# Patient Record
Sex: MALE | Race: WHITE | Employment: FULL TIME | URBAN - METROPOLITAN AREA
[De-identification: names, ages, dates, MRNs, and addresses within clinical notes are randomized per-mention and may not be internally consistent; named-entity substitution may affect disease eponyms.]

---

## 2017-02-06 PROBLEM — J96.01 ACUTE RESPIRATORY FAILURE WITH HYPOXIA (HCC): Status: ACTIVE | Noted: 2017-02-06

## 2017-02-06 PROBLEM — J96.02 ACUTE RESPIRATORY FAILURE WITH HYPOXIA AND HYPERCAPNIA (HCC): Status: ACTIVE | Noted: 2017-02-06

## 2017-02-09 DIAGNOSIS — J44.9 CHRONIC OBSTRUCTIVE PULMONARY DISEASE, UNSPECIFIED COPD TYPE (HCC): Primary | ICD-10-CM

## 2017-02-21 ENCOUNTER — TELEPHONE (OUTPATIENT)
Dept: PULMONOLOGY | Age: 52
End: 2017-02-21

## 2017-02-22 ENCOUNTER — HOSPITAL ENCOUNTER (OUTPATIENT)
Dept: PULMONOLOGY | Age: 52
Discharge: HOME | End: 2017-02-22
Attending: NURSE PRACTITIONER | Admitting: NURSE PRACTITIONER

## 2017-02-22 DIAGNOSIS — J44.9 CHRONIC OBSTRUCTIVE PULMONARY DISEASE, UNSPECIFIED COPD TYPE (HCC): ICD-10-CM

## 2017-02-22 DIAGNOSIS — J44.9 CHRONIC OBSTRUCTIVE PULMONARY DISEASE (HCC): ICD-10-CM

## 2017-03-01 ENCOUNTER — HOSPITAL ENCOUNTER (OUTPATIENT)
Dept: PULMONOLOGY | Age: 52
Discharge: HOME | End: 2017-03-01
Attending: NURSE PRACTITIONER | Admitting: NURSE PRACTITIONER

## 2017-03-01 ENCOUNTER — OFFICE VISIT (OUTPATIENT)
Dept: PULMONOLOGY | Age: 52
End: 2017-03-01

## 2017-03-01 VITALS
SYSTOLIC BLOOD PRESSURE: 158 MMHG | TEMPERATURE: 97.9 F | BODY MASS INDEX: 39.99 KG/M2 | WEIGHT: 240 LBS | HEART RATE: 63 BPM | RESPIRATION RATE: 18 BRPM | DIASTOLIC BLOOD PRESSURE: 82 MMHG | HEIGHT: 65 IN | OXYGEN SATURATION: 90 %

## 2017-03-01 DIAGNOSIS — Z72.0 TOBACCO ABUSE: ICD-10-CM

## 2017-03-01 DIAGNOSIS — G47.10 HYPERSOMNIA: Primary | ICD-10-CM

## 2017-03-01 DIAGNOSIS — J44.9 COPD, SEVERE (HCC): ICD-10-CM

## 2017-03-01 DIAGNOSIS — J44.9 CHRONIC OBSTRUCTIVE PULMONARY DISEASE (HCC): ICD-10-CM

## 2017-03-01 DIAGNOSIS — Z23 NEED FOR INFLUENZA VACCINATION: ICD-10-CM

## 2017-03-01 DIAGNOSIS — R06.83 SNORING: ICD-10-CM

## 2017-03-01 PROCEDURE — 99214 OFFICE O/P EST MOD 30 MIN: CPT | Performed by: NURSE PRACTITIONER

## 2017-03-01 PROCEDURE — 90688 IIV4 VACCINE SPLT 0.5 ML IM: CPT | Performed by: NURSE PRACTITIONER

## 2017-03-01 PROCEDURE — 90471 IMMUNIZATION ADMIN: CPT | Performed by: NURSE PRACTITIONER

## 2017-03-01 RX ORDER — ALBUTEROL SULFATE 90 UG/1
2 AEROSOL, METERED RESPIRATORY (INHALATION) EVERY 6 HOURS PRN
Qty: 1 INHALER | Refills: 3 | Status: SHIPPED | OUTPATIENT
Start: 2017-03-01 | End: 2017-06-29 | Stop reason: SDUPTHER

## 2017-03-01 ASSESSMENT — SLEEP AND FATIGUE QUESTIONNAIRES
ESS TOTAL SCORE: 7
HOW LIKELY ARE YOU TO NOD OFF OR FALL ASLEEP WHILE SITTING QUIETLY AFTER LUNCH WITHOUT ALCOHOL: 0
HOW LIKELY ARE YOU TO NOD OFF OR FALL ASLEEP IN A CAR, WHILE STOPPED FOR A FEW MINUTES IN TRAFFIC: 0
HOW LIKELY ARE YOU TO NOD OFF OR FALL ASLEEP WHILE LYING DOWN TO REST IN THE AFTERNOON WHEN CIRCUMSTANCES PERMIT: 1
NECK CIRCUMFERENCE (INCHES): 18
HOW LIKELY ARE YOU TO NOD OFF OR FALL ASLEEP WHILE SITTING INACTIVE IN A PUBLIC PLACE: 2
HOW LIKELY ARE YOU TO NOD OFF OR FALL ASLEEP WHILE SITTING AND TALKING TO SOMEONE: 0
HOW LIKELY ARE YOU TO NOD OFF OR FALL ASLEEP WHILE SITTING AND READING: 2
HOW LIKELY ARE YOU TO NOD OFF OR FALL ASLEEP WHILE WATCHING TV: 2
HOW LIKELY ARE YOU TO NOD OFF OR FALL ASLEEP WHEN YOU ARE A PASSENGER IN A CAR FOR AN HOUR WITHOUT A BREAK: 0

## 2017-03-06 PROBLEM — J44.9 COPD (CHRONIC OBSTRUCTIVE PULMONARY DISEASE) (HCC): Status: ACTIVE | Noted: 2017-03-06

## 2017-03-06 LAB
DLCO: NORMAL ML/MMHG SEC
FEV1/FVC: NORMAL %
FEV1: NORMAL LITERS
FVC: NORMAL LITERS
TLC: NORMAL LITERS

## 2017-03-06 PROCEDURE — 94729 DIFFUSING CAPACITY: CPT | Performed by: INTERNAL MEDICINE

## 2017-03-06 PROCEDURE — 94010 BREATHING CAPACITY TEST: CPT | Performed by: INTERNAL MEDICINE

## 2017-03-22 ENCOUNTER — TELEPHONE (OUTPATIENT)
Dept: PULMONOLOGY | Age: 52
End: 2017-03-22

## 2017-03-22 DIAGNOSIS — J96.02 ACUTE RESPIRATORY FAILURE WITH HYPOXIA AND HYPERCAPNIA (HCC): ICD-10-CM

## 2017-03-22 DIAGNOSIS — J44.1 COPD EXACERBATION (HCC): Primary | ICD-10-CM

## 2017-03-22 DIAGNOSIS — J44.9 CHRONIC OBSTRUCTIVE PULMONARY DISEASE, UNSPECIFIED COPD TYPE (HCC): ICD-10-CM

## 2017-03-22 DIAGNOSIS — J96.01 ACUTE RESPIRATORY FAILURE WITH HYPOXIA AND HYPERCAPNIA (HCC): ICD-10-CM

## 2017-04-12 PROBLEM — J96.12 CHRONIC RESPIRATORY FAILURE WITH HYPERCAPNIA (HCC): Status: ACTIVE | Noted: 2017-04-12

## 2017-04-12 PROBLEM — G47.33 OSA (OBSTRUCTIVE SLEEP APNEA): Status: ACTIVE | Noted: 2017-04-12

## 2017-04-18 ENCOUNTER — TELEPHONE (OUTPATIENT)
Dept: PULMONOLOGY | Age: 52
End: 2017-04-18

## 2017-04-19 ENCOUNTER — OFFICE VISIT (OUTPATIENT)
Dept: PHARMACY | Facility: CLINIC | Age: 52
End: 2017-04-19

## 2017-04-19 ENCOUNTER — HOSPITAL ENCOUNTER (OUTPATIENT)
Dept: OTHER | Age: 52
Discharge: HOME | End: 2017-04-30
Attending: INTERNAL MEDICINE | Admitting: INTERNAL MEDICINE

## 2017-04-19 VITALS
HEART RATE: 63 BPM | SYSTOLIC BLOOD PRESSURE: 161 MMHG | WEIGHT: 243.4 LBS | OXYGEN SATURATION: 97 % | BODY MASS INDEX: 40.5 KG/M2 | DIASTOLIC BLOOD PRESSURE: 83 MMHG

## 2017-04-19 DIAGNOSIS — I50.33 ACUTE ON CHRONIC DIASTOLIC CONGESTIVE HEART FAILURE (HCC): Primary | ICD-10-CM

## 2017-04-20 ENCOUNTER — OFFICE VISIT (OUTPATIENT)
Dept: CARDIOLOGY CLINIC | Age: 52
End: 2017-04-20

## 2017-04-20 ENCOUNTER — HOSPITAL ENCOUNTER (OUTPATIENT)
Dept: OTHER | Age: 52
Discharge: HOME | End: 2017-04-21
Admitting: INTERNAL MEDICINE

## 2017-04-20 ENCOUNTER — HOSPITAL ENCOUNTER (OUTPATIENT)
Dept: OTHER | Age: 52
Discharge: HOME | End: 2017-04-22
Attending: INTERNAL MEDICINE | Admitting: INTERNAL MEDICINE

## 2017-04-20 VITALS
HEIGHT: 65 IN | BODY MASS INDEX: 40.59 KG/M2 | WEIGHT: 243.6 LBS | DIASTOLIC BLOOD PRESSURE: 80 MMHG | OXYGEN SATURATION: 94 % | HEART RATE: 83 BPM | SYSTOLIC BLOOD PRESSURE: 152 MMHG

## 2017-04-20 DIAGNOSIS — Z72.0 TOBACCO USE: ICD-10-CM

## 2017-04-20 DIAGNOSIS — J44.1 COPD EXACERBATION (HCC): ICD-10-CM

## 2017-04-20 DIAGNOSIS — G47.33 OSA (OBSTRUCTIVE SLEEP APNEA): ICD-10-CM

## 2017-04-20 DIAGNOSIS — J44.9 CHRONIC OBSTRUCTIVE PULMONARY DISEASE, UNSPECIFIED COPD TYPE (HCC): ICD-10-CM

## 2017-04-20 DIAGNOSIS — I10 ESSENTIAL HYPERTENSION: ICD-10-CM

## 2017-04-20 DIAGNOSIS — I27.20 PULMONARY HTN (HCC): ICD-10-CM

## 2017-04-20 DIAGNOSIS — J96.12 CHRONIC RESPIRATORY FAILURE WITH HYPERCAPNIA (HCC): ICD-10-CM

## 2017-04-20 DIAGNOSIS — I50.33 ACUTE ON CHRONIC DIASTOLIC CONGESTIVE HEART FAILURE (HCC): Primary | ICD-10-CM

## 2017-04-20 PROCEDURE — 99214 OFFICE O/P EST MOD 30 MIN: CPT | Performed by: NURSE PRACTITIONER

## 2017-04-20 RX ORDER — AMLODIPINE BESYLATE 5 MG/1
5 TABLET ORAL DAILY
Qty: 90 TABLET | Refills: 3 | Status: SHIPPED | OUTPATIENT
Start: 2017-04-20 | End: 2017-11-08 | Stop reason: DRUGHIGH

## 2017-04-20 RX ORDER — LISINOPRIL 40 MG/1
40 TABLET ORAL DAILY
Qty: 90 TABLET | Refills: 3 | Status: SHIPPED | OUTPATIENT
Start: 2017-04-20 | End: 2018-06-22 | Stop reason: SDUPTHER

## 2017-04-21 ENCOUNTER — TELEPHONE (OUTPATIENT)
Dept: PHARMACY | Facility: CLINIC | Age: 52
End: 2017-04-21

## 2017-04-21 ENCOUNTER — TELEPHONE (OUTPATIENT)
Dept: PULMONOLOGY | Age: 52
End: 2017-04-21

## 2017-04-26 ENCOUNTER — TELEPHONE (OUTPATIENT)
Dept: PHARMACY | Facility: CLINIC | Age: 52
End: 2017-04-26

## 2017-04-27 ENCOUNTER — TELEPHONE (OUTPATIENT)
Dept: PULMONOLOGY | Age: 52
End: 2017-04-27

## 2017-05-01 ENCOUNTER — TELEPHONE (OUTPATIENT)
Dept: PHARMACY | Facility: CLINIC | Age: 52
End: 2017-05-01

## 2017-05-02 ENCOUNTER — OFFICE VISIT (OUTPATIENT)
Dept: PULMONOLOGY | Age: 52
End: 2017-05-02

## 2017-05-02 VITALS
HEART RATE: 64 BPM | RESPIRATION RATE: 20 BRPM | HEIGHT: 65 IN | OXYGEN SATURATION: 93 % | BODY MASS INDEX: 40.79 KG/M2 | SYSTOLIC BLOOD PRESSURE: 144 MMHG | TEMPERATURE: 98.1 F | DIASTOLIC BLOOD PRESSURE: 82 MMHG | WEIGHT: 244.8 LBS

## 2017-05-02 DIAGNOSIS — Z72.0 TOBACCO ABUSE: ICD-10-CM

## 2017-05-02 DIAGNOSIS — G47.33 OSA (OBSTRUCTIVE SLEEP APNEA): ICD-10-CM

## 2017-05-02 DIAGNOSIS — J44.9 COPD, SEVERE (HCC): Primary | ICD-10-CM

## 2017-05-02 DIAGNOSIS — Z23 NEED FOR PNEUMOCOCCAL VACCINATION: ICD-10-CM

## 2017-05-02 DIAGNOSIS — J96.12 CHRONIC RESPIRATORY FAILURE WITH HYPERCAPNIA (HCC): ICD-10-CM

## 2017-05-02 PROCEDURE — 90732 PPSV23 VACC 2 YRS+ SUBQ/IM: CPT | Performed by: NURSE PRACTITIONER

## 2017-05-02 PROCEDURE — 90471 IMMUNIZATION ADMIN: CPT | Performed by: NURSE PRACTITIONER

## 2017-05-02 PROCEDURE — 99214 OFFICE O/P EST MOD 30 MIN: CPT | Performed by: NURSE PRACTITIONER

## 2017-05-11 DIAGNOSIS — I50.33 ACUTE ON CHRONIC DIASTOLIC CONGESTIVE HEART FAILURE (HCC): ICD-10-CM

## 2017-05-11 DIAGNOSIS — I10 ESSENTIAL HYPERTENSION: ICD-10-CM

## 2017-05-11 LAB
ANION GAP SERPL CALCULATED.3IONS-SCNC: 17 MMOL/L (ref 3–16)
BUN BLDV-MCNC: 17 MG/DL (ref 7–20)
CALCIUM SERPL-MCNC: 8.7 MG/DL (ref 8.3–10.6)
CHLORIDE BLD-SCNC: 98 MMOL/L (ref 99–110)
CO2: 27 MMOL/L (ref 21–32)
CREAT SERPL-MCNC: 0.6 MG/DL (ref 0.9–1.3)
GFR AFRICAN AMERICAN: >60
GFR NON-AFRICAN AMERICAN: >60
GLUCOSE BLD-MCNC: 205 MG/DL (ref 70–99)
POTASSIUM SERPL-SCNC: 4.1 MMOL/L (ref 3.5–5.1)
SODIUM BLD-SCNC: 142 MMOL/L (ref 136–145)

## 2017-05-15 ENCOUNTER — TELEPHONE (OUTPATIENT)
Dept: PHARMACY | Facility: CLINIC | Age: 52
End: 2017-05-15

## 2017-05-15 ENCOUNTER — OFFICE VISIT (OUTPATIENT)
Dept: CARDIOLOGY CLINIC | Age: 52
End: 2017-05-15

## 2017-05-15 VITALS
SYSTOLIC BLOOD PRESSURE: 146 MMHG | DIASTOLIC BLOOD PRESSURE: 66 MMHG | WEIGHT: 242 LBS | BODY MASS INDEX: 41.32 KG/M2 | HEART RATE: 66 BPM | OXYGEN SATURATION: 94 % | HEIGHT: 64 IN

## 2017-05-15 DIAGNOSIS — I50.32 CHRONIC DIASTOLIC HF (HEART FAILURE) (HCC): Primary | ICD-10-CM

## 2017-05-15 DIAGNOSIS — G47.33 OSA (OBSTRUCTIVE SLEEP APNEA): ICD-10-CM

## 2017-05-15 DIAGNOSIS — J96.12 CHRONIC RESPIRATORY FAILURE WITH HYPERCAPNIA (HCC): ICD-10-CM

## 2017-05-15 DIAGNOSIS — I27.20 PULMONARY HTN (HCC): ICD-10-CM

## 2017-05-15 DIAGNOSIS — Z72.0 TOBACCO USE: ICD-10-CM

## 2017-05-15 DIAGNOSIS — I10 ESSENTIAL HYPERTENSION: ICD-10-CM

## 2017-05-15 PROCEDURE — 99213 OFFICE O/P EST LOW 20 MIN: CPT | Performed by: NURSE PRACTITIONER

## 2017-05-15 RX ORDER — SPIRONOLACTONE 25 MG/1
12.5 TABLET ORAL DAILY
Qty: 15 TABLET | Refills: 3 | Status: SHIPPED | OUTPATIENT
Start: 2017-05-15 | End: 2017-11-30 | Stop reason: SDUPTHER

## 2017-06-29 ENCOUNTER — OFFICE VISIT (OUTPATIENT)
Dept: PULMONOLOGY | Age: 52
End: 2017-06-29

## 2017-06-29 VITALS
RESPIRATION RATE: 16 BRPM | OXYGEN SATURATION: 91 % | HEART RATE: 60 BPM | WEIGHT: 243 LBS | SYSTOLIC BLOOD PRESSURE: 176 MMHG | HEIGHT: 64 IN | DIASTOLIC BLOOD PRESSURE: 82 MMHG | TEMPERATURE: 98.3 F | BODY MASS INDEX: 41.48 KG/M2

## 2017-06-29 DIAGNOSIS — G47.10 HYPERSOMNIA: ICD-10-CM

## 2017-06-29 DIAGNOSIS — J44.9 COPD, SEVERE (HCC): ICD-10-CM

## 2017-06-29 DIAGNOSIS — J31.0 CHRONIC RHINITIS: ICD-10-CM

## 2017-06-29 DIAGNOSIS — Z72.0 TOBACCO ABUSE: ICD-10-CM

## 2017-06-29 DIAGNOSIS — G47.33 OSA TREATED WITH BIPAP: Primary | ICD-10-CM

## 2017-06-29 PROCEDURE — 99214 OFFICE O/P EST MOD 30 MIN: CPT | Performed by: INTERNAL MEDICINE

## 2017-06-29 RX ORDER — ALBUTEROL SULFATE 90 UG/1
2 AEROSOL, METERED RESPIRATORY (INHALATION) EVERY 6 HOURS PRN
Qty: 1 INHALER | Refills: 3 | Status: SHIPPED | OUTPATIENT
Start: 2017-06-29 | End: 2019-02-11 | Stop reason: SDUPTHER

## 2017-06-29 RX ORDER — FLUTICASONE PROPIONATE 50 MCG
2 SPRAY, SUSPENSION (ML) NASAL DAILY
Qty: 1 BOTTLE | Refills: 3 | Status: SHIPPED | OUTPATIENT
Start: 2017-06-29 | End: 2018-11-09

## 2017-06-29 RX ORDER — DILTIAZEM HYDROCHLORIDE 60 MG/1
2 TABLET, FILM COATED ORAL 2 TIMES DAILY
Qty: 1 INHALER | Refills: 5 | Status: CANCELLED | OUTPATIENT
Start: 2017-06-29

## 2017-06-29 RX ORDER — FLUTICASONE FUROATE AND VILANTEROL 100; 25 UG/1; UG/1
1 POWDER RESPIRATORY (INHALATION) DAILY
Qty: 1 EACH | Refills: 0 | COMMUNITY
Start: 2017-06-29 | End: 2017-11-08 | Stop reason: SDUPTHER

## 2017-06-29 RX ORDER — FLUTICASONE FUROATE AND VILANTEROL 100; 25 UG/1; UG/1
1 POWDER RESPIRATORY (INHALATION) DAILY
Qty: 1 EACH | Refills: 5 | Status: SHIPPED | OUTPATIENT
Start: 2017-06-29 | End: 2017-11-08

## 2017-06-29 ASSESSMENT — SLEEP AND FATIGUE QUESTIONNAIRES
HOW LIKELY ARE YOU TO NOD OFF OR FALL ASLEEP WHILE SITTING QUIETLY AFTER LUNCH WITHOUT ALCOHOL: 0
HOW LIKELY ARE YOU TO NOD OFF OR FALL ASLEEP WHILE WATCHING TV: 2
NECK CIRCUMFERENCE (INCHES): 18.5
HOW LIKELY ARE YOU TO NOD OFF OR FALL ASLEEP WHILE SITTING INACTIVE IN A PUBLIC PLACE: 1
HOW LIKELY ARE YOU TO NOD OFF OR FALL ASLEEP WHILE SITTING AND READING: 2
HOW LIKELY ARE YOU TO NOD OFF OR FALL ASLEEP WHILE LYING DOWN TO REST IN THE AFTERNOON WHEN CIRCUMSTANCES PERMIT: 3
ESS TOTAL SCORE: 10
HOW LIKELY ARE YOU TO NOD OFF OR FALL ASLEEP WHILE SITTING AND TALKING TO SOMEONE: 1
HOW LIKELY ARE YOU TO NOD OFF OR FALL ASLEEP WHEN YOU ARE A PASSENGER IN A CAR FOR AN HOUR WITHOUT A BREAK: 1
HOW LIKELY ARE YOU TO NOD OFF OR FALL ASLEEP IN A CAR, WHILE STOPPED FOR A FEW MINUTES IN TRAFFIC: 0

## 2017-07-28 ENCOUNTER — SCHEDULED TELEPHONE ENCOUNTER (OUTPATIENT)
Dept: PHARMACY | Facility: CLINIC | Age: 52
End: 2017-07-28

## 2017-11-08 ENCOUNTER — TELEPHONE (OUTPATIENT)
Dept: CARDIOLOGY CLINIC | Age: 52
End: 2017-11-08

## 2017-11-08 ENCOUNTER — OFFICE VISIT (OUTPATIENT)
Dept: PULMONOLOGY | Age: 52
End: 2017-11-08

## 2017-11-08 VITALS
BODY MASS INDEX: 39.82 KG/M2 | DIASTOLIC BLOOD PRESSURE: 90 MMHG | WEIGHT: 239 LBS | SYSTOLIC BLOOD PRESSURE: 186 MMHG | OXYGEN SATURATION: 93 % | HEIGHT: 65 IN | HEART RATE: 56 BPM | TEMPERATURE: 97.9 F | RESPIRATION RATE: 16 BRPM

## 2017-11-08 DIAGNOSIS — J31.0 OTHER CHRONIC RHINITIS: ICD-10-CM

## 2017-11-08 DIAGNOSIS — G47.10 HYPERSOMNIA: ICD-10-CM

## 2017-11-08 DIAGNOSIS — Z72.0 TOBACCO ABUSE: ICD-10-CM

## 2017-11-08 DIAGNOSIS — I10 ESSENTIAL HYPERTENSION: Primary | ICD-10-CM

## 2017-11-08 DIAGNOSIS — J44.9 COPD, SEVERE (HCC): ICD-10-CM

## 2017-11-08 DIAGNOSIS — G47.33 OSA TREATED WITH BIPAP: ICD-10-CM

## 2017-11-08 PROCEDURE — 99214 OFFICE O/P EST MOD 30 MIN: CPT | Performed by: INTERNAL MEDICINE

## 2017-11-08 RX ORDER — BUDESONIDE AND FORMOTEROL FUMARATE DIHYDRATE 160; 4.5 UG/1; UG/1
2 AEROSOL RESPIRATORY (INHALATION) 2 TIMES DAILY
Qty: 1 INHALER | Refills: 6 | Status: SHIPPED | OUTPATIENT
Start: 2017-11-08 | End: 2018-06-28 | Stop reason: SDUPTHER

## 2017-11-08 RX ORDER — AMLODIPINE BESYLATE 5 MG/1
10 TABLET ORAL DAILY
Qty: 90 TABLET | Refills: 3 | Status: SHIPPED | OUTPATIENT
Start: 2017-11-08 | End: 2018-04-19 | Stop reason: SDUPTHER

## 2017-11-08 ASSESSMENT — SLEEP AND FATIGUE QUESTIONNAIRES
HOW LIKELY ARE YOU TO NOD OFF OR FALL ASLEEP WHILE SITTING AND READING: 1
HOW LIKELY ARE YOU TO NOD OFF OR FALL ASLEEP WHEN YOU ARE A PASSENGER IN A CAR FOR AN HOUR WITHOUT A BREAK: 0
HOW LIKELY ARE YOU TO NOD OFF OR FALL ASLEEP WHILE LYING DOWN TO REST IN THE AFTERNOON WHEN CIRCUMSTANCES PERMIT: 1
HOW LIKELY ARE YOU TO NOD OFF OR FALL ASLEEP WHILE SITTING AND TALKING TO SOMEONE: 0
HOW LIKELY ARE YOU TO NOD OFF OR FALL ASLEEP WHILE WATCHING TV: 2
ESS TOTAL SCORE: 6
NECK CIRCUMFERENCE (INCHES): 18
HOW LIKELY ARE YOU TO NOD OFF OR FALL ASLEEP WHILE SITTING QUIETLY AFTER LUNCH WITHOUT ALCOHOL: 1
HOW LIKELY ARE YOU TO NOD OFF OR FALL ASLEEP WHILE SITTING INACTIVE IN A PUBLIC PLACE: 1
HOW LIKELY ARE YOU TO NOD OFF OR FALL ASLEEP IN A CAR, WHILE STOPPED FOR A FEW MINUTES IN TRAFFIC: 0

## 2017-11-08 NOTE — PROGRESS NOTES
Noted. I will start a phone encounter on this and have an appt scheduled for pt.
Data Reviewed:   CBC and Renal reviewed  Last CBC  Lab Results   Component Value Date    WBC 11.3 04/17/2017    RBC 6.08 04/17/2017    HGB 17.9 04/17/2017    MCV 91.8 04/17/2017     04/17/2017     Last Renal  Lab Results   Component Value Date     05/11/2017    K 4.1 05/11/2017    CL 98 05/11/2017    CO2 27 05/11/2017    CO2 33 04/17/2017    CO2 40 04/16/2017    BUN 17 05/11/2017    CREATININE 0.6 05/11/2017    GLUCOSE 205 05/11/2017    CALCIUM 8.7 05/11/2017       Last ABG  POC Blood Gas: No results found for: POCPH, POCPCO2, POCPO2, POCHCO3, NBEA, [id number]  No results for input(s): PH, PCO2, PO2, HCO3, BE, O2SAT in the last 72 hours. Assessment:     · Severe sleep apnea with AHI of 135 with nocturnal hypoxemia down to 58%  · Hypersomnia  · Severe COPD  · Chronic rhinitis  · Tobacco abuse, start lung cancer screening at the age of 52  · Obesity Body mass index is 39.77 kg/m². Plan:      1. Essential hypertension  His blood pressure is 200/88 today and was ~ 230 at [facility]. He is taking all his medications. At this point . This is controlled by Dr. Tiffanie Palmer but he missed his last appointment. I have advised him to double norvasc to 10 mg daily and told him to stop by Dr. Alicea Fear office on his way out to get f/u visit. I advised of risk of CVA, CKD at this level of blood pressure. Will defer to Dr Tiffanie Palmer if further blood pressure work up is needed. - amLODIPine (NORVASC) 5 MG tablet; Take 2 tablets by mouth daily  Dispense: 90 tablet; Refill: 3    2. COPD, severe ([address]  Breo and Incruse . Will change back to Symbicort since be believes this works better. Continue Incruse     3. SILVANA treated with BiPAP  4. Hypersomnia  No download today. He is using daily and hypersomnolence  Is controlled. 5. Tobacco abuse  He is still smoking and is not ready to quit at this point. 6. Other chronic rhinitis  Continue Flonase. Add Sinus rinse at this point.

## 2017-11-08 NOTE — Clinical Note
His blood pressure is 200/88 today and ~ 230 at [facility]. He sees you for blood pressure but missed last appointment. Taking medications. Increased Norvasc from 5 to 10 and I told him to stop by your office on the way out to make appointment. See full note attached.   Thanks, Todd Mora

## 2017-11-29 PROBLEM — J96.01 ACUTE RESPIRATORY FAILURE WITH HYPOXIA AND HYPERCAPNIA (HCC): Status: RESOLVED | Noted: 2017-02-06 | Resolved: 2017-11-29

## 2017-11-29 PROBLEM — I27.20 PULMONARY HTN (HCC): Status: ACTIVE | Noted: 2017-11-29

## 2017-11-29 PROBLEM — J96.02 ACUTE RESPIRATORY FAILURE WITH HYPOXIA AND HYPERCAPNIA (HCC): Status: RESOLVED | Noted: 2017-02-06 | Resolved: 2017-11-29

## 2017-11-30 PROBLEM — Z87.09 HISTORY OF PNEUMOTHORAX: Status: ACTIVE | Noted: 2017-11-30

## 2017-11-30 PROBLEM — I10 ESSENTIAL HYPERTENSION: Status: ACTIVE | Noted: 2017-11-30

## 2017-11-30 PROBLEM — Z87.442 HX OF RENAL CALCULI: Status: ACTIVE | Noted: 2017-11-30

## 2017-11-30 PROBLEM — M54.32 SCIATICA OF LEFT SIDE: Status: ACTIVE | Noted: 2017-11-30

## 2017-12-07 ENCOUNTER — OFFICE VISIT (OUTPATIENT)
Dept: FAMILY MEDICINE CLINIC | Age: 52
End: 2017-12-07

## 2017-12-07 VITALS
SYSTOLIC BLOOD PRESSURE: 148 MMHG | DIASTOLIC BLOOD PRESSURE: 72 MMHG | OXYGEN SATURATION: 92 % | HEART RATE: 63 BPM | TEMPERATURE: 98.5 F | BODY MASS INDEX: 39.78 KG/M2 | WEIGHT: 233 LBS | RESPIRATION RATE: 18 BRPM | HEIGHT: 64 IN

## 2017-12-07 DIAGNOSIS — E27.8 ADRENAL NODULE (HCC): ICD-10-CM

## 2017-12-07 DIAGNOSIS — Z72.0 TOBACCO USE: ICD-10-CM

## 2017-12-07 DIAGNOSIS — I27.20 PULMONARY HTN (HCC): ICD-10-CM

## 2017-12-07 DIAGNOSIS — G56.00 CARPAL TUNNEL SYNDROME, UNSPECIFIED LATERALITY: ICD-10-CM

## 2017-12-07 DIAGNOSIS — I10 ESSENTIAL HYPERTENSION: Primary | ICD-10-CM

## 2017-12-07 DIAGNOSIS — Z12.11 SCREENING FOR MALIGNANT NEOPLASM OF COLON: ICD-10-CM

## 2017-12-07 DIAGNOSIS — Z11.59 ENCOUNTER FOR HEPATITIS C SCREENING TEST FOR LOW RISK PATIENT: ICD-10-CM

## 2017-12-07 DIAGNOSIS — G47.33 OSA (OBSTRUCTIVE SLEEP APNEA): ICD-10-CM

## 2017-12-07 DIAGNOSIS — R73.9 ELEVATED BLOOD SUGAR: ICD-10-CM

## 2017-12-07 DIAGNOSIS — Z23 NEED FOR INFLUENZA VACCINATION: ICD-10-CM

## 2017-12-07 PROBLEM — E27.9 ADRENAL NODULE (HCC): Status: ACTIVE | Noted: 2017-12-07

## 2017-12-07 LAB
ANION GAP SERPL CALCULATED.3IONS-SCNC: 11 MMOL/L (ref 3–16)
BASOPHILS ABSOLUTE: 0 K/UL (ref 0–0.2)
BASOPHILS RELATIVE PERCENT: 0.5 %
BUN BLDV-MCNC: 15 MG/DL (ref 7–20)
CALCIUM SERPL-MCNC: 9.4 MG/DL (ref 8.3–10.6)
CHLORIDE BLD-SCNC: 99 MMOL/L (ref 99–110)
CHOLESTEROL, TOTAL: 160 MG/DL (ref 0–199)
CO2: 31 MMOL/L (ref 21–32)
CREAT SERPL-MCNC: 0.6 MG/DL (ref 0.9–1.3)
EOSINOPHILS ABSOLUTE: 0.2 K/UL (ref 0–0.6)
EOSINOPHILS RELATIVE PERCENT: 2.3 %
GFR AFRICAN AMERICAN: >60
GFR NON-AFRICAN AMERICAN: >60
GLUCOSE BLD-MCNC: 133 MG/DL (ref 70–99)
HBA1C MFR BLD: 6.3 %
HCT VFR BLD CALC: 47.9 % (ref 40.5–52.5)
HDLC SERPL-MCNC: 35 MG/DL (ref 40–60)
HEMOGLOBIN: 16.2 G/DL (ref 13.5–17.5)
HEPATITIS C ANTIBODY INTERPRETATION: NORMAL
LDL CHOLESTEROL CALCULATED: 105 MG/DL
LYMPHOCYTES ABSOLUTE: 2.1 K/UL (ref 1–5.1)
LYMPHOCYTES RELATIVE PERCENT: 21.5 %
MCH RBC QN AUTO: 32.4 PG (ref 26–34)
MCHC RBC AUTO-ENTMCNC: 33.8 G/DL (ref 31–36)
MCV RBC AUTO: 95.8 FL (ref 80–100)
MONOCYTES ABSOLUTE: 0.9 K/UL (ref 0–1.3)
MONOCYTES RELATIVE PERCENT: 8.9 %
NEUTROPHILS ABSOLUTE: 6.5 K/UL (ref 1.7–7.7)
NEUTROPHILS RELATIVE PERCENT: 66.8 %
PDW BLD-RTO: 13.5 % (ref 12.4–15.4)
PLATELET # BLD: 217 K/UL (ref 135–450)
PMV BLD AUTO: 9.4 FL (ref 5–10.5)
POTASSIUM SERPL-SCNC: 4.1 MMOL/L (ref 3.5–5.1)
RBC # BLD: 5 M/UL (ref 4.2–5.9)
SODIUM BLD-SCNC: 141 MMOL/L (ref 136–145)
TRIGL SERPL-MCNC: 100 MG/DL (ref 0–150)
TSH REFLEX FT4: 1.05 UIU/ML (ref 0.27–4.2)
VLDLC SERPL CALC-MCNC: 20 MG/DL
WBC # BLD: 9.7 K/UL (ref 4–11)

## 2017-12-07 PROCEDURE — 99203 OFFICE O/P NEW LOW 30 MIN: CPT | Performed by: INTERNAL MEDICINE

## 2017-12-07 PROCEDURE — 83036 HEMOGLOBIN GLYCOSYLATED A1C: CPT | Performed by: INTERNAL MEDICINE

## 2017-12-07 PROCEDURE — 90471 IMMUNIZATION ADMIN: CPT | Performed by: INTERNAL MEDICINE

## 2017-12-07 PROCEDURE — 36415 COLL VENOUS BLD VENIPUNCTURE: CPT | Performed by: INTERNAL MEDICINE

## 2017-12-07 PROCEDURE — 90654 INFLUENZA, TRIV, 18-64 YRS, ID, PF, MICRO INJ, 0.1ML (FLUZONE TRIV, PF, ID): CPT | Performed by: INTERNAL MEDICINE

## 2017-12-07 NOTE — PATIENT INSTRUCTIONS
Patient Education        Carpal Tunnel Syndrome: Exercises  Your Care Instructions  Here are some examples of typical rehabilitation exercises for your condition. Start each exercise slowly. Ease off the exercise if you start to have pain. Your doctor or your physical or occupational therapist will tell you when you can start these exercises and which ones will work best for you. Warm-up stretches  When you no longer have pain or numbness, you can do exercises to help prevent carpal tunnel syndrome from coming back. Do not do any stretch or movement that is uncomfortable or painful. 1. Rotate your wrist up, down, and from side to side. Repeat 4 times. 2. Stretch your fingers far apart. Relax them, and then stretch them again. Repeat 4 times. 3. Stretch your thumb by pulling it back gently, holding it, and then releasing it. Repeat 4 times. How to do the exercises  Prayer stretch    1. Start with your palms together in front of your chest just below your chin. 2. Slowly lower your hands toward your waistline, keeping your hands close to your stomach and your palms together until you feel a mild to moderate stretch under your forearms. 3. Hold for at least 15 to 30 seconds. Repeat 2 to 4 times. Wrist flexor stretch    1. Extend your arm in front of you with your palm up. 2. Bend your wrist, pointing your hand toward the floor. 3. With your other hand, gently bend your wrist farther until you feel a mild to moderate stretch in your forearm. 4. Hold for at least 15 to 30 seconds. Repeat 2 to 4 times. Wrist extensor stretch    1. Repeat steps 1 through 4 of the stretch above, but begin with your extended hand palm down. Follow-up care is a key part of your treatment and safety. Be sure to make and go to all appointments, and call your doctor if you are having problems. It's also a good idea to know your test results and keep a list of the medicines you take. Where can you learn more?   Go to [email or web address] org and sign in to your [employer] account. Enter R540 in the [employer] box to learn more about \"Carpal Tunnel Syndrome: Exercises. \"     If you do not have an account, please click on the \"Sign Up Now\" link. Current as of: March 21, 2017  Content Version: 11.4  © 2730-4535 [employer]. Care instructions adapted under license by [facility] ([facility]). If you have questions about a medical condition or this instruction, always ask your healthcare professional. [place] [address] any warranty or liability for your use of this information. Patient Education        Carpal Tunnel Syndrome: Care Instructions  Your Care Instructions    Carpal tunnel syndrome is a nerve problem. It can cause tingling, numbness, weakness, or pain in the fingers, thumb, and hand. The median nerve and several tough tissues called tendons run through a space in the wrist called the carpal tunnel. The repeated hand motions used in work and some hobbies and sports can put pressure on the nerve. Pregnancy and several conditions, including diabetes, arthritis, and an underactive thyroid, also can cause carpal tunnel syndrome. You may be able to limit an activity or do it differently to reduce your symptoms. You also can take other steps to feel better. If your symptoms are mild, 1 to 2 weeks of home treatment are likely to ease your pain. Surgery is needed only if other treatments do not work. Follow-up care is a key part of your treatment and safety. Be sure to make and go to all appointments, and call your doctor if you are having problems. It's also a good idea to know your test results and keep a list of the medicines you take. How can you care for yourself at home? · If possible, stop or reduce the activity that causes your symptoms. If you cannot stop the activity, take frequent breaks to rest and stretch or change hand positions to do a task.  Try switching hands, such as when using a computer mouse. · Try to avoid bending or twisting your wrists. · Ask your doctor if you can take an over-the-counter pain medicine, such as acetaminophen (Tylenol), ibuprofen (Advil, Motrin), or naproxen (Aleve). Be safe with medicines. Read and follow all instructions on the label. · If your doctor prescribes corticosteroid medicine to help reduce pain and swelling, take it exactly as prescribed. Call your doctor if you think you are having a problem with your medicine. · Put ice or a cold pack on your wrist for 10 to 20 minutes at a time to ease pain. Put a thin cloth between the ice and your skin. · If your doctor or your physical or occupational therapist tells you to wear a wrist splint, wear it as directed to keep your wrist in a neutral position. This also eases pressure on your median nerve. · Ask your doctor whether you should have physical or occupational therapy to learn how to do tasks differently. · Try a yoga class to stretch your muscles and build strength in your hands and wrists. Yoga has been shown to ease carpal tunnel symptoms. To prevent carpal tunnel  · When working at a computer, keep your hands and wrists in line with your forearms. Hold your elbows close to your sides. Take a break every 10 to 15 minutes. · Try these exercises:  ¨ Warm up: Rotate your wrist up, down, and from side to side. Repeat this 4 times. Stretch your fingers far apart, relax them, then stretch them again. Repeat 4 times. Stretch your thumb by pulling it back gently, holding it, and then releasing it. Repeat 4 times. ¨ Prayer stretch: Start with your palms together in front of your chest just below your chin. Slowly lower your hands toward your waistline while keeping your hands close to your stomach and your palms together until you feel a mild to moderate stretch under your forearms. Hold for 10 to 20 seconds. Repeat 4 times.   ¨ Wrist flexor stretch: Hold your arm in front of you with your use of this information.            MUKUND [phone] financial assistance

## 2017-12-07 NOTE — PROGRESS NOTES
HPI: Naman Wood presents to establish care with chronic illnesses of tobacco use, prediabetes, hypertension, carpal tunnel, COPD, and obstructive sleep apnea. History hypertension over last 5-6 years. Echocardiogram April 2017 with moderate left ventricular hypertrophy ejection fraction of 55% without wall abnormality. Right ventricle moderately enlarged and positive pulmonary hypertension. He follows with pulmonary and cardiology. Blood pressures continue to be elevated. Stable adrenal nodule on film. Cardiology appointment next month. Denies any ischemic cardiomyopathy or cardiac catheterization. Prediabetes. Positive tobacco. Unknown lipids. Denies any myocardial infarction or CVA. No claudication. Pressures as an outpatient are in the 150-190 range. Has not filled spironolactone yet. Is on 10 mg amlodipine 25 twice a day metoprolol lisinopril 40 and Lasix 20. Last blood count with elevated hemoglobin    Positive tobacco. One to one and a half pack a day currently. Recurrent bronchitis. Uses nasal spray. Unable to afford Symbicort. Breathing normal today. Shortness of breath with exertion. No hemoptysis. Has been on prednisone in the past rarely. History of traumatic new pneumohemothorax. Compliant with CPAP. Poor dentition. Unable to afford dentist.    History of borderline sugars. Uses no medication. Concern ×10 years. Negative retinopathy 2012. Denies any nephropathy. Feels like he has dysesthesias in his feet on occasion. Carpal tunnel syndrome. Left hand symptomatic. Right hand surgery successful. CTS left h      PMH   right CTS   Pneumohemothorax post rib fracture   Hypoxemia        SH make boxes. + tobacco. No alcohol. No drugs. No STD concerns. . No exercise outside of work.  Wife has breast cancer    FH: 6 brothers 1 sisters   + DM, Heart disease,father lymph node cancer    - colon, prostate cancer    Review of systems: Sleep apnea, chronic sinusitis, ear pain, poor dentition, COPD intervention and exercises given. He did BMI. Discussed decreasing calories. States he is lost 10 pounds. Dental disease. Recommended and prevention. Consider oral surgery. Follow-up one week for blood pressure check and potassium      Diagnosis and treatment discussed.   Possible side effects of medication reviewed  Patients questions answered  Follow up understood  Pt aware if they are not contacted about any test results , this does not mean they are normal.  They should call

## 2017-12-07 NOTE — PROGRESS NOTES
Vaccine Information Sheet, \"Influenza - Inactivated\"  given to Valerie Lee, or parent/legal guardian of  Valerie Lee and verbalized understanding. Patient responses:    Have you ever had a reaction to a flu vaccine? No  Are you able to eat eggs without adverse effects? Yes  Do you have any current illness? No  Have you ever had Guillian Blanchard Syndrome? No    Flu vaccine given per order. Please see immunization tab.     Given in right deltoid-BR

## 2017-12-07 NOTE — LETTER
[address] [facility] [address]  Phone: [phone]  Fax: [phone]    Claudia Romero MD        December 7, 2017    [address]  [facility] [address]      Dear Denisha Arzate:    Michelle Lyles is a FIT test stool card we would like you to complete. This test checks for blood in the stool and avoids colonoscopy's if negative. Please complete and bring with you to your appointment next week. Instructions are on the inside of the envelope. Please make sure name date of birth and day that you took the specimen is written on test label and envelope. If you have any questions or concerns, please don't hesitate to call.     Sincerely,        Claudia Romero MD

## 2017-12-13 PROBLEM — R73.03 PREDIABETES: Status: ACTIVE | Noted: 2017-12-13

## 2017-12-14 ENCOUNTER — OFFICE VISIT (OUTPATIENT)
Dept: FAMILY MEDICINE CLINIC | Age: 52
End: 2017-12-14

## 2017-12-14 VITALS
HEART RATE: 58 BPM | OXYGEN SATURATION: 92 % | RESPIRATION RATE: 16 BRPM | SYSTOLIC BLOOD PRESSURE: 160 MMHG | DIASTOLIC BLOOD PRESSURE: 90 MMHG | HEIGHT: 64 IN

## 2017-12-14 DIAGNOSIS — I10 ESSENTIAL HYPERTENSION: Primary | ICD-10-CM

## 2017-12-14 DIAGNOSIS — R19.5 OCCULT BLOOD POSITIVE STOOL: Primary | ICD-10-CM

## 2017-12-14 DIAGNOSIS — Z12.11 SCREENING FOR MALIGNANT NEOPLASM OF COLON: ICD-10-CM

## 2017-12-14 LAB
CONTROL: ABNORMAL
HEMOCCULT STL QL: POSITIVE

## 2017-12-14 PROCEDURE — 82274 ASSAY TEST FOR BLOOD FECAL: CPT | Performed by: INTERNAL MEDICINE

## 2017-12-14 PROCEDURE — 99212 OFFICE O/P EST SF 10 MIN: CPT | Performed by: INTERNAL MEDICINE

## 2017-12-14 NOTE — PROGRESS NOTES
HPI: Casey Mccarthy presents for blood pressure check however he has not changed his medication.     History hypertension over last 5-6 years. Echocardiogram April 2017 with moderate left ventricular hypertrophy ejection fraction of 55% without wall abnormality. Right ventricle moderately enlarged and positive pulmonary hypertension. He follows with pulmonary and cardiology. Blood pressures continue to be elevated. Stable adrenal nodule on film. Cardiology appointment next month. Denies any ischemic cardiomyopathy or cardiac catheterization. Prediabetes. Positive tobacco. Unknown lipids. Denies any myocardial infarction or CVA. No claudication. Pressures as an outpatient are in the 150-190 range. Has not filled spironolactone yet. Is on 10 mg amlodipine 25 twice a day metoprolol lisinopril 40 and Lasix 20. Last blood count with elevated hemoglobin     Positive tobacco. One to one and a half pack a day currently. Recurrent bronchitis. Uses nasal spray. Unable to afford Symbicort. Breathing normal today. Shortness of breath with exertion. No hemoptysis. Has been on prednisone in the past rarely. History of traumatic new pneumohemothorax. Compliant with CPAP.     Poor dentition. Unable to afford dentist.     History of borderline sugars. Uses no medication. Concern ×10 years. Negative retinopathy 2012. Denies any nephropathy. Feels like he has dysesthesias in his feet on occasion.     Carpal tunnel syndrome. Left hand symptomatic. Right hand surgery successful.     CTS left h        PMH   right CTS   Pneumohemothorax post rib fracture   Hypoxemia         SH make boxes. + tobacco. No alcohol. No drugs. No STD concerns. . No exercise outside of work. Wife has breast cancer     FH: 6 brothers 1 sisters   + DM, Heart disease,father lymph node cancer    - colon, prostate cancer     Review of systems: Sleep apnea, chronic sinusitis, ear pain, poor dentition, COPD with baseline dyspnea, no chest pain with exertion. arthroscopic         Social History     Social History    Marital status:      Spouse name: N/A    Number of children: N/A    Years of education: N/A     Occupational History    Not on file.      Social History Main Topics    Smoking status: Current Every Day Smoker     Packs/day: 1.00     Years: 43.00     Types: Cigarettes     Start date: 1/1/1974    Smokeless tobacco: Never Used    Alcohol use No    Drug use: No    Sexual activity: Yes     Partners: Female     Other Topics Concern    Not on file     Social History Narrative    No narrative on file       Family History   Problem Relation Age of Onset    Diabetes Mother     Cancer Mother     Cancer Father     No Known Problems Sister     Diabetes Brother     No Known Problems Brother     No Known Problems Brother     No Known Problems Brother     No Known Problems Brother     No Known Problems Brother            Review of Systems      Objective     BP (!) 150/70   Pulse 58   Resp 16   Ht 5' 4\" (1.626 m)   SpO2 92% Comment: RA    @LASTSAO2(3)@    Wt Readings from Last 3 Encounters:   12/07/17 233 lb (105.7 kg)   11/30/17 233 lb (105.7 kg)   11/08/17 239 lb (108.4 kg)       Physical Exam     NAD alert and cooperative      Chemistry        Component Value Date/Time     12/07/2017 0839    K 4.1 12/07/2017 0839    CL 99 12/07/2017 0839    CO2 31 12/07/2017 0839    BUN 15 12/07/2017 0839    CREATININE 0.6 (L) 12/07/2017 0839        Component Value Date/Time    CALCIUM 9.4 12/07/2017 0839    ALKPHOS 91 04/11/2017 1354    AST 15 04/11/2017 1354    ALT 20 04/11/2017 1354    BILITOT 0.5 04/11/2017 1354            Lab Results   Component Value Date    WBC 9.7 12/07/2017    HGB 16.2 12/07/2017    HCT 47.9 12/07/2017    MCV 95.8 12/07/2017     12/07/2017     Lab Results   Component Value Date    LABA1C 6.3 12/07/2017     No results found for: EAG  Lab Results   Component Value Date    LABA1C 6.3 12/07/2017     No components found for: CHLPL  Lab Results   Component Value Date    TRIG 100 12/07/2017     Lab Results   Component Value Date    HDL 35 (L) 12/07/2017     Lab Results   Component Value Date    LDLCALC 105 (H) 12/07/2017     Lab Results   Component Value Date    LABVLDL 20 12/07/2017       Old labs and records reviewed or requested  Discussed past lab and studies with patient       Blood pressure still elevated. Discussed laboratories and getting new medicine. We'll give Coreg 12.5 twice a day if this is not improved. Discussed coupons on the Internet and using Atrovent from [employer]. Also let him know that samples were available at the pulmonology office    Follow-up blood pressure on medicine once changes made    Diagnosis and treatment discussed.   Possible side effects of medication reviewed  Patients questions answered  Follow up understood  Pt aware if they are not contacted about any test results , this does not mean they are normal.  They should call

## 2018-01-02 ENCOUNTER — TELEPHONE (OUTPATIENT)
Dept: FAMILY MEDICINE CLINIC | Age: 53
End: 2018-01-02

## 2018-01-02 NOTE — TELEPHONE ENCOUNTER
----- Message from Nicki Pichardo MD sent at 12/21/2017  8:06 AM EST -----  Please check how BP is on aldactone, and if not , 140/90 will switch to below with follow up BP 2 weeks. If BP good get potassium draw and Cardiology apt on adlactone  ----- Message -----  From: Malinda Lockhart MD  Sent: 12/7/2017  11:49 AM  To: Nicki Pichardo MD    Noted. I agree with switching to Coreg 12.5 by mouth twice a day if needs arise. Thanks,  ----- Message -----  From: Nicki Pichardo MD  Sent: 12/7/2017  11:11 AM  To: Malinda Lockhart MD    financial difficulties. Currently 10 norvasc, metoprolol 25 bid with HR 50-70, lasix 20 with elevated hct, lisinopril 40. Starting 12.5 aldactone again with recheck bp 1 week and potassium. Apt with you next month. ? Coreg to replace metoprol if still up and if so do you rec 12.5 bid. -160 in office    thanks  ----- Message -----  From: Malinda Lockhart MD  Sent: 11/29/2017   2:18 PM  To: Nicki Picharod MD    Pedro Raf,    It seems that He is already on ACEI,BB & norvasc per our note. I usually recommend spacing BP meds at least 2-3 hours apart. Encourage Na restriction. If BP is still elevated, consider maximizing current blood pressure meds. ----- Message -----  From: Nicki Pichardo MD  Sent: 11/29/2017   7:29 AM  To: Malinda Lockhart MD    Pt is on the schedule to see me Monday for the first time and I see he never followed up for his htn. Is adding an ACE the next step for elevated BP ?  Appreciate your advise

## 2018-01-04 ENCOUNTER — OFFICE VISIT (OUTPATIENT)
Dept: CARDIOLOGY CLINIC | Age: 53
End: 2018-01-04

## 2018-01-04 VITALS
DIASTOLIC BLOOD PRESSURE: 70 MMHG | SYSTOLIC BLOOD PRESSURE: 148 MMHG | HEIGHT: 65 IN | HEART RATE: 64 BPM | OXYGEN SATURATION: 93 % | WEIGHT: 230 LBS | BODY MASS INDEX: 38.32 KG/M2

## 2018-01-04 DIAGNOSIS — Z51.81 ENCOUNTER FOR MONITORING DIURETIC THERAPY: Primary | ICD-10-CM

## 2018-01-04 DIAGNOSIS — Z79.899 ENCOUNTER FOR MONITORING DIURETIC THERAPY: Primary | ICD-10-CM

## 2018-01-04 PROCEDURE — 99214 OFFICE O/P EST MOD 30 MIN: CPT | Performed by: INTERNAL MEDICINE

## 2018-01-04 RX ORDER — SPIRONOLACTONE 25 MG/1
12.5 TABLET ORAL DAILY
Qty: 15 TABLET | Refills: 5 | Status: SHIPPED | OUTPATIENT
Start: 2018-01-04 | End: 2018-04-19 | Stop reason: SDUPTHER

## 2018-01-04 NOTE — LETTER
Current Outpatient Prescriptions   Medication Sig Dispense Refill    spironolactone (ALDACTONE) 25 MG tablet Take 0.5 tablets by mouth daily 15 tablet 5    ipratropium (ATROVENT) 0.02 % nebulizer solution Take 2.5 mLs by nebulization 4 times daily 2.5 mL 3    amLODIPine (NORVASC) 5 MG tablet Take 2 tablets by mouth daily 90 tablet 3    budesonide-formoterol (SYMBICORT) 160-4.5 MCG/ACT AERO Inhale 2 puffs into the lungs 2 times daily 1 Inhaler 6    albuterol sulfate HFA (PROAIR HFA) 108 (90 Base) MCG/ACT inhaler Inhale 2 puffs into the lungs every 6 hours as needed for Wheezing or Shortness of Breath 1 Inhaler 3    fluticasone (FLONASE) 50 MCG/ACT nasal spray 2 sprays by Nasal route daily 1 Bottle 3    metoprolol tartrate (LOPRESSOR) 25 MG tablet Take 1 tablet by mouth 2 times daily 60 tablet 5    lisinopril (PRINIVIL;ZESTRIL) 40 MG tablet Take 1 tablet by mouth daily 90 tablet 3    furosemide (LASIX) 20 MG tablet Take 1 tablet by mouth daily 60 tablet 3     No current facility-administered medications for this visit. Physical Exam:   BP (!) 148/70 Comment: recheck  Pulse 64   Ht 5' 5\" (1.651 m)   Wt 230 lb (104.3 kg) Comment: did not wish to remove shoes  SpO2 93%   BMI 38.27 kg/m²    Wt Readings from Last 2 Encounters:   01/04/18 230 lb (104.3 kg)   12/07/17 233 lb (105.7 kg)     Constitutional: He is oriented to person, place, and time. He appears well-developed and well-nourished. In no acute distress. Reeks of cigarette smoke. HEENT: Normocephalic and atraumatic. Sclerae anicteric. No xanthelasmas. Neck: Neck supple but thick. No JVD present. No thyromegaly present. Cardiovascular: RRR, normal S1 and S2; no murmur/gallop or rub  Pulmonary/Chest: Effort normal .  Lungs clear to auscultation. Chest wall nontender  Abdominal: obese,soft, nontender, nondistended. + bowel sounds; no hepatomegaly  Extremities: No cyanosis, or clubbing.  Pulses are 2+ radial/dorsalis pedis

## 2018-01-04 NOTE — PROGRESS NOTES
BMP in one month. Pulmonary HTN ([address]  Secondary to underlying COPD and hypoventilation syndrome ( SILVANA and morbid obesity). No evidence of right heart failure at the present time    Patient to return for follow up in 3 months.       [address], [address] [facility] ([facility]) [place], [address] [place] [address]  [facility]  Office: [phone]  Fax: [phone]

## 2018-01-04 NOTE — PATIENT INSTRUCTIONS
goal.  Follow-up care is a key part of your treatment and safety. Be sure to make and go to all appointments, and call your doctor if you are having problems. It's also a good idea to know your test results and keep a list of the medicines you take. How can you care for yourself at home? Medical treatment  · If you stop taking your medicine, your blood pressure will go back up. You may take one or more types of medicine to lower your blood pressure. Be safe with medicines. Take your medicine exactly as prescribed. Call your doctor if you think you are having a problem with your medicine. · Talk to your doctor before you start taking aspirin every day. Aspirin can help certain people lower their risk of a heart attack or stroke. But taking aspirin isn't right for everyone, because it can cause serious bleeding. · See your doctor regularly. You may need to see the doctor more often at first or until your blood pressure comes down. · If you are taking blood pressure medicine, talk to your doctor before you take decongestants or anti-inflammatory medicine, such as ibuprofen. Some of these medicines can raise blood pressure. · Learn how to check your blood pressure at home. Lifestyle changes  · Stay at a healthy weight. This is especially important if you put on weight around the waist. Losing even 10 pounds can help you lower your blood pressure. · If your doctor recommends it, get more exercise. Walking is a good choice. Bit by bit, increase the amount you walk every day. Try for at least 30 minutes on most days of the week. You also may want to swim, bike, or do other activities. · Avoid or limit alcohol. Talk to your doctor about whether you can drink any alcohol. · Try to limit how much sodium you eat to less than 2,300 milligrams (mg) a day. Your doctor may ask you to try to eat less than 1,500 mg a day.   · Eat plenty of fruits (such as bananas and oranges), vegetables, legumes, whole grains, and low-fat

## 2018-01-11 ENCOUNTER — TELEPHONE (OUTPATIENT)
Dept: CARDIOLOGY CLINIC | Age: 53
End: 2018-01-11

## 2018-01-11 ENCOUNTER — PAT TELEPHONE (OUTPATIENT)
Dept: PREADMISSION TESTING | Age: 53
End: 2018-01-11

## 2018-01-11 VITALS — WEIGHT: 215 LBS | HEIGHT: 65 IN | BODY MASS INDEX: 35.82 KG/M2

## 2018-01-11 DIAGNOSIS — I50.32 CHRONIC DIASTOLIC HEART FAILURE (HCC): ICD-10-CM

## 2018-01-11 DIAGNOSIS — Z01.810 PREOPERATIVE CARDIOVASCULAR EXAMINATION: Primary | ICD-10-CM

## 2018-01-11 NOTE — TELEPHONE ENCOUNTER
Spoke with Madie at [address] and [facility]. Confirmed that echo is needed. Will place stat order for clearance and call patient to schedule. will need to call scheduling tomorrow morning. Madie orr/rosalina.

## 2018-01-11 NOTE — TELEPHONE ENCOUNTER
Patient is scheduled to have a colonoscopy with Dr. Amanda Tamayo on 1/18, per pt's wife Dr. Pugh Right office called the pt and informed him he would need an echo completed prior to his colonoscopy. Patient's wife does not know why the pt would need an echo completed prior, but would like to know if Dr. Rajwinder Evans would order one. You can reach the pt or his wife at [phone].

## 2018-01-11 NOTE — TELEPHONE ENCOUNTER
I spoke with Dr. Christina Carmen office to clarify message. It appears that anesthesia is requiring a repeat echocardiogram but Dr. Christina Carmen office will call me back to confirm. I did speak with Dr. Courtney Granados in office in Dr. Maria Del Carmen Jaramillo absence, and he states okay to order echo if it is indeed needed. Will place order after I confirm and contact patient.

## 2018-01-17 ENCOUNTER — HOSPITAL ENCOUNTER (OUTPATIENT)
Dept: NON INVASIVE DIAGNOSTICS | Age: 53
Discharge: HOME | End: 2018-01-17
Attending: INTERNAL MEDICINE | Admitting: INTERNAL MEDICINE

## 2018-01-17 ENCOUNTER — TELEPHONE (OUTPATIENT)
Dept: CARDIOLOGY CLINIC | Age: 53
End: 2018-01-17

## 2018-01-17 DIAGNOSIS — Z01.810 ENCOUNTER FOR PREPROCEDURAL CARDIOVASCULAR EXAMINATION: ICD-10-CM

## 2018-01-17 DIAGNOSIS — I50.32 CHRONIC DIASTOLIC HEART FAILURE (HCC): ICD-10-CM

## 2018-01-17 DIAGNOSIS — Z01.810 PREOPERATIVE CARDIOVASCULAR EXAMINATION: ICD-10-CM

## 2018-01-17 LAB
LV EF: 60 %
LVEF MODALITY: NORMAL

## 2018-01-17 NOTE — ANESTHESIA PRE-OP
Patient Active Problem List   Diagnosis Code    Tobacco use Z72.0    Chronic obstructive pulmonary disease ([address] J44.9    Chronic respiratory failure with hypercapnia ([facility]) J96.12    SILVANA (obstructive sleep apnea) G47.33    Acute on chronic diastolic congestive heart failure (HCC) I50.33    NSVT (nonsustained ventricular tachycardia) ([facility]) I47.2    Acute on chronic respiratory failure with hypercapnia ([address] J96.22    Pulmonary HTN I27.20    Essential hypertension I10    History of pneumothorax Z87.09    Hx of renal calculi Z87.442    Sciatica of left side M54.32    Adrenal nodule ([address] E27.9    Prediabetes R73.03       Past Medical History:        Diagnosis Date    Acute on chronic diastolic congestive heart failure (HCC)     Adrenal nodule ([address] 12/7/2017    low-density nodular enlargement of a left adrenal gland which is stable since 2007      COPD (chronic obstructive pulmonary disease) ([address]     Essential hypertension 11/30/2017    History of pneumothorax 11/30/2017    Rib fx    Hx of renal calculi 11/30/2017    Hypertension     Kidney stone     Pneumothorax     2016 d/t fall     Prediabetes 12/13/2017    Sleep apnea        Past Surgical History:        Procedure Laterality Date    CARPAL TUNNEL RELEASE      KNEE SURGERY      arthroscopic       Social History:    Social History   Substance Use Topics    Smoking status: Current Every Day Smoker     Packs/day: 1.00     Years: 43.00     Types: Cigarettes     Start date: 1/1/1974    Smokeless tobacco: Never Used    Alcohol use No                                Ready to quit: Not Answered  Counseling given: Not Answered      Vital Signs (Current):   Vitals:    01/18/18 0723   BP: (!) 180/77   Pulse: 63   Resp: 16   Temp: 98.5 °F (36.9 °C)   TempSrc: Temporal   SpO2: 95%   Weight: 228 lb 6 oz (103.6 kg)   Height: 5' 5\" (1.651 m)                                              BP Readings from Last 3 Encounters:   01/18/18 (!) 180/77 examination. Anesthetic plan, risks, benefits, alternatives, and personnel involved discussed with patient. Patient verbalized an understanding and agrees to proceed.       Florentino Delong MD  January 18, 2018  7:34 AM      Florentino Delong MD   1/18/2018

## 2018-01-17 NOTE — TELEPHONE ENCOUNTER
Phone encounter on 1/11/18, patient was scheduled for Echo today prior to colonoscopy tomorrow. Last OV 1/4/18. DX: CHF, Pulmonary HTN, HTN. Patient has blood in his stool. Patient not taking any blood thinners.

## 2018-01-17 NOTE — TELEPHONE ENCOUNTER
Dr. Sanjay Rogers,   In Dr. Carolee Austin absence, See below. Can you read the echo and let us know. Thanks.

## 2018-01-18 ENCOUNTER — HOSPITAL ENCOUNTER (OUTPATIENT)
Dept: ENDOSCOPY | Age: 53
Discharge: HOME | End: 2018-01-18
Attending: INTERNAL MEDICINE | Admitting: INTERNAL MEDICINE

## 2018-01-18 VITALS
DIASTOLIC BLOOD PRESSURE: 50 MMHG | HEART RATE: 50 BPM | HEIGHT: 65 IN | TEMPERATURE: 99 F | RESPIRATION RATE: 18 BRPM | SYSTOLIC BLOOD PRESSURE: 127 MMHG | OXYGEN SATURATION: 94 % | BODY MASS INDEX: 38.05 KG/M2 | WEIGHT: 228.38 LBS

## 2018-01-18 PROBLEM — Z86.010 HX OF COLONIC POLYPS: Status: ACTIVE | Noted: 2018-01-18

## 2018-01-18 PROBLEM — Z86.0100 HX OF COLONIC POLYPS: Status: ACTIVE | Noted: 2018-01-18

## 2018-01-18 RX ORDER — MORPHINE SULFATE 4 MG/ML
2 INJECTION, SOLUTION INTRAMUSCULAR; INTRAVENOUS EVERY 5 MIN PRN
Status: DISCONTINUED | OUTPATIENT
Start: 2018-01-18 | End: 2018-01-19 | Stop reason: HOSPADM

## 2018-01-18 RX ORDER — ONDANSETRON 2 MG/ML
4 INJECTION INTRAMUSCULAR; INTRAVENOUS
Status: ACTIVE | OUTPATIENT
Start: 2018-01-18 | End: 2018-01-18

## 2018-01-18 RX ORDER — FENTANYL CITRATE 50 UG/ML
25 INJECTION, SOLUTION INTRAMUSCULAR; INTRAVENOUS EVERY 5 MIN PRN
Status: DISCONTINUED | OUTPATIENT
Start: 2018-01-18 | End: 2018-01-19 | Stop reason: HOSPADM

## 2018-01-18 RX ORDER — MEPERIDINE HYDROCHLORIDE 25 MG/ML
12.5 INJECTION INTRAMUSCULAR; INTRAVENOUS; SUBCUTANEOUS EVERY 5 MIN PRN
Status: DISCONTINUED | OUTPATIENT
Start: 2018-01-18 | End: 2018-01-19 | Stop reason: HOSPADM

## 2018-01-18 RX ORDER — SODIUM CHLORIDE 9 MG/ML
INJECTION, SOLUTION INTRAVENOUS CONTINUOUS
Status: DISCONTINUED | OUTPATIENT
Start: 2018-01-18 | End: 2018-01-19 | Stop reason: HOSPADM

## 2018-01-18 RX ORDER — OXYCODONE HYDROCHLORIDE AND ACETAMINOPHEN 5; 325 MG/1; MG/1
2 TABLET ORAL PRN
Status: ACTIVE | OUTPATIENT
Start: 2018-01-18 | End: 2018-01-18

## 2018-01-18 RX ORDER — OXYCODONE HYDROCHLORIDE AND ACETAMINOPHEN 5; 325 MG/1; MG/1
1 TABLET ORAL PRN
Status: ACTIVE | OUTPATIENT
Start: 2018-01-18 | End: 2018-01-18

## 2018-01-18 RX ORDER — SODIUM CHLORIDE 0.9 % (FLUSH) 0.9 %
10 SYRINGE (ML) INJECTION EVERY 12 HOURS SCHEDULED
Status: DISCONTINUED | OUTPATIENT
Start: 2018-01-18 | End: 2018-01-19 | Stop reason: HOSPADM

## 2018-01-18 RX ORDER — MORPHINE SULFATE 4 MG/ML
1 INJECTION, SOLUTION INTRAMUSCULAR; INTRAVENOUS EVERY 5 MIN PRN
Status: DISCONTINUED | OUTPATIENT
Start: 2018-01-18 | End: 2018-01-19 | Stop reason: HOSPADM

## 2018-01-18 RX ORDER — SODIUM CHLORIDE 0.9 % (FLUSH) 0.9 %
10 SYRINGE (ML) INJECTION PRN
Status: DISCONTINUED | OUTPATIENT
Start: 2018-01-18 | End: 2018-01-19 | Stop reason: HOSPADM

## 2018-01-18 RX ORDER — FENTANYL CITRATE 50 UG/ML
50 INJECTION, SOLUTION INTRAMUSCULAR; INTRAVENOUS EVERY 5 MIN PRN
Status: DISCONTINUED | OUTPATIENT
Start: 2018-01-18 | End: 2018-01-19 | Stop reason: HOSPADM

## 2018-01-18 RX ADMIN — SODIUM CHLORIDE: 9 INJECTION, SOLUTION INTRAVENOUS at 07:32

## 2018-01-18 ASSESSMENT — PAIN - FUNCTIONAL ASSESSMENT: PAIN_FUNCTIONAL_ASSESSMENT: 0-10

## 2018-01-18 ASSESSMENT — ENCOUNTER SYMPTOMS: SHORTNESS OF BREATH: 1

## 2018-01-18 ASSESSMENT — PAIN SCALES - GENERAL
PAINLEVEL_OUTOF10: 0

## 2018-01-18 ASSESSMENT — LIFESTYLE VARIABLES: SMOKING_STATUS: 1

## 2018-01-23 ENCOUNTER — TELEPHONE (OUTPATIENT)
Dept: PULMONOLOGY | Age: 53
End: 2018-01-23

## 2018-02-15 ENCOUNTER — OFFICE VISIT (OUTPATIENT)
Dept: FAMILY MEDICINE CLINIC | Age: 53
End: 2018-02-15

## 2018-02-15 VITALS
WEIGHT: 231 LBS | SYSTOLIC BLOOD PRESSURE: 129 MMHG | OXYGEN SATURATION: 94 % | HEART RATE: 53 BPM | DIASTOLIC BLOOD PRESSURE: 72 MMHG | HEIGHT: 65 IN | BODY MASS INDEX: 38.49 KG/M2

## 2018-02-15 DIAGNOSIS — I27.20 PULMONARY HTN (HCC): ICD-10-CM

## 2018-02-15 DIAGNOSIS — J44.9 CHRONIC OBSTRUCTIVE PULMONARY DISEASE, UNSPECIFIED COPD TYPE (HCC): ICD-10-CM

## 2018-02-15 DIAGNOSIS — R73.03 PREDIABETES: ICD-10-CM

## 2018-02-15 DIAGNOSIS — Z51.81 ENCOUNTER FOR MONITORING DIURETIC THERAPY: ICD-10-CM

## 2018-02-15 DIAGNOSIS — G47.33 OSA (OBSTRUCTIVE SLEEP APNEA): ICD-10-CM

## 2018-02-15 DIAGNOSIS — Z72.0 TOBACCO USE: ICD-10-CM

## 2018-02-15 DIAGNOSIS — J11.1 INFLUENZA-LIKE ILLNESS: Primary | ICD-10-CM

## 2018-02-15 DIAGNOSIS — Z79.899 ENCOUNTER FOR MONITORING DIURETIC THERAPY: ICD-10-CM

## 2018-02-15 DIAGNOSIS — I10 ESSENTIAL HYPERTENSION: ICD-10-CM

## 2018-02-15 LAB
ANION GAP SERPL CALCULATED.3IONS-SCNC: 12 MMOL/L (ref 3–16)
BUN BLDV-MCNC: 13 MG/DL (ref 7–20)
CALCIUM SERPL-MCNC: 9.2 MG/DL (ref 8.3–10.6)
CHLORIDE BLD-SCNC: 101 MMOL/L (ref 99–110)
CO2: 27 MMOL/L (ref 21–32)
CREAT SERPL-MCNC: 0.6 MG/DL (ref 0.9–1.3)
ESTIMATED AVERAGE GLUCOSE: 145.6 MG/DL
GFR AFRICAN AMERICAN: >60
GFR NON-AFRICAN AMERICAN: >60
GLUCOSE BLD-MCNC: 121 MG/DL (ref 70–99)
HBA1C MFR BLD: 6.7 %
INFLUENZA A ANTIBODY: NORMAL
INFLUENZA B ANTIBODY: NORMAL
POTASSIUM SERPL-SCNC: 4.4 MMOL/L (ref 3.5–5.1)
SODIUM BLD-SCNC: 140 MMOL/L (ref 136–145)

## 2018-02-15 PROCEDURE — 87804 INFLUENZA ASSAY W/OPTIC: CPT | Performed by: INTERNAL MEDICINE

## 2018-02-15 PROCEDURE — 99214 OFFICE O/P EST MOD 30 MIN: CPT | Performed by: INTERNAL MEDICINE

## 2018-02-15 RX ORDER — AZITHROMYCIN 250 MG/1
TABLET, FILM COATED ORAL
Qty: 1 PACKET | Refills: 0 | Status: SHIPPED | OUTPATIENT
Start: 2018-02-15 | End: 2018-02-25

## 2018-02-15 RX ORDER — OSELTAMIVIR PHOSPHATE 75 MG/1
CAPSULE ORAL
Qty: 10 CAPSULE | Refills: 0 | Status: SHIPPED | OUTPATIENT
Start: 2018-02-15 | End: 2018-04-19

## 2018-02-15 NOTE — PATIENT INSTRUCTIONS
Start nebulizer 4 times daily  Stop smoking!  antiviral and antibiotic at pharmacy  10 pound weight loss in next 2-3 months recommended.

## 2018-04-19 ENCOUNTER — OFFICE VISIT (OUTPATIENT)
Dept: FAMILY MEDICINE CLINIC | Age: 53
End: 2018-04-19

## 2018-04-19 VITALS
RESPIRATION RATE: 16 BRPM | HEART RATE: 55 BPM | HEIGHT: 65 IN | DIASTOLIC BLOOD PRESSURE: 90 MMHG | SYSTOLIC BLOOD PRESSURE: 190 MMHG | BODY MASS INDEX: 37.99 KG/M2 | OXYGEN SATURATION: 95 % | WEIGHT: 228 LBS

## 2018-04-19 DIAGNOSIS — E11.8 TYPE 2 DIABETES MELLITUS WITH COMPLICATION, WITHOUT LONG-TERM CURRENT USE OF INSULIN (HCC): ICD-10-CM

## 2018-04-19 DIAGNOSIS — G56.02 CARPAL TUNNEL SYNDROME OF LEFT WRIST: ICD-10-CM

## 2018-04-19 DIAGNOSIS — M77.12 LEFT TENNIS ELBOW: ICD-10-CM

## 2018-04-19 DIAGNOSIS — I27.20 PULMONARY HTN (HCC): ICD-10-CM

## 2018-04-19 DIAGNOSIS — J44.9 CHRONIC OBSTRUCTIVE PULMONARY DISEASE, UNSPECIFIED COPD TYPE (HCC): ICD-10-CM

## 2018-04-19 DIAGNOSIS — G47.33 OSA (OBSTRUCTIVE SLEEP APNEA): ICD-10-CM

## 2018-04-19 DIAGNOSIS — I10 ESSENTIAL HYPERTENSION: Primary | ICD-10-CM

## 2018-04-19 DIAGNOSIS — F17.210 CIGARETTE NICOTINE DEPENDENCE WITHOUT COMPLICATION: ICD-10-CM

## 2018-04-19 PROBLEM — E11.9 TYPE 2 DIABETES MELLITUS (HCC): Status: ACTIVE | Noted: 2017-12-13

## 2018-04-19 LAB
CREATININE URINE: 69.5 MG/DL (ref 39–259)
HBA1C MFR BLD: 6.3 %
MICROALBUMIN UR-MCNC: 4.3 MG/DL
MICROALBUMIN/CREAT UR-RTO: 61.9 MG/G (ref 0–30)

## 2018-04-19 PROCEDURE — 83036 HEMOGLOBIN GLYCOSYLATED A1C: CPT | Performed by: INTERNAL MEDICINE

## 2018-04-19 PROCEDURE — 99214 OFFICE O/P EST MOD 30 MIN: CPT | Performed by: INTERNAL MEDICINE

## 2018-04-19 RX ORDER — AMLODIPINE BESYLATE 10 MG/1
TABLET ORAL
Qty: 90 TABLET | Refills: 1 | Status: SHIPPED | OUTPATIENT
Start: 2018-04-19 | End: 2019-06-27 | Stop reason: SDUPTHER

## 2018-04-19 RX ORDER — PREDNISONE 20 MG/1
TABLET ORAL
Qty: 4 TABLET | Refills: 0 | Status: SHIPPED | OUTPATIENT
Start: 2018-04-19 | End: 2018-05-04 | Stop reason: ALTCHOICE

## 2018-04-19 RX ORDER — SPIRONOLACTONE 25 MG/1
TABLET ORAL
Qty: 90 TABLET | Refills: 1 | Status: SHIPPED | OUTPATIENT
Start: 2018-04-19 | End: 2018-11-09 | Stop reason: SDUPTHER

## 2018-04-20 ENCOUNTER — TELEPHONE (OUTPATIENT)
Dept: FAMILY MEDICINE CLINIC | Age: 53
End: 2018-04-20

## 2018-05-02 ENCOUNTER — OFFICE VISIT (OUTPATIENT)
Dept: PULMONOLOGY | Age: 53
End: 2018-05-02

## 2018-05-02 VITALS
HEART RATE: 64 BPM | WEIGHT: 227 LBS | DIASTOLIC BLOOD PRESSURE: 74 MMHG | RESPIRATION RATE: 12 BRPM | HEIGHT: 64 IN | OXYGEN SATURATION: 96 % | SYSTOLIC BLOOD PRESSURE: 160 MMHG | TEMPERATURE: 98.3 F | BODY MASS INDEX: 38.76 KG/M2

## 2018-05-02 DIAGNOSIS — J18.9 COMMUNITY ACQUIRED PNEUMONIA OF RIGHT UPPER LOBE OF LUNG: Primary | ICD-10-CM

## 2018-05-02 DIAGNOSIS — G47.33 OSA TREATED WITH BIPAP: ICD-10-CM

## 2018-05-02 DIAGNOSIS — J31.0 OTHER CHRONIC RHINITIS: ICD-10-CM

## 2018-05-02 DIAGNOSIS — Z72.0 TOBACCO ABUSE: ICD-10-CM

## 2018-05-02 DIAGNOSIS — J44.9 COPD, SEVERE (HCC): ICD-10-CM

## 2018-05-02 DIAGNOSIS — G47.10 HYPERSOMNIA: ICD-10-CM

## 2018-05-02 PROCEDURE — 3023F SPIROM DOC REV: CPT | Performed by: INTERNAL MEDICINE

## 2018-05-02 PROCEDURE — 4004F PT TOBACCO SCREEN RCVD TLK: CPT | Performed by: INTERNAL MEDICINE

## 2018-05-02 PROCEDURE — G8428 CUR MEDS NOT DOCUMENT: HCPCS | Performed by: INTERNAL MEDICINE

## 2018-05-02 PROCEDURE — G8926 SPIRO NO PERF OR DOC: HCPCS | Performed by: INTERNAL MEDICINE

## 2018-05-02 PROCEDURE — G8417 CALC BMI ABV UP PARAM F/U: HCPCS | Performed by: INTERNAL MEDICINE

## 2018-05-02 PROCEDURE — 99214 OFFICE O/P EST MOD 30 MIN: CPT | Performed by: INTERNAL MEDICINE

## 2018-05-02 PROCEDURE — 3017F COLORECTAL CA SCREEN DOC REV: CPT | Performed by: INTERNAL MEDICINE

## 2018-05-02 RX ORDER — PREDNISONE 10 MG/1
TABLET ORAL
Qty: 30 TABLET | Refills: 0 | Status: SHIPPED | OUTPATIENT
Start: 2018-05-02 | End: 2018-05-12

## 2018-05-02 RX ORDER — BUDESONIDE AND FORMOTEROL FUMARATE DIHYDRATE 160; 4.5 UG/1; UG/1
2 AEROSOL RESPIRATORY (INHALATION) 2 TIMES DAILY
Qty: 1 INHALER | Refills: 1 | COMMUNITY
Start: 2018-05-02 | End: 2018-05-04 | Stop reason: SDUPTHER

## 2018-05-02 RX ORDER — LEVOFLOXACIN 750 MG/1
750 TABLET ORAL DAILY
Qty: 7 TABLET | Refills: 0 | Status: SHIPPED | OUTPATIENT
Start: 2018-05-02 | End: 2018-05-09

## 2018-05-02 ASSESSMENT — SLEEP AND FATIGUE QUESTIONNAIRES
HOW LIKELY ARE YOU TO NOD OFF OR FALL ASLEEP IN A CAR, WHILE STOPPED FOR A FEW MINUTES IN TRAFFIC: 0
HOW LIKELY ARE YOU TO NOD OFF OR FALL ASLEEP WHILE SITTING AND TALKING TO SOMEONE: 0
HOW LIKELY ARE YOU TO NOD OFF OR FALL ASLEEP WHILE LYING DOWN TO REST IN THE AFTERNOON WHEN CIRCUMSTANCES PERMIT: 1
HOW LIKELY ARE YOU TO NOD OFF OR FALL ASLEEP WHILE SITTING QUIETLY AFTER LUNCH WITHOUT ALCOHOL: 0
HOW LIKELY ARE YOU TO NOD OFF OR FALL ASLEEP WHEN YOU ARE A PASSENGER IN A CAR FOR AN HOUR WITHOUT A BREAK: 0
ESS TOTAL SCORE: 5
HOW LIKELY ARE YOU TO NOD OFF OR FALL ASLEEP WHILE SITTING AND READING: 1
HOW LIKELY ARE YOU TO NOD OFF OR FALL ASLEEP WHILE SITTING INACTIVE IN A PUBLIC PLACE: 1
HOW LIKELY ARE YOU TO NOD OFF OR FALL ASLEEP WHILE WATCHING TV: 2

## 2018-05-03 ENCOUNTER — TELEPHONE (OUTPATIENT)
Dept: PULMONOLOGY | Age: 53
End: 2018-05-03

## 2018-05-04 ENCOUNTER — OFFICE VISIT (OUTPATIENT)
Dept: CARDIOLOGY CLINIC | Age: 53
End: 2018-05-04

## 2018-05-04 VITALS
SYSTOLIC BLOOD PRESSURE: 132 MMHG | HEIGHT: 64 IN | DIASTOLIC BLOOD PRESSURE: 62 MMHG | WEIGHT: 226.8 LBS | OXYGEN SATURATION: 90 % | BODY MASS INDEX: 38.72 KG/M2 | HEART RATE: 63 BPM

## 2018-05-04 DIAGNOSIS — G47.33 OSA (OBSTRUCTIVE SLEEP APNEA): ICD-10-CM

## 2018-05-04 DIAGNOSIS — I10 ESSENTIAL HYPERTENSION: ICD-10-CM

## 2018-05-04 DIAGNOSIS — I27.20 PULMONARY HTN (HCC): ICD-10-CM

## 2018-05-04 DIAGNOSIS — I50.32 CHRONIC DIASTOLIC HEART FAILURE (HCC): Primary | ICD-10-CM

## 2018-05-04 DIAGNOSIS — J96.12 CHRONIC RESPIRATORY FAILURE WITH HYPERCAPNIA (HCC): ICD-10-CM

## 2018-05-04 DIAGNOSIS — Z72.0 TOBACCO ABUSE: ICD-10-CM

## 2018-05-04 PROCEDURE — 4004F PT TOBACCO SCREEN RCVD TLK: CPT | Performed by: INTERNAL MEDICINE

## 2018-05-04 PROCEDURE — G8417 CALC BMI ABV UP PARAM F/U: HCPCS | Performed by: INTERNAL MEDICINE

## 2018-05-04 PROCEDURE — 3017F COLORECTAL CA SCREEN DOC REV: CPT | Performed by: INTERNAL MEDICINE

## 2018-05-04 PROCEDURE — 99214 OFFICE O/P EST MOD 30 MIN: CPT | Performed by: INTERNAL MEDICINE

## 2018-05-04 PROCEDURE — G8427 DOCREV CUR MEDS BY ELIG CLIN: HCPCS | Performed by: INTERNAL MEDICINE

## 2018-05-17 ENCOUNTER — TELEPHONE (OUTPATIENT)
Dept: FAMILY MEDICINE CLINIC | Age: 53
End: 2018-05-17

## 2018-06-22 DIAGNOSIS — I10 ESSENTIAL HYPERTENSION: ICD-10-CM

## 2018-06-25 RX ORDER — LISINOPRIL 40 MG/1
TABLET ORAL
Qty: 30 TABLET | Refills: 2 | Status: SHIPPED | OUTPATIENT
Start: 2018-06-25 | End: 2019-01-30 | Stop reason: SDUPTHER

## 2018-06-28 ENCOUNTER — TELEPHONE (OUTPATIENT)
Dept: PULMONOLOGY | Age: 53
End: 2018-06-28

## 2018-06-28 RX ORDER — BUDESONIDE AND FORMOTEROL FUMARATE DIHYDRATE 160; 4.5 UG/1; UG/1
2 AEROSOL RESPIRATORY (INHALATION) 2 TIMES DAILY
Qty: 1 INHALER | Refills: 6 | Status: SHIPPED | OUTPATIENT
Start: 2018-06-28 | End: 2020-01-27 | Stop reason: SDUPTHER

## 2018-10-31 DIAGNOSIS — I50.32 CHRONIC DIASTOLIC HEART FAILURE (HCC): ICD-10-CM

## 2018-10-31 LAB
ANION GAP SERPL CALCULATED.3IONS-SCNC: 14 MMOL/L (ref 3–16)
BUN BLDV-MCNC: 15 MG/DL (ref 7–20)
CALCIUM SERPL-MCNC: 9.5 MG/DL (ref 8.3–10.6)
CHLORIDE BLD-SCNC: 99 MMOL/L (ref 99–110)
CO2: 26 MMOL/L (ref 21–32)
CREAT SERPL-MCNC: 0.7 MG/DL (ref 0.9–1.3)
GFR AFRICAN AMERICAN: >60
GFR NON-AFRICAN AMERICAN: >60
GLUCOSE BLD-MCNC: 101 MG/DL (ref 70–99)
POTASSIUM SERPL-SCNC: 3.9 MMOL/L (ref 3.5–5.1)
SODIUM BLD-SCNC: 139 MMOL/L (ref 136–145)

## 2018-11-05 ENCOUNTER — OFFICE VISIT (OUTPATIENT)
Dept: PULMONOLOGY | Age: 53
End: 2018-11-05
Payer: COMMERCIAL

## 2018-11-05 VITALS
OXYGEN SATURATION: 93 % | WEIGHT: 233 LBS | SYSTOLIC BLOOD PRESSURE: 164 MMHG | HEART RATE: 60 BPM | HEIGHT: 64 IN | BODY MASS INDEX: 39.78 KG/M2 | TEMPERATURE: 98.5 F | RESPIRATION RATE: 16 BRPM | DIASTOLIC BLOOD PRESSURE: 74 MMHG

## 2018-11-05 DIAGNOSIS — G47.33 OSA (OBSTRUCTIVE SLEEP APNEA): ICD-10-CM

## 2018-11-05 DIAGNOSIS — J31.0 CHRONIC RHINITIS: ICD-10-CM

## 2018-11-05 DIAGNOSIS — J96.12 CHRONIC RESPIRATORY FAILURE WITH HYPERCAPNIA (HCC): ICD-10-CM

## 2018-11-05 DIAGNOSIS — J44.9 COPD, SEVERE (HCC): ICD-10-CM

## 2018-11-05 DIAGNOSIS — Z72.0 TOBACCO USE: ICD-10-CM

## 2018-11-05 DIAGNOSIS — Z23 NEED FOR INFLUENZA VACCINATION: Primary | ICD-10-CM

## 2018-11-05 PROCEDURE — G8417 CALC BMI ABV UP PARAM F/U: HCPCS | Performed by: INTERNAL MEDICINE

## 2018-11-05 PROCEDURE — 3023F SPIROM DOC REV: CPT | Performed by: INTERNAL MEDICINE

## 2018-11-05 PROCEDURE — G8482 FLU IMMUNIZE ORDER/ADMIN: HCPCS | Performed by: INTERNAL MEDICINE

## 2018-11-05 PROCEDURE — 90688 IIV4 VACCINE SPLT 0.5 ML IM: CPT | Performed by: INTERNAL MEDICINE

## 2018-11-05 PROCEDURE — 3017F COLORECTAL CA SCREEN DOC REV: CPT | Performed by: INTERNAL MEDICINE

## 2018-11-05 PROCEDURE — 90471 IMMUNIZATION ADMIN: CPT | Performed by: INTERNAL MEDICINE

## 2018-11-05 PROCEDURE — G8926 SPIRO NO PERF OR DOC: HCPCS | Performed by: INTERNAL MEDICINE

## 2018-11-05 PROCEDURE — G8427 DOCREV CUR MEDS BY ELIG CLIN: HCPCS | Performed by: INTERNAL MEDICINE

## 2018-11-05 PROCEDURE — 99214 OFFICE O/P EST MOD 30 MIN: CPT | Performed by: INTERNAL MEDICINE

## 2018-11-05 PROCEDURE — 4004F PT TOBACCO SCREEN RCVD TLK: CPT | Performed by: INTERNAL MEDICINE

## 2018-11-05 ASSESSMENT — SLEEP AND FATIGUE QUESTIONNAIRES
NECK CIRCUMFERENCE (INCHES): 17
HOW LIKELY ARE YOU TO NOD OFF OR FALL ASLEEP WHILE SITTING INACTIVE IN A PUBLIC PLACE: 1
HOW LIKELY ARE YOU TO NOD OFF OR FALL ASLEEP WHILE SITTING AND READING: 2
ESS TOTAL SCORE: 7
HOW LIKELY ARE YOU TO NOD OFF OR FALL ASLEEP WHEN YOU ARE A PASSENGER IN A CAR FOR AN HOUR WITHOUT A BREAK: 0
HOW LIKELY ARE YOU TO NOD OFF OR FALL ASLEEP IN A CAR, WHILE STOPPED FOR A FEW MINUTES IN TRAFFIC: 0
HOW LIKELY ARE YOU TO NOD OFF OR FALL ASLEEP WHILE SITTING QUIETLY AFTER LUNCH WITHOUT ALCOHOL: 1
HOW LIKELY ARE YOU TO NOD OFF OR FALL ASLEEP WHILE WATCHING TV: 1
HOW LIKELY ARE YOU TO NOD OFF OR FALL ASLEEP WHILE LYING DOWN TO REST IN THE AFTERNOON WHEN CIRCUMSTANCES PERMIT: 2
HOW LIKELY ARE YOU TO NOD OFF OR FALL ASLEEP WHILE SITTING AND TALKING TO SOMEONE: 0

## 2018-11-05 NOTE — PROGRESS NOTES
Vaccine Information Sheet, \"Influenza - Inactivated\"  given to Hossein Meraz, or parent/legal guardian of  Hossein Meraz and verbalized understanding. Patient responses:    Have you ever had a reaction to a flu vaccine? No  Are you able to eat eggs without adverse effects? Yes  Do you have any current illness? No  Have you ever had [place] Syndrome? No    Flu vaccine given per order. Please see immunization tab.

## 2018-11-09 ENCOUNTER — OFFICE VISIT (OUTPATIENT)
Dept: CARDIOLOGY CLINIC | Age: 53
End: 2018-11-09
Payer: COMMERCIAL

## 2018-11-09 VITALS
BODY MASS INDEX: 39.44 KG/M2 | HEART RATE: 64 BPM | OXYGEN SATURATION: 94 % | HEIGHT: 64 IN | DIASTOLIC BLOOD PRESSURE: 74 MMHG | WEIGHT: 231 LBS | SYSTOLIC BLOOD PRESSURE: 138 MMHG

## 2018-11-09 DIAGNOSIS — I50.32 CHRONIC DIASTOLIC HEART FAILURE (HCC): Primary | ICD-10-CM

## 2018-11-09 DIAGNOSIS — I27.20 PULMONARY HTN (HCC): ICD-10-CM

## 2018-11-09 DIAGNOSIS — J96.12 CHRONIC RESPIRATORY FAILURE WITH HYPERCAPNIA (HCC): ICD-10-CM

## 2018-11-09 DIAGNOSIS — E78.2 MIXED HYPERLIPIDEMIA: ICD-10-CM

## 2018-11-09 DIAGNOSIS — F17.200 SMOKING ADDICTION: ICD-10-CM

## 2018-11-09 DIAGNOSIS — I10 ESSENTIAL HYPERTENSION: ICD-10-CM

## 2018-11-09 DIAGNOSIS — G47.33 OSA (OBSTRUCTIVE SLEEP APNEA): ICD-10-CM

## 2018-11-09 PROCEDURE — G8417 CALC BMI ABV UP PARAM F/U: HCPCS | Performed by: INTERNAL MEDICINE

## 2018-11-09 PROCEDURE — 4004F PT TOBACCO SCREEN RCVD TLK: CPT | Performed by: INTERNAL MEDICINE

## 2018-11-09 PROCEDURE — G8482 FLU IMMUNIZE ORDER/ADMIN: HCPCS | Performed by: INTERNAL MEDICINE

## 2018-11-09 PROCEDURE — 3017F COLORECTAL CA SCREEN DOC REV: CPT | Performed by: INTERNAL MEDICINE

## 2018-11-09 PROCEDURE — 99214 OFFICE O/P EST MOD 30 MIN: CPT | Performed by: INTERNAL MEDICINE

## 2018-11-09 PROCEDURE — G8427 DOCREV CUR MEDS BY ELIG CLIN: HCPCS | Performed by: INTERNAL MEDICINE

## 2018-11-09 RX ORDER — SPIRONOLACTONE 25 MG/1
TABLET ORAL
Qty: 90 TABLET | Refills: 1 | Status: SHIPPED | OUTPATIENT
Start: 2018-11-09 | End: 2019-09-19 | Stop reason: SDUPTHER

## 2018-11-09 NOTE — PATIENT INSTRUCTIONS
trying to quit smoking. · Consider signing up for a smoking cessation program, such as the American Lung Association's Freedom from Smoking program.  · Get text messaging support. Go to the website at www.smokefree. gov to sign up for the [facility] program.  · Set a quit date. Pick your date carefully so that it is not right in the middle of a big deadline or stressful time. Once you quit, do not even take a puff. Get rid of all ashtrays and lighters after your last cigarette. Clean your house and your clothes so that they do not smell of smoke. · Learn how to be a nonsmoker. Think about ways you can avoid those things that make you reach for a cigarette. ? Avoid situations that put you at greatest risk for smoking. For some people, it is hard to have a drink with friends without smoking. For others, they might skip a coffee break with coworkers who smoke. ? Change your daily routine. Take a different route to work or eat a meal in a different place. · Cut down on stress. Calm yourself or release tension by doing an activity you enjoy, such as reading a book, taking a hot bath, or gardening. · Talk to your doctor or pharmacist about nicotine replacement therapy, which replaces the nicotine in your body. You still get nicotine but you do not use tobacco. Nicotine replacement products help you slowly reduce the amount of nicotine you need. These products come in several forms, many of them available over-the-counter:  ? Nicotine patches  ? Nicotine gum and lozenges  ? Nicotine inhaler  · Ask your doctor about bupropion (Wellbutrin) or varenicline (Chantix), which are prescription medicines. They do not contain nicotine. They help you by reducing withdrawal symptoms, such as stress and anxiety. · Some people find hypnosis, acupuncture, and massage helpful for ending the smoking habit. · Eat a healthy diet and get regular exercise.  Having healthy habits will help your body move past its craving for

## 2018-11-12 ENCOUNTER — TELEPHONE (OUTPATIENT)
Dept: CARDIOLOGY CLINIC | Age: 53
End: 2018-11-12

## 2018-12-26 RX ORDER — FUROSEMIDE 20 MG/1
20 TABLET ORAL DAILY
Qty: 60 TABLET | Refills: 3 | Status: SHIPPED | OUTPATIENT
Start: 2018-12-26 | End: 2019-03-28 | Stop reason: SDUPTHER

## 2019-01-24 ENCOUNTER — TELEPHONE (OUTPATIENT)
Dept: FAMILY MEDICINE CLINIC | Age: 54
End: 2019-01-24

## 2019-01-25 RX ORDER — BENZONATATE 100 MG/1
100 CAPSULE ORAL 3 TIMES DAILY PRN
Qty: 30 CAPSULE | Refills: 0 | Status: SHIPPED | OUTPATIENT
Start: 2019-01-25 | End: 2019-02-01

## 2019-01-30 DIAGNOSIS — I10 ESSENTIAL HYPERTENSION: ICD-10-CM

## 2019-01-30 RX ORDER — LISINOPRIL 40 MG/1
TABLET ORAL
Qty: 30 TABLET | Refills: 3 | Status: SHIPPED | OUTPATIENT
Start: 2019-01-30 | End: 2019-08-26 | Stop reason: SDUPTHER

## 2019-02-08 PROBLEM — I27.20 PULMONARY HTN (HCC): Status: RESOLVED | Noted: 2017-11-29 | Resolved: 2019-02-08

## 2019-02-11 ENCOUNTER — OFFICE VISIT (OUTPATIENT)
Dept: FAMILY MEDICINE CLINIC | Age: 54
End: 2019-02-11
Payer: COMMERCIAL

## 2019-02-11 VITALS
OXYGEN SATURATION: 97 % | RESPIRATION RATE: 18 BRPM | HEIGHT: 64 IN | SYSTOLIC BLOOD PRESSURE: 150 MMHG | BODY MASS INDEX: 40.12 KG/M2 | DIASTOLIC BLOOD PRESSURE: 92 MMHG | WEIGHT: 235 LBS | HEART RATE: 61 BPM

## 2019-02-11 DIAGNOSIS — I10 ESSENTIAL HYPERTENSION: ICD-10-CM

## 2019-02-11 DIAGNOSIS — E11.8 TYPE 2 DIABETES MELLITUS WITH COMPLICATION, WITHOUT LONG-TERM CURRENT USE OF INSULIN (HCC): Primary | ICD-10-CM

## 2019-02-11 DIAGNOSIS — J44.9 COPD, SEVERE (HCC): ICD-10-CM

## 2019-02-11 DIAGNOSIS — Z86.010 HX OF COLONIC POLYPS: ICD-10-CM

## 2019-02-11 DIAGNOSIS — Z72.0 TOBACCO USE: ICD-10-CM

## 2019-02-11 LAB
CREATININE URINE: 52.5 MG/DL (ref 39–259)
HBA1C MFR BLD: 6.4 %
MICROALBUMIN UR-MCNC: 2.7 MG/DL
MICROALBUMIN/CREAT UR-RTO: 51.4 MG/G (ref 0–30)

## 2019-02-11 PROCEDURE — 3017F COLORECTAL CA SCREEN DOC REV: CPT | Performed by: INTERNAL MEDICINE

## 2019-02-11 PROCEDURE — 3044F HG A1C LEVEL LT 7.0%: CPT | Performed by: INTERNAL MEDICINE

## 2019-02-11 PROCEDURE — 3023F SPIROM DOC REV: CPT | Performed by: INTERNAL MEDICINE

## 2019-02-11 PROCEDURE — 4004F PT TOBACCO SCREEN RCVD TLK: CPT | Performed by: INTERNAL MEDICINE

## 2019-02-11 PROCEDURE — 83036 HEMOGLOBIN GLYCOSYLATED A1C: CPT | Performed by: INTERNAL MEDICINE

## 2019-02-11 PROCEDURE — G8482 FLU IMMUNIZE ORDER/ADMIN: HCPCS | Performed by: INTERNAL MEDICINE

## 2019-02-11 PROCEDURE — G8417 CALC BMI ABV UP PARAM F/U: HCPCS | Performed by: INTERNAL MEDICINE

## 2019-02-11 PROCEDURE — G8427 DOCREV CUR MEDS BY ELIG CLIN: HCPCS | Performed by: INTERNAL MEDICINE

## 2019-02-11 PROCEDURE — G8926 SPIRO NO PERF OR DOC: HCPCS | Performed by: INTERNAL MEDICINE

## 2019-02-11 PROCEDURE — 99214 OFFICE O/P EST MOD 30 MIN: CPT | Performed by: INTERNAL MEDICINE

## 2019-02-11 PROCEDURE — 2022F DILAT RTA XM EVC RTNOPTHY: CPT | Performed by: INTERNAL MEDICINE

## 2019-02-11 RX ORDER — ALBUTEROL SULFATE 90 UG/1
2 AEROSOL, METERED RESPIRATORY (INHALATION) EVERY 6 HOURS PRN
Qty: 1 INHALER | Refills: 3 | Status: SHIPPED | OUTPATIENT
Start: 2019-02-11 | End: 2021-01-20

## 2019-02-11 ASSESSMENT — PATIENT HEALTH QUESTIONNAIRE - PHQ9
1. LITTLE INTEREST OR PLEASURE IN DOING THINGS: 0
SUM OF ALL RESPONSES TO PHQ QUESTIONS 1-9: 0
SUM OF ALL RESPONSES TO PHQ9 QUESTIONS 1 & 2: 0
2. FEELING DOWN, DEPRESSED OR HOPELESS: 0
SUM OF ALL RESPONSES TO PHQ QUESTIONS 1-9: 0

## 2019-02-14 RX ORDER — SIMVASTATIN 20 MG
20 TABLET ORAL EVERY EVENING
Qty: 90 TABLET | Refills: 0 | Status: SHIPPED | OUTPATIENT
Start: 2019-02-14 | End: 2019-06-13 | Stop reason: SDUPTHER

## 2019-03-11 ENCOUNTER — OFFICE VISIT (OUTPATIENT)
Dept: FAMILY MEDICINE CLINIC | Age: 54
End: 2019-03-11
Payer: COMMERCIAL

## 2019-03-11 VITALS
DIASTOLIC BLOOD PRESSURE: 80 MMHG | HEIGHT: 64 IN | WEIGHT: 238 LBS | BODY MASS INDEX: 40.63 KG/M2 | OXYGEN SATURATION: 92 % | SYSTOLIC BLOOD PRESSURE: 150 MMHG | HEART RATE: 67 BPM | RESPIRATION RATE: 20 BRPM

## 2019-03-11 DIAGNOSIS — I10 ESSENTIAL HYPERTENSION: ICD-10-CM

## 2019-03-11 DIAGNOSIS — J01.00 ACUTE MAXILLARY SINUSITIS, RECURRENCE NOT SPECIFIED: Primary | ICD-10-CM

## 2019-03-11 DIAGNOSIS — Z72.0 TOBACCO USE: ICD-10-CM

## 2019-03-11 DIAGNOSIS — J44.9 COPD, SEVERE (HCC): ICD-10-CM

## 2019-03-11 DIAGNOSIS — G47.33 OSA (OBSTRUCTIVE SLEEP APNEA): ICD-10-CM

## 2019-03-11 PROCEDURE — G8427 DOCREV CUR MEDS BY ELIG CLIN: HCPCS | Performed by: INTERNAL MEDICINE

## 2019-03-11 PROCEDURE — 4004F PT TOBACCO SCREEN RCVD TLK: CPT | Performed by: INTERNAL MEDICINE

## 2019-03-11 PROCEDURE — G8482 FLU IMMUNIZE ORDER/ADMIN: HCPCS | Performed by: INTERNAL MEDICINE

## 2019-03-11 PROCEDURE — G8417 CALC BMI ABV UP PARAM F/U: HCPCS | Performed by: INTERNAL MEDICINE

## 2019-03-11 PROCEDURE — 3023F SPIROM DOC REV: CPT | Performed by: INTERNAL MEDICINE

## 2019-03-11 PROCEDURE — 99213 OFFICE O/P EST LOW 20 MIN: CPT | Performed by: INTERNAL MEDICINE

## 2019-03-11 PROCEDURE — 3017F COLORECTAL CA SCREEN DOC REV: CPT | Performed by: INTERNAL MEDICINE

## 2019-03-11 PROCEDURE — G8926 SPIRO NO PERF OR DOC: HCPCS | Performed by: INTERNAL MEDICINE

## 2019-03-11 RX ORDER — AMOXICILLIN 500 MG/1
500 CAPSULE ORAL 2 TIMES DAILY
Qty: 20 CAPSULE | Refills: 0 | Status: SHIPPED | OUTPATIENT
Start: 2019-03-11 | End: 2019-03-21

## 2019-03-11 RX ORDER — OXYMETAZOLINE HYDROCHLORIDE 0.05 G/100ML
2 SPRAY NASAL 2 TIMES DAILY
Qty: 1 BOTTLE | Refills: 0 | Status: SHIPPED | OUTPATIENT
Start: 2019-03-11 | End: 2020-03-10

## 2019-03-11 RX ORDER — PREDNISONE 20 MG/1
TABLET ORAL
Qty: 9 TABLET | Refills: 0 | Status: SHIPPED | OUTPATIENT
Start: 2019-03-11 | End: 2019-04-25 | Stop reason: ALTCHOICE

## 2019-03-28 DIAGNOSIS — I10 ESSENTIAL HYPERTENSION: Primary | ICD-10-CM

## 2019-03-28 RX ORDER — FUROSEMIDE 20 MG/1
TABLET ORAL
Qty: 30 TABLET | Refills: 0 | Status: SHIPPED | OUTPATIENT
Start: 2019-03-28 | End: 2019-05-30 | Stop reason: SDUPTHER

## 2019-04-25 ENCOUNTER — OFFICE VISIT (OUTPATIENT)
Dept: FAMILY MEDICINE CLINIC | Age: 54
End: 2019-04-25
Payer: COMMERCIAL

## 2019-04-25 VITALS
SYSTOLIC BLOOD PRESSURE: 150 MMHG | WEIGHT: 239 LBS | HEART RATE: 58 BPM | DIASTOLIC BLOOD PRESSURE: 90 MMHG | HEIGHT: 64 IN | BODY MASS INDEX: 40.8 KG/M2 | OXYGEN SATURATION: 92 % | RESPIRATION RATE: 16 BRPM

## 2019-04-25 DIAGNOSIS — I10 ESSENTIAL HYPERTENSION: Primary | ICD-10-CM

## 2019-04-25 DIAGNOSIS — Z72.0 TOBACCO USE: ICD-10-CM

## 2019-04-25 DIAGNOSIS — E11.8 TYPE 2 DIABETES MELLITUS WITH COMPLICATION, WITHOUT LONG-TERM CURRENT USE OF INSULIN (HCC): ICD-10-CM

## 2019-04-25 DIAGNOSIS — J44.9 COPD, SEVERE (HCC): ICD-10-CM

## 2019-04-25 DIAGNOSIS — E78.2 MIXED HYPERLIPIDEMIA: ICD-10-CM

## 2019-04-25 LAB
ALBUMIN SERPL-MCNC: 4.2 G/DL (ref 3.4–5)
ALP BLD-CCNC: 101 U/L (ref 40–129)
ALT SERPL-CCNC: 16 U/L (ref 10–40)
ANION GAP SERPL CALCULATED.3IONS-SCNC: 11 MMOL/L (ref 3–16)
AST SERPL-CCNC: 13 U/L (ref 15–37)
BILIRUB SERPL-MCNC: 0.5 MG/DL (ref 0–1)
BILIRUBIN DIRECT: <0.2 MG/DL (ref 0–0.3)
BILIRUBIN, INDIRECT: ABNORMAL MG/DL (ref 0–1)
BUN BLDV-MCNC: 15 MG/DL (ref 7–20)
CALCIUM SERPL-MCNC: 9.4 MG/DL (ref 8.3–10.6)
CHLORIDE BLD-SCNC: 102 MMOL/L (ref 99–110)
CHOLESTEROL, TOTAL: 138 MG/DL (ref 0–199)
CO2: 26 MMOL/L (ref 21–32)
CREAT SERPL-MCNC: 0.7 MG/DL (ref 0.9–1.3)
GFR AFRICAN AMERICAN: >60
GFR NON-AFRICAN AMERICAN: >60
GLUCOSE BLD-MCNC: 154 MG/DL (ref 70–99)
HDLC SERPL-MCNC: 42 MG/DL (ref 40–60)
LDL CHOLESTEROL CALCULATED: 81 MG/DL
POTASSIUM SERPL-SCNC: 4.2 MMOL/L (ref 3.5–5.1)
SODIUM BLD-SCNC: 139 MMOL/L (ref 136–145)
TOTAL PROTEIN: 7.6 G/DL (ref 6.4–8.2)
TRIGL SERPL-MCNC: 73 MG/DL (ref 0–150)
VLDLC SERPL CALC-MCNC: 15 MG/DL

## 2019-04-25 PROCEDURE — 3044F HG A1C LEVEL LT 7.0%: CPT | Performed by: INTERNAL MEDICINE

## 2019-04-25 PROCEDURE — 3023F SPIROM DOC REV: CPT | Performed by: INTERNAL MEDICINE

## 2019-04-25 PROCEDURE — G8427 DOCREV CUR MEDS BY ELIG CLIN: HCPCS | Performed by: INTERNAL MEDICINE

## 2019-04-25 PROCEDURE — 4004F PT TOBACCO SCREEN RCVD TLK: CPT | Performed by: INTERNAL MEDICINE

## 2019-04-25 PROCEDURE — G8417 CALC BMI ABV UP PARAM F/U: HCPCS | Performed by: INTERNAL MEDICINE

## 2019-04-25 PROCEDURE — 36415 COLL VENOUS BLD VENIPUNCTURE: CPT | Performed by: INTERNAL MEDICINE

## 2019-04-25 PROCEDURE — G8926 SPIRO NO PERF OR DOC: HCPCS | Performed by: INTERNAL MEDICINE

## 2019-04-25 PROCEDURE — 2022F DILAT RTA XM EVC RTNOPTHY: CPT | Performed by: INTERNAL MEDICINE

## 2019-04-25 PROCEDURE — 99213 OFFICE O/P EST LOW 20 MIN: CPT | Performed by: INTERNAL MEDICINE

## 2019-04-25 PROCEDURE — 3017F COLORECTAL CA SCREEN DOC REV: CPT | Performed by: INTERNAL MEDICINE

## 2019-04-25 NOTE — LETTER
[address] [facility] [address] [id number]  Phone: [phone]  Fax: [phone]    Nadia Sarah MD        April 29, 2019    Huong Seaman  [address] [facility] [id number] [place] [address]      Dear Danielle Blue:    I'm sorry I am unable to reach you. Your cholesterol is great. Your liver tests are good and kidney function stable. Please let me know if you found \"free\" satin, BP med and if so where to send your scripts to. If you have any questions or concerns, please don't hesitate to call.     Sincerely,        Nadia Sarah MD

## 2019-04-25 NOTE — PROGRESS NOTES
HPI: Darshana Garcia presents for lipid profile and hypertension. Chronic health issues include tobacco addiction, COPD, hypertension, and left ventricular hypertrophy, poor dentition, elevated A1c and carpal tunnel.           Hypertension diagnosed 2012. Echocardiogram April 2017 with LVH, ejection fraction 55% no abnormal wall motion. Positive ventricular enlargement and pulmonary hypertension. Now on BiPAP. Nocturnal oxygen discontinued. Follows with pulmonology. Negative coronary disease on cath. He denies any chest pain lower extremity edema palpitations no TIA symptoms or claudication. Blood pressure been up. He is out of his Lasix and lisinopril today. States he has a cuff at home. Fasting for his cholesterol panel. Intolerant of simvastatin      Positive occult with adenomatous polyp. Recheck 2023 no blood grossly in the stool.     COPD. Follows with pulmonary. Sleep apnea. Positive Symbicort. Wife has nebulizer. Is using twice a day nebulizer. .  Uses their money to buy his wife's medicines. .      Poor dentition. Unable to afford dentist.     A1c ranging between 6.3 and 6.7. BMI 40 and stable. Uses no medication currently. Does not follow diet. Is active. Denies polyuria. Is behind on eye exams. No microalbumin.        PMH   right CTS   Pneumohemothorax post rib fracture   Hypoxemia        SH make sausages.  + tobacco. No alcohol. No drugs. No STD concerns. . No exercise outside of work. Wife had  breast cancer and is chronically ill with COPD      FH: 6 brothers 1 sisters   + DM, Heart disease,father lymph node cancer    - colon, prostate cancer     Review of systems: Sleep apnea, chronic sinusitis, ear pain, poor dentition, COPD with baseline dyspnea, no chest pain with exertion. Denies GE reflux or bloody stools. I'll endoscopy 2018 negative cancer. 2 polyps removed  No STD concerns.  Generalized arthralgias.       Constitutional, ent, CV, respiratory, GI, , joint, skin, allergic and psychiatric ROS reviewed and negative except for above    Allergies   Allergen Reactions    Asa [Aspirin]     Robitussin (Alcohol Free) [Guaifenesin]        Outpatient Medications Marked as Taking for the 4/25/19 encounter (Office Visit) with Byron Ureña MD   Medication Sig Dispense Refill    simvastatin (ZOCOR) 20 MG tablet Take 1 tablet by mouth every evening Lab on medication in 2 months 90 tablet 0    spironolactone (ALDACTONE) 25 MG tablet 1 po q day 90 tablet 1    metoprolol tartrate (LOPRESSOR) 25 MG tablet TAKE ONE TABLET BY MOUTH TWICE A DAY 60 tablet 4    budesonide-formoterol (SYMBICORT) 160-4.5 MCG/ACT AERO Inhale 2 puffs into the lungs 2 times daily 1 Inhaler 6    amLODIPine (NORVASC) 10 MG tablet 1 po q day note change dose 90 tablet 1     Ran out of simvastatin and Lasix.  today        Past Medical History:   Diagnosis Date    Acute on chronic diastolic congestive heart failure ([address]     ADHD (attention deficit hyperactivity disorder)     Adrenal nodule ([address] 12/7/2017    low-density nodular enlargement of a left adrenal gland which is stable since 2007      COPD (chronic obstructive pulmonary disease) ([address]     Emphysema of lung ([address]     Essential hypertension 11/30/2017    History of pneumothorax 11/30/2017    Rib fx    Hx of colonic polyps 1/18/2018    1.2018 Descending Polyp 2. Sigmoid Polyp 3.  Sigmoid Diverticulosis recheck 2023    Hx of renal calculi 11/30/2017    Hypertension     Kidney stone     Neuropathy     Obesity     Osteoarthritis     Pneumothorax     2016 d/t fall     Prediabetes 12/13/2017    Sleep apnea     Substance abuse ([address]     Type 2 diabetes mellitus without complication ([address]        Past Surgical History:   Procedure Laterality Date    CARPAL TUNNEL RELEASE      COLONOSCOPY  01/18/2018    Cronely, x2 polyps    KNEE SURGERY      arthroscopic             Family History   Problem Relation Age of Onset    Diabetes Mother     Cancer

## 2019-04-25 NOTE — PATIENT INSTRUCTIONS
Check BP on medication  Check with Aidee Jean Baptiste for free drugs    Statin, coreg, lisinopril, lasix most likely ones to be free. I can switch to other medications in same class if free.

## 2019-05-03 ENCOUNTER — TELEPHONE (OUTPATIENT)
Dept: FAMILY MEDICINE CLINIC | Age: 54
End: 2019-05-03

## 2019-05-03 NOTE — TELEPHONE ENCOUNTER
PT called in for recent labs, I gave all available notes. \"Cholesterol great. Liver tests good.  Kidney function stable  Did he find \"free\" satin, BP med and if so where to send scripts\"    PT said he's using the [facility] in [facility] [place]

## 2019-05-03 NOTE — TELEPHONE ENCOUNTER
i'm not sure what this means. I believe he has written scripts for zocor and atorvastatin.   He was going to see if he could find medication free and let me know where and which to continue

## 2019-06-13 DIAGNOSIS — E78.2 MIXED HYPERLIPIDEMIA: Primary | ICD-10-CM

## 2019-06-13 RX ORDER — SIMVASTATIN 20 MG
TABLET ORAL
Qty: 30 TABLET | Refills: 0 | Status: SHIPPED | OUTPATIENT
Start: 2019-06-13 | End: 2019-07-24 | Stop reason: SDUPTHER

## 2019-06-26 ENCOUNTER — TELEPHONE (OUTPATIENT)
Dept: FAMILY MEDICINE CLINIC | Age: 54
End: 2019-06-26

## 2019-06-26 NOTE — TELEPHONE ENCOUNTER
States he has tendonitis in left wrist. It is swollen and painful. Was wanting an appt for tomorrow morning, no availability. Pt is wanting to know if you would call in Rx for an anti-inflammatory med? [facility] [place]  Please call pt's wife back at his request due to having phone issues at [phone]

## 2019-06-27 ENCOUNTER — OFFICE VISIT (OUTPATIENT)
Dept: FAMILY MEDICINE CLINIC | Age: 54
End: 2019-06-27
Payer: COMMERCIAL

## 2019-06-27 VITALS
WEIGHT: 241 LBS | HEART RATE: 62 BPM | OXYGEN SATURATION: 90 % | BODY MASS INDEX: 41.15 KG/M2 | HEIGHT: 64 IN | SYSTOLIC BLOOD PRESSURE: 180 MMHG | RESPIRATION RATE: 16 BRPM | DIASTOLIC BLOOD PRESSURE: 84 MMHG

## 2019-06-27 DIAGNOSIS — Z72.0 TOBACCO USE: ICD-10-CM

## 2019-06-27 DIAGNOSIS — I10 ESSENTIAL HYPERTENSION: ICD-10-CM

## 2019-06-27 DIAGNOSIS — Z86.010 HX OF COLONIC POLYPS: ICD-10-CM

## 2019-06-27 DIAGNOSIS — J44.9 COPD, SEVERE (HCC): ICD-10-CM

## 2019-06-27 DIAGNOSIS — M77.9 TENDONITIS: Primary | ICD-10-CM

## 2019-06-27 PROCEDURE — 3017F COLORECTAL CA SCREEN DOC REV: CPT | Performed by: INTERNAL MEDICINE

## 2019-06-27 PROCEDURE — G8926 SPIRO NO PERF OR DOC: HCPCS | Performed by: INTERNAL MEDICINE

## 2019-06-27 PROCEDURE — G8427 DOCREV CUR MEDS BY ELIG CLIN: HCPCS | Performed by: INTERNAL MEDICINE

## 2019-06-27 PROCEDURE — 3023F SPIROM DOC REV: CPT | Performed by: INTERNAL MEDICINE

## 2019-06-27 PROCEDURE — 99213 OFFICE O/P EST LOW 20 MIN: CPT | Performed by: INTERNAL MEDICINE

## 2019-06-27 PROCEDURE — G8417 CALC BMI ABV UP PARAM F/U: HCPCS | Performed by: INTERNAL MEDICINE

## 2019-06-27 PROCEDURE — 4004F PT TOBACCO SCREEN RCVD TLK: CPT | Performed by: INTERNAL MEDICINE

## 2019-06-27 RX ORDER — AMLODIPINE BESYLATE 10 MG/1
TABLET ORAL
Qty: 90 TABLET | Refills: 1 | Status: SHIPPED | OUTPATIENT
Start: 2019-06-27 | End: 2020-01-27 | Stop reason: SDUPTHER

## 2019-06-27 RX ORDER — PREDNISONE 20 MG/1
TABLET ORAL
Qty: 9 TABLET | Refills: 0 | Status: SHIPPED | OUTPATIENT
Start: 2019-06-27 | End: 2019-08-12

## 2019-06-27 NOTE — PROGRESS NOTES
HPI: Manuela Andre presents for left wrist pain. Chronic health issues include tobacco addiction, COPD, hypertension, and left ventricular hypertrophy, poor dentition, elevated A1c and carpal tunnel, obesity. Left forearm pain over the last week. Mild induration and erythema. No trauma other than repetitive motion. History of tendinitis in the past.  Wish to have a nonsteroidal anti-inflammatory. Has using Motrin without improvement. Started using a brace. No fevers or chills. No history of gout. No weakness. Better with rest.    Financial constraints. Did not fill blood pressure medicines until having [employer]. Hypertension 2012 echocardiogram April 2017 with left ventricular hypertrophy, ejection fraction 55% and positive ventricular enlargement with pulmonary hypertension. On BiPAP. Discontinued nocturnal oxygen. Follows with pulmonary. Negative coronary disease on cath. Currently taking simvastatin intermittently. Positive wheeze. Uses Symbicort. There is a nebulizer at home for his wife but he does not use this currently. Feels like breathing is average. Continues to smoke.     Positive occult with adenomatous polyp. Recheck 2023 no blood grossly in the stool.     COPD. Follows with pulmonary. Sleep apnea. Positive Symbicort. .. Uses their money to buy his wife's medicines. Often goes without his. .      Poor dentition. Unable to afford dentist.     A1c ranging between 6.3 and 6.7. BMI greater than 41 and up. Does not follow diet. Is active. Denies polyuria. Is behind on eye exams. No microalbumin. 2018.        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 .  + tobacco. No alcohol. No drugs. No STD concerns. . No exercise outside of work.  Wife had  breast cancer and is chronically ill with COPD     FH: 6 brothers 1 sisters   + DM, Heart disease,father lymph node cancer    - colon, prostate cancer     Review of systems: Sleep apnea, chronic sinusitis, ear pain, poor dentition, COPD with baseline dyspnea, obesity. No chest pain with exertion. Denies GE reflux or bloody stools. A lot of adenomatous polyps 2018.  No STD concerns. Generalized arthralgias.       Constitutional, ent, CV, respiratory, GI, , joint, skin, allergic and psychiatric ROS reviewed and negative except for above    Allergies   Allergen Reactions    Asa [Aspirin]     Robitussin (Alcohol Free) [Guaifenesin]        Outpatient Medications Marked as Taking for the 6/27/19 encounter (Office Visit) with Kimmy Freeman MD   Medication Sig Dispense Refill    albuterol sulfate HFA (PROAIR HFA) 108 (90 Base) MCG/ACT inhaler Inhale 2 puffs into the lungs every 6 hours as needed for Wheezing or Shortness of Breath 1 Inhaler 3    budesonide-formoterol (SYMBICORT) 160-4.5 MCG/ACT AERO Inhale 2 puffs into the lungs 2 times daily 1 Inhaler 6             Past Medical History:   Diagnosis Date    Acute on chronic diastolic congestive heart failure (HCC)     ADHD (attention deficit hyperactivity disorder)     Adrenal nodule ([address] 12/7/2017    low-density nodular enlargement of a left adrenal gland which is stable since 2007      COPD (chronic obstructive pulmonary disease) ([address]     Emphysema of lung ([address]     Essential hypertension 11/30/2017    History of pneumothorax 11/30/2017    Rib fx    Hx of colonic polyps 1/18/2018    1.2018 Descending Polyp 2. Sigmoid Polyp 3.  Sigmoid Diverticulosis recheck 2023    Hx of renal calculi 11/30/2017    Hypertension     Kidney stone     Neuropathy     Obesity     Osteoarthritis     Pneumothorax     2016 d/t fall     Prediabetes 12/13/2017    Sleep apnea     Substance abuse ([address]     Type 2 diabetes mellitus without complication ([address]        Past Surgical History:   Procedure Laterality Date    CARPAL TUNNEL RELEASE      COLONOSCOPY  01/18/2018    Cronely, x2 polyps    KNEE SURGERY

## 2019-08-26 DIAGNOSIS — I10 ESSENTIAL HYPERTENSION: ICD-10-CM

## 2019-08-26 RX ORDER — LISINOPRIL 40 MG/1
TABLET ORAL
Qty: 90 TABLET | Refills: 1 | Status: SHIPPED | OUTPATIENT
Start: 2019-08-26 | End: 2020-01-27 | Stop reason: SDUPTHER

## 2019-09-19 RX ORDER — SPIRONOLACTONE 25 MG/1
TABLET ORAL
Qty: 30 TABLET | Refills: 0 | Status: SHIPPED | OUTPATIENT
Start: 2019-09-19 | End: 2020-01-27 | Stop reason: SDUPTHER

## 2019-10-05 DIAGNOSIS — I10 ESSENTIAL HYPERTENSION: ICD-10-CM

## 2019-10-07 RX ORDER — AMLODIPINE BESYLATE 10 MG/1
TABLET ORAL
Qty: 90 TABLET | Refills: 1 | OUTPATIENT
Start: 2019-10-07

## 2019-10-09 DIAGNOSIS — I10 ESSENTIAL HYPERTENSION: ICD-10-CM

## 2019-10-09 RX ORDER — AMLODIPINE BESYLATE 10 MG/1
TABLET ORAL
Qty: 90 TABLET | Refills: 1 | OUTPATIENT
Start: 2019-10-09

## 2019-10-31 ENCOUNTER — NURSE ONLY (OUTPATIENT)
Dept: FAMILY MEDICINE CLINIC | Age: 54
End: 2019-10-31
Payer: COMMERCIAL

## 2019-10-31 DIAGNOSIS — Z23 NEED FOR INFLUENZA VACCINATION: Primary | ICD-10-CM

## 2019-10-31 PROCEDURE — 90686 IIV4 VACC NO PRSV 0.5 ML IM: CPT | Performed by: INTERNAL MEDICINE

## 2019-10-31 PROCEDURE — 90471 IMMUNIZATION ADMIN: CPT | Performed by: INTERNAL MEDICINE

## 2019-11-23 DIAGNOSIS — I10 ESSENTIAL HYPERTENSION: ICD-10-CM

## 2019-11-25 RX ORDER — FUROSEMIDE 20 MG/1
TABLET ORAL
Qty: 30 TABLET | Refills: 3 | Status: SHIPPED | OUTPATIENT
Start: 2019-11-25 | End: 2020-01-27 | Stop reason: SDUPTHER

## 2020-01-27 ENCOUNTER — NURSE ONLY (OUTPATIENT)
Dept: FAMILY MEDICINE CLINIC | Age: 55
End: 2020-01-27

## 2020-01-27 VITALS — SYSTOLIC BLOOD PRESSURE: 196 MMHG | DIASTOLIC BLOOD PRESSURE: 90 MMHG

## 2020-01-27 RX ORDER — SPIRONOLACTONE 25 MG/1
TABLET ORAL
Qty: 90 TABLET | Refills: 1 | Status: SHIPPED | OUTPATIENT
Start: 2020-01-27 | End: 2020-09-15

## 2020-01-27 RX ORDER — AMLODIPINE BESYLATE 10 MG/1
TABLET ORAL
Qty: 90 TABLET | Refills: 1 | Status: SHIPPED | OUTPATIENT
Start: 2020-01-27 | End: 2020-09-15

## 2020-01-27 RX ORDER — LISINOPRIL 40 MG/1
TABLET ORAL
Qty: 90 TABLET | Refills: 1 | Status: SHIPPED | OUTPATIENT
Start: 2020-01-27 | End: 2020-09-15

## 2020-01-27 RX ORDER — FUROSEMIDE 20 MG/1
TABLET ORAL
Qty: 90 TABLET | Refills: 1 | Status: SHIPPED | OUTPATIENT
Start: 2020-01-27 | End: 2020-09-15

## 2020-01-27 RX ORDER — BUDESONIDE AND FORMOTEROL FUMARATE DIHYDRATE 160; 4.5 UG/1; UG/1
2 AEROSOL RESPIRATORY (INHALATION) 2 TIMES DAILY
Qty: 1 INHALER | Refills: 6 | Status: SHIPPED | OUTPATIENT
Start: 2020-01-27 | End: 2020-09-15

## 2020-01-27 NOTE — PROGRESS NOTES
Patient seen today for bp check. Confirmed patient has taken metoprolol and lisinopril last night. He has ran out of amlodipine. 1st reading is 196/90    Dr. Chelsea Hays discussed instruction with patient.

## 2020-03-25 NOTE — PROGRESS NOTES
NOTE - this visit conducted via audiovisual means : Stan, Doxy, etc, in accordance with CMS guidelines. Visit initiated at patient or caregiver request with permission to bill to insurer granted. Chronic health issues include tobacco addiction, COPD, hypertension, and left ventricular hypertrophy, poor dentition, elevated A1c and carpal tunnel, obesity. Patient is notes over the last 2 days he had some swelling and pain in the jaw. History of poor dentition. Has had a tooth abscess in the past.  It of tooth pain. Financial constraints make it difficult for him to seek dental care. Feels like this is what is going on currently. Is hoping to have medicine. No systemic symptoms. States that he has not been checking his sugars or his blood pressure however been taking his medication. Has been using Motrin. Able to eat and drink. Symptoms seem to be mildly improved today.         Hypertension 2012 echocardiogram April 2017 with left ventricular hypertrophy, ejection fraction 55% and positive ventricular enlargement with pulmonary hypertension. On BiPAP. Discontinued nocturnal oxygen. Follows with pulmonary. Negative coronary disease on cath. Currently taking simvastatin intermittently. Positive wheeze. Uses Symbicort. There is a nebulizer at home for his wife but he does not use this currently. Feels like breathing is average. Continues to smoke.  Medication noncompliance intermittently secondary to financial issues. Not checking his blood pressure. Denies any edema or increased exercise intolerance no chest pain. Needs a refill on his statin     Positive occult with adenomatous polyp. Recheck 2023 no blood grossly in the stool.     COPD. Follows with pulmonary. Sleep apnea. Positive Symbicort. ..  Uses their money to buy his wife's medicines. Often goes without his. .       A1c ranging between 6.3 and 6.7. BMI greater than 41 and up. Does not follow diet. Is active. Denies polyuria.  Is behind on eye exams. No microalbumin. 2018. Wishes to have a refill on his simvastatin.        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 .  + tobacco. No alcohol. No drugs. No STD concerns. . No exercise outside of work. Wife had  breast cancer and is chronically ill with COPD      FH: 6 brothers 1 sisters   + DM, Heart disease,father lymph node cancer    - colon, prostate cancer     Review of systems: Sleep apnea, chronic sinusitis, ear pain, poor dentition, COPD with baseline dyspnea, obesity. No chest pain with exertion. Denies GE reflux or bloody stools. A lot of adenomatous polyps 2018.  No STD concerns.  Generalized arthralgias.     Constitutional, ent, CV, respiratory, GI, , joint, skin, allergic and psychiatric ROS reviewed and negative except for above    Allergies   Allergen Reactions    Asa [Aspirin]     Robitussin (Alcohol Free) [Guaifenesin]        Outpatient Medications Marked as Taking for the 3/26/20 encounter (Virtual Visit) with Maria Elena Perez MD   Medication Sig Dispense Refill    simvastatin (ZOCOR) 20 MG tablet TAKE 1 TABLET BY MOUTH EVERY DAY IN THE EVENING 30 tablet 9    amLODIPine (NORVASC) 10 MG tablet TAKE ONE TABLET BY MOUTH DAILY 90 tablet 1    spironolactone (ALDACTONE) 25 MG tablet TAKE 1 TABLET BY MOUTH EVERY DAY 90 tablet 1    lisinopril (PRINIVIL;ZESTRIL) 40 MG tablet TAKE ONE TABLET BY MOUTH ONCE DAILY 90 tablet 1    furosemide (LASIX) 20 MG tablet TAKE 1 TABLET BY MOUTH EVERY DAY 90 tablet 1    metoprolol tartrate (LOPRESSOR) 25 MG tablet TAKE ONE TABLET BY MOUTH TWICE A  tablet 1    budesonide-formoterol (SYMBICORT) 160-4.5 MCG/ACT AERO Inhale 2 puffs into the lungs 2 times daily 1 Inhaler 6    albuterol sulfate HFA (PROAIR HFA) 108 (90 Base) MCG/ACT inhaler Inhale 2 puffs into the lungs every 6 hours as needed for Wheezing or Shortness of Breath 1 Inhaler 3             Past Medical History:   Diagnosis Date    Acute on chronic diastolic congestive heart failure ([address]     ADHD (attention deficit hyperactivity disorder)     Adrenal nodule ([address] 12/7/2017    low-density nodular enlargement of a left adrenal gland which is stable since 2007      COPD (chronic obstructive pulmonary disease) ([address]     [facility]     Essential hypertension 11/30/2017    History of pneumothorax 11/30/2017    Rib fx    Hx of colonic polyps 1/18/2018    1.2018 Descending Polyp 2. Sigmoid Polyp 3. Sigmoid Diverticulosis recheck 2023    Hx of renal calculi 11/30/2017    Hypertension     Kidney stone     Neuropathy     Obesity     Osteoarthritis     Pneumothorax     2016 d/t fall     Prediabetes 12/13/2017    Sleep apnea     Substance abuse ([facility]     Type 2 diabetes mellitus without complication ([facility]        Past Surgical History:   Procedure Laterality Date    CARPAL TUNNEL RELEASE      COLONOSCOPY  01/18/2018    Cronely, x2 polyps    KNEE SURGERY      arthroscopic             Family History   Problem Relation Age of Onset    Diabetes Mother     Cancer Mother     Cancer Father     No Known Problems Sister     Diabetes Brother     No Known Problems Brother     No Known Problems Brother     No Known Problems Brother     No Known Problems Brother     No Known Problems Brother          Review of Systems      Chemistry        Component Value Date/Time     04/25/2019 0825    K 4.2 04/25/2019 0825     04/25/2019 0825    CO2 26 04/25/2019 0825    BUN 15 04/25/2019 0825    CREATININE 0.7 (L) 04/25/2019 0825        Component Value Date/Time    CALCIUM 9.4 04/25/2019 0825    ALKPHOS 101 04/25/2019 0825    AST 13 (L) 04/25/2019 0825    ALT 16 04/25/2019 0825    BILITOT 0.5 04/25/2019 0825            NO vitals  as this was a virtual visit during cvod19 pandemic  Visualization he has slightly swollen left jaw. Mild erythema. Talks without dysarthria. Occasional cough.

## 2020-03-26 ENCOUNTER — VIRTUAL VISIT (OUTPATIENT)
Dept: FAMILY MEDICINE CLINIC | Age: 55
End: 2020-03-26
Payer: COMMERCIAL

## 2020-03-26 PROCEDURE — 99212 OFFICE O/P EST SF 10 MIN: CPT | Performed by: INTERNAL MEDICINE

## 2020-03-26 RX ORDER — PENICILLIN V POTASSIUM 500 MG/1
500 TABLET ORAL 4 TIMES DAILY
Qty: 40 TABLET | Refills: 0 | Status: SHIPPED | OUTPATIENT
Start: 2020-03-26 | End: 2020-04-05

## 2020-03-26 RX ORDER — SIMVASTATIN 20 MG
TABLET ORAL
Qty: 30 TABLET | Refills: 9 | Status: SHIPPED | OUTPATIENT
Start: 2020-03-26 | End: 2020-12-29 | Stop reason: SDUPTHER

## 2020-07-14 ENCOUNTER — TELEPHONE (OUTPATIENT)
Dept: FAMILY MEDICINE CLINIC | Age: 55
End: 2020-07-14

## 2020-07-14 NOTE — TELEPHONE ENCOUNTER
Requesting refill on albuterol for nebulizer. [facility] [place]  Pt is reachable at [phone]

## 2020-07-28 NOTE — PROGRESS NOTES
HPI: Oswaldo Brennan presents for follow up. Chronic health issues include tobacco addiction, COPD, hypertension, and left ventricular hypertrophy, poor dentition, elevated A1c and carpal tunnel, obesity,     States over last week and a half his left leg is been swollen some erythema warmth. No trauma. Says this is happened before. No history of clots. No coagulopathy. No known cancer. Not immobile. No increased shortness of breath. No fevers or chills. Is increased his Aldactone. States this is made it a bit better      Hypertension  echocardiogram 2017 with left ventricular hypertrophy, ejection fraction 55% and positive ventricular enlargement with pulmonary hypertension.  On BiPAP.  Discontinued nocturnal oxygen. Claribel Drop coronary disease on cath.  Continue to have elevated blood pressure. Denies any palpitations. No increased exercise intolerance.     Positive occult with adenomatous polyp. Recheck  no blood grossly in the stool.     COPD/SILVANA. Follows with pulmonary. Sleep apnea. BiPAP  Positive Symbicort. financial constraints with intermittent noncompliance .  Does not have a rescue inhaler. Going to  his nebulizer solution today. No productive sputum. Positive wheeze. Baseline shortness of breath. No chest pain.     A1c ranging between 6.3 and 6.7. BMI greater than 41 and up.   Does not follow diet. Is active. Denies polyuria. Is behind on eye exams. + microalb 2.. Grieving. Wife  suddenly. Had multiple close calls previously. However was shocked. Is working 40 hours a week. Notes tearfulness. Walks the dog. Notes some vision changes. Says that he has some changes in field of vision. Long time since he had his eyes examined. No known strokes. No bruits. No vascular studies. Positive tobacco.  LDL 80.   Uncontrolled hypertension.        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 ..  + tobacco 1 PPD. Umesh Grover No alcohol. No drugs. No STD concerns. Wife   No exercise outside of work.        FH: 6 brothers 1 sisters   + DM, Heart disease,father lymph node cancer    - colon, prostate cancer     Review of systems: Sleep apnea, chronic sinusitis, ear pain, poor dentition, COPD with baseline dyspnea, obesity.  No chest pain with exertion. Denies GE reflux or bloody stools.  hx adenomatous polyps 2018.  No STD concerns.  Generalized arthralgias.     Constitutional, ent, CV, respiratory, GI, , joint, skin, allergic and psychiatric ROS reviewed and negative except for above    Allergies   Allergen Reactions    Asa [Aspirin]     Robitussin (Alcohol Free) [Guaifenesin]        Outpatient Medications Marked as Taking for the 20 encounter (Office Visit) with Myke Castillo MD   Medication Sig Dispense Refill    simvastatin (ZOCOR) 20 MG tablet TAKE 1 TABLET BY MOUTH EVERY DAY IN THE EVENING 30 tablet 9    amLODIPine (NORVASC) 10 MG tablet TAKE ONE TABLET BY MOUTH DAILY 90 tablet 1    spironolactone (ALDACTONE) 25 MG tablet TAKE 1 TABLET BY MOUTH EVERY DAY 90 tablet 1    lisinopril (PRINIVIL;ZESTRIL) 40 MG tablet TAKE ONE TABLET BY MOUTH ONCE DAILY 90 tablet 1    furosemide (LASIX) 20 MG tablet TAKE 1 TABLET BY MOUTH EVERY DAY 90 tablet 1    metoprolol tartrate (LOPRESSOR) 25 MG tablet TAKE ONE TABLET BY MOUTH TWICE A  tablet 1    budesonide-formoterol (SYMBICORT) 160-4.5 MCG/ACT AERO Inhale 2 puffs into the lungs 2 times daily 1 Inhaler 6             Past Medical History:   Diagnosis Date    Acute on chronic diastolic congestive heart failure (HCC)     ADHD (attention deficit hyperactivity disorder)     Adrenal nodule (HCC) 2017    low-density nodular enlargement of a left adrenal gland which is stable since       COPD (chronic obstructive pulmonary disease) (HCC)     [facility] [address] [facility]     Essential hypertension 2017    History of pneumothorax 11/30/2017    Rib fx    Hx of colonic polyps 1/18/2018    1.2018 Descending Polyp 2. Sigmoid Polyp 3. Sigmoid Diverticulosis recheck 2023    Hx of renal calculi 11/30/2017    Hypertension     Kidney stone     Neuropathy     Obesity     Osteoarthritis     Pneumothorax     2016 d/t fall     Prediabetes 12/13/2017    Sleep apnea     Substance [facility] [address]     Type 2 diabetes mellitus without complication (HCC)        Past Surgical History:   Procedure Laterality Date    CARPAL TUNNEL RELEASE      COLONOSCOPY  01/18/2018    Cronely, x2 polyps    KNEE SURGERY      arthroscopic             Family History   Problem Relation Age of Onset    Diabetes Mother     Cancer Mother     Cancer Father     No Known Problems Sister     Diabetes Brother     No Known Problems Brother     No Known Problems Brother     No Known Problems Brother     No Known Problems Brother     No Known Problems Brother            Review of Systems          Objective     BP (!) 150/78   Pulse 68   Temp 97.2 °F (36.2 °C)   Resp 16   Ht 5' 4\" (1.626 m)   Wt 246 lb (111.6 kg)   SpO2 90% Comment: RA  BMI 42.23 kg/m²     @LASTSAO2(3)@    Wt Readings from Last 3 Encounters:   06/27/19 241 lb (109.3 kg)   04/25/19 239 lb (108.4 kg)   03/11/19 238 lb (108 kg)       Physical Exam     NAD alert and cooperative tearful. Appropriate. HEENT: Poor dentition. Erythematous pharynx. No masses. Good upstroke of the carotids. No bruits. Lungs increased FRANK ratio without any rhonchi. Rales right base. Cardiovascular exam regular rate and rhythm. No murmur click. No ectopy. Positive obesity. No point tenderness. I cannot detect any hepatosplenomegaly or ascites. Left lower extremity erythema. Warmth. Swollen. Positive pulses lower extremities. Sensation is intact in toes. Rambler nails. No maceration. Questionable tinea between fourth and fifth. No suspicious skin lesions other than above. Wellbutrin for sadness as well as tobacco cessation. Will prescribe after laboratories obtained. Obstructive sleep apnea compliant. Saturation 90%. History colonic polyps repeat in the 23. Hypertension uncontrolled. LVH. Will switch metoprolol to Coreg. Vision changes. Will most likely need carotid ultrasound CTA pending on results with ophthalmology    No follow-ups on file. Diagnosis and treatment discussed.   Possible side effects of medication reviewed  Patients questions answered  Follow up understood  Pt aware if they are not contacted about any test results , this does not mean they are normal.  They should call

## 2020-07-29 ENCOUNTER — OFFICE VISIT (OUTPATIENT)
Dept: FAMILY MEDICINE CLINIC | Age: 55
End: 2020-07-29
Payer: COMMERCIAL

## 2020-07-29 VITALS
SYSTOLIC BLOOD PRESSURE: 150 MMHG | DIASTOLIC BLOOD PRESSURE: 78 MMHG | HEART RATE: 68 BPM | WEIGHT: 246 LBS | BODY MASS INDEX: 42 KG/M2 | HEIGHT: 64 IN | RESPIRATION RATE: 16 BRPM | OXYGEN SATURATION: 90 % | TEMPERATURE: 97.2 F

## 2020-07-29 LAB
A/G RATIO: 1.2 (ref 1.1–2.2)
ALBUMIN SERPL-MCNC: 4 G/DL (ref 3.4–5)
ALP BLD-CCNC: 94 U/L (ref 40–129)
ALT SERPL-CCNC: 10 U/L (ref 10–40)
ANION GAP SERPL CALCULATED.3IONS-SCNC: 17 MMOL/L (ref 3–16)
AST SERPL-CCNC: 14 U/L (ref 15–37)
BASOPHILS ABSOLUTE: 0.1 K/UL (ref 0–0.2)
BASOPHILS RELATIVE PERCENT: 0.7 %
BILIRUB SERPL-MCNC: 0.4 MG/DL (ref 0–1)
BUN BLDV-MCNC: 16 MG/DL (ref 7–20)
CALCIUM SERPL-MCNC: 9 MG/DL (ref 8.3–10.6)
CHLORIDE BLD-SCNC: 97 MMOL/L (ref 99–110)
CHOLESTEROL, TOTAL: 132 MG/DL (ref 0–199)
CO2: 25 MMOL/L (ref 21–32)
CREAT SERPL-MCNC: 0.8 MG/DL (ref 0.9–1.3)
CREATININE URINE: 66.7 MG/DL (ref 39–259)
D DIMER: 296 NG/ML DDU (ref 0–229)
EOSINOPHILS ABSOLUTE: 0.2 K/UL (ref 0–0.6)
EOSINOPHILS RELATIVE PERCENT: 1.9 %
GFR AFRICAN AMERICAN: >60
GFR NON-AFRICAN AMERICAN: >60
GLOBULIN: 3.4 G/DL
GLUCOSE BLD-MCNC: 171 MG/DL (ref 70–99)
HCT VFR BLD CALC: 49.7 % (ref 40.5–52.5)
HDLC SERPL-MCNC: 38 MG/DL (ref 40–60)
HEMOGLOBIN: 16.5 G/DL (ref 13.5–17.5)
LDL CHOLESTEROL CALCULATED: 73 MG/DL
LYMPHOCYTES ABSOLUTE: 1.9 K/UL (ref 1–5.1)
LYMPHOCYTES RELATIVE PERCENT: 23.5 %
MCH RBC QN AUTO: 31.6 PG (ref 26–34)
MCHC RBC AUTO-ENTMCNC: 33.2 G/DL (ref 31–36)
MCV RBC AUTO: 95.3 FL (ref 80–100)
MICROALBUMIN UR-MCNC: 2.6 MG/DL
MICROALBUMIN/CREAT UR-RTO: 39 MG/G (ref 0–30)
MONOCYTES ABSOLUTE: 0.8 K/UL (ref 0–1.3)
MONOCYTES RELATIVE PERCENT: 9.6 %
NEUTROPHILS ABSOLUTE: 5.2 K/UL (ref 1.7–7.7)
NEUTROPHILS RELATIVE PERCENT: 64.3 %
PDW BLD-RTO: 14.2 % (ref 12.4–15.4)
PLATELET # BLD: 199 K/UL (ref 135–450)
PMV BLD AUTO: 9.3 FL (ref 5–10.5)
POTASSIUM SERPL-SCNC: 4.4 MMOL/L (ref 3.5–5.1)
RBC # BLD: 5.22 M/UL (ref 4.2–5.9)
SODIUM BLD-SCNC: 139 MMOL/L (ref 136–145)
TOTAL PROTEIN: 7.4 G/DL (ref 6.4–8.2)
TRIGL SERPL-MCNC: 107 MG/DL (ref 0–150)
VLDLC SERPL CALC-MCNC: 21 MG/DL
WBC # BLD: 8.1 K/UL (ref 4–11)

## 2020-07-29 PROCEDURE — G8417 CALC BMI ABV UP PARAM F/U: HCPCS | Performed by: INTERNAL MEDICINE

## 2020-07-29 PROCEDURE — 99214 OFFICE O/P EST MOD 30 MIN: CPT | Performed by: INTERNAL MEDICINE

## 2020-07-29 PROCEDURE — 3017F COLORECTAL CA SCREEN DOC REV: CPT | Performed by: INTERNAL MEDICINE

## 2020-07-29 PROCEDURE — 3023F SPIROM DOC REV: CPT | Performed by: INTERNAL MEDICINE

## 2020-07-29 PROCEDURE — 3046F HEMOGLOBIN A1C LEVEL >9.0%: CPT | Performed by: INTERNAL MEDICINE

## 2020-07-29 PROCEDURE — 36415 COLL VENOUS BLD VENIPUNCTURE: CPT | Performed by: INTERNAL MEDICINE

## 2020-07-29 PROCEDURE — 2022F DILAT RTA XM EVC RTNOPTHY: CPT | Performed by: INTERNAL MEDICINE

## 2020-07-29 PROCEDURE — G8427 DOCREV CUR MEDS BY ELIG CLIN: HCPCS | Performed by: INTERNAL MEDICINE

## 2020-07-29 PROCEDURE — 4004F PT TOBACCO SCREEN RCVD TLK: CPT | Performed by: INTERNAL MEDICINE

## 2020-07-29 PROCEDURE — G8926 SPIRO NO PERF OR DOC: HCPCS | Performed by: INTERNAL MEDICINE

## 2020-07-29 RX ORDER — CLINDAMYCIN HYDROCHLORIDE 300 MG/1
300 CAPSULE ORAL 3 TIMES DAILY
Qty: 30 CAPSULE | Refills: 0 | Status: SHIPPED | OUTPATIENT
Start: 2020-07-29 | End: 2020-08-08

## 2020-07-29 ASSESSMENT — PATIENT HEALTH QUESTIONNAIRE - PHQ9
SUM OF ALL RESPONSES TO PHQ9 QUESTIONS 1 & 2: 0
2. FEELING DOWN, DEPRESSED OR HOPELESS: 0
SUM OF ALL RESPONSES TO PHQ QUESTIONS 1-9: 0
1. LITTLE INTEREST OR PLEASURE IN DOING THINGS: 0
SUM OF ALL RESPONSES TO PHQ QUESTIONS 1-9: 0

## 2020-07-29 NOTE — PATIENT INSTRUCTIONS
Call to set up leg ultrasound today  Start antibiotic  Increase lasix to 2 a day  switch metoprolol to coreg for better blood pressure control at next refill  I will order bupropion for smoking cessation when labs are back

## 2020-07-30 ENCOUNTER — TELEPHONE (OUTPATIENT)
Dept: FAMILY MEDICINE CLINIC | Age: 55
End: 2020-07-30

## 2020-07-30 LAB
ESTIMATED AVERAGE GLUCOSE: 154.2 MG/DL
HBA1C MFR BLD: 7 %

## 2020-07-30 NOTE — TELEPHONE ENCOUNTER
Calling for results: notified pt of message on results: \" Lab not worrisome for clot.  Still want you to get ultrasound. When is it? Able to get medication? \" he has not scheduled US yet- asked him to call office when scheduled.  States he picked up med today

## 2020-07-31 ENCOUNTER — TELEPHONE (OUTPATIENT)
Dept: FAMILY MEDICINE CLINIC | Age: 55
End: 2020-07-31

## 2020-08-01 ENCOUNTER — HOSPITAL ENCOUNTER (OUTPATIENT)
Dept: VASCULAR LAB | Age: 55
Discharge: HOME | End: 2020-08-01
Payer: COMMERCIAL

## 2020-08-01 PROCEDURE — 93971 EXTREMITY STUDY: CPT

## 2020-08-03 ENCOUNTER — TELEPHONE (OUTPATIENT)
Dept: FAMILY MEDICINE CLINIC | Age: 55
End: 2020-08-03

## 2020-08-03 NOTE — TELEPHONE ENCOUNTER
Tried calling patient, no answer and vm not set up. Please give info and also the blood work results for him.

## 2020-08-03 NOTE — TELEPHONE ENCOUNTER
Had [address] done on Saturday. Calling for results. Please advise.

## 2020-08-04 NOTE — TELEPHONE ENCOUNTER
Returning call. Notified him US results are not in yet. Gave him the message on lab results: \"continued protein in urine   Blood count , cholesterol and kidney blood tests good   Low suspicion for blood clot on blood test but do need to have ultrasound   a1c up to 7. No new medication needed until above 7.  \"

## 2020-08-06 ENCOUNTER — TELEPHONE (OUTPATIENT)
Dept: FAMILY MEDICINE CLINIC | Age: 55
End: 2020-08-06

## 2020-08-06 NOTE — TELEPHONE ENCOUNTER
No clot. + lymph node enlargement which can be from leg infection, but should rechek in future. How is leg and redness with antibiotic?

## 2020-08-06 NOTE — TELEPHONE ENCOUNTER
Reached out to pt.    No answer  Can not lvm     [facility] tomorrow

## 2020-08-07 NOTE — TELEPHONE ENCOUNTER
PT called in regarding message. Informed PT of Dr. Marshel Phoenix note. He says that the swelling has gone down and the redness faded however it's still sore and tender but not as bad.

## 2020-08-21 ENCOUNTER — TELEPHONE (OUTPATIENT)
Dept: FAMILY MEDICINE CLINIC | Age: 55
End: 2020-08-21

## 2020-08-21 NOTE — TELEPHONE ENCOUNTER
Patient notified and verbalizes understanding of instruction given. Patient will call if any other questions/issues arise.

## 2020-08-21 NOTE — TELEPHONE ENCOUNTER
PT called in wanting to let Dr. Jarrett Osborne know that his legs are both still swelling and that his right leg is showing red spots. He was on medications he said for it but he's not sure it's working. Best call back number: [phone], Pt says that after 3 pm he will be at work so to try to call before that time.

## 2020-08-21 NOTE — TELEPHONE ENCOUNTER
Given his very poor health he is advised to be evaluated for blood clot/worsning infection or CHF at ED today.

## 2020-09-15 RX ORDER — LISINOPRIL 40 MG/1
TABLET ORAL
Qty: 90 TABLET | Refills: 1 | Status: SHIPPED | OUTPATIENT
Start: 2020-09-15 | End: 2021-05-17

## 2020-09-15 RX ORDER — FUROSEMIDE 20 MG/1
TABLET ORAL
Qty: 90 TABLET | Refills: 1 | Status: SHIPPED | OUTPATIENT
Start: 2020-09-15 | End: 2020-10-29 | Stop reason: SDUPTHER

## 2020-09-15 RX ORDER — BUDESONIDE AND FORMOTEROL FUMARATE DIHYDRATE 160; 4.5 UG/1; UG/1
AEROSOL RESPIRATORY (INHALATION)
Qty: 30.6 INHALER | Refills: 2 | Status: SHIPPED | OUTPATIENT
Start: 2020-09-15 | End: 2020-10-29 | Stop reason: SDUPTHER

## 2020-09-15 RX ORDER — AMLODIPINE BESYLATE 10 MG/1
TABLET ORAL
Qty: 90 TABLET | Refills: 1 | Status: SHIPPED | OUTPATIENT
Start: 2020-09-15 | End: 2021-05-17

## 2020-09-15 RX ORDER — SPIRONOLACTONE 25 MG/1
TABLET ORAL
Qty: 90 TABLET | Refills: 1 | Status: SHIPPED | OUTPATIENT
Start: 2020-09-15 | End: 2021-05-17

## 2020-09-22 ENCOUNTER — TELEPHONE (OUTPATIENT)
Dept: FAMILY MEDICINE CLINIC | Age: 55
End: 2020-09-22

## 2020-09-22 NOTE — TELEPHONE ENCOUNTER
PT is having trouble seeing out of his right eye seems cloudy. PT says he spoke to PCP regarding this at his last visit.

## 2020-09-22 NOTE — TELEPHONE ENCOUNTER
----- Message from Saima Lee sent at 9/22/2020  9:47 AM EDT -----  Subject: Referral Request    QUESTIONS   Reason for referral request? Patient would like a referral to a eye doctor   if possible   Has the physician seen you for this condition before? No   Preferred Specialist (if applicable)? Queta Cabrera you already have an appointment scheduled? No  Additional Information for Provider? Patient would just like Dr. Jarrett Osborne to   referral him to a eye doctor if possible.   ---------------------------------------------------------------------------  --------------  [address]  What is the best way for the office to contact you? OK to leave message on   voicemail  Preferred Call Back Phone Number?  [phone]

## 2020-09-25 ENCOUNTER — TELEPHONE (OUTPATIENT)
Dept: ADMINISTRATIVE | Age: 55
End: 2020-09-25

## 2020-09-25 ENCOUNTER — TELEPHONE (OUTPATIENT)
Dept: FAMILY MEDICINE CLINIC | Age: 55
End: 2020-09-25

## 2020-09-25 NOTE — TELEPHONE ENCOUNTER
Spoke with pt. He is calling us back when he isn't driving to give us further info on what doctor he is seeing Monday. Carotid US is scheduled for Monday 9/28/20  at 2:30. Arrive at [facility] ID and insurance card     Provide pt with scheduling number.  [phone]

## 2020-09-25 NOTE — TELEPHONE ENCOUNTER
Optometrist in retinal group at [facility], cell number if you need to discuss more:  [phone],  Dr. Shoshana Whyte     She is concerned because patient has bilateral hemorrhaging in his eyes. Right worse than left. Has vitreous hemorrhage, preretinal, and intraretinal hemorrhage. Some vessels are white. She is unable to tell if there are any actual plaques in the vessels due to the amount of hemorrhage. Especially because the hemorrhaging is so asymmetric, she recommends patient have further medical evaluation. This includes carotid Dopplers to rule out significant plaque, potential MRI to rule out stroke though this is less of a concern right now, and possible cardiac evaluation. Patient told her he was not diabetic, but I did inform her he was in fact diabetic. I let her know I would pass this information on to Dr. Angel Luis Kam, and she would be returning on Monday and would be able to evaluate further.

## 2020-09-25 NOTE — TELEPHONE ENCOUNTER
Please let Mr Letitia Jones know I have ordered a carotid ultrasound as optho recommended.  Please call to schedule

## 2020-09-28 ENCOUNTER — HOSPITAL ENCOUNTER (OUTPATIENT)
Dept: VASCULAR LAB | Age: 55
Discharge: HOME | End: 2020-09-28
Payer: COMMERCIAL

## 2020-09-28 PROCEDURE — 93880 EXTRACRANIAL BILAT STUDY: CPT

## 2020-10-05 ENCOUNTER — TELEPHONE (OUTPATIENT)
Dept: FAMILY MEDICINE CLINIC | Age: 55
End: 2020-10-05

## 2020-10-05 NOTE — TELEPHONE ENCOUNTER
PT called in wanting to know the results of his carotid artery ultrasound.      Please call PT back: [phone]

## 2020-10-12 ENCOUNTER — TELEPHONE (OUTPATIENT)
Dept: FAMILY MEDICINE CLINIC | Age: 55
End: 2020-10-12

## 2020-10-12 RX ORDER — CARVEDILOL 12.5 MG/1
12.5 TABLET ORAL 2 TIMES DAILY
Qty: 60 TABLET | Refills: 0 | Status: SHIPPED | OUTPATIENT
Start: 2020-10-12 | End: 2020-11-25

## 2020-10-29 ENCOUNTER — OFFICE VISIT (OUTPATIENT)
Dept: FAMILY MEDICINE CLINIC | Age: 55
End: 2020-10-29
Payer: COMMERCIAL

## 2020-10-29 VITALS
HEIGHT: 64 IN | TEMPERATURE: 97.9 F | RESPIRATION RATE: 18 BRPM | OXYGEN SATURATION: 98 % | BODY MASS INDEX: 43.87 KG/M2 | HEART RATE: 79 BPM | DIASTOLIC BLOOD PRESSURE: 70 MMHG | WEIGHT: 257 LBS | SYSTOLIC BLOOD PRESSURE: 150 MMHG

## 2020-10-29 PROBLEM — H35.60 RETINAL HEMORRHAGE: Status: ACTIVE | Noted: 2020-10-29

## 2020-10-29 LAB
HBA1C MFR BLD: 7.8 %
SEDIMENTATION RATE, ERYTHROCYTE: 17 MM/HR (ref 0–20)

## 2020-10-29 PROCEDURE — 3051F HG A1C>EQUAL 7.0%<8.0%: CPT | Performed by: INTERNAL MEDICINE

## 2020-10-29 PROCEDURE — 3017F COLORECTAL CA SCREEN DOC REV: CPT | Performed by: INTERNAL MEDICINE

## 2020-10-29 PROCEDURE — G8417 CALC BMI ABV UP PARAM F/U: HCPCS | Performed by: INTERNAL MEDICINE

## 2020-10-29 PROCEDURE — G8427 DOCREV CUR MEDS BY ELIG CLIN: HCPCS | Performed by: INTERNAL MEDICINE

## 2020-10-29 PROCEDURE — 36415 COLL VENOUS BLD VENIPUNCTURE: CPT | Performed by: INTERNAL MEDICINE

## 2020-10-29 PROCEDURE — 83036 HEMOGLOBIN GLYCOSYLATED A1C: CPT | Performed by: INTERNAL MEDICINE

## 2020-10-29 PROCEDURE — G8926 SPIRO NO PERF OR DOC: HCPCS | Performed by: INTERNAL MEDICINE

## 2020-10-29 PROCEDURE — 3023F SPIROM DOC REV: CPT | Performed by: INTERNAL MEDICINE

## 2020-10-29 PROCEDURE — 4004F PT TOBACCO SCREEN RCVD TLK: CPT | Performed by: INTERNAL MEDICINE

## 2020-10-29 PROCEDURE — G8484 FLU IMMUNIZE NO ADMIN: HCPCS | Performed by: INTERNAL MEDICINE

## 2020-10-29 PROCEDURE — 99214 OFFICE O/P EST MOD 30 MIN: CPT | Performed by: INTERNAL MEDICINE

## 2020-10-29 PROCEDURE — 2022F DILAT RTA XM EVC RTNOPTHY: CPT | Performed by: INTERNAL MEDICINE

## 2020-10-29 RX ORDER — METFORMIN HYDROCHLORIDE 500 MG/1
TABLET, EXTENDED RELEASE ORAL
Qty: 60 TABLET | Refills: 2 | Status: SHIPPED | OUTPATIENT
Start: 2020-10-29 | End: 2021-01-20

## 2020-10-29 RX ORDER — ALBUTEROL SULFATE 1.25 MG/3ML
1 SOLUTION RESPIRATORY (INHALATION) EVERY 6 HOURS PRN
Qty: 360 ML | Refills: 3 | Status: SHIPPED | OUTPATIENT
Start: 2020-10-29 | End: 2021-01-20

## 2020-10-29 RX ORDER — BUDESONIDE AND FORMOTEROL FUMARATE DIHYDRATE 160; 4.5 UG/1; UG/1
AEROSOL RESPIRATORY (INHALATION)
Qty: 30.6 INHALER | Refills: 11 | Status: SHIPPED | OUTPATIENT
Start: 2020-10-29 | End: 2021-09-28 | Stop reason: SDUPTHER

## 2020-10-29 RX ORDER — FUROSEMIDE 20 MG/1
TABLET ORAL
Qty: 180 TABLET | Refills: 1 | Status: SHIPPED | OUTPATIENT
Start: 2020-11-08 | End: 2021-01-28 | Stop reason: SDUPTHER

## 2020-10-29 NOTE — PROGRESS NOTES
HPI: Ashutosh Low presents for follow-up hypertension diabetes, COPD, no hemorrhage. .    Chronic health issues include tobacco addiction, COPD, hypertension, and left ventricular hypertrophy, poor dentition, diabetes, BMI 40, grieving, retinal hemorrhage.        Hypertension  echocardiogram 2017 with left ventricular hypertrophy, ejection fraction 55% and positive ventricular enlargement with pulmonary hypertension.  On CPAP.  Discontinued nocturnal oxygen. [facility] coronary disease on cath. Distant elevation of blood pressure. Retinal bleed. Recent switch to Coreg 12.5 twice daily from his metoprolol. Dates that this makes him irritable. Has only been using for a week. Does not have a blood pressure cuff. No chest pain palpitations or increased exercise intolerance. Lasix 20 twice daily Norvasc 10 and lisinopril 40. Creatinine stable. Clipped right knee. Pain medial joint space. No swelling or locking. Positive pain lateral hip. Positive arthralgias. History sciatica with normal films remotely. No medication for this. No falls or locking. No erythema or swelling. Chronic lower extremity edema. Positive occult with adenomatous polyp. Recheck  no blood grossly in the stool.     COPD/SILVANA. Follows with pulmonary. Sleep apnea.early on CPAP.  Positive Symbicort. financial constraints with intermittent noncompliance .  Has nebulizer. Rescue inhaler at home. .  . No productive sputum. Positive wheeze. Baseline shortness of breath. No chest pain.      A1c 7 in . Weight is up. Not following diet. States he had low sugars with Metformin in the past.  No polyuria. Positive eye disease from retinal bleed. No peripheral neuropathy. Rarely goes barefoot.   + microalb 2..       Grieving. Wife  suddenly. Had multiple close calls previously. However was shocked. Is working 40 hours a week. Notes tearfulness. Walks the dog.     Tobacco 1 PPD not inclined to discontinue at this time. Not interested in medication. 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 ..  + tobacco 1 PPD. Darlynn Magic No alcohol. No drugs. No STD concerns. Wife   No exercise outside of work.        FH: 6 brothers 1 sisters   + DM, Heart disease,father lymph node cancer    - colon, prostate cancer     Review of systems: Sleep apnea, chronic sinusitis, , poor dentition, COPD with baseline dyspnea, obesity. Struct of sleep apnea no chest pain with exertion. Denies GE reflux or bloody stools.  hx adenomatous polyps 2018.  No STD concerns. Generalized arthralgias. Constitutional, ent, CV, respiratory, GI, , joint, skin, allergic and psychiatric ROS reviewed and negative except for above    Allergies   Allergen Reactions    Asa [Aspirin]     Robitussin (Alcohol Free) [Guaifenesin]        No outpatient medications have been marked as taking for the 10/29/20 encounter (Appointment) with Erick Parra MD.             Past Medical History:   Diagnosis Date    Acute on chronic diastolic congestive heart failure ([address]     ADHD (attention deficit hyperactivity disorder)     Adrenal nodule ([address] 2017    low-density nodular enlargement of a left adrenal gland which is stable since       COPD (chronic obstructive pulmonary disease) ([address]     Emphysema of lung ([address]     Essential hypertension 2017    History of pneumothorax 2017    Rib fx    Hx of colonic polyps 2018    1. Descending Polyp 2. Sigmoid Polyp 3.  Sigmoid Diverticulosis recheck     Hx of renal calculi 2017    Hypertension     Kidney stone     Neuropathy     Obesity     Osteoarthritis     Pneumothorax     2016 d/t fall     Prediabetes 2017    Sleep apnea     Substance abuse ([address]     Type 2 diabetes mellitus without complication [facility])        Past Surgical History:   Procedure Laterality Date    CARPAL TUNNEL RELEASE      COLONOSCOPY  01/18/2018    Adelaida, x2 polyps    KNEE SURGERY      arthroscopic             Family History   Problem Relation Age of Onset    Diabetes Mother     Cancer Mother     Cancer Father     No Known Problems Sister     Diabetes Brother     No Known Problems Brother     No Known Problems Brother     No Known Problems Brother     No Known Problems Brother     No Known Problems Brother                Objective     BP (!) 150/70   Pulse 79   Temp 97.9 °F (36.6 °C)   Resp 18   Ht 5' 4\" (1.626 m)   Wt 257 lb (116.6 kg)   SpO2 98% Comment: rA  BMI 44.11 kg/m²     @LASTSAO2(3)@    Wt Readings from Last 3 Encounters:   07/29/20 246 lb (111.6 kg)   06/27/19 241 lb (109.3 kg)   04/25/19 239 lb (108.4 kg)       Physical Exam     NAD alert and cooperative  HEENT: Poor dentition. Carotid hypopharynx. Conjunctival injection. Neck is full. Good upstroke of the carotids. No nodules. Lungs without any wheezes rales or rhonchi. Decreased inspiration. Cardiovascular exam regular rate and rhythm without any murmur click. Obesity. I cannot define any hepatosplenomegaly. No point tenderness. Able to lie flat. Pain medial aspect right knee. No crepitus with good range of motion. Mild tenderness outer right hip consistent with trochanteric bursa. Diminished range of motion of hips bilaterally. Diminished pulses feet. Pitting edema below the knee. Roundworm toenails. No maceration. Positive callus great toe. Sensation is intact.   No suspicious skin lesions or nodules    Chemistry        Component Value Date/Time     07/29/2020 0853    K 4.4 07/29/2020 0853    CL 97 (L) 07/29/2020 0853    CO2 25 07/29/2020 0853    BUN 16 07/29/2020 0853    CREATININE 0.8 (L) 07/29/2020 0853        Component Value Date/Time    CALCIUM 9.0 07/29/2020 0853    ALKPHOS 94 07/29/2020 0853    AST 14 (L) 07/29/2020 0853    ALT 10 07/29/2020 0853 BILITOT 0.4 07/29/2020 0853            Lab Results   Component Value Date    WBC 8.1 07/29/2020    HGB 16.5 07/29/2020    HCT 49.7 07/29/2020    MCV 95.3 07/29/2020     07/29/2020     Lab Results   Component Value Date    LABA1C 7.0 07/29/2020     Lab Results   Component Value Date    .2 07/29/2020     Lab Results   Component Value Date    LABA1C 7.0 07/29/2020     No components found for: CHLPL  Lab Results   Component Value Date    TRIG 107 07/29/2020    TRIG 73 04/25/2019    TRIG 100 12/07/2017     Lab Results   Component Value Date    HDL 38 (L) 07/29/2020    HDL 42 04/25/2019    HDL 35 (L) 12/07/2017     Lab Results   Component Value Date    LDLCALC 73 07/29/2020    LDLCALC 81 04/25/2019    LDLCALC 105 (H) 12/07/2017     Lab Results   Component Value Date    LABVLDL 21 07/29/2020    LABVLDL 15 04/25/2019    LABVLDL 20 12/07/2017       Old labs and records reviewed or requested  Discussed past lab and studies with patient      Diagnosis Orders   1. Essential hypertension  furosemide (LASIX) 20 MG tablet    ALDOSTERONE   2. Retinal hemorrhage, unspecified laterality  Sedimentation Rate   3. COPD, severe ([address]     4. Type 2 diabetes mellitus with microalbuminuria, without long-term current use of insulin (HCC)  POCT glycosylated hemoglobin (Hb A1C)   5. Tobacco use     6. SILVANA (obstructive sleep apnea)     7. Hx of colonic polyps     8. Acute pain of right knee  Ambulatory referral to Orthopedic Surgery   9. Trochanteric bursitis of right hip  Ambulatory referral to Orthopedic Surgery     Hypertension uncontrolled. Will increase Coreg to 25 mg twice daily. Check aldosterone. Retinal hemorrhage secondary to hypertension. Discussed smoking cessation    Diabetes. A1c 7.8. Will try Metformin. Tobacco abuse. Discussed bupropion and cessation. CT screening  Obstructive sleep apnea    Historical polyps recheck in 23. Knee sprain hip pain.   Follow-up Dr. Oconnor Pretty some if interested in injection. No follow-ups on file. Diagnosis and treatment discussed.   Possible side effects of medication reviewed  Patients questions answered  Follow up understood  Pt aware if they are not contacted about any test results , this does not mean they are normal.  They should call

## 2020-10-29 NOTE — PATIENT INSTRUCTIONS
Consider Wellbutrin for  smoking cessation and sadness. Increase carvidiol  to 2 pills twice a day ( 25 mg twice daily)  The new diabetes medicine can cause some swelling so I would prefer to order Metformin. We can start at a very low dose. Nice breathing today. Colon testing in 2023. If interested in CT screening of the lungs in the future let me know.     Follow-up 2 weeks blood pressure check on to Carvidiol twice daily

## 2020-10-31 LAB — ALDOSTERONE: 5.6 NG/DL

## 2020-12-29 RX ORDER — SIMVASTATIN 20 MG
TABLET ORAL
Qty: 30 TABLET | Refills: 9 | Status: SHIPPED | OUTPATIENT
Start: 2020-12-29 | End: 2022-01-26

## 2021-01-20 ENCOUNTER — APPOINTMENT (OUTPATIENT)
Dept: CT IMAGING | Age: 56
End: 2021-01-20
Payer: COMMERCIAL

## 2021-01-20 ENCOUNTER — HOSPITAL ENCOUNTER (EMERGENCY)
Age: 56
Discharge: HOME | End: 2021-01-20
Attending: EMERGENCY MEDICINE
Payer: COMMERCIAL

## 2021-01-20 VITALS
RESPIRATION RATE: 16 BRPM | HEIGHT: 63 IN | OXYGEN SATURATION: 91 % | WEIGHT: 263.67 LBS | BODY MASS INDEX: 46.72 KG/M2 | TEMPERATURE: 97.7 F | SYSTOLIC BLOOD PRESSURE: 161 MMHG | DIASTOLIC BLOOD PRESSURE: 87 MMHG | HEART RATE: 75 BPM

## 2021-01-20 DIAGNOSIS — R10.9 ACUTE RIGHT FLANK PAIN: Primary | ICD-10-CM

## 2021-01-20 LAB
A/G RATIO: 0.9 (ref 1.1–2.2)
ALBUMIN SERPL-MCNC: 3.9 G/DL (ref 3.4–5)
ALP BLD-CCNC: 105 U/L (ref 40–129)
ALT SERPL-CCNC: 14 U/L (ref 10–40)
ANION GAP SERPL CALCULATED.3IONS-SCNC: 7 MMOL/L (ref 3–16)
AST SERPL-CCNC: 32 U/L (ref 15–37)
BASOPHILS ABSOLUTE: 0 K/UL (ref 0–0.2)
BASOPHILS RELATIVE PERCENT: 0 %
BILIRUB SERPL-MCNC: 0.3 MG/DL (ref 0–1)
BILIRUBIN URINE: NEGATIVE
BLOOD, URINE: NEGATIVE
BUN BLDV-MCNC: 17 MG/DL (ref 7–20)
CALCIUM SERPL-MCNC: 9.2 MG/DL (ref 8.3–10.6)
CHLORIDE BLD-SCNC: 99 MMOL/L (ref 99–110)
CLARITY: CLEAR
CO2: 29 MMOL/L (ref 21–32)
COLOR: YELLOW
CREAT SERPL-MCNC: 0.7 MG/DL (ref 0.9–1.3)
EOSINOPHILS ABSOLUTE: 0.1 K/UL (ref 0–0.6)
EOSINOPHILS RELATIVE PERCENT: 1.2 %
GFR AFRICAN AMERICAN: >60
GFR NON-AFRICAN AMERICAN: >60
GLOBULIN: 4.3 G/DL
GLUCOSE BLD-MCNC: 234 MG/DL (ref 70–99)
GLUCOSE URINE: 500 MG/DL
HCT VFR BLD CALC: 51.7 % (ref 40.5–52.5)
HEMOGLOBIN: 17 G/DL (ref 13.5–17.5)
KETONES, URINE: NEGATIVE MG/DL
LEUKOCYTE ESTERASE, URINE: NEGATIVE
LIPASE: 47 U/L (ref 13–60)
LYMPHOCYTES ABSOLUTE: 1.7 K/UL (ref 1–5.1)
LYMPHOCYTES RELATIVE PERCENT: 18.3 %
MCH RBC QN AUTO: 31.3 PG (ref 26–34)
MCHC RBC AUTO-ENTMCNC: 32.9 G/DL (ref 31–36)
MCV RBC AUTO: 95 FL (ref 80–100)
MICROSCOPIC EXAMINATION: YES
MONOCYTES ABSOLUTE: 0.6 K/UL (ref 0–1.3)
MONOCYTES RELATIVE PERCENT: 6.1 %
MUCUS: ABNORMAL /LPF
NEUTROPHILS ABSOLUTE: 6.8 K/UL (ref 1.7–7.7)
NEUTROPHILS RELATIVE PERCENT: 74.4 %
NITRITE, URINE: NEGATIVE
PDW BLD-RTO: 14.4 % (ref 12.4–15.4)
PH UA: 6 (ref 5–8)
PLATELET # BLD: 213 K/UL (ref 135–450)
PMV BLD AUTO: 8.7 FL (ref 5–10.5)
POTASSIUM REFLEX MAGNESIUM: 5.6 MMOL/L (ref 3.5–5.1)
PROTEIN UA: ABNORMAL MG/DL
RBC # BLD: 5.44 M/UL (ref 4.2–5.9)
RBC UA: ABNORMAL /HPF (ref 0–4)
SODIUM BLD-SCNC: 135 MMOL/L (ref 136–145)
SPECIFIC GRAVITY UA: 1.02 (ref 1–1.03)
TOTAL PROTEIN: 8.2 G/DL (ref 6.4–8.2)
URINE REFLEX TO CULTURE: ABNORMAL
URINE TYPE: ABNORMAL
UROBILINOGEN, URINE: 0.2 E.U./DL
WBC # BLD: 9.2 K/UL (ref 4–11)
WBC UA: ABNORMAL /HPF (ref 0–5)

## 2021-01-20 PROCEDURE — 80053 COMPREHEN METABOLIC PANEL: CPT

## 2021-01-20 PROCEDURE — 99284 EMERGENCY DEPT VISIT MOD MDM: CPT

## 2021-01-20 PROCEDURE — 81001 URINALYSIS AUTO W/SCOPE: CPT

## 2021-01-20 PROCEDURE — 85025 COMPLETE CBC W/AUTO DIFF WBC: CPT

## 2021-01-20 PROCEDURE — 83690 ASSAY OF LIPASE: CPT

## 2021-01-20 PROCEDURE — 6360000002 HC RX W HCPCS: Performed by: EMERGENCY MEDICINE

## 2021-01-20 PROCEDURE — 36415 COLL VENOUS BLD VENIPUNCTURE: CPT

## 2021-01-20 PROCEDURE — 74176 CT ABD & PELVIS W/O CONTRAST: CPT

## 2021-01-20 PROCEDURE — 96374 THER/PROPH/DIAG INJ IV PUSH: CPT

## 2021-01-20 RX ORDER — HYDROCODONE BITARTRATE AND ACETAMINOPHEN 5; 325 MG/1; MG/1
1 TABLET ORAL EVERY 6 HOURS PRN
Qty: 12 TABLET | Refills: 0 | Status: SHIPPED | OUTPATIENT
Start: 2021-01-20 | End: 2021-01-23

## 2021-01-20 RX ORDER — KETOROLAC TROMETHAMINE 10 MG/1
10 TABLET, FILM COATED ORAL EVERY 6 HOURS PRN
Qty: 20 TABLET | Refills: 0 | Status: SHIPPED | OUTPATIENT
Start: 2021-01-20 | End: 2021-02-25

## 2021-01-20 RX ORDER — BACLOFEN 10 MG/1
10 TABLET ORAL 3 TIMES DAILY
Qty: 21 TABLET | Refills: 0 | Status: SHIPPED | OUTPATIENT
Start: 2021-01-20 | End: 2021-02-25

## 2021-01-20 RX ORDER — KETOROLAC TROMETHAMINE 30 MG/ML
15 INJECTION, SOLUTION INTRAMUSCULAR; INTRAVENOUS ONCE
Status: COMPLETED | OUTPATIENT
Start: 2021-01-20 | End: 2021-01-20

## 2021-01-20 RX ADMIN — KETOROLAC TROMETHAMINE 15 MG: 30 INJECTION, SOLUTION INTRAMUSCULAR at 18:17

## 2021-01-20 ASSESSMENT — PAIN DESCRIPTION - ORIENTATION: ORIENTATION: RIGHT;LOWER

## 2021-01-20 ASSESSMENT — PAIN SCALES - GENERAL: PAINLEVEL_OUTOF10: 5

## 2021-01-20 ASSESSMENT — ENCOUNTER SYMPTOMS
NAUSEA: 0
WHEEZING: 0
SHORTNESS OF BREATH: 1
ABDOMINAL PAIN: 0
DIARRHEA: 0
EYE REDNESS: 0
SORE THROAT: 0
BACK PAIN: 1
COUGH: 0
EYE PAIN: 0
EYE DISCHARGE: 0
RHINORRHEA: 0
VOMITING: 0

## 2021-01-20 ASSESSMENT — PAIN DESCRIPTION - LOCATION: LOCATION: BACK;FLANK

## 2021-01-20 ASSESSMENT — PAIN DESCRIPTION - DESCRIPTORS: DESCRIPTORS: ACHING

## 2021-01-20 NOTE — ED TRIAGE NOTES
Pt. C/o right lower back pain, flank area for a week, worse past 2 days. Thought it was sciatica but now thinks could be a kidney stone. No urinary issues.

## 2021-01-20 NOTE — ED PROVIDER NOTES
Neurological: Negative for dizziness, seizures, syncope and headaches. Hematological: Negative for adenopathy. Psychiatric/Behavioral: Negative for suicidal ideas. The patient is not nervous/anxious. PAST MEDICAL HISTORY     Past Medical History:   Diagnosis Date    Acute on chronic diastolic congestive heart failure ([address]     ADHD (attention deficit hyperactivity disorder)     Adrenal nodule ([address] 12/7/2017    low-density nodular enlargement of a left adrenal gland which is stable since 2007      COPD (chronic obstructive pulmonary disease) ([address]     Emphysema of lung ([address]     Essential hypertension 11/30/2017    History of pneumothorax 11/30/2017    Rib fx    Hx of colonic polyps 1/18/2018    1.2018 Descending Polyp 2. Sigmoid Polyp 3.  Sigmoid Diverticulosis recheck 2023    Hx of renal calculi 11/30/2017    Hypertension     Kidney stone     Neuropathy     Obesity     Osteoarthritis     Pneumothorax     2016 d/t fall     Prediabetes 12/13/2017    Retinal hemorrhage 10/29/2020    HTN    Sleep apnea     Substance abuse [facility]     Type 2 diabetes mellitus without complication (HCC)          SURGICAL HISTORY     Past Surgical History:   Procedure Laterality Date    CARPAL TUNNEL RELEASE      COLONOSCOPY  01/18/2018    Cronely, x2 polyps    KNEE SURGERY      arthroscopic         CURRENTMEDICATIONS       Previous Medications    AMLODIPINE (NORVASC) 10 MG TABLET    TAKE 1 TABLET BY MOUTH EVERY DAY    BUDESONIDE-FORMOTEROL (SYMBICORT) 160-4.5 MCG/ACT AERO    TAKE 2 PUFFS BY MOUTH TWICE A DAY    CARVEDILOL (COREG) 12.5 MG TABLET    2 po bid for blood pressure    FUROSEMIDE (LASIX) 20 MG TABLET    1 po bid    LISINOPRIL (PRINIVIL;ZESTRIL) 40 MG TABLET    TAKE 1 TABLET BY MOUTH EVERY DAY    SIMVASTATIN (ZOCOR) 20 MG TABLET    TAKE 1 TABLET BY MOUTH EVERY DAY IN THE EVENING    SPIRONOLACTONE (ALDACTONE) 25 MG TABLET    TAKE 1 TABLET BY MOUTH EVERY DAY       ALLERGIES Robitussin (alcohol free) [guaifenesin] and Asa [aspirin]    FAMILY HISTORY       Family History   Problem Relation Age of Onset    Diabetes Mother     Cancer Mother     Cancer Father     No Known Problems Sister     Diabetes Brother     No Known Problems Brother     No Known Problems Brother     No Known Problems Brother     No Known Problems Brother     No Known Problems Brother           SOCIAL HISTORY       Social History     Socioeconomic History    Marital status:       Spouse name: Not on file    Number of children: Not on file    Years of education: Not on file    Highest education level: Not on file   Occupational History    Not on file   Social Needs    Financial resource strain: Not on file    Food insecurity     Worry: Not on file     Inability: Not on file    Transportation needs     Medical: Not on file     Non-medical: Not on file   Tobacco Use    Smoking status: Current Every Day Smoker     Packs/day: 1.00     Years: 43.00     Pack years: 43.00     Types: Cigarettes     Start date: 1/1/1974    Smokeless tobacco: Never Used   Substance and Sexual Activity    Alcohol use: No    Drug use: No    Sexual activity: Yes     Partners: Female     Comment: wife   Lifestyle    Physical activity     Days per week: Not on file     Minutes per session: Not on file    Stress: Not on file   Relationships    Social connections     Talks on phone: Not on file     Gets together: Not on file     Attends [employer] service: Not on file     Active member of club or organization: Not on file     Attends meetings of clubs or organizations: Not on file     Relationship status: Not on file    Intimate partner violence     Fear of current or ex partner: Not on file     Emotionally abused: Not on file     Physically abused: Not on file     Forced sexual activity: Not on file   Other Topics Concern    Not on file   Social History Narrative    Not on file       SCREENINGS PHYSICAL EXAM    (up to 7 for level 4, 8 or more for level 5)     ED Triage Vitals [01/20/21 1704]   BP Temp Temp Source Pulse Resp SpO2 Height Weight   (!) 187/80 97.9 °F (36.6 °C) Oral 88 16 91 % 5' 3\" (1.6 m) 263 lb 10.7 oz (119.6 kg)      height is 5' 3\" (1.6 m) and weight is 263 lb 10.7 oz (119.6 kg). His oral temperature is 97.7 °F (36.5 °C). His blood pressure is 161/87 (abnormal) and his pulse is 75. His respiration is 16 and oxygen saturation is 91%. Physical Exam  Constitutional:       Appearance: He is well-developed. He is not diaphoretic. HENT:      Head: Normocephalic and atraumatic. Right Ear: External ear normal.      Left Ear: External ear normal.   Eyes:      General: No scleral icterus. Right eye: No discharge. Left eye: No discharge. Neck:      Musculoskeletal: Normal range of motion. Thyroid: No thyromegaly. Vascular: No JVD. Trachea: No tracheal deviation. Cardiovascular:      Rate and Rhythm: Normal rate and regular rhythm. Heart sounds: No murmur. No friction rub. No gallop. Pulmonary:      Effort: Pulmonary effort is normal. No respiratory distress. Breath sounds: No stridor. Examination of the right-lower field reveals decreased breath sounds. Examination of the left-lower field reveals decreased breath sounds. Decreased breath sounds present. No wheezing or rales. Abdominal:      General: There is no distension. Palpations: Abdomen is soft. Tenderness: There is abdominal tenderness in the right upper quadrant. There is right CVA tenderness. There is no guarding or rebound. Musculoskeletal:         General: No tenderness. Skin:     General: Skin is warm and dry. Findings: No rash (On exposed body surfaces). Neurological:      Mental Status: He is alert and oriented to person, place, and time.       Coordination: Coordination normal.   Psychiatric:         Behavior: Behavior normal. Thought Content:  Thought content normal.         DIAGNOSTIC RESULTS   LABS:    Results for orders placed or performed during the hospital encounter of 01/20/21   CBC Auto Differential   Result Value Ref Range    WBC 9.2 4.0 - 11.0 K/uL    RBC 5.44 4.20 - 5.90 M/uL    Hemoglobin 17.0 13.5 - 17.5 g/dL    Hematocrit 51.7 40.5 - 52.5 %    MCV 95.0 80.0 - 100.0 fL    MCH 31.3 26.0 - 34.0 pg    MCHC 32.9 31.0 - 36.0 g/dL    RDW 14.4 12.4 - 15.4 %    Platelets 541 885 - 934 K/uL    MPV 8.7 5.0 - 10.5 fL    Neutrophils % 74.4 %    Lymphocytes % 18.3 %    Monocytes % 6.1 %    Eosinophils % 1.2 %    Basophils % 0.0 %    Neutrophils Absolute 6.8 1.7 - 7.7 K/uL    Lymphocytes Absolute 1.7 1.0 - 5.1 K/uL    Monocytes Absolute 0.6 0.0 - 1.3 K/uL    Eosinophils Absolute 0.1 0.0 - 0.6 K/uL    Basophils Absolute 0.0 0.0 - 0.2 K/uL   Comprehensive Metabolic Panel w/ Reflex to MG   Result Value Ref Range    Sodium 135 (L) 136 - 145 mmol/L    Potassium reflex Magnesium 5.6 (H) 3.5 - 5.1 mmol/L    Chloride 99 99 - 110 mmol/L    CO2 29 21 - 32 mmol/L    Anion Gap 7 3 - 16    Glucose 234 (H) 70 - 99 mg/dL    BUN 17 7 - 20 mg/dL    CREATININE 0.7 (L) 0.9 - 1.3 mg/dL    GFR Non-African American >60 >60    GFR African American >60 >60    Calcium 9.2 8.3 - 10.6 mg/dL    Total Protein 8.2 6.4 - 8.2 g/dL    Alb 3.9 3.4 - 5.0 g/dL    Albumin/Globulin Ratio 0.9 (L) 1.1 - 2.2    Total Bilirubin 0.3 0.0 - 1.0 mg/dL    Alkaline Phosphatase 105 40 - 129 U/L    ALT 14 10 - 40 U/L    AST 32 15 - 37 U/L    Globulin 4.3 g/dL   Lipase   Result Value Ref Range    Lipase 47.0 13.0 - 60.0 U/L   Urinalysis Reflex to Culture    Specimen: Urine, clean catch   Result Value Ref Range    Color, UA Yellow Straw/Yellow    Clarity, UA Clear Clear    Glucose, Ur 500 (A) Negative mg/dL    Bilirubin Urine Negative Negative    Ketones, Urine Negative Negative mg/dL    Specific Gravity, UA 1.025 1.005 - 1.030    Blood, Urine Negative Negative    pH, UA 6.0 5.0 - 8.0 Protein, UA TRACE (A) Negative mg/dL    Urobilinogen, Urine 0.2 <2.0 E.U./dL    Nitrite, Urine Negative Negative    Leukocyte Esterase, Urine Negative Negative    Microscopic Examination YES     Urine Type Cleancatch     Urine Reflex to Culture Not Indicated    Microscopic Urinalysis   Result Value Ref Range    Mucus, UA Rare (A) None Seen /LPF    WBC, UA None seen 0 - 5 /HPF    RBC, UA None seen 0 - 4 /HPF       All other labs were within normal range or not returned as of this dictation. EKG:  All EKG's are interpreted by the Emergency Department Physician who either signs orCo-signs this chart in the absence of a cardiologist.    None    RADIOLOGY:   Non-plain film images such as CT, Ultrasound and MRI are read by the radiologist. Deya Myrick radiographic images are visualized and preliminarily interpreted by the  EDProvider with the below findings:    Ct Abdomen Pelvis Wo Contrast Additional Contrast? None    Result Date: 1/20/2021 (Please note that portions of this note were completed with a voice recognition program.  Efforts were made to editthe dictations but occasionally words are mis-transcribed.)    Ana María Crisostomo MD (electronically signed)            Ana María Crisostomo MD  01/20/21 Eloisa Welsh

## 2021-01-21 NOTE — ED NOTES
Gave patient discharge instructions. He states, understanding.  Patient discharged to home      Myla Guerrier RN  01/20/21 1942

## 2021-01-28 DIAGNOSIS — I10 ESSENTIAL HYPERTENSION: Primary | ICD-10-CM

## 2021-01-28 DIAGNOSIS — I10 HYPERTENSION, UNSPECIFIED TYPE: Primary | ICD-10-CM

## 2021-01-28 DIAGNOSIS — I10 ESSENTIAL HYPERTENSION: ICD-10-CM

## 2021-01-28 RX ORDER — FUROSEMIDE 20 MG/1
TABLET ORAL
Qty: 360 TABLET | Refills: 0 | Status: SHIPPED | OUTPATIENT
Start: 2021-01-28 | End: 2021-03-12

## 2021-01-28 RX ORDER — CARVEDILOL 25 MG/1
TABLET ORAL
Qty: 180 TABLET | Refills: 0 | Status: SHIPPED | OUTPATIENT
Start: 2021-01-28 | End: 2021-04-30

## 2021-01-28 NOTE — TELEPHONE ENCOUNTER
----- Message from Harsha Siddiqui sent at 1/28/2021  9:19 AM EST -----  Subject: Refill Request    QUESTIONS  Name of Medication? carvedilol (COREG) 12.5 MG tablet  Patient-reported dosage and instructions? 1 a day   How many days do you have left? 0  Preferred Pharmacy? [id number]  Pharmacy phone number (if available)? [phone]  Additional Information for Provider? patient is out of his medication for   a week for HTN    he call to schedule a f/u appt for HTN he is need of a morning in office   visit can not do viritual need morning appt due his shift he work   please call pt to let know if can do the refill.  ---------------------------------------------------------------------------  --------------  CALL BACK INFO  What is the best way for the office to contact you? OK to leave message on   voicemail  Preferred Call Back Phone Number?  [phone]

## 2021-02-11 ENCOUNTER — TELEPHONE (OUTPATIENT)
Dept: FAMILY MEDICINE CLINIC | Age: 56
End: 2021-02-11

## 2021-02-11 NOTE — TELEPHONE ENCOUNTER
Received message from the call center that PT was needing to schedule an HTN follow up with Dr. Maritza Covarrubias. Called PT, no voicemail set up so unable to leave message for PT to return call to make appointment. Subject: Appointment Request     Reason for Call: Routine Existing Condition Follow Up     QUESTIONS   Type of Appointment? Established Patient   Reason for appointment request? Available appointments did not meet   patient need   Additional Information for Provider? Pt wants to come in person- for HTN   follow up. Must come in    in person    before noon because he works second shift. (I was only seeing in person   around 3pm) Pt goes into work at 3 and can answer phone after 3. No VM.   ---------------------------------------------------------------------------   --------------   CALL BACK INFO   What is the best way for the office to contact you? Do not leave any   message    patient will call back for answer   Preferred Call Back Phone Number? [phone]   ---------------------------------------------------------------------------   --------------   SCRIPT ANSWERS   Relationship to Patient? Self   Have your symptoms changed? No   Appointment reason? Well Care/Follow Ups   Select a Well Care/Follow Ups appointment reason? Adult Existing Condition   Follow Up [Diabetes    CHF    COPD    Hypertension/Blood Pressure Check]   (Is the patient requesting to be seen urgently for their symptoms?)? No   Is this follow up request related to routine Diabetes Management? No   Are you having any new concerns about your existing condition? No   Have you been diagnosed with    tested for    or told that you are suspected of having COVID-19 (Coronavirus)? Yes   Did your symptoms begin or have you been tested for COVID-19 in the last   10 days? No   Have you had a fever or taken medication to treat a fever within the past   3 days?  No   Have you had a cough    shortness of breath or flu-like symptoms within the past 3

## 2021-02-25 ENCOUNTER — OFFICE VISIT (OUTPATIENT)
Dept: FAMILY MEDICINE CLINIC | Age: 56
End: 2021-02-25
Payer: COMMERCIAL

## 2021-02-25 VITALS
OXYGEN SATURATION: 90 % | WEIGHT: 266 LBS | HEIGHT: 63 IN | BODY MASS INDEX: 47.13 KG/M2 | SYSTOLIC BLOOD PRESSURE: 128 MMHG | RESPIRATION RATE: 18 BRPM | HEART RATE: 65 BPM | TEMPERATURE: 97.3 F | DIASTOLIC BLOOD PRESSURE: 66 MMHG

## 2021-02-25 DIAGNOSIS — R06.09 DOE (DYSPNEA ON EXERTION): ICD-10-CM

## 2021-02-25 DIAGNOSIS — J44.9 COPD, SEVERE (HCC): ICD-10-CM

## 2021-02-25 DIAGNOSIS — I10 ESSENTIAL HYPERTENSION: ICD-10-CM

## 2021-02-25 DIAGNOSIS — Z72.0 TOBACCO USE: ICD-10-CM

## 2021-02-25 DIAGNOSIS — E27.8 ADRENAL NODULE (HCC): ICD-10-CM

## 2021-02-25 DIAGNOSIS — H35.60 RETINAL HEMORRHAGE, UNSPECIFIED LATERALITY: ICD-10-CM

## 2021-02-25 DIAGNOSIS — R80.9 TYPE 2 DIABETES MELLITUS WITH MICROALBUMINURIA, WITHOUT LONG-TERM CURRENT USE OF INSULIN (HCC): Primary | ICD-10-CM

## 2021-02-25 DIAGNOSIS — G89.29 CHRONIC PAIN OF RIGHT KNEE: ICD-10-CM

## 2021-02-25 DIAGNOSIS — M25.561 CHRONIC PAIN OF RIGHT KNEE: ICD-10-CM

## 2021-02-25 DIAGNOSIS — E11.29 TYPE 2 DIABETES MELLITUS WITH MICROALBUMINURIA, WITHOUT LONG-TERM CURRENT USE OF INSULIN (HCC): Primary | ICD-10-CM

## 2021-02-25 LAB
CHOLESTEROL, TOTAL: 171 MG/DL (ref 0–199)
HBA1C MFR BLD: 8.4 %
HDLC SERPL-MCNC: 73 MG/DL (ref 40–60)
LDL CHOLESTEROL CALCULATED: 73 MG/DL
TRIGL SERPL-MCNC: 123 MG/DL (ref 0–150)
VLDLC SERPL CALC-MCNC: 25 MG/DL

## 2021-02-25 PROCEDURE — 3023F SPIROM DOC REV: CPT | Performed by: INTERNAL MEDICINE

## 2021-02-25 PROCEDURE — 83036 HEMOGLOBIN GLYCOSYLATED A1C: CPT | Performed by: INTERNAL MEDICINE

## 2021-02-25 PROCEDURE — 3017F COLORECTAL CA SCREEN DOC REV: CPT | Performed by: INTERNAL MEDICINE

## 2021-02-25 PROCEDURE — G8926 SPIRO NO PERF OR DOC: HCPCS | Performed by: INTERNAL MEDICINE

## 2021-02-25 PROCEDURE — 2022F DILAT RTA XM EVC RTNOPTHY: CPT | Performed by: INTERNAL MEDICINE

## 2021-02-25 PROCEDURE — 99214 OFFICE O/P EST MOD 30 MIN: CPT | Performed by: INTERNAL MEDICINE

## 2021-02-25 PROCEDURE — 93000 ELECTROCARDIOGRAM COMPLETE: CPT | Performed by: INTERNAL MEDICINE

## 2021-02-25 PROCEDURE — G8484 FLU IMMUNIZE NO ADMIN: HCPCS | Performed by: INTERNAL MEDICINE

## 2021-02-25 PROCEDURE — G8427 DOCREV CUR MEDS BY ELIG CLIN: HCPCS | Performed by: INTERNAL MEDICINE

## 2021-02-25 PROCEDURE — 3052F HG A1C>EQUAL 8.0%<EQUAL 9.0%: CPT | Performed by: INTERNAL MEDICINE

## 2021-02-25 PROCEDURE — 4004F PT TOBACCO SCREEN RCVD TLK: CPT | Performed by: INTERNAL MEDICINE

## 2021-02-25 PROCEDURE — 36415 COLL VENOUS BLD VENIPUNCTURE: CPT | Performed by: INTERNAL MEDICINE

## 2021-02-25 PROCEDURE — G8417 CALC BMI ABV UP PARAM F/U: HCPCS | Performed by: INTERNAL MEDICINE

## 2021-02-25 RX ORDER — METFORMIN HYDROCHLORIDE 500 MG/1
TABLET, EXTENDED RELEASE ORAL
Qty: 180 TABLET | Refills: 0 | Status: SHIPPED | OUTPATIENT
Start: 2021-02-25 | End: 2021-05-24

## 2021-02-25 RX ORDER — ALBUTEROL SULFATE 1.25 MG/3ML
1 SOLUTION RESPIRATORY (INHALATION) EVERY 6 HOURS PRN
Qty: 360 ML | Refills: 3 | Status: SHIPPED | OUTPATIENT
Start: 2021-02-25

## 2021-02-25 ASSESSMENT — PATIENT HEALTH QUESTIONNAIRE - PHQ9
SUM OF ALL RESPONSES TO PHQ QUESTIONS 1-9: 0
SUM OF ALL RESPONSES TO PHQ QUESTIONS 1-9: 0
SUM OF ALL RESPONSES TO PHQ9 QUESTIONS 1 & 2: 0
1. LITTLE INTEREST OR PLEASURE IN DOING THINGS: 0

## 2021-02-25 NOTE — PROGRESS NOTES
HPI: Cristiano Miller presents for follow-up. Chronic health issues include tobacco addiction, COPD, hypertension, and left ventricular hypertrophy, poor dentition, diabetes, BMI 40, grieving, retinal hemorrhage. ER visit back pain 1.20.21 CT abd. Non obstructing renal calc. Stable left adrenal mass, + diverticuli, DJD thoracolumbar spine and healed rib fractures. Script for baclofen, norco 5 and ketorolac. No longer using. Brandon Diaz at work on her a palate. Some pain in the knee. Did not have head trauma. Positive osteoarthritis.       Hypertension 2012 echocardiogram April 2017 with left ventricular hypertrophy, ejection fraction 55% and positive ventricular enlargement with pulmonary hypertension.  On CPAP.  Discontinued nocturnal oxygen. Lynsey Guillen coronary disease on cath. Retinal bleed. Coreg 25 twice daily from his metoprolol. No chest pain palpitations at increased exercise intolerance. Lasix 20 twice daily Norvasc 10 and lisinopril 40. And Aldactone. Creatinine stable but last potassium elevated. Marco Murillo Retinal bleed. Neg carotids. Elevated bp a1c 7. Hypertension. Recent switch from Toprol to Coreg 25 twice daily. Knee injury several months ago continues have some discomfort. Had meniscal tear in the past.  Positive hip pain arthritis back. Interested in following up with orthopedist and possible MRI. Using no medication.     A1c 7 June 2020. Weight is up. Taking in [address] [employer] daily. States low sugars with Metformin in the past not taking. Positive polyuria. No retinal disease however bleed. No peripheral neuropathy. Does not go barefoot often. Positive microalbumin. Hypertension.   No recent lipid.    /66   Pulse 65   Temp 97.3 °F (36.3 °C)   Resp 18   Ht 5' 3\" (1.6 m)   Wt 266 lb (120.7 kg)   SpO2 90% Comment: RA  BMI 47.12 kg/m²     @LASTSAO2(3)@    Wt Readings from Last 3 Encounters:   01/20/21 263 lb 10.7 oz (119.6 kg)   10/29/20 257 lb (116.6 kg)   07/29/20 246 lb (111.6 kg)       Physical Exam     NAD alert and cooperative  HEENT: Poor dentition crowded hypopharynx. No stridor. Full neck. No adenopathy. Significantly decreased breath sounds. No wheezes or rales. Cardiovascular exam regular rate and rhythm without any murmur. Able to lie flat. Positive obesity. No point tenderness or mass noted. Umbilical hernia. Pulses lower extremities. Sensation intact. SOBIA for nails. No maceration. No bony abnormality.   No suspicious skin lesions or nodules    Chemistry        Component Value Date/Time     (L) 01/20/2021 1809    K 5.6 (H) 01/20/2021 1809    CL 99 01/20/2021 1809    CO2 29 01/20/2021 1809    BUN 17 01/20/2021 1809    CREATININE 0.7 (L) 01/20/2021 1809        Component Value Date/Time    CALCIUM 9.2 01/20/2021 1809    ALKPHOS 105 01/20/2021 1809    AST 32 01/20/2021 1809    ALT 14 01/20/2021 1809    BILITOT 0.3 01/20/2021 1809            Lab Results   Component Value Date    WBC 9.2 01/20/2021    HGB 17.0 01/20/2021    HCT 51.7 01/20/2021    MCV 95.0 01/20/2021     01/20/2021     Lab Results   Component Value Date    LABA1C 7.8 10/29/2020     Lab Results   Component Value Date    .2 07/29/2020     Lab Results   Component Value Date    LABA1C 7.8 10/29/2020     No components found for: CHLPL  Lab Results   Component Value Date    TRIG 107 07/29/2020    TRIG 73 04/25/2019    TRIG 100 12/07/2017     Lab Results   Component Value Date    HDL 38 (L) 07/29/2020    HDL 42 04/25/2019    HDL 35 (L) 12/07/2017     Lab Results   Component Value Date    LDLCALC 73 07/29/2020    LDLCALC 81 04/25/2019    LDLCALC 105 (H) 12/07/2017     Lab Results   Component Value Date LABVLDL 21 07/29/2020    LABVLDL 15 04/25/2019    LABVLDL 20 12/07/2017       Old labs and records reviewed or requested  Discussed past lab and studies with patient     EKG sinus rhythm with Q's anteriorly unchanged. Nonspecific ST-T wave changes   Diagnosis Orders   1. Type 2 diabetes mellitus with microalbuminuria, without long-term current use of insulin (HCC)  POCT glycosylated hemoglobin (Hb A1C)    Amb External Referral To Podiatry    Lipid Panel   2. Tobacco use     3. Retinal hemorrhage, unspecified laterality     4. Essential hypertension  Lipid Panel   5. COPD, severe ([address]     6. Adrenal nodule ([address]     7. PINK (dyspnea on exertion)  EKG 12 Lead   8. Chronic pain of right knee  Ambulatory referral to Orthopedic Surgery       A1c 8.6. Back on Metformin. Discussed weight loss diet exercise. Has been immunized. Tobacco cessation. Retinal hemorrhage. No recurrence. Did not follow back up. Hypertension. Finally has good control. Continue current medications. Question whether this is exacerbated his COPD as he notes some increasing shortness of breath. Has had a negative cardiac catheterization however some diastolic dysfunction. Stable adrenal nodule on imaging. Knee pain. Injury. Follow-up orthopedist.    Porter Paredes nails. Follow-up podiatry. Tobacco addiction discussed cessation. Not interested at this time. Grief stable. Osteoarthritis    Review of ER labs indicate elevated potassium. May need to discontinue his Aldactone. Nicolas Covid vaccine follow-up with pulmonary for pulmonary function test and COPD. CT screening. Follow back up in 2 weeks for repeat sugar check      No follow-ups on file. Diagnosis and treatment discussed.   Possible side effects of medication reviewed  Patients questions answered  Follow up understood  Pt aware if they are not contacted about any test results , this does not mean they are normal.  They should call

## 2021-02-25 NOTE — PATIENT INSTRUCTIONS
Start nebulizer albuterol 2-4 times daily. Call for podiatry apt and if wanted ortho apt  Cut back on sugar. Start metformin and titrate up to 1 twice daily. EKG no change  Knee exercises and ortho apt if desired  consider CT chest for cancer screen and follow up with lung doctor for COPD ( DR Yogi Mcneil I believe saw you in the past)    Follow up 2 months sugar check    Patient Education        Medial Collateral Ligament Sprain: Rehab Exercises  Introduction  Here are some examples of exercises for you to try. The exercises may be suggested for a condition or for rehabilitation. Start each exercise slowly. Ease off the exercises if you start to have pain. You will be told when to start these exercises and which ones will work best for you. How to do the exercises  Knee flexion with heel slide   1. Lie on your back with your knees bent. 2. Slide your heel back by bending your affected knee as far as you can. Then hook your other foot around your ankle to help pull your heel even farther back. 3. Hold for about 6 seconds, then rest for up to 10 seconds. 4. Repeat 8 to 12 times. Heel slides on a wall   1. Lie on the floor close enough to a wall so that you can place both legs up on the wall. Your hips should be as close to the wall as is comfortable for you. 2. Start with both feet resting on the wall. Slowly let the foot of your affected leg slide down the wall until you feel a stretch in your knee. 3. Hold for 15 to 30 seconds. 4. Then slowly slide your foot up to where you started. 5. Repeat 2 to 4 times. Quad sets   1. Sit with your affected leg straight and supported on the floor or a firm bed. Place a small, rolled-up towel under your knee. Your other leg should be bent, with that foot flat on the floor. 2. Tighten the thigh muscles of your affected leg by pressing the back of your knee down into the towel. 3. Hold for about 6 seconds, then rest for up to 10 seconds. 4. Repeat 8 to 12 times. Short-arc quad   1. Lie on your back with your knees bent over a foam roll or a large rolled-up towel. 2. Lift the lower part of your affected leg and straighten your knee by tightening your thigh muscle. Keep the bottom of your knee on the foam roll or rolled-up towel. 3. Hold your knee straight for about 6 seconds, then slowly bend your knee and lower your leg back to the floor. Rest for up to 10 seconds between repetitions. 4. Repeat 8 to 12 times. Straight-leg raises to the front   1. Lie on your back with your good knee bent so that your foot rests flat on the floor. Your affected leg should be straight. Make sure that your low back has a normal curve. You should be able to slip your hand in between the floor and the small of your back, with your palm touching the floor and your back touching the back of your hand. 2. Tighten the thigh muscles in your affected leg by pressing the back of your knee flat down to the floor. Hold your knee straight. 3. Keeping the thigh muscles tight and your leg straight, lift your affected leg up so that your heel is about 12 inches off the floor. Hold for about 6 seconds, then lower slowly. 4. Relax for up to 10 seconds between repetitions. 5. Repeat 8 to 12 times. Hamstring set (heel dig)   1. Sit with your affected leg bent. Your good leg should be straight and supported on the floor. 2. Tighten the muscles on the back of your bent leg (hamstring) by pressing your heel into the floor. 3. Hold for about 6 seconds, then rest for up to 10 seconds. 4. Repeat 8 to 12 times. Hip adduction   You will need a pillow for this exercise. 1. Sit on the floor with your knees bent. 2. Place a pillow between your knees. 3. Put your hands slightly behind your hips for support. 4. Squeeze the pillow by tightening the muscles on the inside of your thighs. 5. Hold for 6 seconds, then rest for up to 10 seconds. 6. Repeat 8 to 12 times. Hip abduction   You will need a small pillow for this exercise. 1. Sit on the floor with your affected knee close to a wall. 2. Bend your affected knee but keep the other leg straight in front of you. 3. Place a pillow between the outside of your knee and the wall. 4. Put your hands slightly behind your hips for support. 5. Push the outside of your knee against the pillow and the wall. 6. Hold for 6 seconds, then rest for up to 10 seconds. 7. Repeat 8 to 12 times. Lateral step-up   1. Stand sideways on the bottom step of a staircase with your injured leg on the step and your other foot on the floor. Hold on to the banister or wall. 2. Use your injured leg to raise yourself up, bringing your other foot level with the stair step. Make sure to keep your hips level as you do this. And try to keep your knee moving in a straight line with your middle toe. Do not put the foot you are raising on the stair step. 3. Slowly lower your foot back down. 4. Repeat 8 to 12 times. Wall squats with ball   You will need a large therapy ball for this exercise. Ask your doctor or physical therapist what size you will need, but it should be large enough to cover your back. 1. Stand with your back facing a wall. Place your feet about a shoulder-width apart. 2. Place the therapy ball between your back and the wall, and move your feet out in front of you so they are about a foot in front of your hips. 3. Keep your arms at your sides, or put your hands on your hips. 4. Slowly squat down as if you are going to sit in a chair, rolling your back over the ball as you squat. The ball should move with you but stay pressed into the wall. 5. Be sure that your knees do not go in front of your toes as you squat. 6. Hold for 6 seconds. 7. Slowly rise to your standing position. 8. Repeat 8 to 12 times. Follow-up care is a key part of your treatment and safety. Be sure to make and go to all appointments, and call your doctor if you are having problems. It's also a good idea to know your test results and keep a list of the medicines you take. Where can you learn more? Go to [email or web address] org and sign in to your [employer] account. Enter R100 in the [employer] box to learn more about \"Medial Collateral Ligament Sprain: Rehab Exercises. \"     If you do not have an account, please click on the \"Sign Up Now\" link. Current as of: March 2, 2020               Content Version: 12.6  © 7834-3249 [employer]. Care instructions adapted under license by [facility] ([facility]). If you have questions about a medical condition or this instruction, always ask your healthcare professional. [address] any warranty or liability for your use of this information.

## 2021-03-08 ENCOUNTER — OFFICE VISIT (OUTPATIENT)
Dept: ORTHOPEDIC SURGERY | Age: 56
End: 2021-03-08
Payer: COMMERCIAL

## 2021-03-08 VITALS
HEIGHT: 63 IN | BODY MASS INDEX: 47.13 KG/M2 | WEIGHT: 266 LBS | SYSTOLIC BLOOD PRESSURE: 133 MMHG | DIASTOLIC BLOOD PRESSURE: 66 MMHG | RESPIRATION RATE: 12 BRPM | HEART RATE: 63 BPM | TEMPERATURE: 97.7 F

## 2021-03-08 DIAGNOSIS — M25.561 ACUTE PAIN OF RIGHT KNEE: Primary | ICD-10-CM

## 2021-03-08 PROCEDURE — G8427 DOCREV CUR MEDS BY ELIG CLIN: HCPCS | Performed by: ORTHOPAEDIC SURGERY

## 2021-03-08 PROCEDURE — 99204 OFFICE O/P NEW MOD 45 MIN: CPT | Performed by: ORTHOPAEDIC SURGERY

## 2021-03-08 PROCEDURE — G8417 CALC BMI ABV UP PARAM F/U: HCPCS | Performed by: ORTHOPAEDIC SURGERY

## 2021-03-08 PROCEDURE — 20610 DRAIN/INJ JOINT/BURSA W/O US: CPT | Performed by: ORTHOPAEDIC SURGERY

## 2021-03-08 PROCEDURE — 4004F PT TOBACCO SCREEN RCVD TLK: CPT | Performed by: ORTHOPAEDIC SURGERY

## 2021-03-08 PROCEDURE — 3017F COLORECTAL CA SCREEN DOC REV: CPT | Performed by: ORTHOPAEDIC SURGERY

## 2021-03-08 PROCEDURE — G8484 FLU IMMUNIZE NO ADMIN: HCPCS | Performed by: ORTHOPAEDIC SURGERY

## 2021-03-08 RX ORDER — METHYLPREDNISOLONE ACETATE 40 MG/ML
80 INJECTION, SUSPENSION INTRA-ARTICULAR; INTRALESIONAL; INTRAMUSCULAR; SOFT TISSUE ONCE
Status: COMPLETED | OUTPATIENT
Start: 2021-03-08 | End: 2021-03-08

## 2021-03-08 RX ORDER — LIDOCAINE HYDROCHLORIDE 10 MG/ML
4 INJECTION, SOLUTION INFILTRATION; PERINEURAL ONCE
Status: COMPLETED | OUTPATIENT
Start: 2021-03-08 | End: 2021-03-08

## 2021-03-08 RX ADMIN — METHYLPREDNISOLONE ACETATE 80 MG: 40 INJECTION, SUSPENSION INTRA-ARTICULAR; INTRALESIONAL; INTRAMUSCULAR; SOFT TISSUE at 09:17

## 2021-03-08 RX ADMIN — LIDOCAINE HYDROCHLORIDE 4 ML: 10 INJECTION, SOLUTION INFILTRATION; PERINEURAL at 09:17

## 2021-03-08 ASSESSMENT — ENCOUNTER SYMPTOMS
GASTROINTESTINAL NEGATIVE: 1
BACK PAIN: 1
SHORTNESS OF BREATH: 1
EYES NEGATIVE: 1
ALLERGIC/IMMUNOLOGIC NEGATIVE: 1
WHEEZING: 1

## 2021-03-08 NOTE — PROGRESS NOTES
weakness or sensory deficit. Skin:    Head/Neck: inspection reveals no rashes, ulcerations or lesions. Trunk:  inspection reveals no rashes, ulcerations or lesions. Right Lower Extremity: inspection reveals no rashes, ulcerations or lesions. Left Lower Extremity: inspection reveals no rashes, ulcerations or lesions. Bilateral hip exam is benign. Left knee exam range of motion 0 to 100 degrees limited by body habitus but no joint line pain or tenderness. He is stable. Right knee has range of motion 0 to about 100 degrees which is also limited by body habitus. Calf is soft. He has medial and lateral joint line tenderness but no drainable effusion. He is ligamentously stable. Calf is soft. X-rays were obtained today in the office and interpreted by me. AP standing, PA flexed, and merchant views of the bilateral knees as well as a lateral of the right knee. These demonstrate:  Medial joint line narrowing of the right knee greater than left. Most recent hemoglobin A1c was 8.4    Assessment:      Medial meniscus tear versus chondromalacia right knee  Situation is complicated by multiple medical problems, including diabetes, heart disease, morbid obesity, tobacco abuse with COPD. Plan: Today, were going to attempt a cortisone injection to calm down his symptoms. He is not at all a surgical candidate. He is not a good candidate for oral anti-inflammatories given his multiple medical problems. We did have a discussion about the need to get his diabetes under better control. Unfortunately, he is of the opinion that \"I do not actually have diabetes\". Procedure: Under sterile technique, the right knee was injected into the anterolateral arthroscopy portal with 80 mg of Depo-Medrol and 40 mg of lidocaine. Tolerated that well. I recommend he use ice every day after work. Follow-up with me in a month.     I also reminded him the importance of adhering to his primary care provider's advice and medical management. Decision making today was moderate. This note was created using voice recognition software. It has been proofread, but occasionally errors remain. Please disregard these errors. They will be corrected as they are noted.

## 2021-03-12 DIAGNOSIS — I10 ESSENTIAL HYPERTENSION: ICD-10-CM

## 2021-03-12 RX ORDER — FUROSEMIDE 20 MG/1
TABLET ORAL
Qty: 90 TABLET | Refills: 1 | Status: SHIPPED | OUTPATIENT
Start: 2021-03-12 | End: 2021-04-30

## 2021-04-30 DIAGNOSIS — I10 ESSENTIAL HYPERTENSION: ICD-10-CM

## 2021-04-30 RX ORDER — FUROSEMIDE 20 MG/1
TABLET ORAL
Qty: 360 TABLET | Refills: 0 | Status: SHIPPED | OUTPATIENT
Start: 2021-04-30 | End: 2022-01-24

## 2021-04-30 RX ORDER — CARVEDILOL 25 MG/1
TABLET ORAL
Qty: 180 TABLET | Refills: 0 | Status: SHIPPED | OUTPATIENT
Start: 2021-04-30 | End: 2021-09-29

## 2021-05-13 ENCOUNTER — OFFICE VISIT (OUTPATIENT)
Dept: FAMILY MEDICINE CLINIC | Age: 56
End: 2021-05-13
Payer: COMMERCIAL

## 2021-05-13 VITALS
DIASTOLIC BLOOD PRESSURE: 65 MMHG | HEART RATE: 60 BPM | HEIGHT: 63 IN | OXYGEN SATURATION: 91 % | SYSTOLIC BLOOD PRESSURE: 129 MMHG | WEIGHT: 268.4 LBS | BODY MASS INDEX: 47.55 KG/M2

## 2021-05-13 DIAGNOSIS — I10 ESSENTIAL HYPERTENSION: ICD-10-CM

## 2021-05-13 DIAGNOSIS — Z86.010 HX OF COLONIC POLYPS: ICD-10-CM

## 2021-05-13 DIAGNOSIS — Z72.0 TOBACCO USE: ICD-10-CM

## 2021-05-13 DIAGNOSIS — R80.9 TYPE 2 DIABETES MELLITUS WITH MICROALBUMINURIA, WITHOUT LONG-TERM CURRENT USE OF INSULIN (HCC): Primary | ICD-10-CM

## 2021-05-13 DIAGNOSIS — E11.29 TYPE 2 DIABETES MELLITUS WITH MICROALBUMINURIA, WITHOUT LONG-TERM CURRENT USE OF INSULIN (HCC): Primary | ICD-10-CM

## 2021-05-13 DIAGNOSIS — G47.33 OSA (OBSTRUCTIVE SLEEP APNEA): ICD-10-CM

## 2021-05-13 DIAGNOSIS — J44.9 COPD, SEVERE (HCC): ICD-10-CM

## 2021-05-13 LAB — HBA1C MFR BLD: 9 %

## 2021-05-13 PROCEDURE — 3052F HG A1C>EQUAL 8.0%<EQUAL 9.0%: CPT | Performed by: INTERNAL MEDICINE

## 2021-05-13 PROCEDURE — 83036 HEMOGLOBIN GLYCOSYLATED A1C: CPT | Performed by: INTERNAL MEDICINE

## 2021-05-13 PROCEDURE — 99213 OFFICE O/P EST LOW 20 MIN: CPT | Performed by: INTERNAL MEDICINE

## 2021-05-13 RX ORDER — ALBUTEROL SULFATE 90 UG/1
2 AEROSOL, METERED RESPIRATORY (INHALATION) 4 TIMES DAILY PRN
Qty: 1 INHALER | Refills: 0 | Status: SHIPPED | OUTPATIENT
Start: 2021-05-13 | End: 2021-06-07

## 2021-05-13 RX ORDER — EMPAGLIFLOZIN 10 MG/1
1 TABLET, FILM COATED ORAL DAILY
Qty: 30 TABLET | Refills: 0 | Status: SHIPPED | OUTPATIENT
Start: 2021-05-13 | End: 2021-08-16

## 2021-05-13 NOTE — PROGRESS NOTES
a left adrenal gland which is stable since 2007      COPD (chronic obstructive pulmonary disease) (HCC)     Emphysema of lung ([address]     Essential hypertension 11/30/2017    History of pneumothorax 11/30/2017    Rib fx    Hx of colonic polyps 1/18/2018    1.2018 Descending Polyp 2. Sigmoid Polyp 3. Sigmoid Diverticulosis recheck 2023    Hx of renal calculi 11/30/2017    Hypertension     Kidney stone     Neuropathy     Obesity     Osteoarthritis     Pneumothorax     2016 d/t fall     Prediabetes 12/13/2017    Retinal hemorrhage 10/29/2020    HTN    Sleep apnea     Substance abuse ([address]     Type 2 diabetes mellitus without complication ([address]        Past Surgical History:   Procedure Laterality Date    CARPAL TUNNEL RELEASE      COLONOSCOPY  01/18/2018    Cronely, x2 polyps    KNEE SURGERY      arthroscopic             Family History   Problem Relation Age of Onset    Diabetes Mother     Cancer Mother     Cancer Father     No Known Problems Sister     Diabetes Brother     No Known Problems Brother     No Known Problems Brother     No Known Problems Brother     No Known Problems Brother     No Known Problems Brother            Objective     /65   Pulse 60   Ht 5' 3\" (1.6 m)   Wt 268 lb 6.4 oz (121.7 kg)   SpO2 91% Comment: RA  BMI 47.54 kg/m²     @LASTSAO2(3)@    Wt Readings from Last 3 Encounters:   03/08/21 266 lb (120.7 kg)   02/25/21 266 lb (120.7 kg)   01/20/21 263 lb 10.7 oz (119.6 kg)       Physical Exam     NAD alert and cooperative  HEENT: Poor dentition. Dry pharynx. Mild erythema. No masses. Full neck. No adenopathy. Lungs diminished breath sounds. Significant increased FRANK ratio. Cardiovascular exam regular rate and rhythm I do not detect any murmur or S4. Abdomen is obese. I cannot assess hepatosplenomegaly or ascites. No point tenderness. Minimal crepitus of the knees. Good pulses feet. Sensation is intact. Thickened nails. No maceration.   Positive calluses. Edema to the mid calf. No weepage or stasis changes  Patient is appropriate    Chemistry        Component Value Date/Time     (L) 01/20/2021 1809    K 5.6 (H) 01/20/2021 1809    CL 99 01/20/2021 1809    CO2 29 01/20/2021 1809    BUN 17 01/20/2021 1809    CREATININE 0.7 (L) 01/20/2021 1809        Component Value Date/Time    CALCIUM 9.2 01/20/2021 1809    ALKPHOS 105 01/20/2021 1809    AST 32 01/20/2021 1809    ALT 14 01/20/2021 1809    BILITOT 0.3 01/20/2021 1809            Lab Results   Component Value Date    WBC 9.2 01/20/2021    HGB 17.0 01/20/2021    HCT 51.7 01/20/2021    MCV 95.0 01/20/2021     01/20/2021     Lab Results   Component Value Date    LABA1C 8.4 02/25/2021     Lab Results   Component Value Date    .2 07/29/2020     Lab Results   Component Value Date    LABA1C 8.4 02/25/2021     No components found for: CHLPL  Lab Results   Component Value Date    TRIG 123 02/25/2021    TRIG 107 07/29/2020    TRIG 73 04/25/2019     Lab Results   Component Value Date    HDL 73 (H) 02/25/2021    HDL 38 (L) 07/29/2020    HDL 42 04/25/2019     Lab Results   Component Value Date    LDLCALC 73 02/25/2021    LDLCALC 73 07/29/2020    LDLCALC 81 04/25/2019     Lab Results   Component Value Date    LABVLDL 25 02/25/2021    LABVLDL 21 07/29/2020    LABVLDL 15 04/25/2019       Old labs and records reviewed or requested  Discussed past lab and studies with patient      Diagnosis Orders   1. Type 2 diabetes mellitus with microalbuminuria, without long-term current use of insulin (HCC)  POCT glycosylated hemoglobin (Hb A1C)   2. Tobacco use     3. SILVANA (obstructive sleep apnea)     4. Hx of colonic polyps     5. Essential hypertension     6. COPD, severe ([address]         Diabetes uncontrolled. We will see if we can use Jardiance. Patient says he will not check his sugars. Tobacco use. Knows I advised on cessation. Obstructive sleep apnea.   Questionable pulmonary hypertension with increased shortness of breath asked to follow back up with pulmonary. Hypertension good control. Asked him to follow-up with his cardiologist.    Severe COPD. Advised to use his albuterol nebulizer at least twice a day. We will try and get handheld albuterol as he is new to insurance. No follow-ups on file. Diagnosis and treatment discussed.   Possible side effects of medication reviewed  Patients questions answered  Follow up understood  Pt aware if they are not contacted about any test results , this does not mean they are normal.  They should call

## 2021-05-13 NOTE — PATIENT INSTRUCTIONS
Increase your Lasix to three a day. If your insurance covers Ashley Nieves use that with half of your Metformin. I would like you to call Dr. Miguel Armstrong and Dr. Heu Hunter for follow-up of your shortness of breath. I recommend getting the Covid vaccine   other vaccines that are due include your tetanus and shingles. [email or web address]. [email or web address]. [email or web address].gov

## 2021-05-15 DIAGNOSIS — I10 ESSENTIAL HYPERTENSION: ICD-10-CM

## 2021-05-17 RX ORDER — SPIRONOLACTONE 25 MG/1
TABLET ORAL
Qty: 90 TABLET | Refills: 1 | Status: SHIPPED | OUTPATIENT
Start: 2021-05-17 | End: 2021-11-15

## 2021-05-17 RX ORDER — LISINOPRIL 40 MG/1
TABLET ORAL
Qty: 90 TABLET | Refills: 1 | Status: SHIPPED | OUTPATIENT
Start: 2021-05-17 | End: 2021-11-15

## 2021-05-17 RX ORDER — AMLODIPINE BESYLATE 10 MG/1
TABLET ORAL
Qty: 90 TABLET | Refills: 1 | Status: SHIPPED | OUTPATIENT
Start: 2021-05-17 | End: 2021-11-15

## 2021-05-24 RX ORDER — METFORMIN HYDROCHLORIDE 500 MG/1
TABLET, EXTENDED RELEASE ORAL
Qty: 180 TABLET | Refills: 0 | Status: SHIPPED | OUTPATIENT
Start: 2021-05-24 | End: 2022-07-21

## 2021-06-15 ENCOUNTER — CLINICAL DOCUMENTATION (OUTPATIENT)
Dept: OTHER | Age: 56
End: 2021-06-15

## 2021-07-02 ENCOUNTER — APPOINTMENT (OUTPATIENT)
Dept: GENERAL RADIOLOGY | Age: 56
End: 2021-07-02
Payer: COMMERCIAL

## 2021-07-02 ENCOUNTER — HOSPITAL ENCOUNTER (EMERGENCY)
Age: 56
Discharge: HOME | End: 2021-07-03
Attending: EMERGENCY MEDICINE
Payer: COMMERCIAL

## 2021-07-02 VITALS
HEART RATE: 82 BPM | BODY MASS INDEX: 33.97 KG/M2 | RESPIRATION RATE: 16 BRPM | OXYGEN SATURATION: 88 % | DIASTOLIC BLOOD PRESSURE: 82 MMHG | HEIGHT: 64 IN | SYSTOLIC BLOOD PRESSURE: 159 MMHG | TEMPERATURE: 98.2 F | WEIGHT: 199 LBS

## 2021-07-02 DIAGNOSIS — S46.912A STRAIN OF AC JOINT, LEFT, INITIAL ENCOUNTER: Primary | ICD-10-CM

## 2021-07-02 PROCEDURE — 99283 EMERGENCY DEPT VISIT LOW MDM: CPT

## 2021-07-02 PROCEDURE — 73050 X-RAY EXAM OF SHOULDERS: CPT

## 2021-07-02 PROCEDURE — 73030 X-RAY EXAM OF SHOULDER: CPT

## 2021-07-02 ASSESSMENT — PAIN DESCRIPTION - DESCRIPTORS: DESCRIPTORS: ACHING

## 2021-07-02 ASSESSMENT — PAIN SCALES - GENERAL: PAINLEVEL_OUTOF10: 8

## 2021-07-02 ASSESSMENT — PAIN DESCRIPTION - PAIN TYPE: TYPE: ACUTE PAIN

## 2021-07-02 ASSESSMENT — PAIN DESCRIPTION - ORIENTATION: ORIENTATION: LEFT

## 2021-07-02 ASSESSMENT — PAIN DESCRIPTION - LOCATION: LOCATION: SHOULDER

## 2021-07-02 ASSESSMENT — PAIN DESCRIPTION - FREQUENCY: FREQUENCY: INTERMITTENT

## 2021-07-03 NOTE — ED PROVIDER NOTES
[facility]  eMERGENCY dEPARTMENT eNCOUnter      Pt Name: Wendie Saldivar  MRN: [id number]  [place] 1965  Date of evaluation: 7/2/2021  Provider: Emy Reno MD    CHIEF COMPLAINT       Chief Complaint   Patient presents with    Shoulder Pain      LEft shoulder pain since breaking up afight betwenn his little dog and the neighbors dog this afternoon          HISTORY OF PRESENT ILLNESS  (Location/Symptom, Timing/Onset, Context/Setting, Quality, Duration, Modifying Factors, Severity.)   Wendie Saldivar is a 60 y.o. male who presents to the emergency department planing of an injury to his left shoulder that occurred at home around 2 PM this afternoon. He states he was going after his dog and fell backwards and landed on the back of his left shoulder. He did not hit his head. He has no neck or back pain. The pain is in the front of the shoulder. He is able to move the shoulder but is painful. He has no elbow forearm or wrist pain. No other injuries from the fall. Nursing Notes were reviewed and I agree. REVIEW OF SYSTEMS    (2-9 systems for level 4, 10 or more for level 5)     HEENT: No head injury. No head pain. Cardiovascular: No chest or rib pain. Musculoskeletal: Left anterior shoulder pain. Worse with movement. Nonradiating. Skin: No bruising lacerations or abrasions. Neuro: No extremity weakness numbness or tingling. Except as noted above the remainder of the review of systems was reviewed and negative.        PAST MEDICAL HISTORY         Diagnosis Date    Acute on chronic diastolic congestive heart failure ([address]     ADHD (attention deficit hyperactivity disorder)     Adrenal nodule ([address] 12/7/2017    low-density nodular enlargement of a left adrenal gland which is stable since 2007      COPD (chronic obstructive pulmonary disease) ([address]     Emphysema of lung ([address]     Essential hypertension 11/30/2017    History of pneumothorax 11/30/2017    Rib fx    Hx of colonic polyps 1/18/2018 1.2018 Descending Polyp 2. Sigmoid Polyp 3.  Sigmoid Diverticulosis recheck 2023    Hx of renal calculi 11/30/2017    Hypertension     Kidney stone     Neuropathy     Obesity     Osteoarthritis     Pneumothorax     2016 d/t fall     Prediabetes 12/13/2017    Retinal hemorrhage 10/29/2020    HTN    Sleep apnea     [facility]     Type 2 diabetes mellitus without complication (HCC)        SURGICAL HISTORY           Procedure Laterality Date    CARPAL TUNNEL RELEASE      COLONOSCOPY  01/18/2018    Cronely, x2 polyps    KNEE SURGERY      arthroscopic       CURRENT MEDICATIONS       Previous Medications    ALBUTEROL (ACCUNEB) 1.25 MG/3ML NEBULIZER SOLUTION    Inhale 3 mLs into the lungs every 6 hours as needed for Wheezing    ALBUTEROL SULFATE  (90 BASE) MCG/ACT INHALER    INHALE 2 PUFFS INTO THE LUNGS 4 TIMES DAILY AS NEEDED FOR WHEEZING    AMLODIPINE (NORVASC) 10 MG TABLET    TAKE 1 TABLET BY MOUTH EVERY DAY    BUDESONIDE-FORMOTEROL (SYMBICORT) 160-4.5 MCG/ACT AERO    TAKE 2 PUFFS BY MOUTH TWICE A DAY    CARVEDILOL (COREG) 25 MG TABLET    TAKE 1 TABLET BY MOUTH TWICE A DAY FOR BLOOD PRESSURE    EMPAGLIFLOZIN (JARDIANCE) 10 MG TABLET    Take 1 tablet by mouth daily    FUROSEMIDE (LASIX) 20 MG TABLET    TAKE 1 TO 2 TABLETS BY MOUTH TWICE A DAY    LISINOPRIL (PRINIVIL;ZESTRIL) 40 MG TABLET    TAKE 1 TABLET BY MOUTH EVERY DAY    METFORMIN (GLUCOPHAGE-XR) 500 MG EXTENDED RELEASE TABLET    TAKE 1/2 TO 1 TABLET BY MOUTH TWICE A DAY FOR DIABETES    SIMVASTATIN (ZOCOR) 20 MG TABLET    TAKE 1 TABLET BY MOUTH EVERY DAY IN THE EVENING    SPIRONOLACTONE (ALDACTONE) 25 MG TABLET    TAKE 1 TABLET BY MOUTH EVERY DAY       ALLERGIES     Guaifenesin and Asa [aspirin]    FAMILY HISTORY           Problem Relation Age of Onset    Diabetes Mother     Cancer Mother     Cancer Father     No Known Problems Sister     Diabetes Brother     No Known Problems Brother  No Known Problems Brother     No Known Problems Brother     No Known Problems Brother     No Known Problems Brother      Family Status   Relation Name Status    Mother      Father      Sister  Alive    Brother  Alive    Brother  Alive    Brother  Alive    Brother  Alive    Brother  Alive    Brother  Alive    Brother  Alive        [facility] [place]      reports that he has been smoking cigarettes. He started smoking about 47 years ago. He has a 43.00 pack-year smoking history. He has never used smokeless tobacco. He reports that he does not drink alcohol and does not use drugs. PHYSICAL EXAM    (up to 7 for level 4, 8 or more for level 5)     ED Triage Vitals [21]   BP Temp Temp src Pulse Resp SpO2 Height Weight   (!) 159/82 98.2 °F (36.8 °C) -- 82 16 (!) 88 % 5' 4\" (1.626 m) 199 lb (90.3 kg)       General: Alert white male, morbidly obese, no acute distress. Musculoskeletal: Left scapula is nontender. Medial and mid clavicle is nontender. There is point tenderness over the left AC joint. The glenohumeral joint and proximal humerus are not significantly tender. He has intact range of motion including flexion extension at the elbow. Abduction and abduction at the shoulder. Internal and external rotation of the shoulder. He has moderate pain over his anterior shoulder with range of motion. The mid and distal humerus are nontender. The elbow forearm and wrist are nontender on the left. He is intact distal pulses. Skin: Warm and dry, good turgor. No bruising, lacerations, or abrasions. Neuro: Intact motor function sensation left upper extremity.       DIAGNOSTIC RESULTS     RADIOLOGY:   Non-plain film images such as CT, Ultrasound and MRI are read by the radiologist. Plain radiographic images are visualized and preliminarily interpreted by Perla Parr MD with the below findings:      Interpretation per the Radiologist below, if available at the time of

## 2021-08-16 ENCOUNTER — OFFICE VISIT (OUTPATIENT)
Dept: FAMILY MEDICINE CLINIC | Age: 56
End: 2021-08-16
Payer: COMMERCIAL

## 2021-08-16 VITALS
HEART RATE: 53 BPM | DIASTOLIC BLOOD PRESSURE: 70 MMHG | OXYGEN SATURATION: 83 % | SYSTOLIC BLOOD PRESSURE: 110 MMHG | BODY MASS INDEX: 46.99 KG/M2 | RESPIRATION RATE: 15 BRPM | HEIGHT: 63 IN | WEIGHT: 265.2 LBS

## 2021-08-16 DIAGNOSIS — E11.29 TYPE 2 DIABETES MELLITUS WITH MICROALBUMINURIA, WITHOUT LONG-TERM CURRENT USE OF INSULIN (HCC): Primary | ICD-10-CM

## 2021-08-16 DIAGNOSIS — Z72.0 TOBACCO USE: ICD-10-CM

## 2021-08-16 DIAGNOSIS — G47.33 OSA (OBSTRUCTIVE SLEEP APNEA): ICD-10-CM

## 2021-08-16 DIAGNOSIS — J31.0 CHRONIC RHINITIS: ICD-10-CM

## 2021-08-16 DIAGNOSIS — E27.8 ADRENAL NODULE (HCC): ICD-10-CM

## 2021-08-16 DIAGNOSIS — R09.02 HYPOXEMIA: ICD-10-CM

## 2021-08-16 DIAGNOSIS — J44.9 COPD, SEVERE (HCC): ICD-10-CM

## 2021-08-16 DIAGNOSIS — I10 ESSENTIAL HYPERTENSION: ICD-10-CM

## 2021-08-16 DIAGNOSIS — R80.9 TYPE 2 DIABETES MELLITUS WITH MICROALBUMINURIA, WITHOUT LONG-TERM CURRENT USE OF INSULIN (HCC): Primary | ICD-10-CM

## 2021-08-16 DIAGNOSIS — Z72.0 TOBACCO ABUSE: ICD-10-CM

## 2021-08-16 DIAGNOSIS — Z87.891 PERSONAL HISTORY OF TOBACCO USE: ICD-10-CM

## 2021-08-16 DIAGNOSIS — Z86.010 HX OF COLONIC POLYPS: ICD-10-CM

## 2021-08-16 LAB
ANION GAP SERPL CALCULATED.3IONS-SCNC: 12 MMOL/L (ref 3–16)
BUN BLDV-MCNC: 25 MG/DL (ref 7–20)
CALCIUM SERPL-MCNC: 9.5 MG/DL (ref 8.3–10.6)
CHLORIDE BLD-SCNC: 94 MMOL/L (ref 99–110)
CO2: 27 MMOL/L (ref 21–32)
CORTISOL - AM: 12.8 UG/DL (ref 4.3–22.4)
CREAT SERPL-MCNC: 1 MG/DL (ref 0.9–1.3)
GFR AFRICAN AMERICAN: >60
GFR NON-AFRICAN AMERICAN: >60
GLUCOSE BLD-MCNC: 250 MG/DL (ref 70–99)
HBA1C MFR BLD: 11.1 %
POTASSIUM SERPL-SCNC: 4.7 MMOL/L (ref 3.5–5.1)
SODIUM BLD-SCNC: 133 MMOL/L (ref 136–145)

## 2021-08-16 PROCEDURE — 3052F HG A1C>EQUAL 8.0%<EQUAL 9.0%: CPT | Performed by: INTERNAL MEDICINE

## 2021-08-16 PROCEDURE — 36415 COLL VENOUS BLD VENIPUNCTURE: CPT | Performed by: INTERNAL MEDICINE

## 2021-08-16 PROCEDURE — 83036 HEMOGLOBIN GLYCOSYLATED A1C: CPT | Performed by: INTERNAL MEDICINE

## 2021-08-16 PROCEDURE — G0296 VISIT TO DETERM LDCT ELIG: HCPCS | Performed by: INTERNAL MEDICINE

## 2021-08-16 PROCEDURE — 99214 OFFICE O/P EST MOD 30 MIN: CPT | Performed by: INTERNAL MEDICINE

## 2021-08-16 RX ORDER — LEVOFLOXACIN 250 MG/1
TABLET ORAL
Qty: 7 TABLET | Refills: 0 | Status: SHIPPED | OUTPATIENT
Start: 2021-08-16 | End: 2022-03-28

## 2021-08-16 RX ORDER — EMPAGLIFLOZIN 25 MG/1
1 TABLET, FILM COATED ORAL DAILY
Qty: 30 TABLET | Refills: 5 | Status: SHIPPED | OUTPATIENT
Start: 2021-08-16 | End: 2022-02-28

## 2021-08-16 RX ORDER — PREDNISONE 20 MG/1
20 TABLET ORAL 2 TIMES DAILY
Qty: 10 TABLET | Refills: 0 | Status: SHIPPED | OUTPATIENT
Start: 2021-08-16 | End: 2021-08-16

## 2021-08-16 NOTE — PATIENT INSTRUCTIONS
Increase Jardiance to 25 mg daily. Use Metformin to 2 pills a day. Sugars are still elevated we will go up to 4. Side effect can be some loose stool. I recommend having CT screening and following with the lung doctor for your breathing. Put in another referral to Dr. Doni Cox so he can check your sleep apnea equipment and your breathing. I put in an antibiotic and some prednisone since your oximetry is low. I recommend using your oxygen at home. I do think you should go to the emergency room understand that you will not do this. You can call [phone] to set up your CT screening    I recommend having Covid testing as well you may call 72 6/7/2007 to set this up    Vaccines that are recommended include your tetanus, shingles and Covid vaccine. What is lung cancer screening? Lung cancer screening is a way in which doctors check the lungs for early signs of cancer in people who have no symptoms of lung cancer. A low-dose CT scan uses much less radiation than a normal CT scan and shows a more detailed image of the lungs than a standard X-ray. The goal of lung cancer screening is to find cancer early, before it has a chance to grow, spread, or cause problems. One large study found that smokers who were screened with low-dose CT scans were less likely to die of lung cancer than those who were screened with standard X-ray. Below is a summary of the things you need to know regarding screening for lung cancer with low-dose computed tomography (LDCT). This is a screening program that involves routine annual screening with LDCT studies of the lung. The LDCTs are done using low-dose radiation that is not thought to increase your cancer risk. If you have other serious medical conditions (other cancers, congestive heart failure) that limit your life expectancy to less than 10 years, you should not undergo lung cancer screening with LDCT.   The chance is 20%-60% that the LDCT result will show abnormalities. This would require additional testing which could include repeat imaging or even invasive procedures. Most (about 95%) of \"abnormal\" LDCT results are false in the sense that no lung cancer is ultimately found. Additionally, some (about 10%) of the cancers found would not affect your life expectancy, even if undetected and untreated. If you are still smoking, the single most important thing that you can do to reduce your risk of dying of lung cancer is to quit. For this screening to be covered by Medicare and most other insurers, strict criteria must be met. If you do not meet these criteria, but still wish to undergo LDCT testing, you will be required to sign a waiver indicating your willingness to pay for the scan.

## 2021-08-16 NOTE — PROGRESS NOTES
HPI: Britt Hastings presents for hypertension diabetes COPD    Chronic health issues include tobacco addiction, COPD, hypertension, and left ventricular hypertrophy, poor dentition, diabetes, BMI 40, grieving, retinal hemorrhage.     A1c  9 May 2021. BMI 47      No retinal disease however hypertensive bleed.  No peripheral neuropathy.  Does not go barefoot often.  Positive microalbumin.  Hypertension. LDL 73. Walking dog . Current medications prescribed Metformin 500 mg twice daily but discontinued and is only taking. Jardiance 10, statin, ACE, declines aspirin. Nocturia x 3. Denies any lightheadedness. Positive ACE     DJD thoracolumbar spine. AC strain. Knee pain. Seen orthopedist.     CT abdomen 2021 with non obstructive radial calculi, stable left adrenal mass, diverticuli       Hypertension  echocardiogram 2017 with left ventricular hypertrophy, ejection fraction 55% and positive ventricular enlargement with pulmonary hypertension.  On CPAP.  Discontinued nocturnal oxygen. Shae Howard coronary disease on cath.    Retinal bleed.   Coreg 25 twice daily  No chest pain palpitations +increased exercise intolerance.  Lasix 20 twice daily Norvasc 10 and lisinopril 40.  Spironolactone creatinine stable        COPD/SILVANA. Follows with pulmonary. Sleep apnea compliant with CPAP.  Positive Symbicort. financial constraints with intermittent noncompliance .  Has nebulizer uses a couple times a week. Symbicort started has not followed back with pulmonary since . hx pneumothorax and rib fracture.  Emphysema on CT. no PFTs. Positive tobacco.  Has had COVID #1. Some shortness of breath today and congestion. Has oxygen at home but no oximeter.     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   + tobacco 1 PPD. . No alcohol. No drugs.  No STD concerns. Wife   suddenly.  Had multiple health issues no exercise outside of work. Karl Gardner     FH: 6 brothers 1 sisters   + DM, Heart disease,father lymph node cancer    - colon, prostate cancer     Review of systems: Sleep apnea, chronic sinusitis, , poor dentition, COPD with baseline dyspnea, obesity.    SILVANA.  Dyspnea on exertion.  No chest pain with exertion. Denies GE reflux or bloody stools.  hx adenomatous polyps 2018 recheck 2023. Suzan Tamie STD concerns.  Generalized arthralgias.  To arthritis.  Knee pain.  Grief     Constitutional, ent, CV, respiratory, GI, , joint, skin, allergic and psychiatric ROS reviewed and negative except for above    Allergies   Allergen Reactions    Guaifenesin Other (See Comments)     Felt like having a stroke  \"paralized me\"    Asa [Aspirin] Itching       Outpatient Medications Marked as Taking for the 8/16/21 encounter (Office Visit) with Salina Amador MD   Medication Sig Dispense Refill    levoFLOXacin (LEVAQUIN) 250 MG tablet 1 po q day 7 tablet 0    albuterol sulfate  (90 Base) MCG/ACT inhaler INHALE 2 PUFFS INTO THE LUNGS 4 TIMES DAILY AS NEEDED FOR WHEEZING 6.7 Inhaler 5    amLODIPine (NORVASC) 10 MG tablet TAKE 1 TABLET BY MOUTH EVERY DAY 90 tablet 1    lisinopril (PRINIVIL;ZESTRIL) 40 MG tablet TAKE 1 TABLET BY MOUTH EVERY DAY 90 tablet 1    spironolactone (ALDACTONE) 25 MG tablet TAKE 1 TABLET BY MOUTH EVERY DAY 90 tablet 1    [DISCONTINUED] empagliflozin (JARDIANCE) 10 MG tablet Take 1 tablet by mouth daily 30 tablet 0    carvedilol (COREG) 25 MG tablet TAKE 1 TABLET BY MOUTH TWICE A DAY FOR BLOOD PRESSURE 180 tablet 0    furosemide (LASIX) 20 MG tablet TAKE 1 TO 2 TABLETS BY MOUTH TWICE A  tablet 0    albuterol (ACCUNEB) 1.25 MG/3ML nebulizer solution Inhale 3 mLs into the lungs every 6 hours as needed for Wheezing 360 mL 3    simvastatin (ZOCOR) 20 MG tablet TAKE 1 TABLET BY MOUTH EVERY DAY IN THE EVENING 30 tablet 9    budesonide-formoterol (SYMBICORT) 160-4.5 MCG/ACT AERO TAKE 2 PUFFS BY MOUTH TWICE A DAY 30.6 Inhaler 11             Past Medical History:   Diagnosis Date    Acute on chronic diastolic congestive heart failure ([address]     ADHD (attention deficit hyperactivity disorder)     Adrenal nodule ([address] 12/7/2017    low-density nodular enlargement of a left adrenal gland which is stable since 2007      COPD (chronic obstructive pulmonary disease) ([address]     Emphysema of lung ([address]     Essential hypertension 11/30/2017    History of pneumothorax 11/30/2017    Rib fx    Hx of colonic polyps 1/18/2018    1.2018 Descending Polyp 2. Sigmoid Polyp 3. Sigmoid Diverticulosis recheck 2023    Hx of renal calculi 11/30/2017    Hypertension     Kidney stone     Neuropathy     Obesity     Osteoarthritis     Pneumothorax     2016 d/t fall     Prediabetes 12/13/2017    Retinal hemorrhage 10/29/2020    HTN    Sleep apnea     Substance abuse ([address]     Type 2 diabetes mellitus without complication [facility]        Past Surgical History:   Procedure Laterality Date    CARPAL TUNNEL RELEASE      COLONOSCOPY  01/18/2018    Cronely, x2 polyps    KNEE SURGERY      arthroscopic             Family History   Problem Relation Age of Onset    Diabetes Mother     Cancer Mother     Cancer Father     No Known Problems Sister     Diabetes Brother     No Known Problems Brother     No Known Problems Brother     No Known Problems Brother     No Known Problems Brother     No Known Problems Brother            Objective     /70   Pulse 53   Resp 15   Ht 5' 2.5\" (1.588 m)   Wt 265 lb 3.2 oz (120.3 kg)   SpO2 (!) 83%   BMI 47.73 kg/m²     @LASTSAO2(3)@    Wt Readings from Last 3 Encounters:   07/02/21 199 lb (90.3 kg)   05/13/21 268 lb 6.4 oz (121.7 kg)   03/08/21 266 lb (120.7 kg)       Physical Exam     NAD alert and cooperative  HEENT: Crowded hypopharynx erythematous. Conjunctival injection. TMs are unremarkable. Lungs increased FRANK ratio wheeze rales few rhonchi.   Cardiovascular exam regular rate and rhythm without any murmur click. Obesity had did not define any hepatosplenomegaly. Trace peripheral edema. Positive pulses lower extremity. Rolf horn toenails. No suspicious skin lesions or nodules. He is appropriate normal affect    Chemistry        Component Value Date/Time     (L) 01/20/2021 1809    K 5.6 (H) 01/20/2021 1809    CL 99 01/20/2021 1809    CO2 29 01/20/2021 1809    BUN 17 01/20/2021 1809    CREATININE 0.7 (L) 01/20/2021 1809        Component Value Date/Time    CALCIUM 9.2 01/20/2021 1809    ALKPHOS 105 01/20/2021 1809    AST 32 01/20/2021 1809    ALT 14 01/20/2021 1809    BILITOT 0.3 01/20/2021 1809            Lab Results   Component Value Date    WBC 9.2 01/20/2021    HGB 17.0 01/20/2021    HCT 51.7 01/20/2021    MCV 95.0 01/20/2021     01/20/2021     Lab Results   Component Value Date    LABA1C 9.0 05/13/2021     Lab Results   Component Value Date    .2 07/29/2020     Lab Results   Component Value Date    LABA1C 9.0 05/13/2021     No components found for: CHLPL  Lab Results   Component Value Date    TRIG 123 02/25/2021    TRIG 107 07/29/2020    TRIG 73 04/25/2019     Lab Results   Component Value Date    HDL 73 (H) 02/25/2021    HDL 38 (L) 07/29/2020    HDL 42 04/25/2019     Lab Results   Component Value Date    LDLCALC 73 02/25/2021    LDLCALC 73 07/29/2020    LDLCALC 81 04/25/2019     Lab Results   Component Value Date    LABVLDL 25 02/25/2021    LABVLDL 21 07/29/2020    LABVLDL 15 04/25/2019       Old labs and records reviewed or requested  Discussed past lab and studies with patient      Diagnosis Orders   1. Type 2 diabetes mellitus with microalbuminuria, without long-term current use of insulin (HCC)  POCT glycosylated hemoglobin (Hb A1C)   2. Tobacco use     3. SILVANA (obstructive sleep apnea)  Darshana Barrientos MD, Pulmonary, [facility]   4. Hx of colonic polyps     5. Essential hypertension  Basic Metabolic Panel   6.  COPD, severe ([address]  Reji Alfonso, Aidan Harkins MD, Pulmonary, [facility]   7. Chronic rhinitis     8. Adrenal nodule (HCC)  CORTISOL AM   9. Tobacco abuse  CT Lung Screen (Initial or Annual)   10. Hypoxemia  COVID-19   11. Personal history of tobacco use  [place] VISIT TO DISCUSS LUNG CA SCREEN W LDCT    CT Lung Screen (Annual)         A1c is 11. Discussed ADH Jardiance and his Metformin. Probable glipizide. He does not check sugars and states he will not. Tobacco addiction not interested in stopping. Is interested in CT screening following up with his pulmonary doctor. History of colonic polyps. Hypertension good control. Adrenal nodule. Cortisol    Hypoxemia patient is aware I recommend going to the emergency room. He states he will not do so. Has other things to do. Will use his oxygen at home and his nebulizer. Levaquin. Has had recent Covid vaccine. No known exposures. Will put on prednisone for now    No follow-ups on file. Diagnosis and treatment discussed. Possible side effects of medication reviewed  Patients questions answered  Follow up understood  Pt aware if they are not contacted about any test results , this does not mean they are normal.  They should call  Low Dose CT (LDCT) Lung Screening criteria met   Age 50-69   Pack year smoking >30   Still smoking or less than 15 year since quit   No sign or symptoms of lung cancer   > 11 months since last LDCT     Risks and benefits of lung cancer screening with LDCT scans discussed:    Significance of positive screen - False-positive LDCT results often occur. 95% of all positive results do not lead to a diagnosis of cancer. Usually further imaging can resolve most false-positive results; however, some patients may require invasive procedures. Over diagnosis risk - 10% to 12% of screen-detected lung cancer cases are over diagnosedthat is, the cancer would not have been detected in the patient's lifetime without the screening.     Need for follow up screens annually to continue lung cancer screening effectiveness     Risks associated with radiation from annual LDCT- Radiation exposure is about the same as for a mammogram, which is about 1/3 of the annual background radiation exposure from everyday life. Starting screening at age 54 is not likely to increase cancer risk from radiation exposure. Patients with comorbidities resulting in life expectancy of < 10 years, or that would preclude treatment of an abnormality identified on CT, should not be screened due to lack of benefit.     To obtain maximal benefit from this screening, smoking cessation and long-term abstinence from smoking is critical

## 2021-09-27 NOTE — PROGRESS NOTES
HPI: Wendie Saldivar presents for follow-up diabetes, heel pain, severe COPD. Chronic health issues include tobacco addiction, COPD, hypertension, and left ventricular hypertrophy, poor dentition, controlled diabetes with compliance issues, BMI 44, grieving, retinal hemorrhage.     A1c   11 August 2021  intermittent noncompliance.  No retinal disease however hypertensive bleed.  No peripheral neuropathy.  Does not go barefoot often.  Positive microalbumin.  Hypertension.  LDL 73. Walking dog .  Metformin 500 twice daily, Jardiance 25, statin, ACE, declines aspirin. Nocturia x 3. Denies any lightheadedness. .  Has stopped his Metformin prior to last visit. BMI 47. Not following his diet. Opposed to insulin. Positive heel pain. Using ice. Doing some stretching exercises. No trauma. No fevers or chills.     DJD thoracolumbar spine. AC strain. Knee pain. Seen orthopedist.     CT abdomen January 2021 with non obstructive renal calculi, stable left adrenal mass, diverticuli       Hypertension 2012 echocardiogram April 2017 with left ventricular hypertrophy, ejection fraction 55% and positive ventricular enlargement with pulmonary hypertension. EKG decreased anterior forces.  On CPAP.  Discontinued nocturnal oxygen. Gino Amabile coronary disease on cath.    Retinal bleed.   Coreg 25 twice daily denies chest pain palpitations +increased exercise intolerance.  Lasix 20 twice daily Norvasc 10 and lisinopril 40.  Spironolactone creatinine stable. States edema is improved on Jardiance.        COPD/SILVANA. Follows with pulmonary. Sleep apnea compliant with CPAP.  Positive Symbicort. financial constraints with intermittent noncompliance .  Has nebulizer using 1-2 times daily. Symbicort started has not followed back with pulmonary since 2019. hx pneumothorax and rib fracture.  Emphysema on CT. no PFTs. 1 pack a day tobacco Covid vaccine. Some shortness of breath today and congestion.   Has oxygen at home but no oximeter.     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   + tobacco 1 PPD. . No alcohol. No drugs. No STD concerns. Wife   suddenly.  Had multiple health issues no exercise outside of work.        FH: 6 brothers 1 sisters   + DM, Heart disease,father lymph node cancer    - colon, prostate cancer     Review of systems: Sleep apnea, chronic sinusitis, , poor dentition, COPD with baseline dyspnea, obesity.    SILVANA.  Dyspnea on exertion.  No chest pain with exertion. Denies GE reflux or bloody stools.  hx adenomatous polyps 2018 recheck . Tucker Jones STD concerns.  Generalized arthralgias.  To arthritis.  Knee pain.  Grief       Constitutional, ent, CV, respiratory, GI, , joint, skin, allergic and psychiatric ROS reviewed and negative except for above    Allergies   Allergen Reactions    Guaifenesin Other (See Comments)     Felt like having a stroke  \"paralized me\"    Asa [Aspirin] Itching       Outpatient Medications Marked as Taking for the 21 encounter (Office Visit) with Ronn Nesbitt MD   Medication Sig Dispense Refill    albuterol sulfate  (90 Base) MCG/ACT inhaler Inhale 2 puffs into the lungs 4 times daily as needed for Wheezing 1 each 5    budesonide-formoterol (SYMBICORT) 160-4.5 MCG/ACT AERO TAKE 2 PUFFS BY MOUTH TWICE A DAY 1 each 11    diclofenac sodium (VOLTAREN) 1 % GEL 4 grams topically qid to heel 350 g 0    Dulaglutide 0.75 MG/0.5ML SOPN Inject 0.75 mg into the skin once a week 4 pen 1    Insulin Pen Needle 31G X 5 MM MISC 1 each by Does not apply route daily 12 each 1    amoxicillin-clavulanate (AUGMENTIN) 875-125 MG per tablet Take 1 tablet by mouth 2 times daily for 7 days 14 tablet 0    empagliflozin (JARDIANCE) 25 MG tablet Take 1 tablet by mouth daily To replace 10 mg 30 tablet 5    metFORMIN (GLUCOPHAGE-XR) 500 MG extended release tablet TAKE 1/2 TO 1 TABLET BY MOUTH TWICE A DAY FOR DIABETES 180 tablet 0    amLODIPine (NORVASC) 10 MG tablet TAKE 1 TABLET BY MOUTH EVERY DAY 90 tablet 1    lisinopril (PRINIVIL;ZESTRIL) 40 MG tablet TAKE 1 TABLET BY MOUTH EVERY DAY 90 tablet 1    furosemide (LASIX) 20 MG tablet TAKE 1 TO 2 TABLETS BY MOUTH TWICE A  tablet 0    simvastatin (ZOCOR) 20 MG tablet TAKE 1 TABLET BY MOUTH EVERY DAY IN THE EVENING 30 tablet 9             Past Medical History:   Diagnosis Date    Acute on chronic diastolic congestive heart failure (HCC)     ADHD (attention deficit hyperactivity disorder)     Adrenal nodule (HCC) 12/7/2017    low-density nodular enlargement of a left adrenal gland which is stable since 2007      COPD (chronic obstructive pulmonary disease) (HCC)     Em[facility]ma of lung ([facility]     Essential hypertension 11/30/2017    History of pneumothorax 11/30/2017    Rib fx    Hx of colonic polyps 1/18/2018    1.2018 Descending Polyp 2. Sigmoid Polyp 3.  Sigmoid Diverticulosis recheck 2023    Hx of renal calculi 11/30/2017    Hypertension     Kidney stone     Neuropathy     Obesity     Osteoarthritis     Pneumothorax     2016 d/t fall     Prediabetes 12/13/2017    Retinal hemorrhage 10/29/2020    HTN    Sleep apnea     Substance abuse ([address]     Type 2 diabetes mellitus without complication ([address]        Past Surgical History:   Procedure Laterality Date    CARPAL TUNNEL RELEASE      COLONOSCOPY  01/18/2018    Cronely, x2 polyps    KNEE SURGERY      arthroscopic             Family History   Problem Relation Age of Onset    Diabetes Mother     Cancer Mother     Cancer Father     No Known Problems Sister     Diabetes Brother     No Known Problems Brother     No Known Problems Brother     No Known Problems Brother     No Known Problems Brother     No Known Problems Brother              Objective     /71   Pulse 64   Resp 16   Ht 5' 4\" (1.626 m)   Wt 265 lb (120.2 kg)   BMI 45.49 kg/m²     @LASTSAO2(3)@    Wt Readings from Last 3 Encounters:   08/16/21 265 lb 3.2 oz (120.3 kg)   07/02/21 199 lb (90.3 kg)   05/13/21 268 lb 6.4 oz (121.7 kg)       Physical Exam     NAD alert and cooperative appropriate. Blue lips. HEENT: Candida tongue and lips. Erythematous pharynx. Bilateral otitis. Conjunctival injection. Full neck. No stridor. Rales bases bilaterally. Increased FRANK occasional wheeze. Cardiovascular exam regular rate and rhythm no increased P2 or gallop. Abdomen is obese. No point tenderness. Trace lower extremity edema. Callus heel. No abrasion. Rolf horn nails. Diminished pulses lower extremities. He is appropriate. Intermittently tearful.     Chemistry        Component Value Date/Time     (L) 08/16/2021 0849    K 4.7 08/16/2021 0849    K 5.6 (H) 01/20/2021 1809    CL 94 (L) 08/16/2021 0849    CO2 27 08/16/2021 0849    BUN 25 (H) 08/16/2021 0849    CREATININE 1.0 08/16/2021 0849        Component Value Date/Time    CALCIUM 9.5 08/16/2021 0849    ALKPHOS 105 01/20/2021 1809    AST 32 01/20/2021 1809    ALT 14 01/20/2021 1809    BILITOT 0.3 01/20/2021 1809            Lab Results   Component Value Date    WBC 9.2 01/20/2021    HGB 17.0 01/20/2021    HCT 51.7 01/20/2021    MCV 95.0 01/20/2021     01/20/2021     Lab Results   Component Value Date    LABA1C 11.1 08/16/2021     Lab Results   Component Value Date    .2 07/29/2020     Lab Results   Component Value Date    LABA1C 11.1 08/16/2021     No components found for: CHLPL  Lab Results   Component Value Date    TRIG 123 02/25/2021    TRIG 107 07/29/2020    TRIG 73 04/25/2019     Lab Results   Component Value Date    HDL 73 (H) 02/25/2021    HDL 38 (L) 07/29/2020    HDL 42 04/25/2019     Lab Results   Component Value Date    LDLCALC 73 02/25/2021    LDLCALC 73 07/29/2020    LDLCALC 81 04/25/2019     Lab Results   Component Value Date    LABVLDL 25 02/25/2021    LABVLDL 21 07/29/2020    LABVLDL 15 04/25/2019       Old labs and records reviewed or requested  Discussed past lab and studies with patient      Diagnosis Orders   1. Type 2 diabetes mellitus with microalbuminuria, without long-term current use of insulin (HCC)  POCT glycosylated hemoglobin (Hb A1C)    Microalbumin / Creatinine Urine Ratio   2. Tobacco use     3. Retinal hemorrhage, unspecified laterality     4. SILVANA (obstructive sleep apnea)     5. Essential hypertension     6. COPD, severe ([address]     7. Hypoxemia  XR CHEST STANDARD (2 VW)   8. Pain of left heel  XR CALCANEUS LEFT (MIN 2 VIEWS)     On this date  have spent 40minutes reviewing previous notes, test results, and face to face with the patient discussing the diagnosis and plan    Diabetes uncontrolled. Start on higher dose of Metformin, weekly Trulicity. Prescriptions given. Follow back up in 6 weeks. Tobacco use. Severe hypoxemia. Needs 6 L of oxygen to get into the high 80s low 90s. Patient aware I strongly recommend emergency room and this is life-threatening issue. He does not wish to go. Positive grief. Obstructive sleep apnea using machine. Not using nocturnal oxygen. Hypertension LVH controlled. Severe COPD increase his nebulizers refilled his medicines follow-up pulmonary. Left heel pain topical medication. Film. Grieving. Declines urgent intervention of life-threatening illness. He is aware of consequences and competent. No follow-ups on file. Diagnosis and treatment discussed.   Possible side effects of medication reviewed  Patients questions answered  Follow up understood  Pt aware if they are not contacted about any test results , this does not mean they are normal.  They should call

## 2021-09-28 ENCOUNTER — OFFICE VISIT (OUTPATIENT)
Dept: FAMILY MEDICINE CLINIC | Age: 56
End: 2021-09-28
Payer: COMMERCIAL

## 2021-09-28 ENCOUNTER — TELEPHONE (OUTPATIENT)
Dept: PULMONOLOGY | Age: 56
End: 2021-09-28

## 2021-09-28 VITALS
HEART RATE: 64 BPM | BODY MASS INDEX: 45.24 KG/M2 | DIASTOLIC BLOOD PRESSURE: 71 MMHG | RESPIRATION RATE: 16 BRPM | HEIGHT: 64 IN | SYSTOLIC BLOOD PRESSURE: 122 MMHG | WEIGHT: 265 LBS

## 2021-09-28 DIAGNOSIS — J44.9 COPD, SEVERE (HCC): ICD-10-CM

## 2021-09-28 DIAGNOSIS — M79.672 PAIN OF LEFT HEEL: ICD-10-CM

## 2021-09-28 DIAGNOSIS — R80.9 TYPE 2 DIABETES MELLITUS WITH MICROALBUMINURIA, WITHOUT LONG-TERM CURRENT USE OF INSULIN (HCC): Primary | ICD-10-CM

## 2021-09-28 DIAGNOSIS — R09.02 HYPOXEMIA: ICD-10-CM

## 2021-09-28 DIAGNOSIS — Z72.0 TOBACCO USE: ICD-10-CM

## 2021-09-28 DIAGNOSIS — Z23 NEED FOR INFLUENZA VACCINATION: ICD-10-CM

## 2021-09-28 DIAGNOSIS — E11.29 TYPE 2 DIABETES MELLITUS WITH MICROALBUMINURIA, WITHOUT LONG-TERM CURRENT USE OF INSULIN (HCC): Primary | ICD-10-CM

## 2021-09-28 DIAGNOSIS — I10 ESSENTIAL HYPERTENSION: ICD-10-CM

## 2021-09-28 DIAGNOSIS — H35.60 RETINAL HEMORRHAGE, UNSPECIFIED LATERALITY: ICD-10-CM

## 2021-09-28 DIAGNOSIS — G47.33 OSA (OBSTRUCTIVE SLEEP APNEA): ICD-10-CM

## 2021-09-28 LAB
CREATININE URINE: 43.5 MG/DL (ref 39–259)
HBA1C MFR BLD: 10.4 %
MICROALBUMIN UR-MCNC: <1.2 MG/DL
MICROALBUMIN/CREAT UR-RTO: NORMAL MG/G (ref 0–30)

## 2021-09-28 PROCEDURE — 83036 HEMOGLOBIN GLYCOSYLATED A1C: CPT | Performed by: INTERNAL MEDICINE

## 2021-09-28 PROCEDURE — 90674 CCIIV4 VAC NO PRSV 0.5 ML IM: CPT | Performed by: INTERNAL MEDICINE

## 2021-09-28 PROCEDURE — 90471 IMMUNIZATION ADMIN: CPT | Performed by: INTERNAL MEDICINE

## 2021-09-28 PROCEDURE — 99214 OFFICE O/P EST MOD 30 MIN: CPT | Performed by: INTERNAL MEDICINE

## 2021-09-28 RX ORDER — AMOXICILLIN AND CLAVULANATE POTASSIUM 875; 125 MG/1; MG/1
1 TABLET, FILM COATED ORAL 2 TIMES DAILY
Qty: 14 TABLET | Refills: 0 | Status: SHIPPED | OUTPATIENT
Start: 2021-09-28 | End: 2021-10-05

## 2021-09-28 RX ORDER — BUDESONIDE AND FORMOTEROL FUMARATE DIHYDRATE 160; 4.5 UG/1; UG/1
AEROSOL RESPIRATORY (INHALATION)
Qty: 1 EACH | Refills: 11 | Status: SHIPPED | OUTPATIENT
Start: 2021-09-28 | End: 2022-10-18

## 2021-09-28 RX ORDER — ALBUTEROL SULFATE 90 UG/1
2 AEROSOL, METERED RESPIRATORY (INHALATION) 4 TIMES DAILY PRN
Qty: 1 EACH | Refills: 5 | Status: SHIPPED | OUTPATIENT
Start: 2021-09-28 | End: 2022-10-10

## 2021-09-28 NOTE — TELEPHONE ENCOUNTER
----- Message from Desire Quintana MD sent at 9/28/2021 10:00 AM EDT -----  [facility]:  please offer add on this Friday. Have him complete the chest X-ray first.        Dr. Keke Blanco: sounds like to try to do the right thing    ----- Message -----  From: Fabián Jones MD  Sent: 9/28/2021   9:22 AM EDT  To: Desire Quintana MD    History oxygen saturations in the 70s on room air and increases to high 80s low 90s on 6 L. .  Declines emergency room visit. Bilateral otitis, thrush, basilar rales, increased FRANK ratio. Aware I see this is a life-threatening event. He is competent and chooses not to go to the emergency room. We will increase his albuterol. Increase home O2. And use at night. Augmentin for his bilateral otitis nystatin for yeast.I have urged that he call to follow-up with you as well.   Your office could reach out to him as well

## 2021-09-28 NOTE — PATIENT INSTRUCTIONS
Increase metformin to 4 daily   6 L of oxygen. Follow-up Dr. Stanislav Steward pulmonary. X-ray chest and heel. I strongly recommend emergency room evaluation for dangerously low oxygen which is life-threatening. Medicine for ear infection. Use your nebulizer 4 times daily.  your rescue inhaler your maintenance inhaler. Buy an oximeter. Increase your Metformin to 4 a day. Start Trulicity 1 injection weekly for sugar. Consider shingles and tetanus vaccine in the future. Follow-up 6 weeks  . Sandra Moeller

## 2021-09-28 NOTE — LETTER
[address] [place] [address]  Phone: [phone]  Fax: [phone]    Zheng Reed MD        September 28, 2021     Patient: Karl Buenrostro   YOB: 1965   Date of Visit: 9/28/2021       To Whom It May Concern: It is my medical opinion that Perfecto Sleeper should not return to work today secondary to medical issues    If you have any questions or concerns, please don't hesitate to call.     Sincerely,        Zheng Reed MD

## 2021-09-28 NOTE — PROGRESS NOTES
Vaccine Information Sheet, \"Influenza - Inactivated\"  given to Ashutosh Low, or parent/legal guardian of  Ashutosh Low and verbalized understanding. Patient responses:    Have you received any other vaccine within the last 14 days? No  Do you currently have an active infectious or acute respiratory illness or fever? No  Have you ever had a reaction to a flu vaccine? No  Do you have any current illness? No  Have you ever had Guillian Menno Syndrome? No  Do you have a serious allergy to any of the follow: Neomycin, Polymyxin, Thimerosal, eggs or egg products? No      Flu vaccine given per order. Please see immunization tab. Risks and benefits explained. Current VIS given.

## 2021-09-29 DIAGNOSIS — I10 ESSENTIAL HYPERTENSION: ICD-10-CM

## 2021-09-29 RX ORDER — CARVEDILOL 25 MG/1
TABLET ORAL
Qty: 60 TABLET | Refills: 5 | Status: SHIPPED | OUTPATIENT
Start: 2021-09-29 | End: 2022-10-18

## 2021-09-29 NOTE — TELEPHONE ENCOUNTER
LOV 9/28/21    Future Appointments   Date Time Provider Christina Artis   10/1/2021 11:40 AM Angel Luis Thomas MD [facility] [place]   11/9/2021  8:40 AM MD NIKOLAS Navarro  [facility] CALLES

## 2021-10-01 ENCOUNTER — OFFICE VISIT (OUTPATIENT)
Dept: PULMONOLOGY | Age: 56
End: 2021-10-01
Payer: COMMERCIAL

## 2021-10-01 VITALS
TEMPERATURE: 98.7 F | BODY MASS INDEX: 43.9 KG/M2 | DIASTOLIC BLOOD PRESSURE: 80 MMHG | OXYGEN SATURATION: 75 % | WEIGHT: 263.5 LBS | HEIGHT: 65 IN | RESPIRATION RATE: 18 BRPM | SYSTOLIC BLOOD PRESSURE: 130 MMHG | HEART RATE: 75 BPM

## 2021-10-01 DIAGNOSIS — J44.9 COPD, SEVERE (HCC): ICD-10-CM

## 2021-10-01 DIAGNOSIS — Z72.0 TOBACCO USE: ICD-10-CM

## 2021-10-01 DIAGNOSIS — J96.11 CHRONIC RESPIRATORY FAILURE WITH HYPOXIA (HCC): ICD-10-CM

## 2021-10-01 DIAGNOSIS — G47.33 OSA (OBSTRUCTIVE SLEEP APNEA): ICD-10-CM

## 2021-10-01 DIAGNOSIS — E66.01 OBESITY, CLASS III, BMI 40-49.9 (MORBID OBESITY) (HCC): ICD-10-CM

## 2021-10-01 DIAGNOSIS — J96.12 CHRONIC RESPIRATORY FAILURE WITH HYPERCAPNIA (HCC): ICD-10-CM

## 2021-10-01 DIAGNOSIS — Z87.891 PERSONAL HISTORY OF TOBACCO USE: Primary | ICD-10-CM

## 2021-10-01 PROBLEM — E66.813 OBESITY, CLASS III, BMI 40-49.9 (MORBID OBESITY) (HCC): Status: ACTIVE | Noted: 2021-10-01

## 2021-10-01 PROCEDURE — 99205 OFFICE O/P NEW HI 60 MIN: CPT | Performed by: INTERNAL MEDICINE

## 2021-10-01 PROCEDURE — G0296 VISIT TO DETERM LDCT ELIG: HCPCS | Performed by: INTERNAL MEDICINE

## 2021-10-01 RX ORDER — BUDESONIDE, GLYCOPYRROLATE, AND FORMOTEROL FUMARATE 160; 9; 4.8 UG/1; UG/1; UG/1
2 AEROSOL, METERED RESPIRATORY (INHALATION) 2 TIMES DAILY
Qty: 1 EACH | Refills: 11 | Status: SHIPPED | OUTPATIENT
Start: 2021-10-01 | End: 2022-08-29

## 2021-10-01 NOTE — LETTER
[address] [facility]  [address] [place] [address] [address] [facility]. [address] [facility]  Phone: [phone]  Fax: [phone]     10/1/2021    Dr. Hasmukh Mcneil MD    Today had the pleasure to see our mutual patient, Radha Ku. My office note is attached. Please let me know if you have any questions.         Sincerely,    Gricelda Ryan Pulmonary, Sleep and Critical Care  [phone]

## 2021-10-01 NOTE — ASSESSMENT & PLAN NOTE
Patient has a history of pneumothorax and now severely hypoxemic.  chest X-ray advised by Dr. Edwige Freeman. He was advised to complete chest X-ray prior to visit. He was then advised to complete directly after visit. It is still not completed. He also refused to go to ER. Will order oxygen at 2 L [place] and ordered 6 minute walk test to assess oxygen need.

## 2021-10-01 NOTE — ASSESSMENT & PLAN NOTE
He is having worsening shortness of breath and is clearly hypoxemia. We discussed step up in therapy vs just starting oxygen. Change Symbicort to [employer] given.

## 2021-10-01 NOTE — PROGRESS NOTES
REASON FOR CONSULTATION/CC: COPD and sleep apnea      PCP: Shanthi Apodaca MD    HISTORY OF PRESENT ILLNESS: Kamila Mullen is a 60y.o. year old male with a history of COPD, sleep apnea, obesity who presents                 Chronic hypoxemia  Has oxygen but not really using. Currently 75% on room air. Does not have protable tank. Did not complete chest X-ray. COPD  Symbicort bid. Has nebulizer using prn. Needs albuterol refill. SILVANA  He states he returned to old cpap device. Has bipap but states cpap is working better      Tobacco abuse  Still smoking ~ 1 ppd. Obesity   Discusses                  REVIEW OF SYSTEMS:  Constitutional: Negative for fever   HENT: Negative for sore throat  Respiratory: Negative for dyspnea, baseline cough  Cardiovascular: Negative for chest pain  Gastrointestinal: Negative for vomiting, diarrhea   Skin: Negative for rash  Psychiatric/Behavorial: Negative for anxiety    Objective:   PHYSICAL EXAM:  Blood pressure 130/80, pulse 75, temperature 98.7 °F (37.1 °C), resp. rate 18, height 5' 5\" (1.651 m), weight 263 lb 8 oz (119.5 kg), SpO2 (!) 75 %.'  Gen: No distress. Obese Body mass index is 43.85 kg/m². Resp: No accessory muscle use. No crackles. No wheezes. No rhonchi. CV: Regular rate. Regular rhythm. No murmur or rub. No edema. Skin: Warm, dry, normal texture and turgor. No nodule on exposed extremities. M/S: No cyanosis. No clubbing. No joint deformity. Psych: Oriented x 3. No anxiety. Awake. Alert. Intact judgement and insight. Good Mood / Affect. Memory appears in tact        Data Reviewed:       Assessment:     · Severe sleep apnea with AHI of 135 with nocturnal hypoxemia down to 58%  · Hypersomnia  · Severe COPD  · Chronic rhinitis  · Tobacco abuse,    · Obesity Body mass index is 43.85 kg/m².      Plan:         Problem List Items Addressed This Visit     Tobacco use     Despite is shortness of breath and severe hypoxemia, he continues to smoke. He is not interested in quitting.     lung cancer screening ordered. SILVANA (obstructive sleep apnea)     Using cpap. Did not bring device. Obesity, Class III, BMI 40-49.9 (morbid obesity) ([facility])     Uncontrolled. Advised calorie reduction          COPD, severe ([address]     He is having worsening shortness of breath and is clearly hypoxemia. We discussed step up in therapy vs just starting oxygen. Change Symbicort to [employer] given. Relevant Medications    Budeson-Glycopyrrol-Formoterol (BREZTRI AEROSPHERE) 160-9-4.8 MCG/ACT AERO    Chronic respiratory failure with hypoxia (HCC)     Patient has a history of pneumothorax and now severely hypoxemic.  chest X-ray advised by Dr. Cristina Diaz. He was advised to complete chest X-ray prior to visit. He was then advised to complete directly after visit. It is still not completed. He also refused to go to ER. Will order oxygen at 2 L NC and ordered 6 minute walk test to assess oxygen need. Relevant Orders    6 Minute Walk Test    Chronic respiratory failure with hypercapnia (HCC)     Has bipap but only using cpap. Did not bring device for download. Other Visit Diagnoses     Personal history of tobacco use    -  Primary    Relevant Orders    [place] VISIT TO DISCUSS LUNG CA SCREEN W LDCT (Completed)    CT Lung Screen (Annual)        This note was transcribed using [id number]. Please disregard any translational errors. Low Dose CT (LDCT) Lung Screening criteria met:     Age 55-77(Medicare) or 50-80 (USPST)   Pack year smoking >30 (Medicare) or >20 (USPST)   Still smoking or less than 15 year since quit   No sign or symptoms of lung cancer   > 11 months since last LDCT     Risks and benefits of lung cancer screening with LDCT scans discussed:    Significance of positive screen - False-positive LDCT results often occur. 95% of all positive results do not lead to a diagnosis of cancer.  Usually further imaging can resolve most false-positive results; however, some patients may require invasive procedures. Over diagnosis risk - 10% to 12% of screen-detected lung cancer cases are over diagnosed--that is, the cancer would not have been detected in the patient's lifetime without the screening. Need for follow up screens annually to continue lung cancer screening effectiveness     Risks associated with radiation from annual LDCT- Radiation exposure is about the same as for a mammogram, which is about 1/3 of the annual background radiation exposure from everyday life. Starting screening at age 54 is not likely to increase cancer risk from radiation exposure. Patients with comorbidities resulting in life expectancy of < 10 years, or that would preclude treatment of an abnormality identified on CT, should not be screened due to lack of benefit.     To obtain maximal benefit from this screening, smoking cessation and long-term abstinence from smoking is critical

## 2021-10-01 NOTE — ASSESSMENT & PLAN NOTE
Despite is shortness of breath and severe hypoxemia, he continues to smoke. He is not interested in quitting.     lung cancer screening ordered.

## 2021-10-01 NOTE — PATIENT INSTRUCTIONS
chest X-ray today    lung cancer screening Please call [phone] to schedule test      Breztri sample     6 min walk test Please call  [phone] to order 6 minute walk test.            What is lung cancer screening? Lung cancer screening is a way in which doctors check the lungs for early signs of cancer in people who have no symptoms of lung cancer. A low-dose CT scan uses much less radiation than a normal CT scan and shows a more detailed image of the lungs than a standard X-ray. The goal of lung cancer screening is to find cancer early, before it has a chance to grow, spread, or cause problems. One large study found that smokers who were screened with low-dose CT scans were less likely to die of lung cancer than those who were screened with standard X-ray. Below is a summary of the things you need to know regarding screening for lung cancer with low-dose computed tomography (LDCT). This is a screening program that involves routine annual screening with LDCT studies of the lung. The LDCTs are done using low-dose radiation that is not thought to increase your cancer risk. If you have other serious medical conditions (other cancers, congestive heart failure) that limit your life expectancy to less than 10 years, you should not undergo lung cancer screening with LDCT. The chance is 20%-60% that the LDCT result will show abnormalities. This would require additional testing which could include repeat imaging or even invasive procedures. Most (about 95%) of \"abnormal\" LDCT results are false in the sense that no lung cancer is ultimately found. Additionally, some (about 10%) of the cancers found would not affect your life expectancy, even if undetected and untreated. If you are still smoking, the single most important thing that you can do to reduce your risk of dying of lung cancer is to quit. For this screening to be covered by Medicare and most other insurers, strict criteria must be met.   If you do not meet these criteria, but still wish to undergo LDCT testing, you will be required to sign a waiver indicating your willingness to pay for the scan.

## 2021-10-12 ENCOUNTER — HOSPITAL ENCOUNTER (OUTPATIENT)
Dept: CT IMAGING | Age: 56
Discharge: HOME | End: 2021-10-12
Payer: COMMERCIAL

## 2021-10-12 DIAGNOSIS — Z87.891 PERSONAL HISTORY OF TOBACCO USE: ICD-10-CM

## 2021-10-12 PROCEDURE — 71271 CT THORAX LUNG CANCER SCR C-: CPT

## 2021-10-26 ENCOUNTER — TELEPHONE (OUTPATIENT)
Dept: CASE MANAGEMENT | Age: 56
End: 2021-10-26

## 2021-10-26 NOTE — TELEPHONE ENCOUNTER
CT Lung Cancer Screening completed on 10/12/21, Dr Darien Vazquez reviewed radiology results with recommendations & office notified patient of results with recommendations. Result letter mailed to patient. Most recent smoking history reviewed.

## 2021-11-14 DIAGNOSIS — I10 ESSENTIAL HYPERTENSION: ICD-10-CM

## 2021-11-15 RX ORDER — AMLODIPINE BESYLATE 10 MG/1
TABLET ORAL
Qty: 90 TABLET | Refills: 1 | Status: SHIPPED | OUTPATIENT
Start: 2021-11-15 | End: 2022-06-06

## 2021-11-15 RX ORDER — SPIRONOLACTONE 25 MG/1
TABLET ORAL
Qty: 90 TABLET | Refills: 1 | Status: SHIPPED | OUTPATIENT
Start: 2021-11-15 | End: 2022-06-06

## 2021-11-15 RX ORDER — LISINOPRIL 40 MG/1
TABLET ORAL
Qty: 90 TABLET | Refills: 1 | Status: SHIPPED | OUTPATIENT
Start: 2021-11-15 | End: 2022-06-06

## 2022-01-06 ENCOUNTER — TELEPHONE (OUTPATIENT)
Dept: ORTHOPEDIC SURGERY | Age: 57
End: 2022-01-06

## 2022-01-06 NOTE — TELEPHONE ENCOUNTER
Dear PCP Provider: [facility]) has partnered with [facility] to utilize predictive analytics to improve the care management for patients with arthritic knee pain. Utilizing claims data and patient visit features, we have developed an algorithmic risk score to determine which patient's quality of life may be most impacted to their knee pain. The selection criteria for this  program are: 1) risk score greater than .85; 2) existing [address] [place] [address] Floor patient; and 3) seen for knee pain in the past 3 years. Based upon the predictive analytics risk score  program, your patient has a risk score of greater than . 85 (i.e. 85% probability in having their quality of life impacted by their knee pain). To assist with care management of your patient, a navigator will be contacting them to understand their knee pain status and to educate on the [address] Pain Program, including scheduling an appointment with a joint specialist or their PCP. If you feel this patient not appropriate to be contacted given other medical reasons, please reply back to the navigator within 7 days with reason why not to be contacted. Otherwise, the navigator will follow up with you on the details of the patient encounter after the call.  Thank you for your support for this program.

## 2022-01-22 DIAGNOSIS — I10 ESSENTIAL HYPERTENSION: ICD-10-CM

## 2022-01-24 RX ORDER — FUROSEMIDE 20 MG/1
TABLET ORAL
Qty: 360 TABLET | Refills: 0 | Status: SHIPPED | OUTPATIENT
Start: 2022-01-24 | End: 2022-06-06

## 2022-02-28 RX ORDER — EMPAGLIFLOZIN 25 MG/1
1 TABLET, FILM COATED ORAL DAILY
Qty: 90 TABLET | Refills: 1 | Status: SHIPPED | OUTPATIENT
Start: 2022-02-28 | End: 2022-09-06

## 2022-02-28 NOTE — TELEPHONE ENCOUNTER
Scheduled. Patient also mentioned need fills on other meds however after checking he has additional refills at the pharmacy.

## 2022-03-27 NOTE — PROGRESS NOTES
HPI: [place] presents for follow-up. Chronic health issues include tobacco addiction, COPD, hypertension, and left ventricular hypertrophy, diastolic heart failure, hypoxemia. Poor dentition, controlled diabetes with compliance issues, BMI 40, grieving, retinal hemorrhage. Non compliant DM2 A1c down to 10.4 9.2021.negative  retinopathy or neuropathy. +proteinuria. retinal hypertensive bleed. Maribel Lambert LDL 73 on statin. Walks dog. Metformin,jardince,ACE,statin. Declines insulin. Misses metformin frequently. No low sugars. Does not check sugars. [address] Dew's a day. BMI is up. Hx heel pain resolved intermittent right knee discomfort no falls or swelling.      DJD thoracolumbar spine.  AC strain.  Knee pain. + orthopedist.     CT abdomen January 2021 with non obstructive renal calculi, stable left adrenal mass, diverticuli       Hypertension 2012 echocardiogram April 2017 with left ventricular hypertrophy, ejection fraction 55% and positive ventricular enlargement with pulmonary hypertension. EKG decreased anterior forces.  On CPAP.   intermittent nocturnal oxygen. Clifford Schwartz coronary disease on cath.    Retinal bleed.   Coreg 25 twice daily. denies chest pain palpitations +increased exercise intolerance.  Lasix 20 twice daily Norvasc 10 and lisinopril 40.  Spironolactone creatinine stable. States edema is improved on Jardiance.        COPD/SILVANA. Follows with pulmonary. Sleep apnea compliant with CPAP.  Positive Symbicort.  Has nebulizer using 1-2 times daily. hx pneumothorax and rib fracture.  Emphysema on CT. no PFTs. 1 pack a day tobacco. Covid vaccine. Some shortness of breath today and congestion.  Has oxygen at home but no oximeter. Rarely uses oximeter. Is to follow-up with pulmonary. CT screening negative for cancerous lesion fall 2021.   Herbal therapy inhaler ordered however now using Symbicort again.     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   + tobacco 1 PPD. . No alcohol. No drugs. No STD concerns. Wife   suddenly is doing better. Took a friend to [facility]. Aguilar Trejo multiple health issues no exercise outside of work.        FH: 6 brothers 1 sisters   + DM, Heart disease,father lymph node cancer    - colon, prostate cancer     Review of systems: Sleep apnea, chronic sinusitis, , poor dentition, COPD with baseline dyspnea, obesity.    SILVANA.  Dyspnea on exertion.  No chest pain with exertion. Denies GE reflux or bloody stools.  hx adenomatous polyps 2018 recheck . Selina Arseniosoniya STD concerns.  Generalized arthralgias.  To arthritis.  Knee pain.  Grief       Constitutional, ent, CV, respiratory, GI, , joint, skin, allergic and psychiatric ROS reviewed and negative except for above    Allergies   Allergen Reactions    Guaifenesin Other (See Comments)     Felt like having a stroke  \"paralized me\"    Asa [Aspirin] Itching       Outpatient Medications Marked as Taking for the 3/28/22 encounter (Office Visit) with Harshil Villanueva MD   Medication Sig Dispense Refill    Dulaglutide (TRULICITY) 1.5 AC/5.1CQ SOPN Inject 1.5 mg into the skin once a week 4 pen 5    fluticasone (FLONASE) 50 MCG/ACT nasal spray 1 spray by Each Nostril route daily 32 g 1    glipiZIDE (GLUCOTROL XL) 5 MG extended release tablet 2 po q day for diabetes 60 tablet 5    JARDIANCE 25 MG tablet TAKE 1 TABLET BY MOUTH DAILY TO REPLACE 10 MG 90 tablet 1    simvastatin (ZOCOR) 20 MG tablet TAKE 1 TABLET BY MOUTH EVERY DAY IN THE EVENING 90 tablet 0    furosemide (LASIX) 20 MG tablet TAKE 1 TO 2 TABLETS BY MOUTH TWICE A  tablet 0    lisinopril (PRINIVIL;ZESTRIL) 40 MG tablet TAKE 1 TABLET BY MOUTH EVERY DAY 90 tablet 1    spironolactone (ALDACTONE) 25 MG tablet TAKE 1 TABLET BY MOUTH EVERY DAY 90 tablet 1    amLODIPine (NORVASC) 10 MG tablet TAKE 1 TABLET BY MOUTH EVERY DAY 90 tablet 1    carvedilol (COREG) 25 MG tablet TAKE 1 TABLET BY MOUTH TWICE A DAY FOR BLOOD PRESSURE 60 tablet 5    albuterol sulfate  (90 Base) MCG/ACT inhaler Inhale 2 puffs into the lungs 4 times daily as needed for Wheezing 1 each 5    budesonide-formoterol (SYMBICORT) 160-4.5 MCG/ACT AERO TAKE 2 PUFFS BY MOUTH TWICE A DAY 1 each 11    diclofenac sodium (VOLTAREN) 1 % GEL 4 grams topically qid to heel 350 g 0    Insulin Pen Needle 31G X 5 MM MISC 1 each by Does not apply route daily 12 each 1    metFORMIN (GLUCOPHAGE-XR) 500 MG extended release tablet TAKE 1/2 TO 1 TABLET BY MOUTH TWICE A DAY FOR DIABETES 180 tablet 0    albuterol (ACCUNEB) 1.25 MG/3ML nebulizer solution Inhale 3 mLs into the lungs every 6 hours as needed for Wheezing 360 mL 3             Past Medical History:   Diagnosis Date    Acute on chronic diastolic congestive heart failure (HCC)     ADHD (attention deficit hyperactivity disorder)     Adrenal nodule ([address] 12/7/2017    low-density nodular enlargement of a left adrenal gland which is stable since 2007      COPD (chronic obstructive pulmonary disease) (HCC)     Emphysema of lung ([address]     Essential hypertension 11/30/2017    History of pneumothorax 11/30/2017    Rib fx    Hx of colonic polyps 1/18/2018    1.2018 Descending Polyp 2. Sigmoid Polyp 3.  Sigmoid Diverticulosis recheck 2023    Hx of renal calculi 11/30/2017    Hypertension     Kidney stone     Neuropathy     Obesity     Osteoarthritis     Pneumothorax     2016 d/t fall     Prediabetes 12/13/2017    Retinal hemorrhage 10/29/2020    HTN    Sleep apnea     Substance abuse ([address]     Type 2 diabetes mellitus without complication ([address]        Past Surgical History:   Procedure Laterality Date    CARPAL TUNNEL RELEASE      COLONOSCOPY  01/18/2018    Cronely, x2 polyps    KNEE SURGERY      arthroscopic             Family History   Problem Relation Age of Onset    Diabetes Mother     Cancer Mother    Sony Mariscal Cancer Father     No Known Problems Sister     Diabetes Brother     No Known Problems Brother     No Known Problems Brother     No Known Problems Brother     No Known Problems Brother     No Known Problems Brother            Objective     BP (!) 102/58   Pulse 77   Resp 18   Ht 5' 4\" (1.626 m)   Wt 264 lb (119.7 kg)   BMI 45.32 kg/m²     @LASTSAO2(3)@    Wt Readings from Last 3 Encounters:   10/01/21 263 lb 8 oz (119.5 kg)   09/28/21 265 lb (120.2 kg)   08/16/21 265 lb 3.2 oz (120.3 kg)       Physical Exam     NAD alert and cooperative  HEENT: Edentulous. Erythematous buccal mucosa cheilitis. Chemosis. TMs unremarkable. Full neck. No bruits. Lungs increased FRANK ratio. Decreased breath sounds bases. Occasional wheeze. No rhonchi. Cardiovascular exam regular rate and rhythm without any murmur click. Gynecomastia no masses. Obesity. I cannot detect any hepatosplenomegaly or ascites. No point tenderness. Diminished but present pulses lower extremities. Rolf horn toenails. Maceration between toenails. Positive callus. Pain medial joint space right knee without erythema or popliteal swelling. He is appropriate. Well-groomed.   Good eye contact    Chemistry        Component Value Date/Time     (L) 08/16/2021 0849    K 4.7 08/16/2021 0849    K 5.6 (H) 01/20/2021 1809    CL 94 (L) 08/16/2021 0849    CO2 27 08/16/2021 0849    BUN 25 (H) 08/16/2021 0849    CREATININE 1.0 08/16/2021 0849        Component Value Date/Time    CALCIUM 9.5 08/16/2021 0849    ALKPHOS 105 01/20/2021 1809    AST 32 01/20/2021 1809    ALT 14 01/20/2021 1809    BILITOT 0.3 01/20/2021 1809            Lab Results   Component Value Date    WBC 9.2 01/20/2021    HGB 17.0 01/20/2021    HCT 51.7 01/20/2021    MCV 95.0 01/20/2021     01/20/2021     Lab Results   Component Value Date    LABA1C 10.4 09/28/2021     Lab Results   Component Value Date    .2 07/29/2020     Lab Results   Component Value Date    LABA1C 10.4 09/28/2021     No components found for: CHLPL  Lab Results   Component Value Date    TRIG 123 02/25/2021    TRIG 107 07/29/2020    TRIG 73 04/25/2019     Lab Results   Component Value Date    HDL 73 (H) 02/25/2021    HDL 38 (L) 07/29/2020    HDL 42 04/25/2019     Lab Results   Component Value Date    LDLCALC 73 02/25/2021    LDLCALC 73 07/29/2020    LDLCALC 81 04/25/2019     Lab Results   Component Value Date    LABVLDL 25 02/25/2021    LABVLDL 21 07/29/2020    LABVLDL 15 04/25/2019       Old labs and records reviewed or requested  Discussed past lab and studies with patient      Diagnosis Orders   1. Type 2 diabetes mellitus with microalbuminuria, without long-term current use of insulin (HCC)  POCT glycosylated hemoglobin (Hb A1C)    POCT Glucose    Comprehensive Metabolic Panel    CBC    Lipid Panel    External Referral To Podiatry   2. Tobacco use     3. SILVANA (obstructive sleep apnea)     4. Obesity, Class III, BMI 40-49.9 (morbid obesity) ([address]     5. Hx of renal calculi     6. Hx of colonic polyps     7. Essential hypertension  Comprehensive Metabolic Panel   8. COPD, severe ([address]  CBC   9. Acute on chronic diastolic congestive heart failure ([address]     10. Nail disease  External Referral To Podiatry     Lab Results   Component Value Date    LABA1C 12.6 03/28/2022     Lab Results   Component Value Date    .2 07/29/2020     Diabetes uncontrolled. Dietary indiscretion. Add Glucotrol XL 10 mg increase his Trulicity uses Metformin. Advised on discontinuing his 4 [employer] daily. Tobacco abuse. Given information on Wellbutrin. He will see if he is interested in doing this. Obstructive sleep apnea severe COPD diastolic congestive heart failure. Continue current medications and specialist.    History of colonic polyps    Hypertension good control. Consider cutting back. Ramble or nails unable to cut his toenails. Follow-up to for dietary. Noncompliance.   Is doing a bit better. General health maintenance discussed shingles vaccine Tdap    Tobacco use. On this date I have spent 40 minutes reviewing previous notes, test results, and face to face with the patient discussing the diagnosis and plan          Return in about 6 weeks (around 5/9/2022). Diagnosis and treatment discussed.   Possible side effects of medication reviewed  Patients questions answered  Follow up understood  Pt aware if they are not contacted about any test results , this does not mean they are normal.  They should call

## 2022-03-28 ENCOUNTER — OFFICE VISIT (OUTPATIENT)
Dept: FAMILY MEDICINE CLINIC | Age: 57
End: 2022-03-28
Payer: COMMERCIAL

## 2022-03-28 VITALS
HEART RATE: 77 BPM | RESPIRATION RATE: 18 BRPM | SYSTOLIC BLOOD PRESSURE: 102 MMHG | BODY MASS INDEX: 45.07 KG/M2 | HEIGHT: 64 IN | WEIGHT: 264 LBS | DIASTOLIC BLOOD PRESSURE: 58 MMHG

## 2022-03-28 DIAGNOSIS — I10 ESSENTIAL HYPERTENSION: ICD-10-CM

## 2022-03-28 DIAGNOSIS — J44.9 COPD, SEVERE (HCC): ICD-10-CM

## 2022-03-28 DIAGNOSIS — E66.01 OBESITY, CLASS III, BMI 40-49.9 (MORBID OBESITY) (HCC): ICD-10-CM

## 2022-03-28 DIAGNOSIS — R80.9 TYPE 2 DIABETES MELLITUS WITH MICROALBUMINURIA, WITHOUT LONG-TERM CURRENT USE OF INSULIN (HCC): Primary | ICD-10-CM

## 2022-03-28 DIAGNOSIS — Z87.442 HX OF RENAL CALCULI: ICD-10-CM

## 2022-03-28 DIAGNOSIS — I50.33 ACUTE ON CHRONIC DIASTOLIC CONGESTIVE HEART FAILURE (HCC): ICD-10-CM

## 2022-03-28 DIAGNOSIS — Z86.010 HX OF COLONIC POLYPS: ICD-10-CM

## 2022-03-28 DIAGNOSIS — Z72.0 TOBACCO USE: ICD-10-CM

## 2022-03-28 DIAGNOSIS — E11.29 TYPE 2 DIABETES MELLITUS WITH MICROALBUMINURIA, WITHOUT LONG-TERM CURRENT USE OF INSULIN (HCC): Primary | ICD-10-CM

## 2022-03-28 DIAGNOSIS — G47.33 OSA (OBSTRUCTIVE SLEEP APNEA): ICD-10-CM

## 2022-03-28 DIAGNOSIS — L60.9 NAIL DISEASE: ICD-10-CM

## 2022-03-28 LAB
A/G RATIO: 1.1 (ref 1.1–2.2)
ALBUMIN SERPL-MCNC: 3.8 G/DL (ref 3.4–5)
ALP BLD-CCNC: 140 U/L (ref 40–129)
ALT SERPL-CCNC: 13 U/L (ref 10–40)
ANION GAP SERPL CALCULATED.3IONS-SCNC: 18 MMOL/L (ref 3–16)
AST SERPL-CCNC: 9 U/L (ref 15–37)
BILIRUB SERPL-MCNC: 0.3 MG/DL (ref 0–1)
BUN BLDV-MCNC: 34 MG/DL (ref 7–20)
CALCIUM SERPL-MCNC: 9.3 MG/DL (ref 8.3–10.6)
CHLORIDE BLD-SCNC: 94 MMOL/L (ref 99–110)
CHOLESTEROL, TOTAL: 165 MG/DL (ref 0–199)
CHP ED QC CHECK: NORMAL
CO2: 24 MMOL/L (ref 21–32)
CREAT SERPL-MCNC: 1.3 MG/DL (ref 0.9–1.3)
GFR AFRICAN AMERICAN: >60
GFR NON-AFRICAN AMERICAN: 56
GLUCOSE BLD-MCNC: 349 MG/DL
GLUCOSE BLD-MCNC: 463 MG/DL (ref 70–99)
HBA1C MFR BLD: 12.6 %
HCT VFR BLD CALC: 52.3 % (ref 40.5–52.5)
HDLC SERPL-MCNC: 32 MG/DL (ref 40–60)
HEMOGLOBIN: 17.3 G/DL (ref 13.5–17.5)
LDL CHOLESTEROL CALCULATED: ABNORMAL MG/DL
LDL CHOLESTEROL DIRECT: 86 MG/DL
MCH RBC QN AUTO: 32.1 PG (ref 26–34)
MCHC RBC AUTO-ENTMCNC: 33 G/DL (ref 31–36)
MCV RBC AUTO: 97.1 FL (ref 80–100)
PDW BLD-RTO: 16.1 % (ref 12.4–15.4)
PLATELET # BLD: 194 K/UL (ref 135–450)
PMV BLD AUTO: 9.4 FL (ref 5–10.5)
POTASSIUM SERPL-SCNC: 5.3 MMOL/L (ref 3.5–5.1)
RBC # BLD: 5.38 M/UL (ref 4.2–5.9)
SODIUM BLD-SCNC: 136 MMOL/L (ref 136–145)
TOTAL PROTEIN: 7.4 G/DL (ref 6.4–8.2)
TRIGL SERPL-MCNC: 325 MG/DL (ref 0–150)
VLDLC SERPL CALC-MCNC: ABNORMAL MG/DL
WBC # BLD: 7.9 K/UL (ref 4–11)

## 2022-03-28 PROCEDURE — 99214 OFFICE O/P EST MOD 30 MIN: CPT | Performed by: INTERNAL MEDICINE

## 2022-03-28 PROCEDURE — G8482 FLU IMMUNIZE ORDER/ADMIN: HCPCS | Performed by: INTERNAL MEDICINE

## 2022-03-28 PROCEDURE — 2022F DILAT RTA XM EVC RTNOPTHY: CPT | Performed by: INTERNAL MEDICINE

## 2022-03-28 PROCEDURE — 83036 HEMOGLOBIN GLYCOSYLATED A1C: CPT | Performed by: INTERNAL MEDICINE

## 2022-03-28 PROCEDURE — 4004F PT TOBACCO SCREEN RCVD TLK: CPT | Performed by: INTERNAL MEDICINE

## 2022-03-28 PROCEDURE — 3017F COLORECTAL CA SCREEN DOC REV: CPT | Performed by: INTERNAL MEDICINE

## 2022-03-28 PROCEDURE — G8417 CALC BMI ABV UP PARAM F/U: HCPCS | Performed by: INTERNAL MEDICINE

## 2022-03-28 PROCEDURE — G8427 DOCREV CUR MEDS BY ELIG CLIN: HCPCS | Performed by: INTERNAL MEDICINE

## 2022-03-28 PROCEDURE — 36415 COLL VENOUS BLD VENIPUNCTURE: CPT | Performed by: INTERNAL MEDICINE

## 2022-03-28 PROCEDURE — 3023F SPIROM DOC REV: CPT | Performed by: INTERNAL MEDICINE

## 2022-03-28 PROCEDURE — 3046F HEMOGLOBIN A1C LEVEL >9.0%: CPT | Performed by: INTERNAL MEDICINE

## 2022-03-28 PROCEDURE — 82962 GLUCOSE BLOOD TEST: CPT | Performed by: INTERNAL MEDICINE

## 2022-03-28 RX ORDER — GLIPIZIDE 5 MG/1
TABLET, FILM COATED, EXTENDED RELEASE ORAL
Qty: 60 TABLET | Refills: 5 | Status: SHIPPED | OUTPATIENT
Start: 2022-03-28 | End: 2022-05-02

## 2022-03-28 RX ORDER — FLUTICASONE PROPIONATE 50 MCG
1 SPRAY, SUSPENSION (ML) NASAL DAILY
Qty: 32 G | Refills: 1 | Status: SHIPPED | OUTPATIENT
Start: 2022-03-28 | End: 2022-08-18

## 2022-03-28 RX ORDER — DULAGLUTIDE 1.5 MG/.5ML
1.5 INJECTION, SOLUTION SUBCUTANEOUS WEEKLY
Qty: 4 PEN | Refills: 5 | Status: SHIPPED | OUTPATIENT
Start: 2022-03-28 | End: 2022-10-12

## 2022-03-28 SDOH — ECONOMIC STABILITY: FOOD INSECURITY: WITHIN THE PAST 12 MONTHS, YOU WORRIED THAT YOUR FOOD WOULD RUN OUT BEFORE YOU GOT MONEY TO BUY MORE.: NEVER TRUE

## 2022-03-28 SDOH — ECONOMIC STABILITY: FOOD INSECURITY: WITHIN THE PAST 12 MONTHS, THE FOOD YOU BOUGHT JUST DIDN'T LAST AND YOU DIDN'T HAVE MONEY TO GET MORE.: NEVER TRUE

## 2022-03-28 ASSESSMENT — SOCIAL DETERMINANTS OF HEALTH (SDOH): HOW HARD IS IT FOR YOU TO PAY FOR THE VERY BASICS LIKE FOOD, HOUSING, MEDICAL CARE, AND HEATING?: NOT HARD AT ALL

## 2022-03-28 ASSESSMENT — PATIENT HEALTH QUESTIONNAIRE - PHQ9
SUM OF ALL RESPONSES TO PHQ QUESTIONS 1-9: 0
1. LITTLE INTEREST OR PLEASURE IN DOING THINGS: 0
SUM OF ALL RESPONSES TO PHQ9 QUESTIONS 1 & 2: 0
SUM OF ALL RESPONSES TO PHQ QUESTIONS 1-9: 0
2. FEELING DOWN, DEPRESSED OR HOPELESS: 0
SUM OF ALL RESPONSES TO PHQ QUESTIONS 1-9: 0
SUM OF ALL RESPONSES TO PHQ QUESTIONS 1-9: 0

## 2022-03-29 RX ORDER — ATORVASTATIN CALCIUM 40 MG/1
40 TABLET, FILM COATED ORAL DAILY
Qty: 90 TABLET | Refills: 3 | Status: SHIPPED | OUTPATIENT
Start: 2022-03-29

## 2022-05-02 RX ORDER — GLIPIZIDE 5 MG/1
TABLET, FILM COATED, EXTENDED RELEASE ORAL
Qty: 60 TABLET | Refills: 1 | Status: SHIPPED | OUTPATIENT
Start: 2022-05-02 | End: 2022-06-13

## 2022-05-02 NOTE — TELEPHONE ENCOUNTER
Medication:   Requested Prescriptions     Pending Prescriptions Disp Refills    glipiZIDE (GLUCOTROL XL) 5 MG extended release tablet [Pharmacy Med Name: GLIPIZIDE ER 5 MG TABLET] 60 tablet 5     Sig: TAKE 2 TABLETS BY MOUTH EVERY DAY FOR DIABETES       Last Filled:      Patient Phone Number: [phone] (home) [phone] (work)    Last appt: 3/28/2022   Next appt: 5/10/2022    Last Labs DM:   Lab Results   Component Value Date    LABA1C 12.6 03/28/2022

## 2022-05-03 NOTE — TELEPHONE ENCOUNTER
Patient doesn't check sugars but says he has been doing everything he is supposed to and has follow up 5/10.

## 2022-05-10 ENCOUNTER — OFFICE VISIT (OUTPATIENT)
Dept: FAMILY MEDICINE CLINIC | Age: 57
End: 2022-05-10
Payer: COMMERCIAL

## 2022-05-10 VITALS
OXYGEN SATURATION: 86 % | BODY MASS INDEX: 45.75 KG/M2 | HEIGHT: 64 IN | HEART RATE: 65 BPM | SYSTOLIC BLOOD PRESSURE: 121 MMHG | WEIGHT: 268 LBS | DIASTOLIC BLOOD PRESSURE: 71 MMHG | RESPIRATION RATE: 18 BRPM

## 2022-05-10 DIAGNOSIS — E27.8 ADRENAL NODULE (HCC): ICD-10-CM

## 2022-05-10 DIAGNOSIS — J44.9 COPD, SEVERE (HCC): ICD-10-CM

## 2022-05-10 DIAGNOSIS — I10 ESSENTIAL HYPERTENSION: ICD-10-CM

## 2022-05-10 DIAGNOSIS — E66.01 OBESITY, CLASS III, BMI 40-49.9 (MORBID OBESITY) (HCC): ICD-10-CM

## 2022-05-10 DIAGNOSIS — R80.9 TYPE 2 DIABETES MELLITUS WITH MICROALBUMINURIA, WITHOUT LONG-TERM CURRENT USE OF INSULIN (HCC): Primary | ICD-10-CM

## 2022-05-10 DIAGNOSIS — G47.33 OSA (OBSTRUCTIVE SLEEP APNEA): ICD-10-CM

## 2022-05-10 DIAGNOSIS — E11.29 TYPE 2 DIABETES MELLITUS WITH MICROALBUMINURIA, WITHOUT LONG-TERM CURRENT USE OF INSULIN (HCC): Primary | ICD-10-CM

## 2022-05-10 DIAGNOSIS — Z72.0 TOBACCO USE: ICD-10-CM

## 2022-05-10 DIAGNOSIS — Z86.010 HX OF COLONIC POLYPS: ICD-10-CM

## 2022-05-10 LAB
CHP ED QC CHECK: NORMAL
GLUCOSE BLD-MCNC: 170 MG/DL
HBA1C MFR BLD: 9 %

## 2022-05-10 PROCEDURE — 82962 GLUCOSE BLOOD TEST: CPT | Performed by: INTERNAL MEDICINE

## 2022-05-10 PROCEDURE — G8417 CALC BMI ABV UP PARAM F/U: HCPCS | Performed by: INTERNAL MEDICINE

## 2022-05-10 PROCEDURE — 99214 OFFICE O/P EST MOD 30 MIN: CPT | Performed by: INTERNAL MEDICINE

## 2022-05-10 PROCEDURE — 2022F DILAT RTA XM EVC RTNOPTHY: CPT | Performed by: INTERNAL MEDICINE

## 2022-05-10 PROCEDURE — 3046F HEMOGLOBIN A1C LEVEL >9.0%: CPT | Performed by: INTERNAL MEDICINE

## 2022-05-10 PROCEDURE — G8427 DOCREV CUR MEDS BY ELIG CLIN: HCPCS | Performed by: INTERNAL MEDICINE

## 2022-05-10 PROCEDURE — 4004F PT TOBACCO SCREEN RCVD TLK: CPT | Performed by: INTERNAL MEDICINE

## 2022-05-10 PROCEDURE — 83036 HEMOGLOBIN GLYCOSYLATED A1C: CPT | Performed by: INTERNAL MEDICINE

## 2022-05-10 PROCEDURE — 90677 PCV20 VACCINE IM: CPT | Performed by: INTERNAL MEDICINE

## 2022-05-10 PROCEDURE — 3017F COLORECTAL CA SCREEN DOC REV: CPT | Performed by: INTERNAL MEDICINE

## 2022-05-10 PROCEDURE — 90471 IMMUNIZATION ADMIN: CPT | Performed by: INTERNAL MEDICINE

## 2022-05-10 PROCEDURE — 3023F SPIROM DOC REV: CPT | Performed by: INTERNAL MEDICINE

## 2022-05-10 NOTE — PROGRESS NOTES
HPI: Ashutosh Low presents for follow-up diabetes      Chronic health issues include tobacco addiction, COPD, hypertension, and left ventricular hypertrophy, diastolic heart failure, hypoxemia,uncontrolled diabetes with compliance issues, BMI 40, grieving, retinal hemorrhage. Noncompliant DM2. A1c 12.6 March 2022 currently taking 1-2 metformin a day, Jardiance 25, Trulicity 1.5, glipizide 5 twice daily, ACE, statin, walks the dog. Does not check sugars. [address] a day. Elevated BMI. Positive proteinuria and hypertensive retinal bleed tingling in his toes. Not following diet. No recent eye exams     Hx heel pain resolved intermittent right knee discomfort no falls or swelling.      DJD thoracolumbar spine.  AC strain.  Knee pain. + orthopedist.  We should to follow-up with chiropractor. Not interested in spine therapist or physical therapy     CT abdomen January 2021 with non obstructive renal calculi, stable left adrenal mass, diverticuli. No current symptoms       Hypertension 2012 echocardiogram April 2017 with left ventricular hypertrophy, ejection fraction 55% and positive ventricular enlargement with pulmonary hypertension.  EKG decreased anterior forces.  On CPAP.   intermittent nocturnal oxygen. [facility] coronary disease on cath.    Retinal bleed.   Coreg 25 twice daily. denies chest pain palpitations +increased exercise intolerance.  Lasix 20 twice daily Norvasc 10 and lisinopril 40.  Spironolactone creatinine stable.  States edema is improved on Jardiance.        COPD/SILVANA. Follows with pulmonary. Sleep apnea compliant with CPAP.  Positive Symbicort.  Has nebulizer using 1-2 times daily. hx pneumothorax and rib fracture.  Emphysema on CT. no PFTs.   1 pack a day tobacco. Covid vaccine. Some shortness of breath today and congestion.  Has oxygen at home but no oximeter. Rarely uses oximeter. Is to follow-up with pulmonary. CT screening negative for cancerous lesion fall 2021.    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   + tobacco 1 PPD. . No alcohol. No drugs. No STD concerns. Wife   suddenly   Took a friend to [facility]. Josey Lemons multiple health issues no exercise outside of work.        FH: 6 brothers 1 sisters   + DM, Heart disease,father lymph node cancer    - colon, prostate cancer     Review of systems: Sleep apnea, chronic sinusitis, , poor dentition, COPD with baseline dyspnea and hypoxemia, obesity.    SILVANA.  Dyspnea on exertion.  No chest pain with exertion. Denies GE reflux or bloody stools.  hx adenomatous polyps 2018 recheck . Jayde Mejia STD concerns.  Generalized arthralgias.  To arthritis.  Knee pain.  Grief     Constitutional, ent, CV, respiratory, GI, , joint, skin, allergic and psychiatric ROS reviewed and negative except for above    Allergies   Allergen Reactions    Guaifenesin Other (See Comments)     Felt like having a stroke  \"paralized me\"    Asa [Aspirin] Itching       Outpatient Medications Marked as Taking for the 5/10/22 encounter (Office Visit) with Mehrdad Esposito MD   Medication Sig Dispense Refill    glipiZIDE (GLUCOTROL XL) 5 MG extended release tablet TAKE 2 TABLETS BY MOUTH EVERY DAY FOR DIABETES 60 tablet 1    Dulaglutide (TRULICITY) 1.5 EV/8.4PQ SOPN Inject 1.5 mg into the skin once a week 4 pen 5    fluticasone (FLONASE) 50 MCG/ACT nasal spray 1 spray by Each Nostril route daily 32 g 1    JARDIANCE 25 MG tablet TAKE 1 TABLET BY MOUTH DAILY TO REPLACE 10 MG 90 tablet 1    furosemide (LASIX) 20 MG tablet TAKE 1 TO 2 TABLETS BY MOUTH TWICE A  tablet 0    lisinopril (PRINIVIL;ZESTRIL) 40 MG tablet TAKE 1 TABLET BY MOUTH EVERY DAY 90 tablet 1    spironolactone (ALDACTONE) 25 MG tablet TAKE 1 TABLET BY MOUTH EVERY DAY 90 tablet 1    amLODIPine (NORVASC) 10 MG tablet TAKE 1 TABLET BY MOUTH EVERY DAY 90 tablet 1    carvedilol (COREG) 25 MG tablet TAKE 1 TABLET BY MOUTH TWICE A DAY FOR BLOOD PRESSURE 60 tablet 5    albuterol sulfate  (90 Base) MCG/ACT inhaler Inhale 2 puffs into the lungs 4 times daily as needed for Wheezing 1 each 5    budesonide-formoterol (SYMBICORT) 160-4.5 MCG/ACT AERO TAKE 2 PUFFS BY MOUTH TWICE A DAY 1 each 11    metFORMIN (GLUCOPHAGE-XR) 500 MG extended release tablet TAKE 1/2 TO 1 TABLET BY MOUTH TWICE A DAY FOR DIABETES 180 tablet 0    albuterol (ACCUNEB) 1.25 MG/3ML nebulizer solution Inhale 3 mLs into the lungs every 6 hours as needed for Wheezing 360 mL 3             Past Medical History:   Diagnosis Date    Acute on chronic diastolic congestive heart failure (HCC)     ADHD (attention deficit hyperactivity disorder)     Adrenal nodule (HCC) 12/7/2017    low-density nodular enlargement of a left adrenal gland which is stable since 2007      [facility]ch[facility]     Emphysema of lung ([address]     Essential hypertension 11/30/2017    History of pneumothorax 11/30/2017    Rib fx    Hx of colonic polyps 1/18/2018    1.2018 Descending Polyp 2. Sigmoid Polyp 3.  Sigmoid Diverticulosis recheck 2023    Hx of renal calculi 11/30/2017    Hypertension     Kidney stone     Neuropathy     Obesity     Osteoarthritis     Pneumothorax     2016 d/t fall     Prediabetes 12/13/2017    Retinal hemorrhage 10/29/2020    HTN    Sleep apnea     Substance abuse ([address]     Type 2 diabetes mellitus without complication ([address]        Past Surgical History:   Procedure Laterality Date    CARPAL TUNNEL RELEASE      COLONOSCOPY  01/18/2018    Cron[place], x2 polyps    KNEE SURGERY      arthroscopic             Family History   Problem Relation Age of Onset    Diabetes Mother     Cancer Mother     Cancer Father     No Known Problems Sister     Diabetes Brother     No Known Problems Brother     No Known Problems Brother     No Known Problems Brother     No Known Problems Brother     No Known Problems Brother            Objective     /71   Pulse 65   Resp 18   Ht 5' 4\" (1.626 m)   Wt 268 lb (121.6 kg)   SpO2 (!) 86% Comment: ra  BMI 46.00 kg/m²     @LASTSAO2(3)@    Wt Readings from Last 3 Encounters:   03/28/22 264 lb (119.7 kg)   10/01/21 263 lb 8 oz (119.5 kg)   09/28/21 265 lb (120.2 kg)       Physical Exam     NAD alert and cooperative  HEENT: Poor dentition crowded hypopharynx. Plethora. TMs are negative. Pink conjunctive a. Full neck. No adenopathy no stridor. Lungs with increased FRANK ratio occasional wheeze. No rales. Cardiovascular exam regular rate and rhythm. Distant no ectopy. Abdomen is obese. No point tenderness. No peripheral edema. Pulses present. Rolf for nails. Paresthesias distally.   He is appropriate good eye contact     Chemistry        Component Value Date/Time     03/28/2022 0921    K 5.3 (H) 03/28/2022 0921    K 5.6 (H) 01/20/2021 1809    CL 94 (L) 03/28/2022 0921    CO2 24 03/28/2022 0921    BUN 34 (H) 03/28/2022 0921    CREATININE 1.3 03/28/2022 0921        Component Value Date/Time    CALCIUM 9.3 03/28/2022 0921    ALKPHOS 140 (H) 03/28/2022 0921    AST 9 (L) 03/28/2022 0921    ALT 13 03/28/2022 0921    BILITOT 0.3 03/28/2022 0921            Lab Results   Component Value Date    WBC 7.9 03/28/2022    HGB 17.3 03/28/2022    HCT 52.3 03/28/2022    MCV 97.1 03/28/2022     03/28/2022     Lab Results   Component Value Date    LABA1C 12.6 03/28/2022     Lab Results   Component Value Date    .2 07/29/2020     Lab Results   Component Value Date    LABA1C 12.6 03/28/2022     No components found for: CHLPL  Lab Results   Component Value Date    TRIG 325 (H) 03/28/2022    TRIG 123 02/25/2021    TRIG 107 07/29/2020     Lab Results   Component Value Date    HDL 32 (L) 03/28/2022    HDL 73 (H) 02/25/2021    HDL 38 (L) 07/29/2020     Lab Results   Component Value Date    LDLCALC see below 03/28/2022    LDLCALC 73 02/25/2021    LDLCALC 73 07/29/2020     Lab Results   Component Value Date    LABVLDL see below 03/28/2022    LABVLDL 25 02/25/2021    LABVLDL 21 07/29/2020       Old labs and records reviewed or requested  Discussed past lab and studies with patient      Diagnosis Orders   1. Type 2 diabetes mellitus with microalbuminuria, without long-term current use of insulin (HCC)  POCT Glucose    POCT glycosylated hemoglobin (Hb A1C)   2. Tobacco use     3. SILVANA (obstructive sleep apnea)     4. Obesity, Class III, BMI 40-49.9 (morbid obesity) ([address]     5. Hx of colonic polyps     6. Essential hypertension     7. COPD, severe ([address]     8. Adrenal nodule ([address]       Uncontrolled diabetes however improved A1c down from 12-9 is more compliant with medicine will uses metformin twice daily. Will not change diet. Follow-up eye exam.    Tobacco use hypoxemia, COPD. He was given oxygen in the office has this at home. Knows to increase his nebulizer treatments. May use Afrin with Flonase. Obesity. Weight is down a few pounds. History of colonic polyps stable. Hypertension good control today. Grief improved. Chronic back pain states he will see a chiropractor    We did not discuss adrenal nodule CHF chronic rhinitis or retinal hemorrhage or arrhythmia today    On this date     I have spent 30 minutes reviewing previous notes, test results, and face to face with the patient discussing the diagnosis and plan    High  complexity          Return in about 3 months (around 8/10/2022). Diagnosis and treatment discussed.   Possible side effects of medication reviewed  Patients questions answered  Follow up understood  Pt aware if they are not contacted about any test results , this does not mean they are normal.  They should call

## 2022-05-10 NOTE — PATIENT INSTRUCTIONS
Try afrin and flonase together  You may use 2 of the .75 trulicity until gone. You have refills of 1.5 at the pharmacy  Use your nebulizer more  Your [facility] are equivalent to a pound a week of weight. If you are interested in the spine center, pain management or physical therapy let me know. To use your metformin twice a day your labs use 2 of them at once. Call for your pulmonary follow-up.   It would be nice for you to have an oximeter and get your COVID booster

## 2022-06-05 DIAGNOSIS — I10 ESSENTIAL HYPERTENSION: ICD-10-CM

## 2022-06-05 DIAGNOSIS — E78.2 MIXED HYPERLIPIDEMIA: ICD-10-CM

## 2022-06-06 RX ORDER — FUROSEMIDE 20 MG/1
TABLET ORAL
Qty: 360 TABLET | Refills: 0 | Status: SHIPPED | OUTPATIENT
Start: 2022-06-06 | End: 2022-09-06

## 2022-06-06 RX ORDER — LISINOPRIL 40 MG/1
TABLET ORAL
Qty: 90 TABLET | Refills: 1 | Status: SHIPPED | OUTPATIENT
Start: 2022-06-06

## 2022-06-06 RX ORDER — SPIRONOLACTONE 25 MG/1
TABLET ORAL
Qty: 90 TABLET | Refills: 1 | Status: SHIPPED | OUTPATIENT
Start: 2022-06-06

## 2022-06-06 RX ORDER — SIMVASTATIN 20 MG
TABLET ORAL
Qty: 90 TABLET | Refills: 0 | Status: SHIPPED | OUTPATIENT
Start: 2022-06-06 | End: 2022-08-29

## 2022-06-06 RX ORDER — AMLODIPINE BESYLATE 10 MG/1
TABLET ORAL
Qty: 90 TABLET | Refills: 1 | Status: SHIPPED | OUTPATIENT
Start: 2022-06-06

## 2022-06-13 RX ORDER — GLIPIZIDE 5 MG/1
TABLET, FILM COATED, EXTENDED RELEASE ORAL
Qty: 60 TABLET | Refills: 2 | Status: SHIPPED | OUTPATIENT
Start: 2022-06-13 | End: 2022-09-15

## 2022-07-21 RX ORDER — METFORMIN HYDROCHLORIDE 500 MG/1
TABLET, EXTENDED RELEASE ORAL
Qty: 180 TABLET | Refills: 0 | Status: SHIPPED | OUTPATIENT
Start: 2022-07-21

## 2022-07-21 NOTE — TELEPHONE ENCOUNTER
Medication:   Requested Prescriptions     Pending Prescriptions Disp Refills    metFORMIN (GLUCOPHAGE-XR) 500 MG extended release tablet [Pharmacy Med Name: METFORMIN HCL  MG TABLET] 180 tablet 0     Sig: TAKE 1/2 TO 1 TABLET BY MOUTH TWICE A DAY FOR DIABETES       Last Filled:      Patient Phone Number: [phone] (home) [phone] (work)    Last appt: 5/10/2022   Next appt: 8/10/2022

## 2022-07-25 RX ORDER — METFORMIN HYDROCHLORIDE 500 MG/1
TABLET, EXTENDED RELEASE ORAL
Qty: 180 TABLET | Refills: 0 | OUTPATIENT
Start: 2022-07-25

## 2022-08-18 RX ORDER — FLUTICASONE PROPIONATE 50 MCG
SPRAY, SUSPENSION (ML) NASAL
Qty: 1 EACH | Refills: 11 | Status: SHIPPED | OUTPATIENT
Start: 2022-08-18

## 2022-08-18 NOTE — TELEPHONE ENCOUNTER
Medication:   Requested Prescriptions     Pending Prescriptions Disp Refills    fluticasone (FLONASE) 50 MCG/ACT nasal spray [Pharmacy Med Name: FLUTICASONE PROP 50 MCG SPRAY]  3     Sig: SPRAY 1 SPRAY INTO EACH NOSTRIL EVERY DAY        Last Filled:  3/28/2022    Patient Phone Number: [phone] (home) [phone] (work)    Last appt: 5/10/2022   Next appt: 8/29/2022    Last OARRS: No flowsheet data found.

## 2022-08-29 ENCOUNTER — OFFICE VISIT (OUTPATIENT)
Dept: FAMILY MEDICINE CLINIC | Age: 57
End: 2022-08-29
Payer: COMMERCIAL

## 2022-08-29 VITALS
WEIGHT: 264 LBS | OXYGEN SATURATION: 83 % | BODY MASS INDEX: 45.07 KG/M2 | RESPIRATION RATE: 20 BRPM | HEIGHT: 64 IN | DIASTOLIC BLOOD PRESSURE: 70 MMHG | SYSTOLIC BLOOD PRESSURE: 110 MMHG | HEART RATE: 73 BPM

## 2022-08-29 DIAGNOSIS — I10 ESSENTIAL HYPERTENSION: ICD-10-CM

## 2022-08-29 DIAGNOSIS — Z86.010 HX OF COLONIC POLYPS: ICD-10-CM

## 2022-08-29 DIAGNOSIS — R80.9 TYPE 2 DIABETES MELLITUS WITH MICROALBUMINURIA, WITHOUT LONG-TERM CURRENT USE OF INSULIN (HCC): Primary | ICD-10-CM

## 2022-08-29 DIAGNOSIS — J96.11 CHRONIC RESPIRATORY FAILURE WITH HYPOXIA (HCC): ICD-10-CM

## 2022-08-29 DIAGNOSIS — E11.29 TYPE 2 DIABETES MELLITUS WITH MICROALBUMINURIA, WITHOUT LONG-TERM CURRENT USE OF INSULIN (HCC): Primary | ICD-10-CM

## 2022-08-29 DIAGNOSIS — E66.01 OBESITY, CLASS III, BMI 40-49.9 (MORBID OBESITY) (HCC): ICD-10-CM

## 2022-08-29 DIAGNOSIS — Z72.0 TOBACCO USE: ICD-10-CM

## 2022-08-29 DIAGNOSIS — G89.29 CHRONIC PAIN OF RIGHT KNEE: ICD-10-CM

## 2022-08-29 DIAGNOSIS — M25.561 CHRONIC PAIN OF RIGHT KNEE: ICD-10-CM

## 2022-08-29 DIAGNOSIS — Z87.891 PERSONAL HISTORY OF TOBACCO USE: ICD-10-CM

## 2022-08-29 DIAGNOSIS — J44.9 COPD, SEVERE (HCC): ICD-10-CM

## 2022-08-29 DIAGNOSIS — G47.33 OSA (OBSTRUCTIVE SLEEP APNEA): ICD-10-CM

## 2022-08-29 LAB
A/G RATIO: 1 (ref 1.1–2.2)
ALBUMIN SERPL-MCNC: 3.9 G/DL (ref 3.4–5)
ALP BLD-CCNC: 128 U/L (ref 40–129)
ALT SERPL-CCNC: 9 U/L (ref 10–40)
ANION GAP SERPL CALCULATED.3IONS-SCNC: 13 MMOL/L (ref 3–16)
AST SERPL-CCNC: 8 U/L (ref 15–37)
BILIRUB SERPL-MCNC: 0.5 MG/DL (ref 0–1)
BUN BLDV-MCNC: 18 MG/DL (ref 7–20)
CALCIUM SERPL-MCNC: 9.4 MG/DL (ref 8.3–10.6)
CHLORIDE BLD-SCNC: 98 MMOL/L (ref 99–110)
CO2: 29 MMOL/L (ref 21–32)
CREAT SERPL-MCNC: 1.1 MG/DL (ref 0.9–1.3)
CREATININE URINE: 76.9 MG/DL (ref 39–259)
GFR AFRICAN AMERICAN: >60
GFR NON-AFRICAN AMERICAN: >60
GLUCOSE BLD-MCNC: 77 MG/DL (ref 70–99)
HBA1C MFR BLD: 6.6 %
HCT VFR BLD CALC: 56 % (ref 40.5–52.5)
HEMOGLOBIN: 18 G/DL (ref 13.5–17.5)
MCH RBC QN AUTO: 30.1 PG (ref 26–34)
MCHC RBC AUTO-ENTMCNC: 32.2 G/DL (ref 31–36)
MCV RBC AUTO: 93.5 FL (ref 80–100)
MICROALBUMIN UR-MCNC: 1.4 MG/DL
MICROALBUMIN/CREAT UR-RTO: 18.2 MG/G (ref 0–30)
PDW BLD-RTO: 17.4 % (ref 12.4–15.4)
PLATELET # BLD: 199 K/UL (ref 135–450)
PMV BLD AUTO: 8.8 FL (ref 5–10.5)
POTASSIUM SERPL-SCNC: 4.7 MMOL/L (ref 3.5–5.1)
RBC # BLD: 5.98 M/UL (ref 4.2–5.9)
SODIUM BLD-SCNC: 140 MMOL/L (ref 136–145)
TOTAL PROTEIN: 7.9 G/DL (ref 6.4–8.2)
WBC # BLD: 8.5 K/UL (ref 4–11)

## 2022-08-29 PROCEDURE — 99214 OFFICE O/P EST MOD 30 MIN: CPT | Performed by: INTERNAL MEDICINE

## 2022-08-29 PROCEDURE — 3017F COLORECTAL CA SCREEN DOC REV: CPT | Performed by: INTERNAL MEDICINE

## 2022-08-29 PROCEDURE — 4004F PT TOBACCO SCREEN RCVD TLK: CPT | Performed by: INTERNAL MEDICINE

## 2022-08-29 PROCEDURE — G8417 CALC BMI ABV UP PARAM F/U: HCPCS | Performed by: INTERNAL MEDICINE

## 2022-08-29 PROCEDURE — 2022F DILAT RTA XM EVC RTNOPTHY: CPT | Performed by: INTERNAL MEDICINE

## 2022-08-29 PROCEDURE — 36415 COLL VENOUS BLD VENIPUNCTURE: CPT | Performed by: INTERNAL MEDICINE

## 2022-08-29 PROCEDURE — G8427 DOCREV CUR MEDS BY ELIG CLIN: HCPCS | Performed by: INTERNAL MEDICINE

## 2022-08-29 PROCEDURE — 3023F SPIROM DOC REV: CPT | Performed by: INTERNAL MEDICINE

## 2022-08-29 PROCEDURE — G0296 VISIT TO DETERM LDCT ELIG: HCPCS | Performed by: INTERNAL MEDICINE

## 2022-08-29 PROCEDURE — 83036 HEMOGLOBIN GLYCOSYLATED A1C: CPT | Performed by: INTERNAL MEDICINE

## 2022-08-29 PROCEDURE — 3044F HG A1C LEVEL LT 7.0%: CPT | Performed by: INTERNAL MEDICINE

## 2022-08-29 NOTE — PROGRESS NOTES
HPI: Tamra Darnell presents for evaluation and management of diabetes    Chronic health issues include tobacco addiction, COPD, hypertension, and left ventricular hypertrophy, diastolic heart failure, hypoxemia,uncontrolled diabetes with compliance issues, BMI 40, grieving, retinal hemorrhage. Concern with right knee pain. Medial joint space. Has seen orthopedist in the past which she see someone else. Injection short-term. Using no medication. No locking or swelling. No giving way. I do not have access to his x-ray result. Interferes with activity is interested in having a disability sticker. BMI 45. Topical not effective. DM2. A1c 12.6 March 2022 currently taking 2 metformin a day, Jardiance 25, Trulicity 1.5, glipizide 10 daily  ACE, statin, daily aspirin. Walks the dog. No low sugars does not check sugars. [address] a day. Elevated BMI. Positive proteinuria and hypertensive retinal bleed tingling in his toes. Hx heel pain resolved intermittent right knee discomfort no falls or swelling. DJD thoracolumbar spine. AC strain. Knee pain. Diabetes,    CT abdomen January 2021 with non obstructive renal calculi, stable left adrenal mass, diverticuli. No current symptoms       Hypertension 2012 echocardiogram April 2017 with left ventricular hypertrophy, ejection fraction 55% and positive ventricular enlargement with pulmonary hypertension. EKG decreased anterior forces. On CPAP. intermittent nocturnal oxygen. Hypoxemia does not use oxygen during the day. .  Negative coronary disease on cath. Retinal bleed. Coreg 25 twice daily. .  Lasix 20 twice daily Norvasc 10 and lisinopril 40. Spironolactone creatinine stable. States edema is improved on Jardiance. COPD/SILVANA. Follows with pulmonary. Sleep apnea compliant with CPAP. Positive Symbicort. albuterol 2 times daily. Has nebulizer using 1 infrequently. . hx pneumothorax and rib fracture.   Emphysema on CT. no PFTs.  1 pack a day tobacco. Covid vaccine. Some shortness of breath today and congestion. Has oxygen at home but no oximeter. Rarely uses oximeter. Is to follow-up with pulmonary. CT screening negative for cancerous 2021. 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 + tobacco dust under 1 PPD. Sorin Lambert No alcohol. No drugs. No STD concerns. Wife   suddenly   Took a friend to [facility]. .   no exercise outside of work. FH: 6 brothers 1 sisters   + DM, Heart disease,father lymph node cancer    - colon, prostate cancer     Review of systems:  chronic sinusitis, , poor dentition, COPD with baseline dyspnea and hypoxemia, obesity. CPAP compliant. Dyspnea on exertion. No chest pain with exertion. Denies GE reflux or bloody stools. hx adenomatous polyps 2018 recheck . Sorin Lambert No STD concerns. Generalized arthralgias. Osteoarthritis knee pain.   Grief     Constitutional, ent, CV, respiratory, GI, , joint, skin, allergic and psychiatric ROS reviewed and negative except for above    Allergies   Allergen Reactions    Guaifenesin Other (See Comments)     Felt like having a stroke  \"paralized me\"    Asa [Aspirin] Itching       Outpatient Medications Marked as Taking for the 22 encounter (Office Visit) with Ave Bamberger, MD   Medication Sig Dispense Refill    fluticasone (FLONASE) 50 MCG/ACT nasal spray SPRAY 1 SPRAY INTO EACH NOSTRIL EVERY DAY 1 each 11    metFORMIN (GLUCOPHAGE-XR) 500 MG extended release tablet TAKE 1/2 TO 1 TABLET BY MOUTH TWICE A DAY FOR DIABETES 180 tablet 0    glipiZIDE (GLUCOTROL XL) 5 MG extended release tablet TAKE 2 TABLETS BY MOUTH EVERY DAY FOR DIABETES 60 tablet 2    lisinopril (PRINIVIL;ZESTRIL) 40 MG tablet TAKE 1 TABLET BY MOUTH EVERY DAY 90 tablet 1    spironolactone (ALDACTONE) 25 MG tablet TAKE 1 TABLET BY MOUTH EVERY DAY 90 tablet 1    amLODIPine (NORVASC) 10 MG tablet TAKE 1 TABLET BY MOUTH EVERY DAY 90 tablet 1    furosemide (LASIX) 20 MG tablet TAKE 1 TO 2 TABLETS BY MOUTH TWICE A  tablet 0    atorvastatin (LIPITOR) 40 MG tablet Take 1 tablet by mouth daily To replace simvastatin 90 tablet 3    Dulaglutide (TRULICITY) 1.5 TJ/3.1UX SOPN Inject 1.5 mg into the skin once a week 4 pen 5    JARDIANCE 25 MG tablet TAKE 1 TABLET BY MOUTH DAILY TO REPLACE 10 MG 90 tablet 1    carvedilol (COREG) 25 MG tablet TAKE 1 TABLET BY MOUTH TWICE A DAY FOR BLOOD PRESSURE 60 tablet 5    albuterol sulfate  (90 Base) MCG/ACT inhaler Inhale 2 puffs into the lungs 4 times daily as needed for Wheezing 1 each 5    budesonide-formoterol (SYMBICORT) 160-4.5 MCG/ACT AERO TAKE 2 PUFFS BY MOUTH TWICE A DAY 1 each 11    Insulin Pen Needle 31G X 5 MM MISC 1 each by Does not apply route daily 12 each 1    albuterol (ACCUNEB) 1.25 MG/3ML nebulizer solution Inhale 3 mLs into the lungs every 6 hours as needed for Wheezing 360 mL 3             Past Medical History:   Diagnosis Date    Acute on chronic diastolic congestive heart failure (HCC)     ADHD (attention deficit hyperactivity disorder)     Adrenal nodule (HCC) 12/7/2017    low-density nodular enlargement of a left adrenal gland which is stable since 2007      COPD (chronic obstructive pulmonary disease) (HCC)     Emphysema of lung (HCC)     Essential hypertension 11/30/2017    History of pneumothorax 11/30/2017    Rib fx    Hx of colonic polyps 1/18/2018    1.2018 Descending Polyp 2. Sigmoid Polyp 3.  Sigmoid Diverticulosis recheck 2023    Hx of renal calculi 11/30/2017    Hypertension     Kidney stone     Neuropathy     Obesity     Osteoarthritis     Pneumothorax     2016 d/t fall     Prediabetes 12/13/2017    Retinal hemorrhage 10/29/2020    HTN    Sleep apnea     Substance abuse ([facility]     Type 2 diabetes mellitus without complication [facility])        Past Surgical History:   Procedure Laterality Date    CARPAL TUNNEL RELEASE      COLONOSCOPY  01/18/2018    Adelaida, x2 polyps    KNEE SURGERY      arthroscopic             Family History   Problem Relation Age of Onset    Diabetes Mother     Cancer Mother     Cancer Father     No Known Problems Sister     Diabetes Brother     No Known Problems Brother     No Known Problems Brother     No Known Problems Brother     No Known Problems Brother     No Known Problems Brother            Objective     There were no vitals taken for this visit. @LASTSAO2(3)@    Wt Readings from Last 3 Encounters:   05/10/22 268 lb (121.6 kg)   03/28/22 264 lb (119.7 kg)   10/01/21 263 lb 8 oz (119.5 kg)       Physical Exam     NAD alert and cooperative  HEENT: Crowded hypopharynx. No ulcerations. Marginal dentition. Edentulous on top. TMs are unremarkable. I did not detect a bruit. Rales at base increased FRANK ratio. Continue rhonchi. Knees in the supine position. Cardiovascular exam regular rate and rhythm I detect no S4 gallop. Abdomen is obese diastases recti. I did not detect any ascites or hepatosplenomegaly although compromised exam.  Good pulses feet. Sensation is intact. No maceration. Elongated thickened nails. He is appropriate. Well-groomed. Good eye contact.     Chemistry        Component Value Date/Time     03/28/2022 0921    K 5.3 (H) 03/28/2022 0921    K 5.6 (H) 01/20/2021 1809    CL 94 (L) 03/28/2022 0921    CO2 24 03/28/2022 0921    BUN 34 (H) 03/28/2022 0921    CREATININE 1.3 03/28/2022 0921        Component Value Date/Time    CALCIUM 9.3 03/28/2022 0921    ALKPHOS 140 (H) 03/28/2022 0921    AST 9 (L) 03/28/2022 0921    ALT 13 03/28/2022 0921    BILITOT 0.3 03/28/2022 0921            Lab Results   Component Value Date    WBC 7.9 03/28/2022    HGB 17.3 03/28/2022    HCT 52.3 03/28/2022    MCV 97.1 03/28/2022     03/28/2022     Lab Results   Component Value Date    LABA1C 9.0 05/10/2022     Lab Results   Component Value Date .2 07/29/2020     Lab Results   Component Value Date    LABA1C 9.0 05/10/2022     No components found for: CHLPL  Lab Results   Component Value Date    TRIG 325 (H) 03/28/2022    TRIG 123 02/25/2021    TRIG 107 07/29/2020     Lab Results   Component Value Date    HDL 32 (L) 03/28/2022    HDL 73 (H) 02/25/2021    HDL 38 (L) 07/29/2020     Lab Results   Component Value Date    LDLCALC see below 03/28/2022    LDLCALC 73 02/25/2021    LDLCALC 73 07/29/2020     Lab Results   Component Value Date    LABVLDL see below 03/28/2022    LABVLDL 25 02/25/2021    LABVLDL 21 07/29/2020       Old labs and records reviewed or requested  Discussed past lab and studies with patient    Diagnosis Orders   1. Type 2 diabetes mellitus with microalbuminuria, without long-term current use of insulin (HCC)  POCT glycosylated hemoglobin (Hb A1C)    Microalbumin / Creatinine Urine Ratio      2. Tobacco use        3. COPD, severe ([address]        4. SILVANA (obstructive sleep apnea)  CBC      5. Essential hypertension  Comprehensive Metabolic Panel      6. Hx of colonic polyps  CBC      7. Obesity, Class III, BMI 40-49.9 (morbid obesity) ([address]        8. Chronic pain of right knee  Tommie Belcher MD, Orthopedic Surgery (Hip; Knee; Shoulder; Elbow), [facility]    XR KNEE RIGHT (3 VIEWS)      9. Personal history of tobacco use  NM VISIT TO DISCUSS LUNG CA SCREEN W LDCT    CT Lung Screen (Annual)      10. Chronic respiratory failure with hypoxia ([facility])          A1c is 6.6 much improved. Discussed symptoms of low sugars and if he has these will cut back on his glipizide. Advised on discontinuing soda as this will be helpful for his weight as well. Follow-up eye exam.  Microalbumin. Tobacco addiction. Not interested in intervention at this time. Severe COPD continue on with his current medications. CT screening. Pulmonary.   Flu vaccine and COVID booster discussed has had pneumonia vaccine    Hypertension good control on current medications will continue. History of colonic polyps repeat in 24. Obesity. Discussed weight loss. General health maintenance discussed tetanus vaccine flu vaccine and COVID booster. We did not discuss chronic respiratory failure, acute on chronic diastolic congestive heart failure, history pneumothorax, sciatica, adrenal nodule, chronic rhinitis, today    On this date I have spent 40 minutes reviewing previous notes, test results, and face to face with the patient discussing the diagnosis and plan          No follow-ups on file. Diagnosis and treatment discussed. Possible side effects of medication reviewed  Patients questions answered  Follow up understood  Pt aware if they are not contacted about any test results , this does not mean they are normal.  They should call  Low Dose CT (LDCT) Lung Screening criteria met:     Age 50-77(Medicare) or 50-80 ([facility])   Pack year smoking >20   Still smoking or less than 15 year since quit   No sign or symptoms of lung cancer   > 11 months since last LDCT     Risks and benefits of lung cancer screening with LDCT scans discussed:    Significance of positive screen - False-positive LDCT results often occur. 95% of all positive results do not lead to a diagnosis of cancer. Usually further imaging can resolve most false-positive results; however, some patients may require invasive procedures. Over diagnosis risk - 10% to 12% of screen-detected lung cancer cases are over diagnosed--that is, the cancer would not have been detected in the patient's lifetime without the screening. Need for follow up screens annually to continue lung cancer screening effectiveness     Risks associated with radiation from annual LDCT- Radiation exposure is about the same as for a mammogram, which is about 1/3 of the annual background radiation exposure from everyday life. Starting screening at age 54 is not likely to increase cancer risk from radiation exposure.     Patients with comorbidities resulting in life expectancy of < 10 years, or that would preclude treatment of an abnormality identified on CT, should not be screened due to lack of benefit.     To obtain maximal benefit from this screening, smoking cessation and long-term abstinence from smoking is critical

## 2022-08-29 NOTE — PATIENT INSTRUCTIONS
Great a1c! If low sugars would stop or cut back on glipizide  Get flu vaccine next month. I recommend covid booster    Continue current medications  Cut back on soday    CT screen lungs oct. Call for ortho apt and get xray right knee. [address]  [address], [address]   Phone: [phone]        MD Corrie Michelle MD  [facility] internal Medicine  [address] [facility] [address]  [address]  [phone] scheduling  [phone]      What is lung cancer screening? Lung cancer screening is a way in which doctors check the lungs for early signs of cancer in people who have no symptoms of lung cancer. A low-dose CT scan uses much less radiation than a normal CT scan and shows a more detailed image of the lungs than a standard X-ray. The goal of lung cancer screening is to find cancer early, before it has a chance to grow, spread, or cause problems. One large study found that smokers who were screened with low-dose CT scans were less likely to die of lung cancer than those who were screened with standard X-ray. Below is a summary of the things you need to know regarding screening for lung cancer with low-dose computed tomography (LDCT). This is a screening program that involves routine annual screening with LDCT studies of the lung. The LDCTs are done using low-dose radiation that is not thought to increase your cancer risk. If you have other serious medical conditions (other cancers, congestive heart failure) that limit your life expectancy to less than 10 years, you should not undergo lung cancer screening with LDCT. The chance is 20%-60% that the LDCT result will show abnormalities. This would require additional testing which could include repeat imaging or even invasive procedures. Most (about 95%) of \"abnormal\" LDCT results are false in the sense that no lung cancer is ultimately found. Additionally, some (about 10%) of the cancers found would not affect your life expectancy, even if undetected and untreated. If you are still smoking, the single most important thing that you can do to reduce your risk of dying of lung cancer is to quit. For this screening to be covered by Medicare and most other insurers, strict criteria must be met. If you do not meet these criteria, but still wish to undergo LDCT testing, you will be required to sign a waiver indicating your willingness to pay for the scan.

## 2022-09-06 DIAGNOSIS — I10 ESSENTIAL HYPERTENSION: ICD-10-CM

## 2022-09-06 DIAGNOSIS — E78.2 MIXED HYPERLIPIDEMIA: ICD-10-CM

## 2022-09-06 RX ORDER — EMPAGLIFLOZIN 25 MG/1
TABLET, FILM COATED ORAL DAILY
Qty: 90 TABLET | Refills: 1 | Status: SHIPPED | OUTPATIENT
Start: 2022-09-06

## 2022-09-06 RX ORDER — SIMVASTATIN 20 MG
TABLET ORAL
Qty: 90 TABLET | Refills: 3 | Status: SHIPPED | OUTPATIENT
Start: 2022-09-06

## 2022-09-06 RX ORDER — FUROSEMIDE 20 MG/1
TABLET ORAL
Qty: 360 TABLET | Refills: 1 | Status: SHIPPED | OUTPATIENT
Start: 2022-09-06

## 2022-09-12 ENCOUNTER — TELEPHONE (OUTPATIENT)
Dept: CASE MANAGEMENT | Age: 57
End: 2022-09-12

## 2022-09-15 RX ORDER — GLIPIZIDE 5 MG/1
TABLET, FILM COATED, EXTENDED RELEASE ORAL
Qty: 180 TABLET | Refills: 1 | Status: SHIPPED | OUTPATIENT
Start: 2022-09-15

## 2022-10-10 RX ORDER — ALBUTEROL SULFATE 90 UG/1
2 AEROSOL, METERED RESPIRATORY (INHALATION) 4 TIMES DAILY PRN
Qty: 8.5 EACH | Refills: 5 | Status: SHIPPED | OUTPATIENT
Start: 2022-10-10

## 2022-10-12 RX ORDER — DULAGLUTIDE 1.5 MG/.5ML
1.5 INJECTION, SOLUTION SUBCUTANEOUS WEEKLY
Qty: 6 ADJUSTABLE DOSE PRE-FILLED PEN SYRINGE | Refills: 0 | Status: SHIPPED | OUTPATIENT
Start: 2022-10-12

## 2022-10-18 DIAGNOSIS — I10 ESSENTIAL HYPERTENSION: ICD-10-CM

## 2022-10-18 RX ORDER — BUDESONIDE AND FORMOTEROL FUMARATE DIHYDRATE 160; 4.5 UG/1; UG/1
AEROSOL RESPIRATORY (INHALATION)
Qty: 30.6 EACH | Refills: 3 | Status: SHIPPED | OUTPATIENT
Start: 2022-10-18

## 2022-10-18 RX ORDER — CARVEDILOL 25 MG/1
TABLET ORAL
Qty: 180 TABLET | Refills: 1 | Status: SHIPPED | OUTPATIENT
Start: 2022-10-18

## 2022-11-11 ENCOUNTER — TELEPHONE (OUTPATIENT)
Dept: CASE MANAGEMENT | Age: 57
End: 2022-11-11

## 2022-11-17 ENCOUNTER — OFFICE VISIT (OUTPATIENT)
Dept: INTERNAL MEDICINE CLINIC | Age: 57
End: 2022-11-17
Payer: COMMERCIAL

## 2022-11-17 VITALS
HEART RATE: 63 BPM | SYSTOLIC BLOOD PRESSURE: 130 MMHG | OXYGEN SATURATION: 88 % | BODY MASS INDEX: 45.34 KG/M2 | RESPIRATION RATE: 18 BRPM | DIASTOLIC BLOOD PRESSURE: 72 MMHG | WEIGHT: 265.6 LBS | HEIGHT: 64 IN

## 2022-11-17 DIAGNOSIS — E11.29 TYPE 2 DIABETES MELLITUS WITH MICROALBUMINURIA, WITHOUT LONG-TERM CURRENT USE OF INSULIN (HCC): ICD-10-CM

## 2022-11-17 DIAGNOSIS — R80.9 TYPE 2 DIABETES MELLITUS WITH MICROALBUMINURIA, WITHOUT LONG-TERM CURRENT USE OF INSULIN (HCC): ICD-10-CM

## 2022-11-17 DIAGNOSIS — J44.9 COPD, SEVERE (HCC): ICD-10-CM

## 2022-11-17 DIAGNOSIS — J44.9 CHRONIC OBSTRUCTIVE PULMONARY DISEASE, UNSPECIFIED COPD TYPE (HCC): Primary | ICD-10-CM

## 2022-11-17 DIAGNOSIS — I10 ESSENTIAL HYPERTENSION: ICD-10-CM

## 2022-11-17 PROCEDURE — 3023F SPIROM DOC REV: CPT | Performed by: STUDENT IN AN ORGANIZED HEALTH CARE EDUCATION/TRAINING PROGRAM

## 2022-11-17 PROCEDURE — G8427 DOCREV CUR MEDS BY ELIG CLIN: HCPCS | Performed by: STUDENT IN AN ORGANIZED HEALTH CARE EDUCATION/TRAINING PROGRAM

## 2022-11-17 PROCEDURE — G8417 CALC BMI ABV UP PARAM F/U: HCPCS | Performed by: STUDENT IN AN ORGANIZED HEALTH CARE EDUCATION/TRAINING PROGRAM

## 2022-11-17 PROCEDURE — 3074F SYST BP LT 130 MM HG: CPT | Performed by: STUDENT IN AN ORGANIZED HEALTH CARE EDUCATION/TRAINING PROGRAM

## 2022-11-17 PROCEDURE — 2022F DILAT RTA XM EVC RTNOPTHY: CPT | Performed by: STUDENT IN AN ORGANIZED HEALTH CARE EDUCATION/TRAINING PROGRAM

## 2022-11-17 PROCEDURE — 4004F PT TOBACCO SCREEN RCVD TLK: CPT | Performed by: STUDENT IN AN ORGANIZED HEALTH CARE EDUCATION/TRAINING PROGRAM

## 2022-11-17 PROCEDURE — 3044F HG A1C LEVEL LT 7.0%: CPT | Performed by: STUDENT IN AN ORGANIZED HEALTH CARE EDUCATION/TRAINING PROGRAM

## 2022-11-17 PROCEDURE — G8484 FLU IMMUNIZE NO ADMIN: HCPCS | Performed by: STUDENT IN AN ORGANIZED HEALTH CARE EDUCATION/TRAINING PROGRAM

## 2022-11-17 PROCEDURE — 3078F DIAST BP <80 MM HG: CPT | Performed by: STUDENT IN AN ORGANIZED HEALTH CARE EDUCATION/TRAINING PROGRAM

## 2022-11-17 PROCEDURE — 3017F COLORECTAL CA SCREEN DOC REV: CPT | Performed by: STUDENT IN AN ORGANIZED HEALTH CARE EDUCATION/TRAINING PROGRAM

## 2022-11-17 PROCEDURE — 99213 OFFICE O/P EST LOW 20 MIN: CPT | Performed by: STUDENT IN AN ORGANIZED HEALTH CARE EDUCATION/TRAINING PROGRAM

## 2022-11-17 RX ORDER — METFORMIN HYDROCHLORIDE 500 MG/1
500 TABLET, EXTENDED RELEASE ORAL
Qty: 90 TABLET | Refills: 3 | Status: SHIPPED | OUTPATIENT
Start: 2022-11-17

## 2022-11-17 RX ORDER — LORATADINE 10 MG/1
10 TABLET ORAL DAILY
Qty: 30 TABLET | Refills: 3 | Status: SHIPPED | OUTPATIENT
Start: 2022-11-17 | End: 2022-12-17

## 2022-11-17 RX ORDER — AZITHROMYCIN 250 MG/1
250 TABLET, FILM COATED ORAL SEE ADMIN INSTRUCTIONS
Qty: 6 TABLET | Refills: 0 | Status: SHIPPED | OUTPATIENT
Start: 2022-11-17 | End: 2022-11-22

## 2022-11-17 RX ORDER — PREDNISONE 20 MG/1
TABLET ORAL
Qty: 20 TABLET | Refills: 0 | Status: SHIPPED | OUTPATIENT
Start: 2022-11-17 | End: 2022-11-27

## 2022-11-17 ASSESSMENT — ENCOUNTER SYMPTOMS
BLOOD IN STOOL: 0
CHEST TIGHTNESS: 0
ABDOMINAL DISTENTION: 0
COUGH: 0
ABDOMINAL PAIN: 0
SORE THROAT: 0
SHORTNESS OF BREATH: 0
BACK PAIN: 0

## 2022-11-17 ASSESSMENT — PATIENT HEALTH QUESTIONNAIRE - PHQ9
SUM OF ALL RESPONSES TO PHQ9 QUESTIONS 1 & 2: 0
SUM OF ALL RESPONSES TO PHQ QUESTIONS 1-9: 0
1. LITTLE INTEREST OR PLEASURE IN DOING THINGS: 0
2. FEELING DOWN, DEPRESSED OR HOPELESS: 0
SUM OF ALL RESPONSES TO PHQ QUESTIONS 1-9: 0

## 2022-11-17 NOTE — ASSESSMENT & PLAN NOTE
Patient with low oxygen today. He does have oxygen at home that he will use. His oxygen saturations did come up with 2 L of oxygen. We did discuss potentially going to the hospital for evaluation.   Patient feels well and would like to go home  -start prednisone   -Start azithromycin for 5 days  -Continue budesonide formoterol  -Continue albuterol as needed

## 2022-11-17 NOTE — PROGRESS NOTES
[facility]) Internal Medicine    Mr. Keri Gallegos is 59year old male who presents routine follow up. COPD: Patient follows with Dr. Jennifer Sellers. He does have home oxygen that he uses. He uses albuterol as needed he takes budesonide formoterol every day. He reports that he is having some shortness of breath. He is coughing some. Does not think he is coughing more than usual.    Hypertension: Patient is compliant with his home blood pressure medications. Congestion: Patient reports nasal congestion. He has been using Flonase at home. He is also used Afrin in the past without much relief. He reports that his breathing is worse because he cannot breathe out of his nose. Review of Systems   Constitutional:  Negative for fatigue and fever. HENT:  Negative for congestion, nosebleeds and sore throat. Respiratory:  Negative for cough, chest tightness and shortness of breath. Cardiovascular:  Negative for chest pain, palpitations and leg swelling. Gastrointestinal:  Negative for abdominal distention, abdominal pain and blood in stool. Endocrine: Negative for cold intolerance and heat intolerance. Genitourinary:  Negative for difficulty urinating. Musculoskeletal:  Negative for back pain. Neurological:  Negative for weakness, light-headedness and numbness. Psychiatric/Behavioral:  Negative for confusion and sleep disturbance. Past Medical History:   Diagnosis Date    Acute on chronic diastolic congestive heart failure (HCC)     ADHD (attention deficit hyperactivity disorder)     Adrenal nodule ([address] 12/7/2017    low-density nodular enlargement of a left adrenal gland which is stable since 2007      COPD (chronic obstructive pulmonary disease) ([address]     Emphysema of lung ([address]     Essential hypertension 11/30/2017    History of pneumothorax 11/30/2017    Rib fx    Hx of colonic polyps 1/18/2018    1.2018 Descending Polyp 2. Sigmoid Polyp 3.  Sigmoid Diverticulosis recheck 2023    Hx of renal calculi 11/30/2017    Hypertension     Kidney stone     Neuropathy     Obesity     Osteoarthritis     Pneumothorax     2016 d/t fall     Prediabetes 12/13/2017    Retinal hemorrhage 10/29/2020    HTN    Sleep apnea     Substance abuse ([facility]     Type 2 diabetes mellitus without complication [facility])        Past Surgical History:   Procedure Laterality Date    CARPAL TUNNEL RELEASE      COLONOSCOPY  01/18/2018    Adelaida, x2 polyps    KNEE SURGERY      arthroscopic       Social History     Socioeconomic History    Marital status:       Spouse name: Not on file    Number of children: Not on file    Years of education: Not on file    Highest education level: Not on file   Occupational History    Not on file   Tobacco Use    Smoking status: Every Day     Packs/day: 1.00     Years: 43.00     Pack years: 43.00     Types: Cigarettes     Start date: 1/1/1974    Smokeless tobacco: Never   Vaping Use    Vaping Use: Never used   Substance and Sexual Activity    Alcohol use: No    Drug use: No    Sexual activity: Yes     Partners: Female     Comment: wife   Other Topics Concern    Not on file   Social History Narrative    Not on file     Social Determinants of Health     Financial Resource Strain: Low Risk     Difficulty of Paying Living Expenses: Not hard at all   Food Insecurity: No Food Insecurity    Worried About Running Out of Food in the Last Year: Never true    Ran Out of Food in the Last Year: Never true   Transportation Needs: Not on file   Physical Activity: Not on file   Stress: Not on file   Social Connections: Not on file   Intimate Partner Violence: Not on file   Housing Stability: Not on file       Family History   Problem Relation Age of Onset    Diabetes Mother     Cancer Mother     Cancer Father     No Known Problems Sister     Diabetes Brother     No Known Problems Brother     No Known Problems Brother     No Known Problems Brother     No Known Problems Brother     No Known Problems Brother          Current Outpatient Medications:     metFORMIN (GLUCOPHAGE-XR) 500 MG extended release tablet, Take 1 tablet by mouth daily (with breakfast), Disp: 90 tablet, Rfl: 3    loratadine (CLARITIN) 10 MG tablet, Take 1 tablet by mouth daily, Disp: 30 tablet, Rfl: 3    azithromycin (ZITHROMAX) 250 MG tablet, Take 1 tablet by mouth See Admin Instructions for 5 days 500mg on day 1 followed by 250mg on days 2 - 5, Disp: 6 tablet, Rfl: 0    predniSONE (DELTASONE) 20 MG tablet, Take 2 tablets by mouth daily for 5 days, THEN 1 tablet daily for 3 days, THEN 0.5 tablets daily for 2 days. , Disp: 20 tablet, Rfl: 0    carvedilol (COREG) 25 MG tablet, TAKE 1 TABLET BY MOUTH TWICE A DAY FOR BLOOD PRESSURE, Disp: 180 tablet, Rfl: 1    budesonide-formoterol (SYMBICORT) 160-4.5 MCG/ACT AERO, TAKE 2 PUFFS BY MOUTH TWICE A DAY, Disp: 30.6 each, Rfl: 3    TRULICITY 1.5 JY/0.3YL SOPN, INJECT 1.5 MG INTO THE SKIN ONCE A WEEK, Disp: 6 Adjustable Dose Pre-filled Pen Syringe, Rfl: 0    albuterol sulfate HFA (PROVENTIL;VENTOLIN;PROAIR) 108 (90 Base) MCG/ACT inhaler, INHALE 2 PUFFS INTO THE LUNGS 4 TIMES DAILY AS NEEDED FOR WHEEZING, Disp: 8.5 each, Rfl: 5    glipiZIDE (GLUCOTROL XL) 5 MG extended release tablet, TAKE 2 TABLETS BY MOUTH EVERY DAY FOR DIABETES, Disp: 180 tablet, Rfl: 1    JARDIANCE 25 MG tablet, TAKE 1 TABLET BY MOUTH DAILY TO REPLACE 10 MG, Disp: 90 tablet, Rfl: 1    furosemide (LASIX) 20 MG tablet, TAKE 1 TO 2 TABLETS BY MOUTH TWICE A DAY, Disp: 360 tablet, Rfl: 1    simvastatin (ZOCOR) 20 MG tablet, TAKE 1 TABLET BY MOUTH EVERY DAY IN THE EVENING, Disp: 90 tablet, Rfl: 3    fluticasone (FLONASE) 50 MCG/ACT nasal spray, SPRAY 1 SPRAY INTO EACH NOSTRIL EVERY DAY, Disp: 1 each, Rfl: 11    lisinopril (PRINIVIL;ZESTRIL) 40 MG tablet, TAKE 1 TABLET BY MOUTH EVERY DAY, Disp: 90 tablet, Rfl: 1    spironolactone (ALDACTONE) 25 MG tablet, TAKE 1 TABLET BY MOUTH EVERY DAY, Disp: 90 tablet, Rfl: 1    amLODIPine (NORVASC) 10 MG tablet, TAKE 1 TABLET BY MOUTH EVERY DAY, Disp: 90 tablet, Rfl: 1    atorvastatin (LIPITOR) 40 MG tablet, Take 1 tablet by mouth daily To replace simvastatin, Disp: 90 tablet, Rfl: 3    Insulin Pen Needle 31G X 5 MM MISC, 1 each by Does not apply route daily, Disp: 12 each, Rfl: 1    albuterol (ACCUNEB) 1.25 MG/3ML nebulizer solution, Inhale 3 mLs into the lungs every 6 hours as needed for Wheezing, Disp: 360 mL, Rfl: 3     Examination  Vitals:    11/17/22 0955 11/17/22 1021   BP:  130/72   Pulse: 63    Resp: 18    SpO2: (!) 72% (!) 88%   Weight: 265 lb 9.6 oz (120.5 kg)    Height: 5' 4\" (1.626 m)       Physical Exam  Constitutional:       General: He is not in acute distress. HENT:      Mouth/Throat:      Mouth: Mucous membranes are moist.   Eyes:      Pupils: Pupils are equal, round, and reactive to light. Cardiovascular:      Rate and Rhythm: Normal rate and regular rhythm. Pulses: Normal pulses. Pulmonary:      Effort: No respiratory distress. Breath sounds: No wheezing, rhonchi or rales. Comments: Decreased air movement bilaterally. Abdominal:      General: Abdomen is flat. There is no distension. Palpations: Abdomen is soft. Tenderness: There is no abdominal tenderness. Skin:     General: Skin is warm and dry. Coloration: Skin is not jaundiced or pale. Findings: No erythema. Neurological:      General: No focal deficit present. Mental Status: He is alert and oriented to person, place, and time. Assessment and Plan  Essential hypertension   Blood pressure controlled  - Continue carvedilol 25 mg p.o. twice daily, lisinopril 40 mg p.o. daily, furosemide 20 mg p.o. daily, spironolactone 25 mg p.o. daily, and amlodipine 10 mg p.o. daily    Type 2 diabetes mellitus ([address]  Patient is compliant with his metformin.   He is ran out of his metformin  Hemoglobin A1C   Date Value Ref Range Status   08/29/2022 6.6 % Final       -Refill metformin 500 mg p.o. daily  -Recheck A1c at next visit    COPD, severe ([facility]  Patient with low oxygen today. He does have oxygen at home that he will use. His oxygen saturations did come up with 2 L of oxygen. We did discuss potentially going to the hospital for evaluation. Patient feels well and would like to go home  -start prednisone   -Start azithromycin for 5 days  -Continue budesonide formoterol  -Continue albuterol as needed       Discussed use, benefit, and side effects of prescribed medications. Barriers to medication compliance addressed. Discussed all ordered testing and labs. All patient questions answered. Patient agreeable with plan above. Please note that this chart was generated using dragon dictation software. Although every effort was made to ensure the accuracy of this automated transcription, some errors in transcription may have occurred.

## 2022-11-17 NOTE — ASSESSMENT & PLAN NOTE
Patient is compliant with his metformin.   He is ran out of his metformin  Hemoglobin A1C   Date Value Ref Range Status   08/29/2022 6.6 % Final       -Refill metformin 500 mg p.o. daily  -Recheck A1c at next visit

## 2022-11-17 NOTE — ASSESSMENT & PLAN NOTE
Blood pressure controlled  - Continue carvedilol 25 mg p.o. twice daily, lisinopril 40 mg p.o. daily, furosemide 20 mg p.o. daily, spironolactone 25 mg p.o. daily, and amlodipine 10 mg p.o. daily

## 2022-11-17 NOTE — LETTER
[address] [facility]  [address] [place] [address]  Phone: [phone]  Fax: [phone]    Keon Molina MD        November 17, 2022     Patient: Irlanda Sousa   YOB: 1965   Date of Visit: 11/17/2022       To Whom It May Concern:    Patient was seen on 11/17/2022. He is released to return to work on 11/18/2022. If you have any questions or concerns, please don't hesitate to call.     Sincerely,        Keon Molina MD

## 2022-12-09 RX ORDER — LORATADINE 10 MG/1
TABLET ORAL
Qty: 30 TABLET | Refills: 3 | Status: SHIPPED | OUTPATIENT
Start: 2022-12-09

## 2022-12-09 NOTE — TELEPHONE ENCOUNTER
Last office visit : 11/17/2022    Future Appointments   Date Time Provider Christina Artis   2/23/2023  8:30 AM Harmony Helms MD [facility]

## 2022-12-11 ENCOUNTER — TELEPHONE (OUTPATIENT)
Dept: CASE MANAGEMENT | Age: 57
End: 2022-12-11

## 2022-12-11 NOTE — TELEPHONE ENCOUNTER
Certified Lung Cancer Screening Reminder Recommendation letter mailed to patient, this is patients 3rd & final reminder letter. Patients ordering provider, Dr Bel Askwe, notified via EMR direct message, verification received. Most recent smoking history reviewed. [facility] smoking cessation [employer] class information also mailed to patient.

## 2023-01-05 ENCOUNTER — TELEPHONE (OUTPATIENT)
Dept: INTERNAL MEDICINE CLINIC | Age: 58
End: 2023-01-05

## 2023-01-05 DIAGNOSIS — I10 ESSENTIAL HYPERTENSION: ICD-10-CM

## 2023-01-05 RX ORDER — CARVEDILOL 25 MG/1
TABLET ORAL
Qty: 180 TABLET | Refills: 3 | Status: SHIPPED | OUTPATIENT
Start: 2023-01-05

## 2023-01-05 RX ORDER — AMLODIPINE BESYLATE 10 MG/1
TABLET ORAL
Qty: 90 TABLET | Refills: 3 | Status: SHIPPED | OUTPATIENT
Start: 2023-01-05

## 2023-01-05 RX ORDER — LISINOPRIL 40 MG/1
TABLET ORAL
Qty: 90 TABLET | Refills: 3 | Status: SHIPPED | OUTPATIENT
Start: 2023-01-05

## 2023-01-05 RX ORDER — SPIRONOLACTONE 25 MG/1
TABLET ORAL
Qty: 90 TABLET | Refills: 3 | Status: SHIPPED | OUTPATIENT
Start: 2023-01-05

## 2023-01-05 RX ORDER — ATORVASTATIN CALCIUM 40 MG/1
40 TABLET, FILM COATED ORAL DAILY
Qty: 90 TABLET | Refills: 3 | Status: SHIPPED | OUTPATIENT
Start: 2023-01-05

## 2023-01-05 RX ORDER — METFORMIN HYDROCHLORIDE 500 MG/1
500 TABLET, EXTENDED RELEASE ORAL
Qty: 90 TABLET | Refills: 3 | Status: SHIPPED | OUTPATIENT
Start: 2023-01-05

## 2023-01-05 NOTE — TELEPHONE ENCOUNTER
Pt called for scripts. Spironalactone 25 mg, #90, 1 po qd,  Lisinopril 40 mg, #90, 1 po qd,  Amlodipine 10 mg, #90, 1 po qd,  Atorvastatin 40 mg, 390, 1 po qd,  Carvedilol 25 mg, #180, 1 po bid,   Metformin 500 mg XR, #90, 1 po qd--he has refills for this one. [facility] [employer].   Eugenia Vidal

## 2023-03-06 ENCOUNTER — OFFICE VISIT (OUTPATIENT)
Dept: INTERNAL MEDICINE CLINIC | Age: 58
End: 2023-03-06
Payer: COMMERCIAL

## 2023-03-06 VITALS
SYSTOLIC BLOOD PRESSURE: 110 MMHG | TEMPERATURE: 98.2 F | OXYGEN SATURATION: 89 % | BODY MASS INDEX: 45.07 KG/M2 | RESPIRATION RATE: 14 BRPM | WEIGHT: 264 LBS | HEART RATE: 60 BPM | HEIGHT: 64 IN | DIASTOLIC BLOOD PRESSURE: 60 MMHG

## 2023-03-06 DIAGNOSIS — R80.9 TYPE 2 DIABETES MELLITUS WITH MICROALBUMINURIA, WITHOUT LONG-TERM CURRENT USE OF INSULIN (HCC): Primary | ICD-10-CM

## 2023-03-06 DIAGNOSIS — J44.9 COPD, SEVERE (HCC): ICD-10-CM

## 2023-03-06 DIAGNOSIS — E11.29 TYPE 2 DIABETES MELLITUS WITH MICROALBUMINURIA, WITHOUT LONG-TERM CURRENT USE OF INSULIN (HCC): Primary | ICD-10-CM

## 2023-03-06 DIAGNOSIS — D58.2 ELEVATED HEMOGLOBIN (HCC): ICD-10-CM

## 2023-03-06 DIAGNOSIS — I10 ESSENTIAL HYPERTENSION: ICD-10-CM

## 2023-03-06 LAB
A/G RATIO: 1 (ref 1.1–2.2)
ALBUMIN SERPL-MCNC: 3.6 G/DL (ref 3.4–5)
ALP BLD-CCNC: 124 U/L (ref 40–129)
ALT SERPL-CCNC: 9 U/L (ref 10–40)
ANION GAP SERPL CALCULATED.3IONS-SCNC: 10 MMOL/L (ref 3–16)
AST SERPL-CCNC: 11 U/L (ref 15–37)
BASOPHILS ABSOLUTE: 0.1 K/UL (ref 0–0.2)
BASOPHILS RELATIVE PERCENT: 0.7 %
BILIRUB SERPL-MCNC: 1 MG/DL (ref 0–1)
BUN BLDV-MCNC: 21 MG/DL (ref 7–20)
CALCIUM SERPL-MCNC: 9.6 MG/DL (ref 8.3–10.6)
CHLORIDE BLD-SCNC: 95 MMOL/L (ref 99–110)
CO2: 29 MMOL/L (ref 21–32)
CREAT SERPL-MCNC: 1.1 MG/DL (ref 0.8–1.3)
EOSINOPHILS ABSOLUTE: 0.1 K/UL (ref 0–0.6)
EOSINOPHILS RELATIVE PERCENT: 0.8 %
GFR SERPL CREATININE-BSD FRML MDRD: >60 ML/MIN/{1.73_M2}
GLUCOSE BLD-MCNC: 108 MG/DL (ref 70–99)
HCT VFR BLD CALC: 58.2 % (ref 40.5–52.5)
HEMOGLOBIN: 18.5 G/DL (ref 13.5–17.5)
LYMPHOCYTES ABSOLUTE: 1.7 K/UL (ref 1–5.1)
LYMPHOCYTES RELATIVE PERCENT: 19.4 %
MCH RBC QN AUTO: 28.9 PG (ref 26–34)
MCHC RBC AUTO-ENTMCNC: 31.8 G/DL (ref 31–36)
MCV RBC AUTO: 90.9 FL (ref 80–100)
MONOCYTES ABSOLUTE: 0.7 K/UL (ref 0–1.3)
MONOCYTES RELATIVE PERCENT: 7.9 %
NEUTROPHILS ABSOLUTE: 6.2 K/UL (ref 1.7–7.7)
NEUTROPHILS RELATIVE PERCENT: 71.2 %
PDW BLD-RTO: 22.2 % (ref 12.4–15.4)
PLATELET # BLD: 152 K/UL (ref 135–450)
PMV BLD AUTO: 9.3 FL (ref 5–10.5)
POTASSIUM SERPL-SCNC: 4.6 MMOL/L (ref 3.5–5.1)
RBC # BLD: 6.4 M/UL (ref 4.2–5.9)
SODIUM BLD-SCNC: 134 MMOL/L (ref 136–145)
TOTAL PROTEIN: 7.1 G/DL (ref 6.4–8.2)
WBC # BLD: 8.7 K/UL (ref 4–11)

## 2023-03-06 PROCEDURE — 3074F SYST BP LT 130 MM HG: CPT | Performed by: STUDENT IN AN ORGANIZED HEALTH CARE EDUCATION/TRAINING PROGRAM

## 2023-03-06 PROCEDURE — G8482 FLU IMMUNIZE ORDER/ADMIN: HCPCS | Performed by: STUDENT IN AN ORGANIZED HEALTH CARE EDUCATION/TRAINING PROGRAM

## 2023-03-06 PROCEDURE — 3078F DIAST BP <80 MM HG: CPT | Performed by: STUDENT IN AN ORGANIZED HEALTH CARE EDUCATION/TRAINING PROGRAM

## 2023-03-06 PROCEDURE — 3023F SPIROM DOC REV: CPT | Performed by: STUDENT IN AN ORGANIZED HEALTH CARE EDUCATION/TRAINING PROGRAM

## 2023-03-06 PROCEDURE — G8417 CALC BMI ABV UP PARAM F/U: HCPCS | Performed by: STUDENT IN AN ORGANIZED HEALTH CARE EDUCATION/TRAINING PROGRAM

## 2023-03-06 PROCEDURE — G8427 DOCREV CUR MEDS BY ELIG CLIN: HCPCS | Performed by: STUDENT IN AN ORGANIZED HEALTH CARE EDUCATION/TRAINING PROGRAM

## 2023-03-06 PROCEDURE — 4004F PT TOBACCO SCREEN RCVD TLK: CPT | Performed by: STUDENT IN AN ORGANIZED HEALTH CARE EDUCATION/TRAINING PROGRAM

## 2023-03-06 PROCEDURE — 3017F COLORECTAL CA SCREEN DOC REV: CPT | Performed by: STUDENT IN AN ORGANIZED HEALTH CARE EDUCATION/TRAINING PROGRAM

## 2023-03-06 PROCEDURE — 3046F HEMOGLOBIN A1C LEVEL >9.0%: CPT | Performed by: STUDENT IN AN ORGANIZED HEALTH CARE EDUCATION/TRAINING PROGRAM

## 2023-03-06 PROCEDURE — 2022F DILAT RTA XM EVC RTNOPTHY: CPT | Performed by: STUDENT IN AN ORGANIZED HEALTH CARE EDUCATION/TRAINING PROGRAM

## 2023-03-06 PROCEDURE — 99214 OFFICE O/P EST MOD 30 MIN: CPT | Performed by: STUDENT IN AN ORGANIZED HEALTH CARE EDUCATION/TRAINING PROGRAM

## 2023-03-06 PROCEDURE — 36415 COLL VENOUS BLD VENIPUNCTURE: CPT | Performed by: STUDENT IN AN ORGANIZED HEALTH CARE EDUCATION/TRAINING PROGRAM

## 2023-03-06 RX ORDER — CARVEDILOL 25 MG/1
TABLET ORAL
Qty: 180 TABLET | Refills: 3 | Status: SHIPPED | OUTPATIENT
Start: 2023-03-06

## 2023-03-06 RX ORDER — GLIPIZIDE 5 MG/1
TABLET, FILM COATED, EXTENDED RELEASE ORAL
Qty: 180 TABLET | Refills: 3 | Status: SHIPPED | OUTPATIENT
Start: 2023-03-06

## 2023-03-06 RX ORDER — DULAGLUTIDE 1.5 MG/.5ML
1.5 INJECTION, SOLUTION SUBCUTANEOUS WEEKLY
Qty: 12 ADJUSTABLE DOSE PRE-FILLED PEN SYRINGE | Refills: 3 | Status: SHIPPED | OUTPATIENT
Start: 2023-03-06

## 2023-03-06 RX ORDER — ALBUTEROL SULFATE 90 UG/1
2 AEROSOL, METERED RESPIRATORY (INHALATION) EVERY 6 HOURS PRN
Qty: 18 G | Refills: 3 | Status: SHIPPED | OUTPATIENT
Start: 2023-03-06

## 2023-03-06 RX ORDER — ALBUTEROL SULFATE 90 UG/1
2 AEROSOL, METERED RESPIRATORY (INHALATION) 4 TIMES DAILY PRN
Qty: 18 G | Refills: 5 | Status: SHIPPED | OUTPATIENT
Start: 2023-03-06

## 2023-03-06 RX ORDER — ATORVASTATIN CALCIUM 40 MG/1
40 TABLET, FILM COATED ORAL DAILY
Qty: 90 TABLET | Refills: 3 | Status: SHIPPED | OUTPATIENT
Start: 2023-03-06

## 2023-03-06 RX ORDER — FUROSEMIDE 20 MG/1
TABLET ORAL
Qty: 360 TABLET | Refills: 3 | Status: SHIPPED | OUTPATIENT
Start: 2023-03-06

## 2023-03-06 SDOH — ECONOMIC STABILITY: FOOD INSECURITY: WITHIN THE PAST 12 MONTHS, YOU WORRIED THAT YOUR FOOD WOULD RUN OUT BEFORE YOU GOT MONEY TO BUY MORE.: NEVER TRUE

## 2023-03-06 SDOH — ECONOMIC STABILITY: HOUSING INSECURITY
IN THE LAST 12 MONTHS, WAS THERE A TIME WHEN YOU DID NOT HAVE A STEADY PLACE TO SLEEP OR SLEPT IN A SHELTER (INCLUDING NOW)?: NO

## 2023-03-06 SDOH — ECONOMIC STABILITY: FOOD INSECURITY: WITHIN THE PAST 12 MONTHS, THE FOOD YOU BOUGHT JUST DIDN'T LAST AND YOU DIDN'T HAVE MONEY TO GET MORE.: NEVER TRUE

## 2023-03-06 SDOH — ECONOMIC STABILITY: INCOME INSECURITY: HOW HARD IS IT FOR YOU TO PAY FOR THE VERY BASICS LIKE FOOD, HOUSING, MEDICAL CARE, AND HEATING?: NOT HARD AT ALL

## 2023-03-06 ASSESSMENT — ENCOUNTER SYMPTOMS
BACK PAIN: 0
SHORTNESS OF BREATH: 0
ABDOMINAL PAIN: 0
BLOOD IN STOOL: 0
COUGH: 0
ABDOMINAL DISTENTION: 0
CHEST TIGHTNESS: 0
SORE THROAT: 0

## 2023-03-06 ASSESSMENT — PATIENT HEALTH QUESTIONNAIRE - PHQ9
SUM OF ALL RESPONSES TO PHQ QUESTIONS 1-9: 0
SUM OF ALL RESPONSES TO PHQ QUESTIONS 1-9: 0
SUM OF ALL RESPONSES TO PHQ9 QUESTIONS 1 & 2: 0
SUM OF ALL RESPONSES TO PHQ QUESTIONS 1-9: 0
2. FEELING DOWN, DEPRESSED OR HOPELESS: 0
1. LITTLE INTEREST OR PLEASURE IN DOING THINGS: 0
SUM OF ALL RESPONSES TO PHQ QUESTIONS 1-9: 0

## 2023-03-06 NOTE — ASSESSMENT & PLAN NOTE
Patient continues to smoke. Initial pulse ox read 82%. Repeat pulse ox read 89%. Patient is compliant with inhaler therapy.  -Continue budesonide-formoterol until you run out. Samples given for Trelegy to try. Insurance may cover this a bit better than budesonide formoterol as patient reports that his budesonide formoterol is expensive  -Continue as needed albuterol through inhaler and nebulizer.

## 2023-03-06 NOTE — ASSESSMENT & PLAN NOTE
Last CBC showed elevated hemoglobin.   Potentially due to continued tobacco abuse.  - Repeat CBC today

## 2023-03-06 NOTE — PROGRESS NOTES
[facility]) Internal Medicine    Irina Hawk is a 59 y.o. male with the past medical history as listed below presenting to the clinic for follow up on chronic condition and discussion of preventative health care    COPD: Patient feels well today. Patient has chronic congestion and cough. He reports its likely because he is smoking. He reports his cough is worse in the morning. Patient is using Symbicort inhaler which helps. He feels like his albuterol inhaler does not help much. He feels like the nebulizer helps more. He was wondering if there is a stronger dose of the albuterol inhaler. T2DM,: Patient feels like his A1c may be increased a slight bit. Patient was unable to get his Trulicity as insurance stopped paying for it. Reports they told him he needs another prior authorization. Reports difficulty with meal schedule given his work schedule. Patient works 2 PM to about 11 or 12 in the evening. Hypertension: Patient is compliant with medications for hypertension    Elevated hemoglobin on last CBC      Review of Systems   Constitutional:  Negative for fatigue and fever. HENT:  Negative for congestion, nosebleeds and sore throat. Respiratory:  Negative for cough, chest tightness and shortness of breath. Cardiovascular:  Negative for chest pain, palpitations and leg swelling. Gastrointestinal:  Negative for abdominal distention, abdominal pain and blood in stool. Endocrine: Negative for cold intolerance and heat intolerance. Genitourinary:  Negative for difficulty urinating. Musculoskeletal:  Negative for back pain. Neurological:  Negative for weakness, light-headedness and numbness. Psychiatric/Behavioral:  Negative for confusion and sleep disturbance.       Past Medical History:   Diagnosis Date    Acute on chronic diastolic congestive heart failure ([facility]     ADHD (attention deficit hyperactivity disorder)     Adrenal nodule ([facility] 12/7/2017    low-density nodular enlargement of a left adrenal gland which is stable since 2007      COPD (chronic obstructive pulmonary disease) [address]     Emphysema of lung ([facility]     Essential hypertension 11/30/2017    History of pneumothorax 11/30/2017    Rib fx    Hx of colonic polyps 1/18/2018    1.2018 Descending Polyp 2. Sigmoid Polyp 3. Sigmoid Diverticulosis recheck 2023    Hx of renal calculi 11/30/2017    Hypertension     Kidney stone     Neuropathy     Obesity     Osteoarthritis     Pneumothorax     2016 d/t fall     Prediabetes 12/13/2017    Retinal hemorrhage 10/29/2020    HTN    Sleep apnea     Substance abuse ([facility]     Type 2 diabetes mellitus without complication [facility])        Past Surgical History:   Procedure Laterality Date    CARPAL TUNNEL RELEASE      COLONOSCOPY  01/18/2018    [place], x2 polyps    KNEE SURGERY      arthroscopic       Social History     Socioeconomic History    Marital status:      Spouse name: Not on file    Number of children: Not on file    Years of education: Not on file    Highest education level: Not on file   Occupational History    Not on file   Tobacco Use    Smoking status: Every Day     Packs/day: 1.00     Years: 43.00     Pack years: 43.00     Types: Cigarettes     Start date: 1/1/1974    Smokeless tobacco: Never   Vaping Use    Vaping Use: Never used   Substance and Sexual Activity    Alcohol use: No    Drug use: No    Sexual activity: Yes     Partners: Female     Comment: wife   Other Topics Concern    Not on file   Social History Narrative    Not on file     Social Determinants of Health     Financial Resource Strain: Low Risk     Difficulty of Paying Living Expenses: Not hard at all   Food Insecurity: No Food Insecurity    Worried About Running Out of Food in the Last Year: Never true    [address] in the Last Year: Never true   Transportation Needs: Unknown    Lack of Transportation (Medical): Not on file    Lack of Transportation (Non-Medical):  No   Physical Activity: Not on file   Stress: Not on file   Social Connections: Not on file   Intimate Partner Violence: Not on file   Housing Stability: Unknown    Unable to Pay for Housing in the Last Year: Not on file    Number of Places Lived in the Last Year: Not on file    Unstable Housing in the Last Year: No       Family History   Problem Relation Age of Onset    Diabetes Mother     Cancer Mother     Cancer Father     No Known Problems Sister     Diabetes Brother     No Known Problems Brother     No Known Problems Brother     No Known Problems Brother     No Known Problems Brother     No Known Problems Brother        [unfilled]        Current Outpatient Medications:     empagliflozin (JARDIANCE) 25 MG tablet, Take 1 tablet by mouth daily To replace 10 mg, Disp: 90 tablet, Rfl: 3    atorvastatin (LIPITOR) 40 MG tablet, Take 1 tablet by mouth daily To replace simvastatin, Disp: 90 tablet, Rfl: 3    furosemide (LASIX) 20 MG tablet, TAKE 1 TO 2 TABLETS BY MOUTH TWICE A DAY, Disp: 360 tablet, Rfl: 3    glipiZIDE (GLUCOTROL XL) 5 MG extended release tablet, TAKE 2 TABLETS BY MOUTH EVERY DAY FOR DIABETES, Disp: 180 tablet, Rfl: 3    carvedilol (COREG) 25 MG tablet, TAKE 1 TABLET BY MOUTH TWICE A DAY FOR BLOOD PRESSURE, Disp: 180 tablet, Rfl: 3    dulaglutide (TRULICITY) 1.5 YK/8.0NH SC injection, Inject 0.5 mLs into the skin once a week, Disp: 12 Adjustable Dose Pre-filled Pen Syringe, Rfl: 3    albuterol sulfate HFA (VENTOLIN HFA) 108 (90 Base) MCG/ACT inhaler, Inhale 2 puffs into the lungs 4 times daily as needed for Wheezing, Disp: 18 g, Rfl: 5    albuterol sulfate HFA (PROVENTIL;VENTOLIN;PROAIR) 108 (90 Base) MCG/ACT inhaler, Inhale 2 puffs into the lungs every 6 hours as needed for Wheezing, Disp: 18 g, Rfl: 3    amLODIPine (NORVASC) 10 MG tablet, TAKE 1 TABLET BY MOUTH EVERY DAY, Disp: 90 tablet, Rfl: 3    lisinopril (PRINIVIL;ZESTRIL) 40 MG tablet, TAKE 1 TABLET BY MOUTH EVERY DAY, Disp: 90 tablet, Rfl: 3    spironolactone (ALDACTONE) 25 MG tablet, TAKE 1 TABLET BY MOUTH EVERY DAY, Disp: 90 tablet, Rfl: 3    metFORMIN (GLUCOPHAGE-XR) 500 MG extended release tablet, Take 1 tablet by mouth daily (with breakfast), Disp: 90 tablet, Rfl: 3    budesonide-formoterol (SYMBICORT) 160-4.5 MCG/ACT AERO, TAKE 2 PUFFS BY MOUTH TWICE A DAY, Disp: 30.6 each, Rfl: 3    fluticasone (FLONASE) 50 MCG/ACT nasal spray, SPRAY 1 SPRAY INTO EACH NOSTRIL EVERY DAY, Disp: 1 each, Rfl: 11    Insulin Pen Needle 31G X 5 MM MISC, 1 each by Does not apply route daily, Disp: 12 each, Rfl: 1    albuterol (ACCUNEB) 1.25 MG/3ML nebulizer solution, Inhale 3 mLs into the lungs every 6 hours as needed for Wheezing, Disp: 360 mL, Rfl: 3     Examination  Vitals:    03/06/23 1057 03/06/23 1333   BP: 110/60    Site: Right Upper Arm    Position: Sitting    Cuff Size: Large Adult    Pulse: 60    Resp: 14    Temp: 98.2 °F (36.8 °C)    TempSrc: Temporal    SpO2: (!) 82% (!) 89%   Weight: 264 lb (119.7 kg)    Height: 5' 4\" (1.626 m)       Physical Exam  Constitutional:       General: He is not in acute distress. HENT:      Mouth/Throat:      Mouth: Mucous membranes are moist.   Eyes:      Pupils: Pupils are equal, round, and reactive to light. Cardiovascular:      Rate and Rhythm: Normal rate and regular rhythm. Pulses: Normal pulses. Pulmonary:      Comments: Decreased air movement bilaterally  Abdominal:      General: Abdomen is flat. There is no distension. Palpations: Abdomen is soft. Tenderness: There is no abdominal tenderness. Skin:     General: Skin is warm and dry. Coloration: Skin is not jaundiced or pale. Findings: No erythema. Neurological:      General: No focal deficit present. Mental Status: He is alert and oriented to person, place, and time.         Assessment and Plan  Essential hypertension    Blood pressure controlled  - Continue carvedilol 25 mg p.o. twice daily, lisinopril 40 mg p.o. daily, furosemide 20 mg p.o. daily, spironolactone 25 mg p.o. daily, and amlodipine 10 mg p.o. daily    Type 2 diabetes mellitus (HCC)   - Repeat A1c today  -Continue empagliflozin 25 mg p.o. daily  -Continue metformin 500 mg p.o. daily  -Send dulaglutide. Start prior authorization. Sample provided in office. COPD, severe ([address]   Patient continues to smoke. Initial pulse ox read 82%. Repeat pulse ox read 89%. Patient is compliant with inhaler therapy.  -Continue budesonide-formoterol until you run out. Samples given for Trelegy to try. Insurance may cover this a bit better than budesonide formoterol as patient reports that his budesonide formoterol is expensive  -Continue as needed albuterol through inhaler and nebulizer. Elevated hemoglobin (HCC)   Last CBC showed elevated hemoglobin. Potentially due to continued tobacco abuse.  - Repeat CBC today     There are no diagnoses linked to this encounter. Discussed use, benefit, and side effects of prescribed medications. Barriers to medication compliance addressed. Discussed all ordered testing and labs. All patient questions answered. Patient agreeable with plan above. Please note that this chart was generated using dragon dictation software. Although every effort was made to ensure the accuracy of this automated transcription, some errors in transcription may have occurred.

## 2023-03-06 NOTE — ASSESSMENT & PLAN NOTE
- Repeat A1c today  -Continue empagliflozin 25 mg p.o. daily  -Continue metformin 500 mg p.o. daily  -Send dulaglutide. Start prior authorization. Sample provided in office. Medicare Preventive Visit Patient Instructions  Thank you for completing your Welcome to Medicare Visit or Medicare Annual Wellness Visit today  Your next wellness visit will be due in one year (5/14/2023)  The screening/preventive services that you may require over the next 5-10 years are detailed below  Some tests may not apply to you based off risk factors and/or age  Screening tests ordered at today's visit but not completed yet may show as past due  Also, please note that scanned in results may not display below  Preventive Screenings:  Service Recommendations Previous Testing/Comments   Colorectal Cancer Screening  * Colonoscopy    * Fecal Occult Blood Test (FOBT)/Fecal Immunochemical Test (FIT)  * Fecal DNA/Cologuard Test  * Flexible Sigmoidoscopy Age: 54-65 years old   Colonoscopy: every 10 years (may be performed more frequently if at higher risk)  OR  FOBT/FIT: every 1 year  OR  Cologuard: every 3 years  OR  Sigmoidoscopy: every 5 years  Screening may be recommended earlier than age 48 if at higher risk for colorectal cancer  Also, an individualized decision between you and your healthcare provider will decide whether screening between the ages of 74-80 would be appropriate  Colonoscopy: 12/16/2020  FOBT/FIT: Not on file  Cologuard: 08/20/2020  Sigmoidoscopy: Not on file    Screening Current     Breast Cancer Screening Age: 36 years old  Frequency: every 1-2 years  Not required if history of left and right mastectomy Mammogram: 06/28/2018        Cervical Cancer Screening Between the ages of 21-29, pap smear recommended once every 3 years  Between the ages of 33-67, can perform pap smear with HPV co-testing every 5 years     Recommendations may differ for women with a history of total hysterectomy, cervical cancer, or abnormal pap smears in past  Pap Smear: 06/13/2018    Screening Not Indicated   Hepatitis C Screening Once for adults born between 1945 and 1965  More frequently in patients at high risk for Hepatitis C Hep C Antibody: 06/22/2018    Screening Current   Diabetes Screening 1-2 times per year if you're at risk for diabetes or have pre-diabetes Fasting glucose: 94 mg/dL   A1C: 5 4 %    Screening Current   Cholesterol Screening Once every 5 years if you don't have a lipid disorder  May order more often based on risk factors  Lipid panel: 03/23/2022    Screening Current     Other Preventive Screenings Covered by Medicare:  1  Abdominal Aortic Aneurysm (AAA) Screening: covered once if your at risk  You're considered to be at risk if you have a family history of AAA  2  Lung Cancer Screening: covers low dose CT scan once per year if you meet all of the following conditions: (1) Age 50-69; (2) No signs or symptoms of lung cancer; (3) Current smoker or have quit smoking within the last 15 years; (4) You have a tobacco smoking history of at least 30 pack years (packs per day multiplied by number of years you smoked); (5) You get a written order from a healthcare provider  3  Glaucoma Screening: covered annually if you're considered high risk: (1) You have diabetes OR (2) Family history of glaucoma OR (3)  aged 48 and older OR (3)  American aged 72 and older  3  Osteoporosis Screening: covered every 2 years if you meet one of the following conditions: (1) You're estrogen deficient and at risk for osteoporosis based off medical history and other findings; (2) Have a vertebral abnormality; (3) On glucocorticoid therapy for more than 3 months; (4) Have primary hyperparathyroidism; (5) On osteoporosis medications and need to assess response to drug therapy  · Last bone density test (DXA Scan): 05/12/2020   5  HIV Screening: covered annually if you're between the age of 15-65  Also covered annually if you are younger than 13 and older than 72 with risk factors for HIV infection  For pregnant patients, it is covered up to 3 times per pregnancy      Immunizations:  Immunization Recommendations   Influenza Vaccine Annual influenza vaccination during flu season is recommended for all persons aged >= 6 months who do not have contraindications   Pneumococcal Vaccine (Prevnar and Pneumovax)  * Prevnar = PCV13  * Pneumovax = PPSV23   Adults 25-60 years old: 1-3 doses may be recommended based on certain risk factors  Adults 72 years old: Prevnar (PCV13) vaccine recommended followed by Pneumovax (PPSV23) vaccine  If already received PPSV23 since turning 65, then PCV13 recommended at least one year after PPSV23 dose  Hepatitis B Vaccine 3 dose series if at intermediate or high risk (ex: diabetes, end stage renal disease, liver disease)   Tetanus (Td) Vaccine - COST NOT COVERED BY MEDICARE PART B Following completion of primary series, a booster dose should be given every 10 years to maintain immunity against tetanus  Td may also be given as tetanus wound prophylaxis  Tdap Vaccine - COST NOT COVERED BY MEDICARE PART B Recommended at least once for all adults  For pregnant patients, recommended with each pregnancy  Shingles Vaccine (Shingrix) - COST NOT COVERED BY MEDICARE PART B  2 shot series recommended in those aged 48 and above     Health Maintenance Due:      Topic Date Due    Breast Cancer Screening: Mammogram  06/28/2019    Lung Cancer Screening  05/12/2021    DXA SCAN  05/12/2023    Colorectal Cancer Screening  12/16/2023    Hepatitis C Screening  Completed     Immunizations Due:      Topic Date Due    DTaP,Tdap,and Td Vaccines (1 - Tdap) Never done    COVID-19 Vaccine (3 - Moderna risk series) 03/18/2021     Advance Directives   What are advance directives? Advance directives are legal documents that state your wishes and plans for medical care  These plans are made ahead of time in case you lose your ability to make decisions for yourself  Advance directives can apply to any medical decision, such as the treatments you want, and if you want to donate organs     What are the types of advance directives? There are many types of advance directives, and each state has rules about how to use them  You may choose a combination of any of the following:  · Living will: This is a written record of the treatment you want  You can also choose which treatments you do not want, which to limit, and which to stop at a certain time  This includes surgery, medicine, IV fluid, and tube feedings  · Durable power of  for healthcare [facility]): This is a written record that states who you want to make healthcare choices for you when you are unable to make them for yourself  This person, called a proxy, is usually a family member or a friend  You may choose more than 1 proxy  · Do not resuscitate (DNR) order:  A DNR order is used in case your heart stops beating or you stop breathing  It is a request not to have certain forms of treatment, such as CPR  A DNR order may be included in other types of advance directives  · Medical directive: This covers the care that you want if you are in a coma, near death, or unable to make decisions for yourself  You can list the treatments you want for each condition  Treatment may include pain medicine, surgery, blood transfusions, dialysis, IV or tube feedings, and a ventilator (breathing machine)  · Values history: This document has questions about your views, beliefs, and how you feel and think about life  This information can help others choose the care that you would choose  Why are advance directives important? An advance directive helps you control your care  Although spoken wishes may be used, it is better to have your wishes written down  Spoken wishes can be misunderstood, or not followed  Treatments may be given even if you do not want them  An advance directive may make it easier for your family to make difficult choices about your care  Urinary Incontinence   Urinary incontinence (UI)  is when you lose control of your bladder   UI develops because your bladder cannot store or empty urine properly  The 3 most common types of UI are stress incontinence, urge incontinence, or both  Medicines:   · May be given to help strengthen your bladder control  Report any side effects of medication to your healthcare provider  Do pelvic muscle exercises often:  Your pelvic muscles help you stop urinating  Squeeze these muscles tight for 5 seconds, then relax for 5 seconds  Gradually work up to squeezing for 10 seconds  Do 3 sets of 15 repetitions a day, or as directed  This will help strengthen your pelvic muscles and improve bladder control  Train your bladder:  Go to the bathroom at set times, such as every 2 hours, even if you do not feel the urge to go  You can also try to hold your urine when you feel the urge to go  For example, hold your urine for 5 minutes when you feel the urge to go  As that becomes easier, hold your urine for 10 minutes  Self-care:   · Keep a UI record  Write down how often you leak urine and how much you leak  Make a note of what you were doing when you leaked urine  · Drink liquids as directed  You may need to limit the amount of liquid you drink to help control your urine leakage  Do not drink any liquid right before you go to bed  Limit or do not have drinks that contain caffeine or alcohol  · Prevent constipation  Eat a variety of high-fiber foods  Good examples are high-fiber cereals, beans, vegetables, and whole-grain breads  Walking is the best way to trigger your intestines to have a bowel movement  · Exercise regularly and maintain a healthy weight  Weight loss and exercise will decrease pressure on your bladder and help you control your leakage  · Use a catheter as directed  to help empty your bladder  A catheter is a tiny, plastic tube that is put into your bladder to drain your urine  · Go to behavior therapy as directed    Behavior therapy may be used to help you learn to control your urge to urinate  Cigarette Smoking and Your Health   Risks to your health if you smoke:  Nicotine and other chemicals found in tobacco damage every cell in your body  Even if you are a light smoker, you have an increased risk for cancer, heart disease, and lung disease  If you are pregnant or have diabetes, smoking increases your risk for complications  Benefits to your health if you stop smoking:   · You decrease respiratory symptoms such as coughing, wheezing, and shortness of breath  · You reduce your risk for cancers of the lung, mouth, throat, kidney, bladder, pancreas, stomach, and cervix  If you already have cancer, you increase the benefits of chemotherapy  You also reduce your risk for cancer returning or a second cancer from developing  · You reduce your risk for heart disease, blood clots, heart attack, and stroke  · You reduce your risk for lung infections, and diseases such as pneumonia, asthma, chronic bronchitis, and emphysema  · Your circulation improves  More oxygen can be delivered to your body  If you have diabetes, you lower your risk for complications, such as kidney, artery, and eye diseases  You also lower your risk for nerve damage  Nerve damage can lead to amputations, poor vision, and blindness  · You improve your body's ability to heal and to fight infections  For more information and support to stop smoking:   · [employer]  Phone: 6- 757 - [phone]  Web Address: www [employer]  Weight Management   Why it is important to manage your weight:  Being overweight increases your risk of health conditions such as heart disease, high blood pressure, type 2 diabetes, and certain types of cancer  It can also increase your risk for osteoarthritis, sleep apnea, and other respiratory problems  Aim for a slow, steady weight loss  Even a small amount of weight loss can lower your risk of health problems    How to lose weight safely:  A safe and healthy way to lose weight is to eat fewer calories and get regular exercise  You can lose up about 1 pound a week by decreasing the number of calories you eat by 500 calories each day  Healthy meal plan for weight management:  A healthy meal plan includes a variety of foods, contains fewer calories, and helps you stay healthy  A healthy meal plan includes the following:  · Eat whole-grain foods more often  A healthy meal plan should contain fiber  Fiber is the part of grains, fruits, and vegetables that is not broken down by your body  Whole-grain foods are healthy and provide extra fiber in your diet  Some examples of whole-grain foods are whole-wheat breads and pastas, oatmeal, brown rice, and bulgur  · Eat a variety of vegetables every day  Include dark, leafy greens such as spinach, kale, lima greens, and mustard greens  Eat yellow and orange vegetables such as carrots, sweet potatoes, and winter squash  · Eat a variety of fruits every day  Choose fresh or canned fruit (canned in its own juice or light syrup) instead of juice  Fruit juice has very little or no fiber  · Eat low-fat dairy foods  Drink fat-free (skim) milk or 1% milk  Eat fat-free yogurt and low-fat cottage cheese  Try low-fat cheeses such as mozzarella and other reduced-fat cheeses  · Choose meat and other protein foods that are low in fat  Choose beans or other legumes such as split peas or lentils  Choose fish, skinless poultry (chicken or turkey), or lean cuts of red meat (beef or pork)  Before you cook meat or poultry, cut off any visible fat  · Use less fat and oil  Try baking foods instead of frying them  Add less fat, such as margarine, sour cream, regular salad dressing and mayonnaise to foods  Eat fewer high-fat foods  Some examples of high-fat foods include french fries, doughnuts, ice cream, and cakes  · Eat fewer sweets  Limit foods and drinks that are high in sugar  This includes candy, cookies, regular soda, and sweetened drinks    Exercise:  Exercise at least 30 minutes per day on most days of the week  Some examples of exercise include walking, biking, dancing, and swimming  You can also fit in more physical activity by taking the stairs instead of the elevator or parking farther away from stores  Ask your healthcare provider about the best exercise plan for you  © Copyright [employer] 2018 Information is for End User's use only and may not be sold, redistributed or otherwise used for commercial purposes  All illustrations and images included in CareNotes® are the copyrighted property of [employer]  or [facility] Preventive Visit Patient Instructions  Thank you for completing your Welcome to Medicare Visit or Medicare Annual Wellness Visit today  Your next wellness visit will be due in one year (5/14/2023)  The screening/preventive services that you may require over the next 5-10 years are detailed below  Some tests may not apply to you based off risk factors and/or age  Screening tests ordered at today's visit but not completed yet may show as past due  Also, please note that scanned in results may not display below  Preventive Screenings:  Service Recommendations Previous Testing/Comments   Colorectal Cancer Screening  * Colonoscopy    * Fecal Occult Blood Test (FOBT)/Fecal Immunochemical Test (FIT)  * Fecal DNA/Cologuard Test  * Flexible Sigmoidoscopy Age: 54-65 years old   Colonoscopy: every 10 years (may be performed more frequently if at higher risk)  OR  FOBT/FIT: every 1 year  OR  Cologuard: every 3 years  OR  Sigmoidoscopy: every 5 years  Screening may be recommended earlier than age 48 if at higher risk for colorectal cancer  Also, an individualized decision between you and your healthcare provider will decide whether screening between the ages of 74-80 would be appropriate   Colonoscopy: 12/16/2020  FOBT/FIT: Not on file  Cologuard: 08/20/2020  Sigmoidoscopy: Not on file    Screening Current     Breast Cancer Screening Age: 36 years old  Frequency: every 1-2 years  Not required if history of left and right mastectomy Mammogram: 06/28/2018    Patient Declines   Cervical Cancer Screening Between the ages of 21-29, pap smear recommended once every 3 years  Between the ages of 33-67, can perform pap smear with HPV co-testing every 5 years  Recommendations may differ for women with a history of total hysterectomy, cervical cancer, or abnormal pap smears in past  Pap Smear: 06/13/2018    Screening Not Indicated   Hepatitis C Screening Once for adults born between 1945 and 1965  More frequently in patients at high risk for Hepatitis C Hep C Antibody: 06/22/2018    Screening Current   Diabetes Screening 1-2 times per year if you're at risk for diabetes or have pre-diabetes Fasting glucose: 94 mg/dL   A1C: 5 4 %    Screening Current   Cholesterol Screening Once every 5 years if you don't have a lipid disorder  May order more often based on risk factors  Lipid panel: 03/23/2022    Screening Current     Other Preventive Screenings Covered by Medicare:  6  Abdominal Aortic Aneurysm (AAA) Screening: covered once if your at risk  You're considered to be at risk if you have a family history of AAA  7  Lung Cancer Screening: covers low dose CT scan once per year if you meet all of the following conditions: (1) Age 50-69; (2) No signs or symptoms of lung cancer; (3) Current smoker or have quit smoking within the last 15 years; (4) You have a tobacco smoking history of at least 30 pack years (packs per day multiplied by number of years you smoked); (5) You get a written order from a healthcare provider  8  Glaucoma Screening: covered annually if you're considered high risk: (1) You have diabetes OR (2) Family history of glaucoma OR (3)  aged 48 and older OR (3)  American aged 72 and older  5   Osteoporosis Screening: covered every 2 years if you meet one of the following conditions: (1) You're estrogen deficient and at risk for osteoporosis based off medical history and other findings; (2) Have a vertebral abnormality; (3) On glucocorticoid therapy for more than 3 months; (4) Have primary hyperparathyroidism; (5) On osteoporosis medications and need to assess response to drug therapy  · Last bone density test (DXA Scan): 05/12/2020  10  HIV Screening: covered annually if you're between the age of 12-76  Also covered annually if you are younger than 13 and older than 72 with risk factors for HIV infection  For pregnant patients, it is covered up to 3 times per pregnancy  Immunizations:  Immunization Recommendations   Influenza Vaccine Annual influenza vaccination during flu season is recommended for all persons aged >= 6 months who do not have contraindications   Pneumococcal Vaccine (Prevnar and Pneumovax)  * Prevnar = PCV13  * Pneumovax = PPSV23   Adults 25-60 years old: 1-3 doses may be recommended based on certain risk factors  Adults 72 years old: Prevnar (PCV13) vaccine recommended followed by Pneumovax (PPSV23) vaccine  If already received PPSV23 since turning 65, then PCV13 recommended at least one year after PPSV23 dose  Hepatitis B Vaccine 3 dose series if at intermediate or high risk (ex: diabetes, end stage renal disease, liver disease)   Tetanus (Td) Vaccine - COST NOT COVERED BY MEDICARE PART B Following completion of primary series, a booster dose should be given every 10 years to maintain immunity against tetanus  Td may also be given as tetanus wound prophylaxis  Tdap Vaccine - COST NOT COVERED BY MEDICARE PART B Recommended at least once for all adults  For pregnant patients, recommended with each pregnancy     Shingles Vaccine (Shingrix) - COST NOT COVERED BY MEDICARE PART B  2 shot series recommended in those aged 48 and above     Health Maintenance Due:      Topic Date Due    Breast Cancer Screening: Mammogram  06/28/2019    Lung Cancer Screening  05/12/2021    DXA SCAN  05/12/2023    Colorectal Cancer Screening  12/16/2023    Hepatitis C Screening  Completed     Immunizations Due:      Topic Date Due    DTaP,Tdap,and Td Vaccines (1 - Tdap) Never done    COVID-19 Vaccine (3 - Moderna risk series) 03/18/2021     Advance Directives   What are advance directives? Advance directives are legal documents that state your wishes and plans for medical care  These plans are made ahead of time in case you lose your ability to make decisions for yourself  Advance directives can apply to any medical decision, such as the treatments you want, and if you want to donate organs  What are the types of advance directives? There are many types of advance directives, and each state has rules about how to use them  You may choose a combination of any of the following:  · Living will: This is a written record of the treatment you want  You can also choose which treatments you do not want, which to limit, and which to stop at a certain time  This includes surgery, medicine, IV fluid, and tube feedings  · Durable power of  for healthcare [facility]): This is a written record that states who you want to make healthcare choices for you when you are unable to make them for yourself  This person, called a proxy, is usually a family member or a friend  You may choose more than 1 proxy  · Do not resuscitate (DNR) order:  A DNR order is used in case your heart stops beating or you stop breathing  It is a request not to have certain forms of treatment, such as CPR  A DNR order may be included in other types of advance directives  · Medical directive: This covers the care that you want if you are in a coma, near death, or unable to make decisions for yourself  You can list the treatments you want for each condition  Treatment may include pain medicine, surgery, blood transfusions, dialysis, IV or tube feedings, and a ventilator (breathing machine)  · Values history:   This document has questions about your views, beliefs, and how you feel and think about life  This information can help others choose the care that you would choose  Why are advance directives important? An advance directive helps you control your care  Although spoken wishes may be used, it is better to have your wishes written down  Spoken wishes can be misunderstood, or not followed  Treatments may be given even if you do not want them  An advance directive may make it easier for your family to make difficult choices about your care  Urinary Incontinence   Urinary incontinence (UI)  is when you lose control of your bladder  UI develops because your bladder cannot store or empty urine properly  The 3 most common types of UI are stress incontinence, urge incontinence, or both  Medicines:   · May be given to help strengthen your bladder control  Report any side effects of medication to your healthcare provider  Do pelvic muscle exercises often:  Your pelvic muscles help you stop urinating  Squeeze these muscles tight for 5 seconds, then relax for 5 seconds  Gradually work up to squeezing for 10 seconds  Do 3 sets of 15 repetitions a day, or as directed  This will help strengthen your pelvic muscles and improve bladder control  Train your bladder:  Go to the bathroom at set times, such as every 2 hours, even if you do not feel the urge to go  You can also try to hold your urine when you feel the urge to go  For example, hold your urine for 5 minutes when you feel the urge to go  As that becomes easier, hold your urine for 10 minutes  Self-care:   · Keep a UI record  Write down how often you leak urine and how much you leak  Make a note of what you were doing when you leaked urine  · Drink liquids as directed  You may need to limit the amount of liquid you drink to help control your urine leakage  Do not drink any liquid right before you go to bed  Limit or do not have drinks that contain caffeine or alcohol  · Prevent constipation    Eat a variety of high-fiber foods  Good examples are high-fiber cereals, beans, vegetables, and whole-grain breads  Walking is the best way to trigger your intestines to have a bowel movement  · Exercise regularly and maintain a healthy weight  Weight loss and exercise will decrease pressure on your bladder and help you control your leakage  · Use a catheter as directed  to help empty your bladder  A catheter is a tiny, plastic tube that is put into your bladder to drain your urine  · Go to behavior therapy as directed  Behavior therapy may be used to help you learn to control your urge to urinate  Cigarette Smoking and Your Health   Risks to your health if you smoke:  Nicotine and other chemicals found in tobacco damage every cell in your body  Even if you are a light smoker, you have an increased risk for cancer, heart disease, and lung disease  If you are pregnant or have diabetes, smoking increases your risk for complications  Benefits to your health if you stop smoking:   · You decrease respiratory symptoms such as coughing, wheezing, and shortness of breath  · You reduce your risk for cancers of the lung, mouth, throat, kidney, bladder, pancreas, stomach, and cervix  If you already have cancer, you increase the benefits of chemotherapy  You also reduce your risk for cancer returning or a second cancer from developing  · You reduce your risk for heart disease, blood clots, heart attack, and stroke  · You reduce your risk for lung infections, and diseases such as pneumonia, asthma, chronic bronchitis, and emphysema  · Your circulation improves  More oxygen can be delivered to your body  If you have diabetes, you lower your risk for complications, such as kidney, artery, and eye diseases  You also lower your risk for nerve damage  Nerve damage can lead to amputations, poor vision, and blindness  · You improve your body's ability to heal and to fight infections    For more information and support to stop smoking:   · [employer]  Phone: 6- 530 - 729-9774  Web Address: www [employer]  Weight Management   Why it is important to manage your weight:  Being overweight increases your risk of health conditions such as heart disease, high blood pressure, type 2 diabetes, and certain types of cancer  It can also increase your risk for osteoarthritis, sleep apnea, and other respiratory problems  Aim for a slow, steady weight loss  Even a small amount of weight loss can lower your risk of health problems  How to lose weight safely:  A safe and healthy way to lose weight is to eat fewer calories and get regular exercise  You can lose up about 1 pound a week by decreasing the number of calories you eat by 500 calories each day  Healthy meal plan for weight management:  A healthy meal plan includes a variety of foods, contains fewer calories, and helps you stay healthy  A healthy meal plan includes the following:  · Eat whole-grain foods more often  A healthy meal plan should contain fiber  Fiber is the part of grains, fruits, and vegetables that is not broken down by your body  Whole-grain foods are healthy and provide extra fiber in your diet  Some examples of whole-grain foods are whole-wheat breads and pastas, oatmeal, brown rice, and bulgur  · Eat a variety of vegetables every day  Include dark, leafy greens such as spinach, kale, lima greens, and mustard greens  Eat yellow and orange vegetables such as carrots, sweet potatoes, and winter squash  · Eat a variety of fruits every day  Choose fresh or canned fruit (canned in its own juice or light syrup) instead of juice  Fruit juice has very little or no fiber  · Eat low-fat dairy foods  Drink fat-free (skim) milk or 1% milk  Eat fat-free yogurt and low-fat cottage cheese  Try low-fat cheeses such as mozzarella and other reduced-fat cheeses  · Choose meat and other protein foods that are low in fat    Choose beans or other legumes such as split peas or lentils  Choose fish, skinless poultry (chicken or turkey), or lean cuts of red meat (beef or pork)  Before you cook meat or poultry, cut off any visible fat  · Use less fat and oil  Try baking foods instead of frying them  Add less fat, such as margarine, sour cream, regular salad dressing and mayonnaise to foods  Eat fewer high-fat foods  Some examples of high-fat foods include french fries, doughnuts, ice cream, and cakes  · Eat fewer sweets  Limit foods and drinks that are high in sugar  This includes candy, cookies, regular soda, and sweetened drinks  Exercise:  Exercise at least 30 minutes per day on most days of the week  Some examples of exercise include walking, biking, dancing, and swimming  You can also fit in more physical activity by taking the stairs instead of the elevator or parking farther away from stores  Ask your healthcare provider about the best exercise plan for you  © Copyright [employer] 2018 Information is for End User's use only and may not be sold, redistributed or otherwise used for commercial purposes   All illustrations and images included in CareNotes® are the copyrighted property of A D A M , Inc  or [address] [place], [place] [address]

## 2023-03-07 LAB
ESTIMATED AVERAGE GLUCOSE: 171.4 MG/DL
HBA1C MFR BLD: 7.6 %

## 2023-03-08 ENCOUNTER — TELEPHONE (OUTPATIENT)
Dept: INTERNAL MEDICINE CLINIC | Age: 58
End: 2023-03-08

## 2023-05-10 ENCOUNTER — TELEPHONE (OUTPATIENT)
Dept: INTERNAL MEDICINE CLINIC | Age: 58
End: 2023-05-10

## 2023-05-10 RX ORDER — FLUTICASONE FUROATE, UMECLIDINIUM BROMIDE AND VILANTEROL TRIFENATATE 200; 62.5; 25 UG/1; UG/1; UG/1
1 POWDER RESPIRATORY (INHALATION) DAILY
Qty: 60 EACH | Refills: 11 | Status: SHIPPED | OUTPATIENT
Start: 2023-05-10

## 2023-06-08 ENCOUNTER — OFFICE VISIT (OUTPATIENT)
Dept: INTERNAL MEDICINE CLINIC | Age: 58
End: 2023-06-08
Payer: COMMERCIAL

## 2023-06-08 VITALS
SYSTOLIC BLOOD PRESSURE: 100 MMHG | DIASTOLIC BLOOD PRESSURE: 58 MMHG | HEART RATE: 55 BPM | BODY MASS INDEX: 44.16 KG/M2 | TEMPERATURE: 98.7 F | WEIGHT: 257.25 LBS

## 2023-06-08 DIAGNOSIS — J44.9 COPD, SEVERE (HCC): ICD-10-CM

## 2023-06-08 DIAGNOSIS — R80.9 TYPE 2 DIABETES MELLITUS WITH MICROALBUMINURIA, WITHOUT LONG-TERM CURRENT USE OF INSULIN (HCC): ICD-10-CM

## 2023-06-08 DIAGNOSIS — Z12.11 COLON CANCER SCREENING: ICD-10-CM

## 2023-06-08 DIAGNOSIS — E11.29 TYPE 2 DIABETES MELLITUS WITH MICROALBUMINURIA, WITHOUT LONG-TERM CURRENT USE OF INSULIN (HCC): ICD-10-CM

## 2023-06-08 DIAGNOSIS — Z87.891 PERSONAL HISTORY OF TOBACCO USE: Primary | ICD-10-CM

## 2023-06-08 DIAGNOSIS — I10 ESSENTIAL HYPERTENSION: ICD-10-CM

## 2023-06-08 PROCEDURE — 3051F HG A1C>EQUAL 7.0%<8.0%: CPT | Performed by: STUDENT IN AN ORGANIZED HEALTH CARE EDUCATION/TRAINING PROGRAM

## 2023-06-08 PROCEDURE — G8427 DOCREV CUR MEDS BY ELIG CLIN: HCPCS | Performed by: STUDENT IN AN ORGANIZED HEALTH CARE EDUCATION/TRAINING PROGRAM

## 2023-06-08 PROCEDURE — 3078F DIAST BP <80 MM HG: CPT | Performed by: STUDENT IN AN ORGANIZED HEALTH CARE EDUCATION/TRAINING PROGRAM

## 2023-06-08 PROCEDURE — 99214 OFFICE O/P EST MOD 30 MIN: CPT | Performed by: STUDENT IN AN ORGANIZED HEALTH CARE EDUCATION/TRAINING PROGRAM

## 2023-06-08 PROCEDURE — 3023F SPIROM DOC REV: CPT | Performed by: STUDENT IN AN ORGANIZED HEALTH CARE EDUCATION/TRAINING PROGRAM

## 2023-06-08 PROCEDURE — G8417 CALC BMI ABV UP PARAM F/U: HCPCS | Performed by: STUDENT IN AN ORGANIZED HEALTH CARE EDUCATION/TRAINING PROGRAM

## 2023-06-08 PROCEDURE — 2022F DILAT RTA XM EVC RTNOPTHY: CPT | Performed by: STUDENT IN AN ORGANIZED HEALTH CARE EDUCATION/TRAINING PROGRAM

## 2023-06-08 PROCEDURE — G0296 VISIT TO DETERM LDCT ELIG: HCPCS | Performed by: STUDENT IN AN ORGANIZED HEALTH CARE EDUCATION/TRAINING PROGRAM

## 2023-06-08 PROCEDURE — 3017F COLORECTAL CA SCREEN DOC REV: CPT | Performed by: STUDENT IN AN ORGANIZED HEALTH CARE EDUCATION/TRAINING PROGRAM

## 2023-06-08 PROCEDURE — 4004F PT TOBACCO SCREEN RCVD TLK: CPT | Performed by: STUDENT IN AN ORGANIZED HEALTH CARE EDUCATION/TRAINING PROGRAM

## 2023-06-08 PROCEDURE — 3074F SYST BP LT 130 MM HG: CPT | Performed by: STUDENT IN AN ORGANIZED HEALTH CARE EDUCATION/TRAINING PROGRAM

## 2023-06-08 RX ORDER — GUAIFENESIN AND CODEINE PHOSPHATE 100; 10 MG/5ML; MG/5ML
5 SOLUTION ORAL 2 TIMES DAILY PRN
Qty: 180 ML | Refills: 0 | Status: SHIPPED | OUTPATIENT
Start: 2023-06-08 | End: 2023-06-26

## 2023-06-08 ASSESSMENT — ENCOUNTER SYMPTOMS
ABDOMINAL PAIN: 0
COUGH: 1
CHEST TIGHTNESS: 0
SORE THROAT: 0
BACK PAIN: 0
SHORTNESS OF BREATH: 0
BLOOD IN STOOL: 0
ABDOMINAL DISTENTION: 0

## 2023-06-08 NOTE — PROGRESS NOTES
the accuracy of this automated transcription, some errors in transcription may have occurred. Discussed with the patient the current USPSTF guidelines released March 9, 2021 for screening for lung cancer. For adults aged 48 to [de-identified] years who have a 20 pack-year smoking history and currently smoke or have quit within the past 15 years the grade B recommendation is to:  Screen for lung cancer with low-dose computed tomography (LDCT) every year. Stop screening once a person has not smoked for 15 years or has a health problem that limits life expectancy or the ability to have lung surgery. The patient  reports that he has been smoking cigarettes. He started smoking about 49 years ago. He has a 43.00 pack-year smoking history. He has never used smokeless tobacco.. Discussed with patient the risks and benefits of screening, including over-diagnosis, false positive rate, and total radiation exposure. The patient currently exhibits no signs or symptoms suggestive of lung cancer. Discussed with patient the importance of compliance with yearly annual lung cancer screenings and willingness to undergo diagnosis and treatment if screening scan is positive. In addition, the patient was counseled regarding the importance of remaining smoke free and/or total smoking cessation.     Also reviewed the following if the patient has Medicare that as of February 10, 2022, Medicare only covers LDCT screening in patients aged 51-72 with at least a 20 pack-year smoking history who currently smoke or have quit in the last 15 years

## 2023-06-08 NOTE — PATIENT INSTRUCTIONS
follow-up, he or she will help you understand what to do next. After a lung cancer screening, you can go back to your usual activities right away. A lung cancer screening test can't tell if you have lung cancer. If your results are positive, your doctor can't tell whether an abnormal finding is a harmless nodule, cancer, or something else without doing more tests. What can you do to help prevent lung cancer? Some lung cancers can't be prevented. But if you smoke, quitting smoking is the best step you can take to prevent lung cancer. If you want to quit, your doctor can recommend medicines or other ways to help. Follow-up care is a key part of your treatment and safety. Be sure to make and go to all appointments, and call your doctor if you are having problems. It's also a good idea to know your test results and keep a list of the medicines you take. Where can you learn more? Go to http://www.montanez.com/ and enter Q940 to learn more about \"Learning About Lung Cancer Screening. \"  Current as of: May 4, 2022               Content Version: 13.6  © 3039-0089 [employer]. Care instructions adapted under license by [facility] ([facility]). If you have questions about a medical condition or this instruction, always ask your healthcare professional. [address] any warranty or liability for your use of this information.

## 2023-06-08 NOTE — ASSESSMENT & PLAN NOTE
- Repeat A1c today  -Continue empagliflozin 25 mg p.o. daily  -Continue metformin 500 mg p.o. daily  -Continue dulaglutide.

## 2023-07-08 PROBLEM — Z12.11 COLON CANCER SCREENING: Status: RESOLVED | Noted: 2023-06-08 | Resolved: 2023-07-08

## 2023-07-29 ENCOUNTER — HOSPITAL ENCOUNTER (OUTPATIENT)
Dept: CT IMAGING | Age: 58
Discharge: HOME | End: 2023-07-29
Attending: STUDENT IN AN ORGANIZED HEALTH CARE EDUCATION/TRAINING PROGRAM
Payer: COMMERCIAL

## 2023-07-29 DIAGNOSIS — Z87.891 PERSONAL HISTORY OF TOBACCO USE: ICD-10-CM

## 2023-07-29 PROCEDURE — 71271 CT THORAX LUNG CANCER SCR C-: CPT

## 2023-08-01 ENCOUNTER — TELEPHONE (OUTPATIENT)
Dept: CASE MANAGEMENT | Age: 58
End: 2023-08-01

## 2023-08-01 NOTE — TELEPHONE ENCOUNTER
CT Lung Cancer Screening completed on 7/29/2023, ordering provider, Dr. Todd Villanueva routed radiology results with recommendations. Smoking history reviewed.

## 2023-08-18 ENCOUNTER — ANESTHESIA EVENT (OUTPATIENT)
Dept: ENDOSCOPY | Age: 58
End: 2023-08-18
Payer: COMMERCIAL

## 2023-08-21 ENCOUNTER — ANESTHESIA (OUTPATIENT)
Dept: ENDOSCOPY | Age: 58
End: 2023-08-21
Payer: COMMERCIAL

## 2023-08-21 ENCOUNTER — HOSPITAL ENCOUNTER (OUTPATIENT)
Age: 58
Setting detail: OUTPATIENT SURGERY
Discharge: HOME | End: 2023-08-21
Attending: INTERNAL MEDICINE | Admitting: INTERNAL MEDICINE
Payer: COMMERCIAL

## 2023-08-21 VITALS
SYSTOLIC BLOOD PRESSURE: 137 MMHG | HEIGHT: 64 IN | BODY MASS INDEX: 45 KG/M2 | RESPIRATION RATE: 16 BRPM | TEMPERATURE: 97.2 F | OXYGEN SATURATION: 81 % | HEART RATE: 86 BPM | WEIGHT: 263.56 LBS | DIASTOLIC BLOOD PRESSURE: 67 MMHG

## 2023-08-21 LAB
GLUCOSE BLD-MCNC: 103 MG/DL (ref 70–99)
GLUCOSE BLD-MCNC: 114 MG/DL (ref 70–99)
PERFORMED ON: ABNORMAL
PERFORMED ON: ABNORMAL

## 2023-08-21 PROCEDURE — 3700000001 HC ADD 15 MINUTES (ANESTHESIA): Performed by: INTERNAL MEDICINE

## 2023-08-21 PROCEDURE — 7100000010 HC PHASE II RECOVERY - FIRST 15 MIN: Performed by: INTERNAL MEDICINE

## 2023-08-21 PROCEDURE — 7100000000 HC PACU RECOVERY - FIRST 15 MIN: Performed by: INTERNAL MEDICINE

## 2023-08-21 PROCEDURE — 2709999900 HC NON-CHARGEABLE SUPPLY: Performed by: INTERNAL MEDICINE

## 2023-08-21 PROCEDURE — 94640 AIRWAY INHALATION TREATMENT: CPT

## 2023-08-21 PROCEDURE — 2580000003 HC RX 258: Performed by: ANESTHESIOLOGY

## 2023-08-21 PROCEDURE — 7100000011 HC PHASE II RECOVERY - ADDTL 15 MIN: Performed by: INTERNAL MEDICINE

## 2023-08-21 PROCEDURE — 3700000000 HC ANESTHESIA ATTENDED CARE: Performed by: INTERNAL MEDICINE

## 2023-08-21 PROCEDURE — 3609027000 HC COLONOSCOPY: Performed by: INTERNAL MEDICINE

## 2023-08-21 PROCEDURE — 2500000003 HC RX 250 WO HCPCS: Performed by: NURSE ANESTHETIST, CERTIFIED REGISTERED

## 2023-08-21 PROCEDURE — 7100000001 HC PACU RECOVERY - ADDTL 15 MIN: Performed by: INTERNAL MEDICINE

## 2023-08-21 PROCEDURE — 6370000000 HC RX 637 (ALT 250 FOR IP): Performed by: ANESTHESIOLOGY

## 2023-08-21 PROCEDURE — 2580000003 HC RX 258: Performed by: NURSE ANESTHETIST, CERTIFIED REGISTERED

## 2023-08-21 PROCEDURE — 6360000002 HC RX W HCPCS: Performed by: NURSE ANESTHETIST, CERTIFIED REGISTERED

## 2023-08-21 PROCEDURE — 6370000000 HC RX 637 (ALT 250 FOR IP): Performed by: INTERNAL MEDICINE

## 2023-08-21 PROCEDURE — 6360000002 HC RX W HCPCS: Performed by: ANESTHESIOLOGY

## 2023-08-21 RX ORDER — PROPOFOL 10 MG/ML
INJECTION, EMULSION INTRAVENOUS PRN
Status: DISCONTINUED | OUTPATIENT
Start: 2023-08-21 | End: 2023-08-21 | Stop reason: SDUPTHER

## 2023-08-21 RX ORDER — SODIUM CHLORIDE 9 MG/ML
INJECTION, SOLUTION INTRAVENOUS CONTINUOUS PRN
Status: DISCONTINUED | OUTPATIENT
Start: 2023-08-21 | End: 2023-08-21 | Stop reason: SDUPTHER

## 2023-08-21 RX ORDER — SODIUM CHLORIDE 0.9 % (FLUSH) 0.9 %
5-40 SYRINGE (ML) INJECTION PRN
Status: DISCONTINUED | OUTPATIENT
Start: 2023-08-21 | End: 2023-08-21 | Stop reason: HOSPADM

## 2023-08-21 RX ORDER — SODIUM CHLORIDE 9 MG/ML
INJECTION, SOLUTION INTRAVENOUS PRN
Status: DISCONTINUED | OUTPATIENT
Start: 2023-08-21 | End: 2023-08-21 | Stop reason: HOSPADM

## 2023-08-21 RX ORDER — ALBUTEROL SULFATE 2.5 MG/3ML
2.5 SOLUTION RESPIRATORY (INHALATION) ONCE
Status: DISCONTINUED | OUTPATIENT
Start: 2023-08-21 | End: 2023-08-21 | Stop reason: HOSPADM

## 2023-08-21 RX ORDER — DROPERIDOL 2.5 MG/ML
0.62 INJECTION, SOLUTION INTRAMUSCULAR; INTRAVENOUS
Status: DISCONTINUED | OUTPATIENT
Start: 2023-08-21 | End: 2023-08-21 | Stop reason: HOSPADM

## 2023-08-21 RX ORDER — ONDANSETRON 2 MG/ML
4 INJECTION INTRAMUSCULAR; INTRAVENOUS
Status: DISCONTINUED | OUTPATIENT
Start: 2023-08-21 | End: 2023-08-21 | Stop reason: HOSPADM

## 2023-08-21 RX ORDER — SODIUM CHLORIDE 0.9 % (FLUSH) 0.9 %
5-40 SYRINGE (ML) INJECTION EVERY 12 HOURS SCHEDULED
Status: DISCONTINUED | OUTPATIENT
Start: 2023-08-21 | End: 2023-08-21 | Stop reason: HOSPADM

## 2023-08-21 RX ORDER — GLYCOPYRROLATE 0.2 MG/ML
INJECTION INTRAMUSCULAR; INTRAVENOUS PRN
Status: DISCONTINUED | OUTPATIENT
Start: 2023-08-21 | End: 2023-08-21 | Stop reason: SDUPTHER

## 2023-08-21 RX ORDER — LIDOCAINE HYDROCHLORIDE 20 MG/ML
INJECTION, SOLUTION EPIDURAL; INFILTRATION; INTRACAUDAL; PERINEURAL PRN
Status: DISCONTINUED | OUTPATIENT
Start: 2023-08-21 | End: 2023-08-21 | Stop reason: SDUPTHER

## 2023-08-21 RX ORDER — METOCLOPRAMIDE HYDROCHLORIDE 5 MG/ML
10 INJECTION INTRAMUSCULAR; INTRAVENOUS ONCE
Status: COMPLETED | OUTPATIENT
Start: 2023-08-21 | End: 2023-08-21

## 2023-08-21 RX ORDER — IPRATROPIUM BROMIDE AND ALBUTEROL SULFATE 2.5; .5 MG/3ML; MG/3ML
1 SOLUTION RESPIRATORY (INHALATION) ONCE
Status: COMPLETED | OUTPATIENT
Start: 2023-08-21 | End: 2023-08-21

## 2023-08-21 RX ADMIN — METOCLOPRAMIDE HYDROCHLORIDE 10 MG: 5 INJECTION INTRAMUSCULAR; INTRAVENOUS at 07:03

## 2023-08-21 RX ADMIN — IPRATROPIUM BROMIDE AND ALBUTEROL SULFATE 1 DOSE: 2.5; .5 SOLUTION RESPIRATORY (INHALATION) at 08:40

## 2023-08-21 RX ADMIN — PROPOFOL 30 MG: 10 INJECTION, EMULSION INTRAVENOUS at 07:49

## 2023-08-21 RX ADMIN — IPRATROPIUM BROMIDE AND ALBUTEROL SULFATE 1 DOSE: 2.5; .5 SOLUTION RESPIRATORY (INHALATION) at 07:15

## 2023-08-21 RX ADMIN — PROPOFOL 50 MG: 10 INJECTION, EMULSION INTRAVENOUS at 07:37

## 2023-08-21 RX ADMIN — SODIUM CHLORIDE: 9 INJECTION, SOLUTION INTRAVENOUS at 07:00

## 2023-08-21 RX ADMIN — GLYCOPYRROLATE 0.2 MG: 0.2 INJECTION INTRAMUSCULAR; INTRAVENOUS at 07:37

## 2023-08-21 RX ADMIN — PROPOFOL 30 MG: 10 INJECTION, EMULSION INTRAVENOUS at 07:46

## 2023-08-21 RX ADMIN — PROPOFOL 30 MG: 10 INJECTION, EMULSION INTRAVENOUS at 07:40

## 2023-08-21 RX ADMIN — LIDOCAINE HYDROCHLORIDE 60 MG: 20 INJECTION, SOLUTION EPIDURAL; INFILTRATION; INTRACAUDAL; PERINEURAL at 07:37

## 2023-08-21 RX ADMIN — SODIUM CHLORIDE: 9 INJECTION, SOLUTION INTRAVENOUS at 07:32

## 2023-08-21 RX ADMIN — PROPOFOL 30 MG: 10 INJECTION, EMULSION INTRAVENOUS at 07:43

## 2023-08-21 ASSESSMENT — PAIN - FUNCTIONAL ASSESSMENT: PAIN_FUNCTIONAL_ASSESSMENT: 0-10

## 2023-08-21 ASSESSMENT — PAIN SCALES - GENERAL
PAINLEVEL_OUTOF10: 0

## 2023-08-21 ASSESSMENT — COPD QUESTIONNAIRES: CAT_SEVERITY: SEVERE

## 2023-08-21 NOTE — H&P
Pre-operative History and Physical    Patient: Mike Castlilo  : 1965  Acct#:     Intended Procedure:  Colonoscopy    HISTORY OF PRESENT ILLNESS:  The patient is a 59 y.o. male  who presents for/due to Surveillance/Hx adenomatous polyps      Past Medical History:        Diagnosis Date    Acute on chronic diastolic congestive heart failure ([address])     ADHD (attention deficit hyperactivity disorder)     pt denies    Adrenal nodule ([address]) 2017    low-density nodular enlargement of a left adrenal gland which is stable since       COPD (chronic obstructive pulmonary disease) (HCC)     Emphysema of lung ([address])     Essential hypertension 2017    History of pneumothorax 2017    Rib fx    Hx of colonic polyps 2018    1. Descending Polyp 2. Sigmoid Polyp 3. Sigmoid Diverticulosis recheck     Hx of renal calculi 2017    Hypertension     Kidney stone     Neuropathy     Obesity     Osteoarthritis     Pneumothorax      d/t fall     Prediabetes 2017    Retinal hemorrhage 10/29/2020    HTN-right eye    Sleep apnea     uses C-PAP    Substance abuse ([address])     Type 2 diabetes mellitus without complication ([address])      Past Surgical History:        Procedure Laterality Date    CARPAL TUNNEL RELEASE Right     COLONOSCOPY  2018    [place], x2 polyps    KNEE SURGERY Left     arthroscopic     Medications Prior to Admission:   Prior to Admission medications    Medication Sig Start Date End Date Taking?  Authorizing Provider   fluticasone-umeclidin-vilant (TRELEGY ELLIPTA) 200-62.5-25 MCG/ACT AEPB inhaler Inhale 1 puff into the lungs daily 5/10/23   Daniel Agustin MD   empagliflozin (JARDIANCE) 25 MG tablet Take 1 tablet by mouth daily To replace 10 mg 3/6/23   Daniel Agustin MD   atorvastatin (LIPITOR) 40 MG tablet Take 1 tablet by mouth daily To replace simvastatin 3/6/23   Daniel Agustin MD   furosemide (LASIX) 20 MG tablet TAKE 1 TO 2 TABLETS BY MOUTH TWICE A

## 2023-08-21 NOTE — DISCHARGE INSTRUCTIONS
Recommendations:  Recommend repeat colonoscopy in 5 years for surveillance purposes    Discharge Instructions for Colonoscopy     Colonoscopy is a visual exam of the lining of the large intestine, also called the bowel or colon, with a colonoscope. A colonoscope is a flexible tube with a light and a viewing device. It allows the doctor to view the inside of the colon through a tiny video camera. Colonoscopy is performed for many reasons: unexplained anemia , pain, diarrhea , bloody stools, cancer screening, among many other reasons. Complications from a colonoscopy are rare. Some possible serious complications include perforated bowel (which might require surgery) and bleeding (which could require blood transfusion ). Minor complications include bloating, gas, and cramping that can last for 1-2 days after the procedure. Because air is put into your colon during the procedure, it is normal to pass large amounts of air from your rectum. You may not have a bowel movement for 1-3 days after the procedure. What You Will Need:  Someone to drive you home after the procedure     Steps to Take:  [address] when you get home. Because the sedative will make you drowsy, don't drive, operate machinery, or make important decisions the day of the procedure. Feelings of bloating, gas, or cramping may persist for 24 hours. Diet -  Try sips of water first. If tolerated, resume bland food (scrambled eggs, toast, soup) first.  If tolerated, resume regular diet or the diet recommended by your physician. Do not drink alcohol for 24 hours. Physical Activity -  Ask your doctor when you will be able to return to work. Do not drive, operate heavy machinery, or do activities that require coordination or balance for 24 hours. Otherwise, return to your normal routine as soon as you are comfortable to do so, which is usually the next day after the procedure.    Medications - When taking medications, it's

## 2023-08-21 NOTE — OP NOTE
Colonoscopy Procedure Note      Patient: Jose Henry  : 1965  Acct#:     Procedure: Colonoscopy    Date:  2023    Surgeon:  Hao Kaur MD    Referring Physician:  Holly Cohn MD    Previous Colonoscopy: YES  Date:    Greater than 3 years: YES    Preoperative Diagnosis:  1. Surveillance/Hx adenomatous polyps    Postoperative Diagnosis:  1. Mild Left Colon Diverticulosis     Consent:  The patient or their legal guardian has signed a consent, and is aware of the potential risks, benefits, alternatives, and potential complications of this procedure. These include, but are not limited to hemorrhage, bleeding, post procedural pain, perforation, phlebitis, aspiration, hypotension, hypoxia, cardiovascular events such as arryhthmia, and possibly death. Additionally, the possibility of missed colonic polyps and interval colon cancer was discussed in the consent. Anesthesia:  The patient was administered IV propofol per anesthesiology team.  Please see their operative records for full details. Procedure: An informed consent was obtained from the patient after explanation of indications, benefits, possible risks and complications of the procedure. The patient was then taken to the endoscopy suite, placed in the left lateral decubitus position, and the above IV anesthesia was administered. A digital rectal examination was performed and revealed negative without mass, lesions or tenderness. The Olympus video colonoscope was placed in the patient's rectum under digital direction and advanced to the cecum. The cecum was identified by characteristic anatomy and ballottment. The preparation was good. The ileocecal valve was identified. The scope was then withdrawn back through the cecum, ascending, transverse, descending, sigmoid colon, and rectum. Careful circumferential examination of the mucosa in these areas demonstrated: The left colon had mild diverticulosis.

## 2023-08-21 NOTE — FLOWSHEET NOTE
Pt. Arrived in phase 2. O2 sat 80-82% on RA. Dr. Nicol Tuttle here to assess. Pt. Denies feeling lightheaded or dizzy. Pt says that this is \"his norm. \"  Has a home oxygen concentrator. Usually wears 2-3L/NC at home. Lives about 15 minutes away.

## 2023-08-21 NOTE — FLOWSHEET NOTE
Dr. Carolin Huber discussed going to ER to investigate O2 options. Pt. Was not interested. Ok to discharge. Encouraged to cough and deep breathe.

## 2023-09-14 ENCOUNTER — OFFICE VISIT (OUTPATIENT)
Dept: INTERNAL MEDICINE CLINIC | Age: 58
End: 2023-09-14
Payer: COMMERCIAL

## 2023-09-14 VITALS
SYSTOLIC BLOOD PRESSURE: 111 MMHG | WEIGHT: 267.38 LBS | BODY MASS INDEX: 45.89 KG/M2 | TEMPERATURE: 97.6 F | DIASTOLIC BLOOD PRESSURE: 65 MMHG | OXYGEN SATURATION: 81 % | HEART RATE: 64 BPM

## 2023-09-14 DIAGNOSIS — R80.9 TYPE 2 DIABETES MELLITUS WITH MICROALBUMINURIA, WITHOUT LONG-TERM CURRENT USE OF INSULIN (HCC): Primary | ICD-10-CM

## 2023-09-14 DIAGNOSIS — E11.29 TYPE 2 DIABETES MELLITUS WITH MICROALBUMINURIA, WITHOUT LONG-TERM CURRENT USE OF INSULIN (HCC): Primary | ICD-10-CM

## 2023-09-14 PROCEDURE — G8417 CALC BMI ABV UP PARAM F/U: HCPCS | Performed by: STUDENT IN AN ORGANIZED HEALTH CARE EDUCATION/TRAINING PROGRAM

## 2023-09-14 PROCEDURE — 3051F HG A1C>EQUAL 7.0%<8.0%: CPT | Performed by: STUDENT IN AN ORGANIZED HEALTH CARE EDUCATION/TRAINING PROGRAM

## 2023-09-14 PROCEDURE — 99213 OFFICE O/P EST LOW 20 MIN: CPT | Performed by: STUDENT IN AN ORGANIZED HEALTH CARE EDUCATION/TRAINING PROGRAM

## 2023-09-14 PROCEDURE — 2022F DILAT RTA XM EVC RTNOPTHY: CPT | Performed by: STUDENT IN AN ORGANIZED HEALTH CARE EDUCATION/TRAINING PROGRAM

## 2023-09-14 PROCEDURE — G8427 DOCREV CUR MEDS BY ELIG CLIN: HCPCS | Performed by: STUDENT IN AN ORGANIZED HEALTH CARE EDUCATION/TRAINING PROGRAM

## 2023-09-14 PROCEDURE — 3074F SYST BP LT 130 MM HG: CPT | Performed by: STUDENT IN AN ORGANIZED HEALTH CARE EDUCATION/TRAINING PROGRAM

## 2023-09-14 PROCEDURE — 83036 HEMOGLOBIN GLYCOSYLATED A1C: CPT | Performed by: STUDENT IN AN ORGANIZED HEALTH CARE EDUCATION/TRAINING PROGRAM

## 2023-09-14 PROCEDURE — 3078F DIAST BP <80 MM HG: CPT | Performed by: STUDENT IN AN ORGANIZED HEALTH CARE EDUCATION/TRAINING PROGRAM

## 2023-09-14 PROCEDURE — 3017F COLORECTAL CA SCREEN DOC REV: CPT | Performed by: STUDENT IN AN ORGANIZED HEALTH CARE EDUCATION/TRAINING PROGRAM

## 2023-09-14 PROCEDURE — 4004F PT TOBACCO SCREEN RCVD TLK: CPT | Performed by: STUDENT IN AN ORGANIZED HEALTH CARE EDUCATION/TRAINING PROGRAM

## 2023-09-14 ASSESSMENT — ENCOUNTER SYMPTOMS
COUGH: 0
SHORTNESS OF BREATH: 0
ABDOMINAL DISTENTION: 0
ABDOMINAL PAIN: 0
BLOOD IN STOOL: 0
BACK PAIN: 0
CHEST TIGHTNESS: 0
SORE THROAT: 0

## 2023-09-14 NOTE — PROGRESS NOTES
[facility]) Internal Medicine    Jose Henry is a 59 y.o. male with the past medical history as listed below presenting to the clinic for follow up on chronic condition and discussion of preventative health care. Patient feels well. He is notes having low blood sugar. He has chronic hypoxia and wears oxygen. Preventative health care: Patient is due to for eye exam.    Review of Systems   Constitutional:  Negative for fatigue and fever. HENT:  Negative for congestion, nosebleeds and sore throat. Respiratory:  Negative for cough, chest tightness and shortness of breath. Cardiovascular:  Negative for chest pain, palpitations and leg swelling. Gastrointestinal:  Negative for abdominal distention, abdominal pain and blood in stool. Endocrine: Negative for cold intolerance and heat intolerance. Genitourinary:  Negative for difficulty urinating. Musculoskeletal:  Negative for back pain. Neurological:  Negative for weakness, light-headedness and numbness. Psychiatric/Behavioral:  Negative for confusion and sleep disturbance. Past Medical History:   Diagnosis Date    Acute on chronic diastolic congestive heart failure (HCC)     ADHD (attention deficit hyperactivity disorder)     pt denies    Adrenal nodule ([address]) 12/07/2017    low-density nodular enlargement of a left adrenal gland which is stable since 2007      COPD (chronic obstructive pulmonary disease) ([address])     Emphysema of lung ([address])     Essential hypertension 11/30/2017    History of pneumothorax 11/30/2017    Rib fx    Hx of colonic polyps 01/18/2018    1.2018 Descending Polyp 2. Sigmoid Polyp 3.  Sigmoid Diverticulosis recheck 2023    Hx of renal calculi 11/30/2017    Hypertension     Kidney stone     Neuropathy     Obesity     Osteoarthritis     Pneumothorax     2016 d/t fall     Prediabetes 12/13/2017    Retinal hemorrhage 10/29/2020    HTN-right eye    Sleep apnea     uses C-PAP    Substance abuse ([address])     Type 2 diabetes mellitus

## 2023-10-16 NOTE — TELEPHONE ENCOUNTER
Future Appointments   Date Time Provider [address]   1/4/2024  8:30 AM Bhakti Gomez MD 1081 [facility]. appt 9/14/23

## 2023-10-17 RX ORDER — FLUTICASONE PROPIONATE 50 MCG
SPRAY, SUSPENSION (ML) NASAL
Qty: 1 EACH | Refills: 11 | Status: SHIPPED | OUTPATIENT
Start: 2023-10-17

## 2023-11-17 ENCOUNTER — TELEPHONE (OUTPATIENT)
Dept: INTERNAL MEDICINE CLINIC | Age: 58
End: 2023-11-17

## 2023-12-13 DIAGNOSIS — I10 ESSENTIAL HYPERTENSION: ICD-10-CM

## 2023-12-13 RX ORDER — SPIRONOLACTONE 25 MG/1
TABLET ORAL
Qty: 90 TABLET | Refills: 3 | Status: SHIPPED | OUTPATIENT
Start: 2023-12-13

## 2023-12-13 RX ORDER — LISINOPRIL 40 MG/1
TABLET ORAL
Qty: 90 TABLET | Refills: 3 | Status: SHIPPED | OUTPATIENT
Start: 2023-12-13

## 2023-12-13 RX ORDER — AMLODIPINE BESYLATE 10 MG/1
TABLET ORAL
Qty: 90 TABLET | Refills: 3 | Status: SHIPPED | OUTPATIENT
Start: 2023-12-13

## 2023-12-13 NOTE — TELEPHONE ENCOUNTER
Future Appointments   Date Time Provider [address]   1/4/2024  8:30 AM Jonathan Ruggiero MD 1081 [facility]. appt 9/14/23

## 2023-12-27 ENCOUNTER — HOSPITAL ENCOUNTER (EMERGENCY)
Age: 58
Discharge: HOME | End: 2023-12-27
Attending: EMERGENCY MEDICINE
Payer: COMMERCIAL

## 2023-12-27 ENCOUNTER — APPOINTMENT (OUTPATIENT)
Dept: GENERAL RADIOLOGY | Age: 58
End: 2023-12-27
Payer: COMMERCIAL

## 2023-12-27 VITALS
BODY MASS INDEX: 46.56 KG/M2 | HEART RATE: 72 BPM | OXYGEN SATURATION: 90 % | TEMPERATURE: 98.3 F | HEIGHT: 64 IN | RESPIRATION RATE: 22 BRPM | SYSTOLIC BLOOD PRESSURE: 156 MMHG | DIASTOLIC BLOOD PRESSURE: 74 MMHG | WEIGHT: 272.71 LBS

## 2023-12-27 DIAGNOSIS — M25.562 CHRONIC PAIN OF LEFT KNEE: Primary | ICD-10-CM

## 2023-12-27 DIAGNOSIS — G89.29 CHRONIC PAIN OF LEFT KNEE: Primary | ICD-10-CM

## 2023-12-27 PROCEDURE — 6370000000 HC RX 637 (ALT 250 FOR IP): Performed by: EMERGENCY MEDICINE

## 2023-12-27 PROCEDURE — 73562 X-RAY EXAM OF KNEE 3: CPT

## 2023-12-27 PROCEDURE — 99284 EMERGENCY DEPT VISIT MOD MDM: CPT

## 2023-12-27 RX ORDER — IPRATROPIUM BROMIDE AND ALBUTEROL SULFATE 2.5; .5 MG/3ML; MG/3ML
1 SOLUTION RESPIRATORY (INHALATION) ONCE
Status: DISCONTINUED | OUTPATIENT
Start: 2023-12-27 | End: 2023-12-27 | Stop reason: HOSPADM

## 2023-12-27 RX ORDER — OXYCODONE HYDROCHLORIDE AND ACETAMINOPHEN 5; 325 MG/1; MG/1
1 TABLET ORAL
Status: COMPLETED | OUTPATIENT
Start: 2023-12-27 | End: 2023-12-27

## 2023-12-27 RX ADMIN — OXYCODONE AND ACETAMINOPHEN 1 TABLET: 5; 325 TABLET ORAL at 17:28

## 2023-12-27 NOTE — ED NOTES
Discharge and education instructions reviewed with patient. Teach-back successful. Pt verbalized understanding. Pt denied questions at this time. No acute distress noted. Patient instructed to follow-up as noted - return to emergency department if symptoms worsen. Patient verbalized understanding. Discharged per EDMD with discharge instructions. Pt discharged to private vehicle. Patient stable upon departure. Thanked patient for choosing [facility]) for care.

## 2023-12-27 NOTE — ED NOTES
Patient states, he has COPD. His o2 sat normally runs between 85-90% on r/a.  He wears oxygen 2-3 liter prn at home

## 2023-12-27 NOTE — ED PROVIDER NOTES
performing a physical exam, bedside monitoring of interventions, collecting and interpreting tests and discussion with consultants but excluding time spent performing procedures, treating other patients and teaching time. EMERGENCY DEPARTMENT COURSE and DIFFERENTIAL DIAGNOSIS/MDM:     Patient seen and evaluated. At presentation, patient was awake, alert, afebrile, hemodynamically stable, and satting 90% on room air. He reports wearing oxygen at home as needed. He was placed on 3 L nasal cannula O2. He was offered breathing treatment, but he declines. Says he feels fine. Reports having chronic left knee pain, but has become worse over the past 2 days. Felt like he could not make it to work today. Denies any injuries. On exam, I did not appreciate overlying erythema, warmth, and has passive range of movements. Given this, low concern for septic arthritis at this time. Slight effusion present. Suspect the pain is due to his chronic arthritis. X-ray obtained. He was given one-time dose of Percocet in the ER. X-ray shows mild osteoarthritic changes in the knee and minimal chondrocalcinosis laterally with no acute bony abnormality. Small suprapatellar effusion present. Discussed this with patient. Discussed following up with orthopedics for further evaluation and treatment. He verbalized understanding was agreeable with plan. Given crutches in the ER. Discharged home with strict return precautions. CONSULTS: (Who and What was discussed)  None    Is this patient to be included in the SEP-1 Core Measure due to severe sepsis or septic shock?    No   Exclusion criteria - the patient is NOT to be included for SEP-1 Core Measure due to:  2+ SIRS criteria are not met       During the patient's ED course, the patient was given:  Medications   ipratropium 0.5 mg-albuterol 2.5 mg (DUONEB) nebulizer solution

## 2023-12-27 NOTE — DISCHARGE INSTRUCTIONS
Please follow up with orthopedics within 1-2 days for further evaluation and treatment. If persistent or worsening symptoms, or if you have any concerns, return to ED immediately.

## 2023-12-28 ENCOUNTER — TELEPHONE (OUTPATIENT)
Dept: ORTHOPEDIC SURGERY | Age: 58
End: 2023-12-28

## 2023-12-28 ENCOUNTER — OFFICE VISIT (OUTPATIENT)
Dept: ORTHOPEDIC SURGERY | Age: 58
End: 2023-12-28

## 2023-12-28 VITALS — RESPIRATION RATE: 16 BRPM | WEIGHT: 272 LBS | BODY MASS INDEX: 46.44 KG/M2 | HEIGHT: 64 IN

## 2023-12-28 DIAGNOSIS — Z72.0 TOBACCO ABUSE: ICD-10-CM

## 2023-12-28 DIAGNOSIS — M25.562 CHRONIC PAIN OF LEFT KNEE: Primary | ICD-10-CM

## 2023-12-28 DIAGNOSIS — G89.29 CHRONIC PAIN OF LEFT KNEE: Primary | ICD-10-CM

## 2023-12-28 DIAGNOSIS — M11.262 CHONDROCALCINOSIS OF LEFT KNEE: ICD-10-CM

## 2023-12-28 RX ORDER — BUPIVACAINE HYDROCHLORIDE 2.5 MG/ML
1 INJECTION, SOLUTION INFILTRATION; PERINEURAL ONCE
Status: COMPLETED | OUTPATIENT
Start: 2023-12-28 | End: 2023-12-28

## 2023-12-28 RX ORDER — TRIAMCINOLONE ACETONIDE 40 MG/ML
40 INJECTION, SUSPENSION INTRA-ARTICULAR; INTRAMUSCULAR ONCE
Status: COMPLETED | OUTPATIENT
Start: 2023-12-28 | End: 2023-12-28

## 2023-12-28 RX ORDER — LIDOCAINE HYDROCHLORIDE 10 MG/ML
1 INJECTION, SOLUTION INFILTRATION; PERINEURAL ONCE
Status: COMPLETED | OUTPATIENT
Start: 2023-12-28 | End: 2023-12-28

## 2023-12-28 RX ADMIN — LIDOCAINE HYDROCHLORIDE 1 ML: 10 INJECTION, SOLUTION INFILTRATION; PERINEURAL at 15:03

## 2023-12-28 RX ADMIN — TRIAMCINOLONE ACETONIDE 40 MG: 40 INJECTION, SUSPENSION INTRA-ARTICULAR; INTRAMUSCULAR at 15:04

## 2023-12-28 RX ADMIN — BUPIVACAINE HYDROCHLORIDE 2.5 MG: 2.5 INJECTION, SOLUTION INFILTRATION; PERINEURAL at 15:03

## 2023-12-28 NOTE — PROGRESS NOTES
DRAIN/INJECT LARGE JOINT/BURSA       Pathoanatomy discussed  He does not have advanced arthritis on his weightbearing radiographs  There is preserved articular height  He denies gout history, but unsure if ever evaluated  Radiographs do show chondrocalcinosis, consistent with pseudogout    I have recommended a course of conservative treatment:  1)  Recommend oral or topical NSAIDs to reduce pain and swelling. 2)  Recommend acetaminophen to reduce pain. 3)  Oral narcotics, including tramadol, result in a significant increase of adverse events and are not recommended. 4)  Recommend weight loss to reduce stresses on the knee, particularly the patellofemoral compartment which sees up to 6x body weight. Weight loss referral given. 5)  Physical therapy referral with transition to home program, focusing on strengthening, low impact aerobic exercises, and neuromuscular education (i.e. balance, agility, coordination). 6)  Intra-articular corticosteroid injections are an option. Corticosteroid injections can be performed every 4 months as needed. 7)  Bracing and cane use can improve pain and function. 8)  Ice therapy and topical patches such as Salonpas are good options as well. Intra-articular knee injection:    Risks and benefits of a steroid injection were discussed with the patient, including the possibility of adverse local site reactions such as dermal atrophy and skin discoloration. Although rare, an infection is possible and may necessitate surgical treatment if it occurs. Finally, a rise in blood sugar levels is anticipated, particularly in diabetics. Diabetic patients were instructed to monitor their blood glucose levels after the injection and to adjust their insulin regimen as appropriate. The patient elected to proceed, and after verbal consent was obtained and drug allergies were reviewed, the injection site was prepped with alcohol and ChloraPrep.   40mg of Kenalog mixed with lidocaine and

## 2023-12-28 NOTE — TELEPHONE ENCOUNTER
Appointment Request     Patient requesting earlier appointment: No TODAY, IF POSSIBLE. Appointment offered to patient: YES - WOULD LIKE TO BE SEEN ASAP  Patient Contact Number: [phone]     Pt WENT TO ER AND IS STILL UNABLE TO WALK ON KNEE AND WAS REFERRED TO ORTHO. Pt WOULD LIKE TO BE SEEN TODAY, IF POSSIBLE. CAN Pt BE ADDED TO 1:15 FU SPOT AS A NEW PT?     PLEASE ADVISE Pt AND HE WILL BE AT THE APPT THIS AFTERNOON.

## 2023-12-29 ENCOUNTER — TELEPHONE (OUTPATIENT)
Dept: INTERNAL MEDICINE CLINIC | Age: 58
End: 2023-12-29

## 2023-12-29 RX ORDER — IBUPROFEN 800 MG/1
800 TABLET ORAL 3 TIMES DAILY PRN
Qty: 90 TABLET | Refills: 1 | Status: SHIPPED | OUTPATIENT
Start: 2023-12-29

## 2023-12-29 NOTE — TELEPHONE ENCOUNTER
Future Appointments   Date Time Provider [address]   1/4/2024  8:30 AM Holly Cohn MD 1081 [facility]. appt 9/14/23

## 2023-12-29 NOTE — TELEPHONE ENCOUNTER
Pt called, has bad knee pain, can't stand or walk on it. Went to the ER and they didn't do anything. Also had a steroid injection yesterday. Said that did not help. Wants to know if a stronger ibuprofen Rx can be sent in.     [place] [facility] [address] [place]    Thank you

## 2024-01-04 ENCOUNTER — OFFICE VISIT (OUTPATIENT)
Dept: INTERNAL MEDICINE CLINIC | Age: 59
End: 2024-01-04
Payer: COMMERCIAL

## 2024-01-04 VITALS
HEART RATE: 58 BPM | TEMPERATURE: 97.1 F | BODY MASS INDEX: 46.35 KG/M2 | SYSTOLIC BLOOD PRESSURE: 113 MMHG | WEIGHT: 270 LBS | DIASTOLIC BLOOD PRESSURE: 54 MMHG | OXYGEN SATURATION: 88 %

## 2024-01-04 DIAGNOSIS — M25.562 ACUTE PAIN OF LEFT KNEE: ICD-10-CM

## 2024-01-04 DIAGNOSIS — J44.9 COPD, SEVERE (HCC): ICD-10-CM

## 2024-01-04 DIAGNOSIS — E11.29 TYPE 2 DIABETES MELLITUS WITH MICROALBUMINURIA, WITHOUT LONG-TERM CURRENT USE OF INSULIN (HCC): Primary | ICD-10-CM

## 2024-01-04 DIAGNOSIS — R80.9 TYPE 2 DIABETES MELLITUS WITH MICROALBUMINURIA, WITHOUT LONG-TERM CURRENT USE OF INSULIN (HCC): Primary | ICD-10-CM

## 2024-01-04 DIAGNOSIS — M25.362 INSTABILITY OF LEFT KNEE JOINT: ICD-10-CM

## 2024-01-04 LAB — HBA1C MFR BLD: 8.8 %

## 2024-01-04 PROCEDURE — 3052F HG A1C>EQUAL 8.0%<EQUAL 9.0%: CPT | Performed by: STUDENT IN AN ORGANIZED HEALTH CARE EDUCATION/TRAINING PROGRAM

## 2024-01-04 PROCEDURE — G8427 DOCREV CUR MEDS BY ELIG CLIN: HCPCS | Performed by: STUDENT IN AN ORGANIZED HEALTH CARE EDUCATION/TRAINING PROGRAM

## 2024-01-04 PROCEDURE — 4004F PT TOBACCO SCREEN RCVD TLK: CPT | Performed by: STUDENT IN AN ORGANIZED HEALTH CARE EDUCATION/TRAINING PROGRAM

## 2024-01-04 PROCEDURE — G8417 CALC BMI ABV UP PARAM F/U: HCPCS | Performed by: STUDENT IN AN ORGANIZED HEALTH CARE EDUCATION/TRAINING PROGRAM

## 2024-01-04 PROCEDURE — 83036 HEMOGLOBIN GLYCOSYLATED A1C: CPT | Performed by: STUDENT IN AN ORGANIZED HEALTH CARE EDUCATION/TRAINING PROGRAM

## 2024-01-04 PROCEDURE — 99213 OFFICE O/P EST LOW 20 MIN: CPT | Performed by: STUDENT IN AN ORGANIZED HEALTH CARE EDUCATION/TRAINING PROGRAM

## 2024-01-04 PROCEDURE — 3074F SYST BP LT 130 MM HG: CPT | Performed by: STUDENT IN AN ORGANIZED HEALTH CARE EDUCATION/TRAINING PROGRAM

## 2024-01-04 PROCEDURE — 3017F COLORECTAL CA SCREEN DOC REV: CPT | Performed by: STUDENT IN AN ORGANIZED HEALTH CARE EDUCATION/TRAINING PROGRAM

## 2024-01-04 PROCEDURE — 2022F DILAT RTA XM EVC RTNOPTHY: CPT | Performed by: STUDENT IN AN ORGANIZED HEALTH CARE EDUCATION/TRAINING PROGRAM

## 2024-01-04 PROCEDURE — G8484 FLU IMMUNIZE NO ADMIN: HCPCS | Performed by: STUDENT IN AN ORGANIZED HEALTH CARE EDUCATION/TRAINING PROGRAM

## 2024-01-04 PROCEDURE — 3078F DIAST BP <80 MM HG: CPT | Performed by: STUDENT IN AN ORGANIZED HEALTH CARE EDUCATION/TRAINING PROGRAM

## 2024-01-04 PROCEDURE — 3023F SPIROM DOC REV: CPT | Performed by: STUDENT IN AN ORGANIZED HEALTH CARE EDUCATION/TRAINING PROGRAM

## 2024-01-04 RX ORDER — TRAMADOL HYDROCHLORIDE 50 MG/1
50 TABLET ORAL EVERY 6 HOURS PRN
Qty: 56 TABLET | Refills: 0 | Status: SHIPPED | OUTPATIENT
Start: 2024-01-04 | End: 2024-01-18

## 2024-01-04 ASSESSMENT — PATIENT HEALTH QUESTIONNAIRE - PHQ9
SUM OF ALL RESPONSES TO PHQ QUESTIONS 1-9: 2
2. FEELING DOWN, DEPRESSED OR HOPELESS: 1
1. LITTLE INTEREST OR PLEASURE IN DOING THINGS: 1
SUM OF ALL RESPONSES TO PHQ9 QUESTIONS 1 & 2: 2

## 2024-01-04 ASSESSMENT — ENCOUNTER SYMPTOMS
CHEST TIGHTNESS: 0
ABDOMINAL PAIN: 0
COUGH: 0
ABDOMINAL DISTENTION: 0
SORE THROAT: 0
BLOOD IN STOOL: 0
BACK PAIN: 0
SHORTNESS OF BREATH: 0

## 2024-01-04 NOTE — ASSESSMENT & PLAN NOTE
A1c elevated today. Discussed dietary modifications  - Continue current regimen  - Cut down on sugars

## 2024-01-20 ENCOUNTER — APPOINTMENT (OUTPATIENT)
Dept: GENERAL RADIOLOGY | Age: 59
End: 2024-01-20
Payer: COMMERCIAL

## 2024-01-20 ENCOUNTER — HOSPITAL ENCOUNTER (EMERGENCY)
Age: 59
Discharge: HOME HEALTH | End: 2024-01-20
Attending: EMERGENCY MEDICINE
Payer: COMMERCIAL

## 2024-01-20 VITALS
RESPIRATION RATE: 15 BRPM | SYSTOLIC BLOOD PRESSURE: 145 MMHG | OXYGEN SATURATION: 91 % | TEMPERATURE: 97.9 F | BODY MASS INDEX: 46.48 KG/M2 | WEIGHT: 272.27 LBS | HEART RATE: 65 BPM | DIASTOLIC BLOOD PRESSURE: 66 MMHG | HEIGHT: 64 IN

## 2024-01-20 DIAGNOSIS — R91.8 PULMONARY INFILTRATES ON CXR: Primary | ICD-10-CM

## 2024-01-20 DIAGNOSIS — Z20.822 LAB TEST NEGATIVE FOR COVID-19 VIRUS: ICD-10-CM

## 2024-01-20 DIAGNOSIS — R06.02 SHORTNESS OF BREATH: ICD-10-CM

## 2024-01-20 DIAGNOSIS — R79.89 ELEVATED BRAIN NATRIURETIC PEPTIDE (BNP) LEVEL: ICD-10-CM

## 2024-01-20 DIAGNOSIS — R79.89 ELEVATED TROPONIN: ICD-10-CM

## 2024-01-20 DIAGNOSIS — R09.02 HYPOXIA: ICD-10-CM

## 2024-01-20 LAB
ANION GAP SERPL CALCULATED.3IONS-SCNC: 12 MMOL/L (ref 3–16)
BASOPHILS # BLD: 0.1 K/UL (ref 0–0.2)
BASOPHILS NFR BLD: 0.5 %
BUN SERPL-MCNC: 33 MG/DL (ref 7–20)
CALCIUM SERPL-MCNC: 9.5 MG/DL (ref 8.3–10.6)
CHLORIDE SERPL-SCNC: 99 MMOL/L (ref 99–110)
CO2 SERPL-SCNC: 27 MMOL/L (ref 21–32)
CREAT SERPL-MCNC: 1.1 MG/DL (ref 0.8–1.3)
DEPRECATED RDW RBC AUTO: 15.6 % (ref 12.4–15.4)
EOSINOPHIL # BLD: 0.2 K/UL (ref 0–0.6)
EOSINOPHIL NFR BLD: 1.6 %
FLUAV RNA UPPER RESP QL NAA+PROBE: NEGATIVE
FLUBV AG NPH QL: NEGATIVE
GFR SERPLBLD CREATININE-BSD FMLA CKD-EPI: >60 ML/MIN/{1.73_M2}
GLUCOSE SERPL-MCNC: 148 MG/DL (ref 70–99)
HCT VFR BLD AUTO: 57.3 % (ref 40.5–52.5)
HGB BLD-MCNC: 19.1 G/DL (ref 13.5–17.5)
LYMPHOCYTES # BLD: 1.7 K/UL (ref 1–5.1)
LYMPHOCYTES NFR BLD: 15.7 %
MCH RBC QN AUTO: 32.6 PG (ref 26–34)
MCHC RBC AUTO-ENTMCNC: 33.4 G/DL (ref 31–36)
MCV RBC AUTO: 97.6 FL (ref 80–100)
MONOCYTES # BLD: 0.9 K/UL (ref 0–1.3)
MONOCYTES NFR BLD: 8.2 %
NEUTROPHILS # BLD: 8 K/UL (ref 1.7–7.7)
NEUTROPHILS NFR BLD: 74 %
NT-PROBNP SERPL-MCNC: 142 PG/ML (ref 0–124)
PLATELET # BLD AUTO: 168 K/UL (ref 135–450)
PMV BLD AUTO: 8.8 FL (ref 5–10.5)
POTASSIUM SERPL-SCNC: 4.3 MMOL/L (ref 3.5–5.1)
RBC # BLD AUTO: 5.87 M/UL (ref 4.2–5.9)
SARS-COV-2 RDRP RESP QL NAA+PROBE: NOT DETECTED
SODIUM SERPL-SCNC: 138 MMOL/L (ref 136–145)
TROPONIN, HIGH SENSITIVITY: 22 NG/L (ref 0–22)
TROPONIN, HIGH SENSITIVITY: 24 NG/L (ref 0–22)
WBC # BLD AUTO: 10.8 K/UL (ref 4–11)

## 2024-01-20 PROCEDURE — 87804 INFLUENZA ASSAY W/OPTIC: CPT

## 2024-01-20 PROCEDURE — 94760 N-INVAS EAR/PLS OXIMETRY 1: CPT

## 2024-01-20 PROCEDURE — 99285 EMERGENCY DEPT VISIT HI MDM: CPT

## 2024-01-20 PROCEDURE — 84484 ASSAY OF TROPONIN QUANT: CPT

## 2024-01-20 PROCEDURE — 87635 SARS-COV-2 COVID-19 AMP PRB: CPT

## 2024-01-20 PROCEDURE — 6370000000 HC RX 637 (ALT 250 FOR IP): Performed by: EMERGENCY MEDICINE

## 2024-01-20 PROCEDURE — 93005 ELECTROCARDIOGRAM TRACING: CPT | Performed by: EMERGENCY MEDICINE

## 2024-01-20 PROCEDURE — 2700000000 HC OXYGEN THERAPY PER DAY

## 2024-01-20 PROCEDURE — 71046 X-RAY EXAM CHEST 2 VIEWS: CPT

## 2024-01-20 PROCEDURE — 94640 AIRWAY INHALATION TREATMENT: CPT

## 2024-01-20 PROCEDURE — 80048 BASIC METABOLIC PNL TOTAL CA: CPT

## 2024-01-20 PROCEDURE — 83880 ASSAY OF NATRIURETIC PEPTIDE: CPT

## 2024-01-20 PROCEDURE — 85025 COMPLETE CBC W/AUTO DIFF WBC: CPT

## 2024-01-20 RX ORDER — METHYLPREDNISOLONE 4 MG/1
4 TABLET ORAL SEE ADMIN INSTRUCTIONS
Qty: 1 KIT | Refills: 0 | Status: SHIPPED | OUTPATIENT
Start: 2024-01-20 | End: 2024-01-26

## 2024-01-20 RX ORDER — IPRATROPIUM BROMIDE AND ALBUTEROL SULFATE 2.5; .5 MG/3ML; MG/3ML
1 SOLUTION RESPIRATORY (INHALATION) ONCE
Status: COMPLETED | OUTPATIENT
Start: 2024-01-20 | End: 2024-01-20

## 2024-01-20 RX ORDER — PREDNISONE 20 MG/1
60 TABLET ORAL ONCE
Status: COMPLETED | OUTPATIENT
Start: 2024-01-20 | End: 2024-01-20

## 2024-01-20 RX ORDER — AZITHROMYCIN 250 MG/1
TABLET, FILM COATED ORAL
Qty: 1 PACKET | Refills: 0 | Status: SHIPPED | OUTPATIENT
Start: 2024-01-20 | End: 2024-01-24

## 2024-01-20 RX ORDER — AZITHROMYCIN 500 MG/1
500 TABLET, FILM COATED ORAL ONCE
Status: COMPLETED | OUTPATIENT
Start: 2024-01-20 | End: 2024-01-20

## 2024-01-20 RX ADMIN — IPRATROPIUM BROMIDE AND ALBUTEROL SULFATE 1 DOSE: 2.5; .5 SOLUTION RESPIRATORY (INHALATION) at 08:05

## 2024-01-20 RX ADMIN — AZITHROMYCIN 500 MG: 500 TABLET, FILM COATED ORAL at 10:45

## 2024-01-20 RX ADMIN — IPRATROPIUM BROMIDE AND ALBUTEROL SULFATE 1 DOSE: 2.5; .5 SOLUTION RESPIRATORY (INHALATION) at 10:18

## 2024-01-20 RX ADMIN — NITROGLYCERIN 1 INCH: 20 OINTMENT TOPICAL at 10:14

## 2024-01-20 RX ADMIN — PREDNISONE 60 MG: 20 TABLET ORAL at 08:13

## 2024-01-20 ASSESSMENT — PAIN SCALES - GENERAL: PAINLEVEL_OUTOF10: 0

## 2024-01-20 ASSESSMENT — LIFESTYLE VARIABLES
HOW MANY STANDARD DRINKS CONTAINING ALCOHOL DO YOU HAVE ON A TYPICAL DAY: PATIENT DOES NOT DRINK
HOW OFTEN DO YOU HAVE A DRINK CONTAINING ALCOHOL: NEVER

## 2024-01-20 ASSESSMENT — PAIN - FUNCTIONAL ASSESSMENT: PAIN_FUNCTIONAL_ASSESSMENT: 0-10

## 2024-01-20 NOTE — ED NOTES
Discharge and education instructions reviewed. Patient verbalized understanding, teach-back successful. Patient denied questions at this time. No acute distress noted. Patient instructed to follow-up as noted - return to emergency department if symptoms worsen. Patient verbalized understanding. Discharged per EDMD with discharge instructions.

## 2024-01-20 NOTE — ED PROVIDER NOTES
[facility] EMERGENCY DEPARTMENT  EMERGENCY DEPARTMENT ENCOUNTER      Pt Name: Alvarado Sibley  MRN: [id number]  Birthdate 1965  Date of evaluation: 1/20/2024  Provider: ABISAI NY DO    CHIEF COMPLAINT  History given by: PATIENT   Chief Complaint   Patient presents with    COPD     Has a cold x1 week, w/ a cough and congestions and states it is exacerbating his COPD. Was here this morning for and MRI and they recommended him to come over for breathing TX. But hasn't been using his at home TX because he states he doesn't need them.75% RA in triage states he leaving is 88-90% at baseline.         This patient is at risk for a communicable infection.  Therefore, personal protection equipment consisting of a mask was worn for the exam.    HPI  Alvarado Sibley is a 59 y.o. male who presents with increasing shortness of breath and cough over the past week.  He was sent for an MRI and cannot lay still for the MRI so they sent him to the emergency department for treatment to see if he can get back to MRI.  They state he can come back if we were able to treat him and he could lay still.  Denies any fevers or chills.  Denies exposure to COVID.  He lives by himself.  He states no one at work has been sick.  Denies any fevers.  Been coughing up a \"phlegm that he describes as thick white.  He has got worse over the past week.  He states he has had a cold.  He has had a mild sore throat.  He denies earache.  Exertion makes it worse and rest makes it better.  He describes it as moderate  ?  REVIEW OF SYSTEMS  All systems negative except as marked.  Reviewed Nurses' notes and concur.    No LMP for male patient.    PAST MEDICAL HISTORY  Past Medical History:   Diagnosis Date    Acute on chronic diastolic congestive heart failure (HCC)     ADHD (attention deficit hyperactivity disorder)     pt denies    Adrenal nodule (HCC) 12/07/2017    low-density nodular enlargement of a left adrenal gland which is stable

## 2024-01-20 NOTE — DISCHARGE INSTRUCTIONS
You may take Tylenol (acetaminophen) and/or Motrin (ibuprofen) with the medications that are prescribed for you, if you are permitted to take these medications. Please follow package directions for the appropriate dosing and frequency.     Please continue your present treatments and medications until you receive further instructions from your healthcare professional. Take all your medications as prescribed unless a healthcare professional instructs you to do otherwise.

## 2024-01-20 NOTE — ED TRIAGE NOTES
Patient arrived to the ED ambulatory from home w/ complaints of COPD.     Patient/EMS reports states Has a cold x1 week, w/ a cough and congestions and states it is exacerbating his COPD. Was here this morning for and MRI and they recommended him to come over for breathing TX. But hasn't been using his at home TX because he states he doesn't need them.75% RA in triage states he leaving is 88-90% at baseline.    Patient A&O x 4, VSS,

## 2024-01-21 LAB
EKG ATRIAL RATE: 64 BPM
EKG DIAGNOSIS: NORMAL
EKG P AXIS: 48 DEGREES
EKG P-R INTERVAL: 206 MS
EKG Q-T INTERVAL: 420 MS
EKG QRS DURATION: 82 MS
EKG QTC CALCULATION (BAZETT): 433 MS
EKG R AXIS: -2 DEGREES
EKG T AXIS: 54 DEGREES
EKG VENTRICULAR RATE: 64 BPM

## 2024-01-21 PROCEDURE — 93010 ELECTROCARDIOGRAM REPORT: CPT | Performed by: INTERNAL MEDICINE

## 2024-02-02 ENCOUNTER — TELEPHONE (OUTPATIENT)
Dept: INTERNAL MEDICINE CLINIC | Age: 59
End: 2024-02-02

## 2024-02-02 NOTE — TELEPHONE ENCOUNTER
Pt called, his Trelegy is going to cost him $300 for a 90 day supply, that is too expensive for him. Is there something else he can take?    Please advise    Thank you

## 2024-02-06 ENCOUNTER — TELEPHONE (OUTPATIENT)
Dept: INTERNAL MEDICINE CLINIC | Age: 59
End: 2024-02-06

## 2024-02-06 NOTE — TELEPHONE ENCOUNTER
Pt called again and stated his Trelegy is going to cost him $300 for a 90 day supply, that is too expensive for him. He stated that he has talked to his insurance and they refused to budge.  Is there something else he can take or could you give him a samples.?    He would like a call back tomorrow before he goes to work at 3pm. He does not have a VM that is set up.     Thank you

## 2024-02-07 NOTE — TELEPHONE ENCOUNTER
Attempted to call pt before work as requested. Unable to leave vm to let him know samples are ready.

## 2024-02-07 NOTE — TELEPHONE ENCOUNTER
Samples left up front. I will call insurance and see what inhaler would be better covered. Please get some samples for now

## 2024-02-14 RX ORDER — METFORMIN HYDROCHLORIDE 500 MG/1
500 TABLET, EXTENDED RELEASE ORAL
Qty: 90 TABLET | Refills: 3 | Status: SHIPPED | OUTPATIENT
Start: 2024-02-14

## 2024-02-14 NOTE — TELEPHONE ENCOUNTER
Future Appointments   Date Time Provider Department Center   4/4/2024  9:15 AM Oscar Medina MD [facility] - [facility]         Last appt 1/4/24

## 2024-02-29 ENCOUNTER — APPOINTMENT (OUTPATIENT)
Dept: GENERAL RADIOLOGY | Age: 59
End: 2024-02-29
Payer: COMMERCIAL

## 2024-02-29 ENCOUNTER — HOSPITAL ENCOUNTER (INPATIENT)
Age: 59
LOS: 13 days | Discharge: HOME | End: 2024-03-13
Attending: EMERGENCY MEDICINE | Admitting: STUDENT IN AN ORGANIZED HEALTH CARE EDUCATION/TRAINING PROGRAM
Payer: COMMERCIAL

## 2024-02-29 DIAGNOSIS — J96.01 ACUTE RESPIRATORY FAILURE WITH HYPOXIA (HCC): Primary | ICD-10-CM

## 2024-02-29 DIAGNOSIS — Z71.89 GOALS OF CARE, COUNSELING/DISCUSSION: ICD-10-CM

## 2024-02-29 DIAGNOSIS — I10 ESSENTIAL HYPERTENSION: ICD-10-CM

## 2024-02-29 DIAGNOSIS — I50.9 ACUTE ON CHRONIC CONGESTIVE HEART FAILURE, UNSPECIFIED HEART FAILURE TYPE (HCC): ICD-10-CM

## 2024-02-29 PROBLEM — I50.33 ACUTE ON CHRONIC HEART FAILURE WITH PRESERVED EJECTION FRACTION (HFPEF) (HCC): Status: ACTIVE | Noted: 2024-02-29

## 2024-02-29 LAB
ALBUMIN SERPL-MCNC: 3.6 G/DL (ref 3.4–5)
ALBUMIN/GLOB SERPL: 0.9 {RATIO} (ref 1.1–2.2)
ALP SERPL-CCNC: 131 U/L (ref 40–129)
ALT SERPL-CCNC: 10 U/L (ref 10–40)
ANION GAP SERPL CALCULATED.3IONS-SCNC: 8 MMOL/L (ref 3–16)
AST SERPL-CCNC: 13 U/L (ref 15–37)
BASOPHILS # BLD: 0 K/UL (ref 0–0.2)
BASOPHILS NFR BLD: 0.5 %
BILIRUB SERPL-MCNC: 0.6 MG/DL (ref 0–1)
BUN SERPL-MCNC: 32 MG/DL (ref 7–20)
CALCIUM SERPL-MCNC: 8.9 MG/DL (ref 8.3–10.6)
CHLORIDE SERPL-SCNC: 100 MMOL/L (ref 99–110)
CO2 SERPL-SCNC: 29 MMOL/L (ref 21–32)
CREAT SERPL-MCNC: 1.2 MG/DL (ref 0.8–1.3)
DEPRECATED RDW RBC AUTO: 16 % (ref 12.4–15.4)
EOSINOPHIL # BLD: 0.1 K/UL (ref 0–0.6)
EOSINOPHIL NFR BLD: 0.7 %
FLUAV RNA UPPER RESP QL NAA+PROBE: NEGATIVE
FLUBV AG NPH QL: NEGATIVE
GFR SERPLBLD CREATININE-BSD FMLA CKD-EPI: >60 ML/MIN/{1.73_M2}
GLUCOSE SERPL-MCNC: 225 MG/DL (ref 70–99)
HCT VFR BLD AUTO: 54.4 % (ref 40.5–52.5)
HGB BLD-MCNC: 18.1 G/DL (ref 13.5–17.5)
LYMPHOCYTES # BLD: 1.3 K/UL (ref 1–5.1)
LYMPHOCYTES NFR BLD: 14 %
MCH RBC QN AUTO: 33 PG (ref 26–34)
MCHC RBC AUTO-ENTMCNC: 33.2 G/DL (ref 31–36)
MCV RBC AUTO: 99.4 FL (ref 80–100)
MONOCYTES # BLD: 0.8 K/UL (ref 0–1.3)
MONOCYTES NFR BLD: 8.5 %
NEUTROPHILS # BLD: 6.8 K/UL (ref 1.7–7.7)
NEUTROPHILS NFR BLD: 76.3 %
NT-PROBNP SERPL-MCNC: 113 PG/ML (ref 0–124)
PLATELET # BLD AUTO: 180 K/UL (ref 135–450)
PMV BLD AUTO: 8.8 FL (ref 5–10.5)
POTASSIUM SERPL-SCNC: 4.3 MMOL/L (ref 3.5–5.1)
PROT SERPL-MCNC: 7.5 G/DL (ref 6.4–8.2)
RBC # BLD AUTO: 5.47 M/UL (ref 4.2–5.9)
SARS-COV-2 RDRP RESP QL NAA+PROBE: NOT DETECTED
SODIUM SERPL-SCNC: 137 MMOL/L (ref 136–145)
TROPONIN, HIGH SENSITIVITY: 22 NG/L (ref 0–22)
TROPONIN, HIGH SENSITIVITY: 23 NG/L (ref 0–22)
TROPONIN, HIGH SENSITIVITY: 27 NG/L (ref 0–22)
WBC # BLD AUTO: 9 K/UL (ref 4–11)

## 2024-02-29 PROCEDURE — 87635 SARS-COV-2 COVID-19 AMP PRB: CPT

## 2024-02-29 PROCEDURE — 36415 COLL VENOUS BLD VENIPUNCTURE: CPT

## 2024-02-29 PROCEDURE — 93005 ELECTROCARDIOGRAM TRACING: CPT | Performed by: EMERGENCY MEDICINE

## 2024-02-29 PROCEDURE — 99285 EMERGENCY DEPT VISIT HI MDM: CPT

## 2024-02-29 PROCEDURE — 71045 X-RAY EXAM CHEST 1 VIEW: CPT

## 2024-02-29 PROCEDURE — 84484 ASSAY OF TROPONIN QUANT: CPT

## 2024-02-29 PROCEDURE — 6360000002 HC RX W HCPCS: Performed by: EMERGENCY MEDICINE

## 2024-02-29 PROCEDURE — 6370000000 HC RX 637 (ALT 250 FOR IP): Performed by: PHYSICIAN ASSISTANT

## 2024-02-29 PROCEDURE — 96374 THER/PROPH/DIAG INJ IV PUSH: CPT

## 2024-02-29 PROCEDURE — 83880 ASSAY OF NATRIURETIC PEPTIDE: CPT

## 2024-02-29 PROCEDURE — 87804 INFLUENZA ASSAY W/OPTIC: CPT

## 2024-02-29 PROCEDURE — 1200000000 HC SEMI PRIVATE

## 2024-02-29 PROCEDURE — 85025 COMPLETE CBC W/AUTO DIFF WBC: CPT

## 2024-02-29 PROCEDURE — 80053 COMPREHEN METABOLIC PANEL: CPT

## 2024-02-29 RX ORDER — LISINOPRIL 40 MG/1
40 TABLET ORAL NIGHTLY
Status: DISCONTINUED | OUTPATIENT
Start: 2024-02-29 | End: 2024-03-13 | Stop reason: HOSPADM

## 2024-02-29 RX ORDER — SODIUM CHLORIDE 0.9 % (FLUSH) 0.9 %
5-40 SYRINGE (ML) INJECTION PRN
Status: DISCONTINUED | OUTPATIENT
Start: 2024-02-29 | End: 2024-03-12

## 2024-02-29 RX ORDER — SODIUM CHLORIDE 0.9 % (FLUSH) 0.9 %
5-40 SYRINGE (ML) INJECTION EVERY 12 HOURS SCHEDULED
Status: DISCONTINUED | OUTPATIENT
Start: 2024-02-29 | End: 2024-03-12

## 2024-02-29 RX ORDER — AMLODIPINE BESYLATE 10 MG/1
10 TABLET ORAL NIGHTLY
Status: DISCONTINUED | OUTPATIENT
Start: 2024-02-29 | End: 2024-03-13 | Stop reason: HOSPADM

## 2024-02-29 RX ORDER — INSULIN LISPRO 100 [IU]/ML
0-4 INJECTION, SOLUTION INTRAVENOUS; SUBCUTANEOUS NIGHTLY
Status: DISCONTINUED | OUTPATIENT
Start: 2024-02-29 | End: 2024-03-02 | Stop reason: SDUPTHER

## 2024-02-29 RX ORDER — AZITHROMYCIN 500 MG/1
500 TABLET, FILM COATED ORAL DAILY
Status: COMPLETED | OUTPATIENT
Start: 2024-02-29 | End: 2024-03-02

## 2024-02-29 RX ORDER — IPRATROPIUM BROMIDE AND ALBUTEROL SULFATE 2.5; .5 MG/3ML; MG/3ML
1 SOLUTION RESPIRATORY (INHALATION)
Status: DISCONTINUED | OUTPATIENT
Start: 2024-03-01 | End: 2024-03-04

## 2024-02-29 RX ORDER — SPIRONOLACTONE 25 MG/1
25 TABLET ORAL NIGHTLY
Status: DISCONTINUED | OUTPATIENT
Start: 2024-02-29 | End: 2024-03-13 | Stop reason: HOSPADM

## 2024-02-29 RX ORDER — ACETAMINOPHEN 500 MG
1000 TABLET ORAL 3 TIMES DAILY PRN
Status: DISCONTINUED | OUTPATIENT
Start: 2024-02-29 | End: 2024-03-13 | Stop reason: HOSPADM

## 2024-02-29 RX ORDER — FUROSEMIDE 10 MG/ML
80 INJECTION INTRAMUSCULAR; INTRAVENOUS ONCE
Status: COMPLETED | OUTPATIENT
Start: 2024-02-29 | End: 2024-02-29

## 2024-02-29 RX ORDER — ENOXAPARIN SODIUM 100 MG/ML
30 INJECTION SUBCUTANEOUS 2 TIMES DAILY
Status: DISCONTINUED | OUTPATIENT
Start: 2024-02-29 | End: 2024-03-13 | Stop reason: HOSPADM

## 2024-02-29 RX ORDER — PREDNISONE 20 MG/1
40 TABLET ORAL DAILY
Status: DISCONTINUED | OUTPATIENT
Start: 2024-02-29 | End: 2024-03-03

## 2024-02-29 RX ORDER — POLYETHYLENE GLYCOL 3350 17 G/17G
17 POWDER, FOR SOLUTION ORAL DAILY PRN
Status: DISCONTINUED | OUTPATIENT
Start: 2024-02-29 | End: 2024-03-13 | Stop reason: HOSPADM

## 2024-02-29 RX ORDER — DEXTROSE MONOHYDRATE 100 MG/ML
INJECTION, SOLUTION INTRAVENOUS CONTINUOUS PRN
Status: DISCONTINUED | OUTPATIENT
Start: 2024-02-29 | End: 2024-03-13 | Stop reason: HOSPADM

## 2024-02-29 RX ORDER — ASPIRIN 81 MG/1
324 TABLET, CHEWABLE ORAL ONCE
Status: COMPLETED | OUTPATIENT
Start: 2024-02-29 | End: 2024-02-29

## 2024-02-29 RX ORDER — INSULIN LISPRO 100 [IU]/ML
0-4 INJECTION, SOLUTION INTRAVENOUS; SUBCUTANEOUS
Status: DISCONTINUED | OUTPATIENT
Start: 2024-03-01 | End: 2024-03-02

## 2024-02-29 RX ORDER — GLUCAGON 1 MG/ML
1 KIT INJECTION PRN
Status: DISCONTINUED | OUTPATIENT
Start: 2024-02-29 | End: 2024-03-13 | Stop reason: HOSPADM

## 2024-02-29 RX ORDER — ATORVASTATIN CALCIUM 40 MG/1
40 TABLET, FILM COATED ORAL NIGHTLY
Status: DISCONTINUED | OUTPATIENT
Start: 2024-02-29 | End: 2024-03-13 | Stop reason: HOSPADM

## 2024-02-29 RX ORDER — CARVEDILOL 25 MG/1
25 TABLET ORAL 2 TIMES DAILY WITH MEALS
Status: DISCONTINUED | OUTPATIENT
Start: 2024-02-29 | End: 2024-03-05

## 2024-02-29 RX ORDER — SODIUM CHLORIDE 9 MG/ML
INJECTION, SOLUTION INTRAVENOUS PRN
Status: DISCONTINUED | OUTPATIENT
Start: 2024-02-29 | End: 2024-03-12

## 2024-02-29 RX ADMIN — ASPIRIN 324 MG: 81 TABLET, CHEWABLE ORAL at 19:27

## 2024-02-29 RX ADMIN — FUROSEMIDE 80 MG: 10 INJECTION, SOLUTION INTRAMUSCULAR; INTRAVENOUS at 17:02

## 2024-02-29 NOTE — ED PROVIDER NOTES
[facility] EMERGENCY DEPARTMENT  EMERGENCY DEPARTMENT ENCOUNTER      Pt Name: Alvarado Sibley  MRN: [id number]  Birthdate 1965  Date of evaluation: 2/29/2024  Provider: Bebe Little MD  PCP: Oscar Medina MD  Note Started: 4:43 PM EST 2/29/24    ED Attending Attestation Note     CHIEF COMPLAINT       Chief Complaint   Patient presents with    Shortness of Breath     Pt reports hx of CHF and COPD. Pt reports on diuretics and has been taking as prescribed. Pt reports BLE swelling and SOB. Pt is 77% on RA. Pt reports on 2L O2 at home as needed. Pt placed on 4l n/c in triage and increased to 89%     EMERGENCY DEPARTMENT COURSE and DIFFERENTIAL DIAGNOSIS/MDM:      This patient was seen by the advance practice provider.  In addition to the SCARLETT I personally saw Alvarado Sibley and made/approved the management plan. I take responsibility for the patient management.     Briefly, this is a 59 y.o. male who presents to the emergency department 2/2 concern for sob. On arrival he was cyanotic and noted to be 77% on room air, he was immediately placed on oxygen with improvement to 90s (4L NC). He has been having BLE edema for one week associated with the sob. He knows it is his heart failure and has been trying to manage at home with increasing his Lasix from 20 to 40 mg twice a day to 40 mg twice a day, not sure for exactly how long he has been doing this.      On exam he is awake, alert, oriented.  At the time of my exam he has already received his Lasix.  He states he has urinated significantly and he is feeling a lot better.  We discussed concerns for heart failure exacerbation more than COPD since his lung sounds are overall reassuring.  Feel it is more likely fluid overload which caused his low oxygen.  Discussed treatment and recommendation for admission which she was in agreement with.  Discussed goals of care, he is a full code.    I did discuss the case with Dr. Bailey who is on-call for Dr. Medina.    Exclusion criteria - the patient is NOT to be included for SEP-1 Core Measure due to:  2+ SIRS criteria are not met    Past medical, social, surgical, family history:    has a past medical history of Acute on chronic diastolic congestive heart failure (HCC), ADHD (attention deficit hyperactivity disorder), Adrenal nodule (HCC), COPD (chronic obstructive pulmonary disease) (HCC), Emphysema of lung (HCC), Essential hypertension, History of pneumothorax, Hx of colonic polyps, Hx of renal calculi, Hypertension, Kidney stone, Neuropathy, Obesity, Osteoarthritis, Pneumothorax, Prediabetes, Retinal hemorrhage, Sleep apnea, Substance abuse (HCC), and Type 2 diabetes mellitus without complication (HCC).     Social History     Socioeconomic History    Marital status:      Spouse name: None    Number of children: None    Years of education: None    Highest education level: None   Tobacco Use    Smoking status: Every Day     Current packs/day: 1.00     Average packs/day: 1 pack/day for 50.2 years (50.2 ttl pk-yrs)     Types: Cigarettes     Start date: 1/1/1974    Smokeless tobacco: Never   Vaping Use    Vaping Use: Never used   Substance and Sexual Activity    Alcohol use: No    Drug use: No    Sexual activity: Yes     Partners: Female     Comment: wife     Social Determinants of Health     Financial Resource Strain: Low Risk  (3/6/2023)    Overall Financial Resource Strain (CARDIA)     Difficulty of Paying Living Expenses: Not hard at all   Transportation Needs: Unknown (3/6/2023)    PRAPARE - Transportation     Lack of Transportation (Non-Medical): No   Housing Stability: Unknown (3/6/2023)    Housing Stability Vital Sign     Unstable Housing in the Last Year: No       Past Surgical History:   Procedure Laterality Date    CARPAL TUNNEL RELEASE Right     COLONOSCOPY  01/18/2018    Adelaida, x2 polyps    COLONOSCOPY N/A 8/21/2023    COLONOSCOPY performed by Emerson Horne MD at [facility] ENDOSCOPY    KNEE SURGERY

## 2024-02-29 NOTE — ED PROVIDER NOTES
[facility] EMERGENCY DEPARTMENT  EMERGENCY DEPARTMENT ENCOUNTER        Pt Name: Alvarado Sibley  MRN: [id number]  Birthdate 1965  Date of evaluation: 2/29/2024  Provider: Brenda Gustafson PA-C  PCP: Oscar Medina MD  Note Started: 4:41 PM EST 2/29/24       I have seen and evaluated this patient with my supervising physician Bebe Little MD.      CHIEF COMPLAINT       Chief Complaint   Patient presents with    Shortness of Breath     Pt reports hx of CHF and COPD. Pt reports on diuretics and has been taking as prescribed. Pt reports BLE swelling and SOB. Pt is 77% on RA. Pt reports on 2L O2 at home as needed. Pt placed on 4l n/c in triage and increased to 89%       HISTORY OF PRESENT ILLNESS: 1 or more Elements     History From: patient    Alvarado Sibley is a 59 y.o. male who presents for shortness of breath and bilateral lower extremity edema for the past 1 week.  He thinks he is having a CHF exacerbation.  Has been trying to manage at home with his Lasix.  Typically takes 1 to 2  20 mg Lasix twice daily.  Recently has been taking 2 twice a day and it is not helping.  He has not weighed himself recently.  He wears 2 L nasal cannula as needed at home.  Has not been using it more frequently.  He still works. Denies chest pain.  Has chronic cough productive of a clear sputum that  is unchanged.  Denies fever, nausea vomiting,.  History of COPD, diabetes, hypertension, atrial flutter on Xarelto, CHF, COPD, hypertension, hyperlipidemia, current smoker pack a day for 50 years, history of MI    Nursing Notes were reviewed and agreed with or any disagreements were addressed in the HPI.    REVIEW OF SYSTEMS :      Review of Systems    Positives and Pertinent negatives as per HPI.     SURGICAL HISTORY     Past Surgical History:   Procedure Laterality Date    CARPAL TUNNEL RELEASE Right     COLONOSCOPY  01/18/2018    [place], x2 polyps    COLONOSCOPY N/A 8/21/2023    COLONOSCOPY performed by  Emerson Horne MD at [facility] ENDOSCOPY    KNEE SURGERY Left     arthroscopic       CURRENTMEDICATIONS       Previous Medications    ALBUTEROL (ACCUNEB) 1.25 MG/3ML NEBULIZER SOLUTION    Inhale 3 mLs into the lungs every 6 hours as needed for Wheezing    ALBUTEROL SULFATE HFA (PROVENTIL;VENTOLIN;PROAIR) 108 (90 BASE) MCG/ACT INHALER    Inhale 2 puffs into the lungs every 6 hours as needed for Wheezing    AMLODIPINE (NORVASC) 10 MG TABLET    TAKE 1 TABLET BY MOUTH EVERY DAY    ATORVASTATIN (LIPITOR) 40 MG TABLET    Take 1 tablet by mouth daily To replace simvastatin    CARVEDILOL (COREG) 25 MG TABLET    TAKE 1 TABLET BY MOUTH TWICE A DAY FOR BLOOD PRESSURE    EMPAGLIFLOZIN (JARDIANCE) 25 MG TABLET    Take 1 tablet by mouth daily To replace 10 mg    FLUTICASONE (FLONASE) 50 MCG/ACT NASAL SPRAY    SPRAY 1 SPRAY INTO EACH NOSTRIL EVERY DAY  Strength: 50 MCG/ACT    FLUTICASONE-UMECLIDIN-VILANT (TRELEGY ELLIPTA) 200-62.5-25 MCG/ACT AEPB INHALER    Inhale 1 puff into the lungs daily    FUROSEMIDE (LASIX) 20 MG TABLET    TAKE 1 TO 2 TABLETS BY MOUTH TWICE A DAY    GLIPIZIDE (GLUCOTROL XL) 5 MG EXTENDED RELEASE TABLET    TAKE 2 TABLETS BY MOUTH EVERY DAY FOR DIABETES    IBUPROFEN (ADVIL;MOTRIN) 800 MG TABLET    Take 1 tablet by mouth 3 times daily as needed for Pain    INSULIN PEN NEEDLE 31G X 5 MM MISC    1 each by Does not apply route daily    LISINOPRIL (PRINIVIL;ZESTRIL) 40 MG TABLET    TAKE 1 TABLET BY MOUTH EVERY DAY    METFORMIN (GLUCOPHAGE-XR) 500 MG EXTENDED RELEASE TABLET    TAKE 1 TABLET BY MOUTH EVERY DAY WITH BREAKFAST    SPIRONOLACTONE (ALDACTONE) 25 MG TABLET    TAKE 1 TABLET BY MOUTH EVERY DAY       ALLERGIES     Guaifenesin, Dextromethorphan-guaifenesin, and Aspirin    FAMILYHISTORY       Family History   Problem Relation Age of Onset    Diabetes Mother     Cancer Mother     Cancer Father     No Known Problems Sister     Diabetes Brother     No Known Problems Brother     No Known Problems Brother     No

## 2024-03-01 LAB
ALBUMIN SERPL-MCNC: 3.8 G/DL (ref 3.4–5)
ALP SERPL-CCNC: 138 U/L (ref 40–129)
ALT SERPL-CCNC: 11 U/L (ref 10–40)
ANION GAP SERPL CALCULATED.3IONS-SCNC: 12 MMOL/L (ref 3–16)
AST SERPL-CCNC: 11 U/L (ref 15–37)
BASOPHILS # BLD: 0 K/UL (ref 0–0.2)
BASOPHILS NFR BLD: 0.2 %
BILIRUB DIRECT SERPL-MCNC: <0.2 MG/DL (ref 0–0.3)
BILIRUB INDIRECT SERPL-MCNC: ABNORMAL MG/DL (ref 0–1)
BILIRUB SERPL-MCNC: 0.6 MG/DL (ref 0–1)
BUN SERPL-MCNC: 37 MG/DL (ref 7–20)
CALCIUM SERPL-MCNC: 9 MG/DL (ref 8.3–10.6)
CHLORIDE SERPL-SCNC: 98 MMOL/L (ref 99–110)
CO2 SERPL-SCNC: 28 MMOL/L (ref 21–32)
CREAT SERPL-MCNC: 1.3 MG/DL (ref 0.8–1.3)
DEPRECATED RDW RBC AUTO: 16 % (ref 12.4–15.4)
EKG ATRIAL RATE: 75 BPM
EKG DIAGNOSIS: NORMAL
EKG P AXIS: 53 DEGREES
EKG P-R INTERVAL: 206 MS
EKG Q-T INTERVAL: 380 MS
EKG QRS DURATION: 84 MS
EKG QTC CALCULATION (BAZETT): 424 MS
EKG R AXIS: 26 DEGREES
EKG T AXIS: 42 DEGREES
EKG VENTRICULAR RATE: 75 BPM
EOSINOPHIL # BLD: 0 K/UL (ref 0–0.6)
EOSINOPHIL NFR BLD: 0 %
GFR SERPLBLD CREATININE-BSD FMLA CKD-EPI: >60 ML/MIN/{1.73_M2}
GLUCOSE BLD-MCNC: 114 MG/DL (ref 70–99)
GLUCOSE BLD-MCNC: 226 MG/DL (ref 70–99)
GLUCOSE BLD-MCNC: 231 MG/DL (ref 70–99)
GLUCOSE BLD-MCNC: 273 MG/DL (ref 70–99)
GLUCOSE BLD-MCNC: 338 MG/DL (ref 70–99)
GLUCOSE SERPL-MCNC: 233 MG/DL (ref 70–99)
HCT VFR BLD AUTO: 56 % (ref 40.5–52.5)
HGB BLD-MCNC: 18.4 G/DL (ref 13.5–17.5)
LYMPHOCYTES # BLD: 0.7 K/UL (ref 1–5.1)
LYMPHOCYTES NFR BLD: 6.4 %
MCH RBC QN AUTO: 32.8 PG (ref 26–34)
MCHC RBC AUTO-ENTMCNC: 32.9 G/DL (ref 31–36)
MCV RBC AUTO: 99.5 FL (ref 80–100)
MONOCYTES # BLD: 0.1 K/UL (ref 0–1.3)
MONOCYTES NFR BLD: 1.4 %
NEUTROPHILS # BLD: 9.6 K/UL (ref 1.7–7.7)
NEUTROPHILS NFR BLD: 92 %
PERFORMED ON: ABNORMAL
PLATELET # BLD AUTO: 198 K/UL (ref 135–450)
PMV BLD AUTO: 8.8 FL (ref 5–10.5)
POTASSIUM SERPL-SCNC: 4.8 MMOL/L (ref 3.5–5.1)
PROT SERPL-MCNC: 8.1 G/DL (ref 6.4–8.2)
RBC # BLD AUTO: 5.63 M/UL (ref 4.2–5.9)
SODIUM SERPL-SCNC: 138 MMOL/L (ref 136–145)
TROPONIN, HIGH SENSITIVITY: 28 NG/L (ref 0–22)
WBC # BLD AUTO: 10.4 K/UL (ref 4–11)

## 2024-03-01 PROCEDURE — 84484 ASSAY OF TROPONIN QUANT: CPT

## 2024-03-01 PROCEDURE — 80076 HEPATIC FUNCTION PANEL: CPT

## 2024-03-01 PROCEDURE — 99223 1ST HOSP IP/OBS HIGH 75: CPT | Performed by: STUDENT IN AN ORGANIZED HEALTH CARE EDUCATION/TRAINING PROGRAM

## 2024-03-01 PROCEDURE — 6370000000 HC RX 637 (ALT 250 FOR IP): Performed by: INTERNAL MEDICINE

## 2024-03-01 PROCEDURE — 97535 SELF CARE MNGMENT TRAINING: CPT

## 2024-03-01 PROCEDURE — 2580000003 HC RX 258: Performed by: INTERNAL MEDICINE

## 2024-03-01 PROCEDURE — 94640 AIRWAY INHALATION TREATMENT: CPT

## 2024-03-01 PROCEDURE — 85025 COMPLETE CBC W/AUTO DIFF WBC: CPT

## 2024-03-01 PROCEDURE — 6360000002 HC RX W HCPCS: Performed by: INTERNAL MEDICINE

## 2024-03-01 PROCEDURE — 36415 COLL VENOUS BLD VENIPUNCTURE: CPT

## 2024-03-01 PROCEDURE — 1200000000 HC SEMI PRIVATE

## 2024-03-01 PROCEDURE — 6360000004 HC RX CONTRAST MEDICATION: Performed by: INTERNAL MEDICINE

## 2024-03-01 PROCEDURE — 6370000000 HC RX 637 (ALT 250 FOR IP): Performed by: STUDENT IN AN ORGANIZED HEALTH CARE EDUCATION/TRAINING PROGRAM

## 2024-03-01 PROCEDURE — 93010 ELECTROCARDIOGRAM REPORT: CPT | Performed by: INTERNAL MEDICINE

## 2024-03-01 PROCEDURE — C8929 TTE W OR WO FOL WCON,DOPPLER: HCPCS

## 2024-03-01 PROCEDURE — 97530 THERAPEUTIC ACTIVITIES: CPT

## 2024-03-01 PROCEDURE — 80048 BASIC METABOLIC PNL TOTAL CA: CPT

## 2024-03-01 PROCEDURE — 94761 N-INVAS EAR/PLS OXIMETRY MLT: CPT

## 2024-03-01 PROCEDURE — 2700000000 HC OXYGEN THERAPY PER DAY

## 2024-03-01 PROCEDURE — 97165 OT EVAL LOW COMPLEX 30 MIN: CPT

## 2024-03-01 PROCEDURE — 6360000002 HC RX W HCPCS: Performed by: STUDENT IN AN ORGANIZED HEALTH CARE EDUCATION/TRAINING PROGRAM

## 2024-03-01 RX ORDER — FUROSEMIDE 10 MG/ML
80 INJECTION INTRAMUSCULAR; INTRAVENOUS ONCE
Status: COMPLETED | OUTPATIENT
Start: 2024-03-01 | End: 2024-03-01

## 2024-03-01 RX ADMIN — IPRATROPIUM BROMIDE AND ALBUTEROL SULFATE 1 DOSE: 2.5; .5 SOLUTION RESPIRATORY (INHALATION) at 07:46

## 2024-03-01 RX ADMIN — EMPAGLIFLOZIN 25 MG: 25 TABLET, FILM COATED ORAL at 22:03

## 2024-03-01 RX ADMIN — ENOXAPARIN SODIUM 30 MG: 100 INJECTION SUBCUTANEOUS at 00:32

## 2024-03-01 RX ADMIN — PERFLUTREN 1.5 ML: 6.52 INJECTION, SUSPENSION INTRAVENOUS at 11:03

## 2024-03-01 RX ADMIN — LISINOPRIL 40 MG: 40 TABLET ORAL at 00:32

## 2024-03-01 RX ADMIN — AMLODIPINE BESYLATE 10 MG: 10 TABLET ORAL at 20:38

## 2024-03-01 RX ADMIN — SPIRONOLACTONE 25 MG: 25 TABLET ORAL at 00:32

## 2024-03-01 RX ADMIN — CARVEDILOL 25 MG: 25 TABLET, FILM COATED ORAL at 08:28

## 2024-03-01 RX ADMIN — Medication 10 ML: at 00:59

## 2024-03-01 RX ADMIN — MOMETASONE FUROATE AND FORMOTEROL FUMARATE DIHYDRATE 2 PUFF: 200; 5 AEROSOL RESPIRATORY (INHALATION) at 07:46

## 2024-03-01 RX ADMIN — ENOXAPARIN SODIUM 30 MG: 100 INJECTION SUBCUTANEOUS at 08:28

## 2024-03-01 RX ADMIN — IPRATROPIUM BROMIDE AND ALBUTEROL SULFATE 1 DOSE: 2.5; .5 SOLUTION RESPIRATORY (INHALATION) at 12:03

## 2024-03-01 RX ADMIN — ENOXAPARIN SODIUM 30 MG: 100 INJECTION SUBCUTANEOUS at 20:39

## 2024-03-01 RX ADMIN — AZITHROMYCIN 500 MG: 500 TABLET, FILM COATED ORAL at 00:32

## 2024-03-01 RX ADMIN — EMPAGLIFLOZIN 25 MG: 25 TABLET, FILM COATED ORAL at 01:15

## 2024-03-01 RX ADMIN — IPRATROPIUM BROMIDE AND ALBUTEROL SULFATE 1 DOSE: 2.5; .5 SOLUTION RESPIRATORY (INHALATION) at 15:53

## 2024-03-01 RX ADMIN — LISINOPRIL 40 MG: 40 TABLET ORAL at 20:39

## 2024-03-01 RX ADMIN — ATORVASTATIN CALCIUM 40 MG: 40 TABLET, FILM COATED ORAL at 00:32

## 2024-03-01 RX ADMIN — PREDNISONE 40 MG: 20 TABLET ORAL at 00:59

## 2024-03-01 RX ADMIN — Medication 10 ML: at 20:45

## 2024-03-01 RX ADMIN — INSULIN LISPRO 1 UNITS: 100 INJECTION, SOLUTION INTRAVENOUS; SUBCUTANEOUS at 18:06

## 2024-03-01 RX ADMIN — Medication 10 ML: at 08:29

## 2024-03-01 RX ADMIN — CARVEDILOL 25 MG: 25 TABLET, FILM COATED ORAL at 00:32

## 2024-03-01 RX ADMIN — INSULIN LISPRO 2 UNITS: 100 INJECTION, SOLUTION INTRAVENOUS; SUBCUTANEOUS at 09:23

## 2024-03-01 RX ADMIN — PREDNISONE 40 MG: 20 TABLET ORAL at 08:28

## 2024-03-01 RX ADMIN — INSULIN LISPRO 3 UNITS: 100 INJECTION, SOLUTION INTRAVENOUS; SUBCUTANEOUS at 13:10

## 2024-03-01 RX ADMIN — AMLODIPINE BESYLATE 10 MG: 10 TABLET ORAL at 00:32

## 2024-03-01 RX ADMIN — ATORVASTATIN CALCIUM 40 MG: 40 TABLET, FILM COATED ORAL at 20:39

## 2024-03-01 RX ADMIN — IPRATROPIUM BROMIDE AND ALBUTEROL SULFATE 1 DOSE: 2.5; .5 SOLUTION RESPIRATORY (INHALATION) at 19:18

## 2024-03-01 RX ADMIN — MOMETASONE FUROATE AND FORMOTEROL FUMARATE DIHYDRATE 2 PUFF: 200; 5 AEROSOL RESPIRATORY (INHALATION) at 19:18

## 2024-03-01 RX ADMIN — AZITHROMYCIN 500 MG: 500 TABLET, FILM COATED ORAL at 08:28

## 2024-03-01 RX ADMIN — TIOTROPIUM BROMIDE INHALATION SPRAY 2 PUFF: 3.12 SPRAY, METERED RESPIRATORY (INHALATION) at 07:46

## 2024-03-01 RX ADMIN — FUROSEMIDE 80 MG: 10 INJECTION, SOLUTION INTRAMUSCULAR; INTRAVENOUS at 10:20

## 2024-03-01 RX ADMIN — ACETAMINOPHEN 1000 MG: 500 TABLET ORAL at 00:31

## 2024-03-01 RX ADMIN — SPIRONOLACTONE 25 MG: 25 TABLET ORAL at 20:39

## 2024-03-01 ASSESSMENT — PAIN SCALES - GENERAL
PAINLEVEL_OUTOF10: 0

## 2024-03-01 NOTE — PLAN OF CARE
Problem: Pain  Goal: Verbalizes/displays adequate comfort level or baseline comfort level  Outcome: Progressing  Flowsheets (Taken 3/1/2024 1112)  Verbalizes/displays adequate comfort level or baseline comfort level:   Encourage patient to monitor pain and request assistance   Assess pain using appropriate pain scale   Administer analgesics based on type and severity of pain and evaluate response   Implement non-pharmacological measures as appropriate and evaluate response  Note: Pt able to express presence and absence of pain using numerical pain scale. Pt pain is managed by PRN analgesics as ordered by MD. Pain reassess after each interventions. Will continue to monitor as needed.        Problem: Safety - Adult  Goal: Free from fall injury  Outcome: Progressing  Flowsheets (Taken 3/1/2024 1112)  Free From Fall Injury: Instruct family/caregiver on patient safety  Note: Pt free from falls this shift. Fall precautions in place at all times. Call light always within reach. Pt able and agreeable to contact for safety appropriately.

## 2024-03-01 NOTE — H&P
performed by Emerson Horne MD at [facility] ENDOSCOPY    KNEE SURGERY Left     arthroscopic       Medications Prior to Admission:    No current facility-administered medications on file prior to encounter.     Current Outpatient Medications on File Prior to Encounter   Medication Sig Dispense Refill    diclofenac sodium (VOLTAREN) 1 % GEL Apply 2 g topically 2 times daily as needed for Pain      metFORMIN (GLUCOPHAGE-XR) 500 MG extended release tablet TAKE 1 TABLET BY MOUTH EVERY DAY WITH BREAKFAST (Patient taking differently: Take 1 tablet by mouth at bedtime) 90 tablet 3    ibuprofen (ADVIL;MOTRIN) 800 MG tablet Take 1 tablet by mouth 3 times daily as needed for Pain 90 tablet 1    lisinopril (PRINIVIL;ZESTRIL) 40 MG tablet TAKE 1 TABLET BY MOUTH EVERY DAY (Patient taking differently: Take 1 tablet by mouth at bedtime TAKE 1 TABLET BY MOUTH EVERY DAY) 90 tablet 3    amLODIPine (NORVASC) 10 MG tablet TAKE 1 TABLET BY MOUTH EVERY DAY (Patient taking differently: Take 1 tablet by mouth at bedtime TAKE 1 TABLET BY MOUTH EVERY DAY) 90 tablet 3    spironolactone (ALDACTONE) 25 MG tablet TAKE 1 TABLET BY MOUTH EVERY DAY (Patient taking differently: Take 1 tablet by mouth at bedtime TAKE 1 TABLET BY MOUTH EVERY DAY) 90 tablet 3    fluticasone (FLONASE) 50 MCG/ACT nasal spray SPRAY 1 SPRAY INTO EACH NOSTRIL EVERY DAY  Strength: 50 MCG/ACT 1 each 11    fluticasone-umeclidin-vilant (TRELEGY ELLIPTA) 200-62.5-25 MCG/ACT AEPB inhaler Inhale 1 puff into the lungs daily 60 each 11    empagliflozin (JARDIANCE) 25 MG tablet Take 1 tablet by mouth daily To replace 10 mg (Patient taking differently: Take 1 tablet by mouth at bedtime To replace 10 mg) 90 tablet 3    atorvastatin (LIPITOR) 40 MG tablet Take 1 tablet by mouth daily To replace simvastatin (Patient taking differently: Take 1 tablet by mouth at bedtime To replace simvastatin) 90 tablet 3    furosemide (LASIX) 20 MG tablet TAKE 1 TO 2 TABLETS BY MOUTH TWICE A DAY  (Patient taking differently: 2 tablets at bedtime TAKE 1 TO 2 TABLETS BY MOUTH TWICE A DAY) 360 tablet 3    glipiZIDE (GLUCOTROL XL) 5 MG extended release tablet TAKE 2 TABLETS BY MOUTH EVERY DAY FOR DIABETES (Patient taking differently: Take 1 tablet by mouth at bedtime TAKE 2 TABLETS BY MOUTH EVERY DAY FOR DIABETES) 180 tablet 3    carvedilol (COREG) 25 MG tablet TAKE 1 TABLET BY MOUTH TWICE A DAY FOR BLOOD PRESSURE (Patient taking differently: Take 1 tablet by mouth at bedtime TAKE 1 TABLET BY MOUTH TWICE A DAY FOR BLOOD PRESSURE) 180 tablet 3    albuterol sulfate HFA (PROVENTIL;VENTOLIN;PROAIR) 108 (90 Base) MCG/ACT inhaler Inhale 2 puffs into the lungs every 6 hours as needed for Wheezing 18 g 3    albuterol (ACCUNEB) 1.25 MG/3ML nebulizer solution Inhale 3 mLs into the lungs every 6 hours as needed for Wheezing 360 mL 3       Allergies:  Guaifenesin, Dextromethorphan-guaifenesin, and Aspirin    Social History:   TOBACCO:   reports that he has been smoking cigarettes. He started smoking about 50 years ago. He has a 50.2 pack-year smoking history. He has never used smokeless tobacco.     ETOH:   reports no history of alcohol use.  Patient currently lives alone    Family History:       Problem Relation Age of Onset    Diabetes Mother     Cancer Mother     Cancer Father     No Known Problems Sister     Diabetes Brother     No Known Problems Brother     No Known Problems Brother     No Known Problems Brother     No Known Problems Brother     No Known Problems Brother        ROS:   Review of Systems   Constitutional:  Negative for fatigue and fever.   HENT:  Negative for congestion, nosebleeds and sore throat.    Respiratory:  Positive for cough, chest tightness and shortness of breath.    Cardiovascular:  Positive for leg swelling. Negative for chest pain and palpitations.   Gastrointestinal:  Negative for abdominal distention, abdominal pain and blood in stool.   Endocrine: Negative for cold intolerance and heat

## 2024-03-01 NOTE — PLAN OF CARE
Problem: ABCDS Injury Assessment  Goal: Absence of physical injury  3/1/2024 1112 by Maryann Meier RN  Outcome: Progressing  3/1/2024 1111 by Maryann Meier RN  Outcome: Progressing     Problem: Respiratory - Adult  Goal: Achieves optimal ventilation and oxygenation  3/1/2024 1112 by Maryann Meier RN  Outcome: Progressing  3/1/2024 1111 by Maryann Meier RN  Outcome: Progressing     Problem: Cardiovascular - Adult  Goal: Maintains optimal cardiac output and hemodynamic stability  Outcome: Progressing     Problem: Discharge Planning  Goal: Discharge to home or other facility with appropriate resources  Outcome: Progressing     Problem: Metabolic/Fluid and Electrolytes - Adult  Goal: Electrolytes maintained within normal limits  Outcome: Progressing

## 2024-03-01 NOTE — PROGRESS NOTES
24 Eyes Skin Assessment     NAME:  Alvarado Sibley  YOB: 1965  MEDICAL RECORD NUMBER:  [id number]    The patient is being assessed for  Admission    I agree that at least one RN has performed a thorough Head to Toe Skin Assessment on the patient. ALL assessment sites listed below have been assessed.      Areas assessed by both nurses:    Head, Face, Ears, Shoulders, Back, Chest, Arms, Elbows, Hands, Sacrum. Buttock, Coccyx, Ischium, Legs. Feet and Heels, and Under Medical Devices         Does the Patient have a Wound? Yes wound(s) were present on assessment. LDA wound assessment was Initiated and completed by RN, ELEANOR in gluteal cleft, open blister L shin       Hugh Prevention initiated by RN: Yes  Wound Care Orders initiated by RN: Yes    Pressure Injury (Stage 3,4, Unstageable, DTI, NWPT, and Complex wounds) if present, place Wound referral order by RN under : No    New Ostomies, if present place, Ostomy referral order under : No     Nurse 1 eSignature: Electronically signed by Marla Rosenbaum RN on 3/1/24 at 2:46 PM EST    **SHARE this note so that the co-signing nurse can place an eSignature**    Nurse 2 eSignature: Electronically signed by Nivia Wallace RN on 3/1/24 at 5:02 PM EST

## 2024-03-01 NOTE — PROGRESS NOTES
Pt arrived to unit at 1323. Pt is alert and oriented X4. Pt oriented to unit and to room. Pt oriented to call light and to phone. White board updated. Pt denies any further needs at this time. Will continue to monitor and assess.   Electronically signed by Marla Rosenbaum RN on 3/1/2024 at 1:26 PM

## 2024-03-01 NOTE — PROGRESS NOTES
Medication Reconciliation    List of medications patient is currently taking is complete.     Source of information: 1. Conversation with patient at bedside                                      2. EPIC records      Allergies  Guaifenesin, Dextromethorphan-guaifenesin, and Aspirin     Notes regarding home medications:   1. Patient received all of his home medications prior to arrival to the emergency department.    2. Patient works 2nd shift so all medications are taken nightly around 1am. Patient states his provider told him to take 1 5mg glipizide tablet nightly rather than 2 tablets due to reports of hypoglycemia.     Ramila Trujillo, Pharmacy Intern  2/29/2024 8:18 PM

## 2024-03-01 NOTE — PROGRESS NOTES
Pt alert and oriented x 4. Pt denies any chest pain or any other pain. Pt reports SOB has improved as compared to yesterday. Pt's respirations are unlabored, SpO2 91% on 5lnc. Lungs sounds are diminished. Pt sitting in bed. Denies other needs at this time. Plan of care reviewed with the pt-pt verbalized understandings . Call light and item need in reach. Electronically signed by Maryann Meier RN on 3/1/2024 at 11:07 AM

## 2024-03-01 NOTE — PROGRESS NOTES
Report called to nurse Gutiérrez on [address]. All questions answered. Electronically signed by Maryann Meier RN on 3/1/2024 at 12:10 PM

## 2024-03-01 NOTE — PROGRESS NOTES
Occupational Therapy  Facility/Department: [address] ORTHOPEDICS  Occupational Therapy Initial Assessment and Discharge Summary    Name: Alvarado Sibley  : 1965  MRN: [id number]  Date of Service: 3/1/2024    Discharge Recommendations:  Home independently  OT Equipment Recommendations  Other: Recommended shower chair for energy conservation and wide sock aide (gave handout)       Patient Diagnosis(es): The primary encounter diagnosis was Acute respiratory failure with hypoxia (HCC). Diagnoses of Acute on chronic congestive heart failure, unspecified heart failure type (HCC) and Goals of care, counseling/discussion were also pertinent to this visit.  Past Medical History:  has a past medical history of Acute on chronic diastolic congestive heart failure (HCC), ADHD (attention deficit hyperactivity disorder), Adrenal nodule (HCC), COPD (chronic obstructive pulmonary disease) (HCC), Emphysema of lung (HCC), Essential hypertension, History of pneumothorax, Hx of colonic polyps, Hx of renal calculi, Hypertension, Kidney stone, Neuropathy, Obesity, Osteoarthritis, Pneumothorax, Prediabetes, Retinal hemorrhage, Sleep apnea, Substance abuse (HCC), and Type 2 diabetes mellitus without complication (HCC).  Past Surgical History:  has a past surgical history that includes Carpal tunnel release (Right); knee surgery (Left); Colonoscopy (2018); and Colonoscopy (N/A, 2023).  Alvarado Sibley scored a 24/24 on the AM-PAC ADL Inpatient form.  At this time, no further OT is recommended upon discharge due to pt is independent. Recommend patient returns home w/o further services.       Assessment   Assessment: Alvarado is a 59 y.o. male who presents to the emergency department 2/2 concern for sob. On arrival he was cyanotic and noted to be 77% on room air, he was immediately placed on oxygen with improvement to 90s (4L NC). He has been having BLE edema for one week associated with the sob. Prior to admit pt was independent with

## 2024-03-01 NOTE — PLAN OF CARE
Problem: ABCDS Injury Assessment  Goal: Absence of physical injury  3/1/2024 1703 by Marla Rosenbaum RN  Outcome: Progressing  Flowsheets (Taken 3/1/2024 1703)  Absence of Physical Injury: Implement safety measures based on patient assessment     Problem: Respiratory - Adult  Goal: Achieves optimal ventilation and oxygenation  3/1/2024 1703 by Marla Rosenbaum RN  Outcome: Progressing  Flowsheets (Taken 3/1/2024 1703)  Achieves optimal ventilation and oxygenation: Assess for changes in respiratory status     Problem: Cardiovascular - Adult  Goal: Maintains optimal cardiac output and hemodynamic stability  3/1/2024 1703 by Marla Rosenbaum RN  Outcome: Progressing  Flowsheets (Taken 3/1/2024 1703)  Maintains optimal cardiac output and hemodynamic stability: Monitor blood pressure and heart rate

## 2024-03-01 NOTE — PROGRESS NOTES
Pt transported to room 3112 with all of his belongings. Electronically signed by Maryann Meier RN on 3/1/2024 at 1:27 PM

## 2024-03-01 NOTE — CARE COORDINATION
[facility] Nurse  Consult Note       NAME:  Alvarado Sibley  MEDICAL RECORD NUMBER:  [id number]  AGE: 59 y.o.   GENDER: male  : 1965  TODAY'S DATE:  3/1/2024    Subjective   Reason for WOCN Evaluation and Assessment: MASD and LLE blister      Alvarado Sibley is a 59 y.o. male referred by:   [] Physician  [x] Nursing  [] Other:     Wound Identification:  Wound Type:  MASD  Contributing Factors: poor hygiene and moisture    Wound History: pt denies pain to gluteal cleft  Current Wound Care Treatment:  ANALI    Patient Goal of Care:  [x] Wound Healing  [] Odor Control  [] Palliative Care  [] Pain Control   [] Other:         PAST MEDICAL HISTORY        Diagnosis Date    Acute on chronic diastolic congestive heart failure (HCC)     ADHD (attention deficit hyperactivity disorder)     pt denies    Adrenal nodule (HCC) 2017    low-density nodular enlargement of a left adrenal gland which is stable since       COPD (chronic obstructive pulmonary disease) (HCC)     Emphysema of lung (HCC)     Essential hypertension 2017    History of pneumothorax 2017    Rib fx    Hx of colonic polyps 2018    1. Descending Polyp 2. Sigmoid Polyp 3. Sigmoid Diverticulosis recheck     Hx of renal calculi 2017    Hypertension     Kidney stone     Neuropathy     Obesity     Osteoarthritis     Pneumothorax      d/t fall     Prediabetes 2017    Retinal hemorrhage 10/29/2020    HTN-right eye    Sleep apnea     uses C-PAP    Substance abuse (HCC)     Type 2 diabetes mellitus without complication (HCC)        PAST SURGICAL HISTORY    Past Surgical History:   Procedure Laterality Date    CARPAL TUNNEL RELEASE Right     COLONOSCOPY  2018    Adelaida, x2 polyps    COLONOSCOPY N/A 2023    COLONOSCOPY performed by Emerson Horne MD at [facility] ENDOSCOPY    KNEE SURGERY Left     arthroscopic       FAMILY HISTORY    Family History   Problem Relation Age of Onset     Diabetes Mother     Cancer Mother     Cancer Father     No Known Problems Sister     Diabetes Brother     No Known Problems Brother     No Known Problems Brother     No Known Problems Brother     No Known Problems Brother     No Known Problems Brother        SOCIAL HISTORY    Social History     Tobacco Use    Smoking status: Every Day     Current packs/day: 1.00     Average packs/day: 1 pack/day for 50.2 years (50.2 ttl pk-yrs)     Types: Cigarettes     Start date: 1/1/1974    Smokeless tobacco: Never   Vaping Use    Vaping Use: Never used   Substance Use Topics    Alcohol use: No    Drug use: No       ALLERGIES    Allergies   Allergen Reactions    Guaifenesin Other (See Comments)     Felt like having a stroke    \"paralized me\"    Dextromethorphan-Guaifenesin      Other reaction(s): catatonic state    Aspirin Itching       MEDICATIONS    No current facility-administered medications on file prior to encounter.     Current Outpatient Medications on File Prior to Encounter   Medication Sig Dispense Refill    diclofenac sodium (VOLTAREN) 1 % GEL Apply 2 g topically 2 times daily as needed for Pain      metFORMIN (GLUCOPHAGE-XR) 500 MG extended release tablet TAKE 1 TABLET BY MOUTH EVERY DAY WITH BREAKFAST (Patient taking differently: Take 1 tablet by mouth at bedtime) 90 tablet 3    ibuprofen (ADVIL;MOTRIN) 800 MG tablet Take 1 tablet by mouth 3 times daily as needed for Pain 90 tablet 1    lisinopril (PRINIVIL;ZESTRIL) 40 MG tablet TAKE 1 TABLET BY MOUTH EVERY DAY (Patient taking differently: Take 1 tablet by mouth at bedtime TAKE 1 TABLET BY MOUTH EVERY DAY) 90 tablet 3    amLODIPine (NORVASC) 10 MG tablet TAKE 1 TABLET BY MOUTH EVERY DAY (Patient taking differently: Take 1 tablet by mouth at bedtime TAKE 1 TABLET BY MOUTH EVERY DAY) 90 tablet 3    spironolactone (ALDACTONE) 25 MG tablet TAKE 1 TABLET BY MOUTH EVERY DAY (Patient taking differently: Take 1 tablet by mouth at bedtime TAKE 1 TABLET BY MOUTH EVERY DAY) 90

## 2024-03-01 NOTE — DISCHARGE INSTRUCTIONS
Extra Heart Failure sites:     https://AllClear ID.Clouli/publication/?e=676029   --- this is American Heart Association interactive Healthier Living with Heart Failure guidebook.  Please click hyperlink or copy / paste link into search bar. Use your mouse to scroll through the pages.  Lots of information about weight monitoring, diet tips, activity, meds, etc    HF Monte[place]uma cole  -- this is a free smart phone cole available for iPhone and Android download.  Use your phone to track sodium / fluid intake, zone tool symptom tracking, weights, medications, etc. Click on this hyperlink  HF [place] Cole   for QR code for easy download.      DASH (Dietary Approach to Stop Hypertension) diet --  https://www.nhlbi.nih.gov/education/dash-eating-plan -- this diet is a flexible eating plan that promotes heart healthy eating style.  Click on hyperlink or copy / paste link into search bar.  Lots of low sodium recipes and tips.    https://www.Norse/recipes  -- more free recipes     Diabetes Association:    About Diabetes: https://diabetes.org/about-diabetes    Life with Diabetes: https://diabetes.org/living-with-diabetes    Health & Wellness: https://diabetes.org/health-wellness    Food & Nutrition: https://diabetes.org/food-nutrition    Tools & Resources: https://diabetes.org/tools-resources   no

## 2024-03-02 ENCOUNTER — APPOINTMENT (OUTPATIENT)
Dept: GENERAL RADIOLOGY | Age: 59
End: 2024-03-02
Payer: COMMERCIAL

## 2024-03-02 LAB
ALBUMIN SERPL-MCNC: 3.6 G/DL (ref 3.4–5)
ALP SERPL-CCNC: 124 U/L (ref 40–129)
ALT SERPL-CCNC: 10 U/L (ref 10–40)
ANION GAP SERPL CALCULATED.3IONS-SCNC: 11 MMOL/L (ref 3–16)
AST SERPL-CCNC: 9 U/L (ref 15–37)
BASOPHILS # BLD: 0 K/UL (ref 0–0.2)
BASOPHILS NFR BLD: 0.4 %
BILIRUB DIRECT SERPL-MCNC: <0.2 MG/DL (ref 0–0.3)
BILIRUB INDIRECT SERPL-MCNC: ABNORMAL MG/DL (ref 0–1)
BILIRUB SERPL-MCNC: 0.6 MG/DL (ref 0–1)
BUN SERPL-MCNC: 44 MG/DL (ref 7–20)
CALCIUM SERPL-MCNC: 8.9 MG/DL (ref 8.3–10.6)
CHLORIDE SERPL-SCNC: 97 MMOL/L (ref 99–110)
CO2 SERPL-SCNC: 30 MMOL/L (ref 21–32)
CREAT SERPL-MCNC: 1.3 MG/DL (ref 0.8–1.3)
DEPRECATED RDW RBC AUTO: 15.7 % (ref 12.4–15.4)
EOSINOPHIL # BLD: 0 K/UL (ref 0–0.6)
EOSINOPHIL NFR BLD: 0.2 %
GFR SERPLBLD CREATININE-BSD FMLA CKD-EPI: >60 ML/MIN/{1.73_M2}
GLUCOSE BLD-MCNC: 153 MG/DL (ref 70–99)
GLUCOSE BLD-MCNC: 188 MG/DL (ref 70–99)
GLUCOSE BLD-MCNC: 232 MG/DL (ref 70–99)
GLUCOSE BLD-MCNC: 267 MG/DL (ref 70–99)
GLUCOSE SERPL-MCNC: 152 MG/DL (ref 70–99)
HCT VFR BLD AUTO: 54.7 % (ref 40.5–52.5)
HGB BLD-MCNC: 18.2 G/DL (ref 13.5–17.5)
LYMPHOCYTES # BLD: 1.2 K/UL (ref 1–5.1)
LYMPHOCYTES NFR BLD: 11.3 %
MCH RBC QN AUTO: 32.7 PG (ref 26–34)
MCHC RBC AUTO-ENTMCNC: 33.3 G/DL (ref 31–36)
MCV RBC AUTO: 98.3 FL (ref 80–100)
MONOCYTES # BLD: 0.8 K/UL (ref 0–1.3)
MONOCYTES NFR BLD: 7.8 %
NEUTROPHILS # BLD: 8.6 K/UL (ref 1.7–7.7)
NEUTROPHILS NFR BLD: 80.3 %
ORGANISM: ABNORMAL
PERFORMED ON: ABNORMAL
PLATELET # BLD AUTO: 188 K/UL (ref 135–450)
PMV BLD AUTO: 8.7 FL (ref 5–10.5)
POTASSIUM SERPL-SCNC: 4.1 MMOL/L (ref 3.5–5.1)
PROCALCITONIN SERPL IA-MCNC: 0.14 NG/ML (ref 0–0.15)
PROT SERPL-MCNC: 7.7 G/DL (ref 6.4–8.2)
RBC # BLD AUTO: 5.57 M/UL (ref 4.2–5.9)
REPORT: NORMAL
RESP PATH DNA+RNA PNL NPH NAA+NON-PROBE: ABNORMAL
SODIUM SERPL-SCNC: 138 MMOL/L (ref 136–145)
WBC # BLD AUTO: 10.8 K/UL (ref 4–11)

## 2024-03-02 PROCEDURE — 85025 COMPLETE CBC W/AUTO DIFF WBC: CPT

## 2024-03-02 PROCEDURE — 71045 X-RAY EXAM CHEST 1 VIEW: CPT

## 2024-03-02 PROCEDURE — 94669 MECHANICAL CHEST WALL OSCILL: CPT

## 2024-03-02 PROCEDURE — 2580000003 HC RX 258: Performed by: INTERNAL MEDICINE

## 2024-03-02 PROCEDURE — 97162 PT EVAL MOD COMPLEX 30 MIN: CPT

## 2024-03-02 PROCEDURE — 99232 SBSQ HOSP IP/OBS MODERATE 35: CPT | Performed by: INTERNAL MEDICINE

## 2024-03-02 PROCEDURE — 94761 N-INVAS EAR/PLS OXIMETRY MLT: CPT

## 2024-03-02 PROCEDURE — 97530 THERAPEUTIC ACTIVITIES: CPT

## 2024-03-02 PROCEDURE — 6360000002 HC RX W HCPCS: Performed by: INTERNAL MEDICINE

## 2024-03-02 PROCEDURE — 6370000000 HC RX 637 (ALT 250 FOR IP): Performed by: INTERNAL MEDICINE

## 2024-03-02 PROCEDURE — 1200000000 HC SEMI PRIVATE

## 2024-03-02 PROCEDURE — 94640 AIRWAY INHALATION TREATMENT: CPT

## 2024-03-02 PROCEDURE — 0202U NFCT DS 22 TRGT SARS-COV-2: CPT

## 2024-03-02 PROCEDURE — 36415 COLL VENOUS BLD VENIPUNCTURE: CPT

## 2024-03-02 PROCEDURE — 80048 BASIC METABOLIC PNL TOTAL CA: CPT

## 2024-03-02 PROCEDURE — 80076 HEPATIC FUNCTION PANEL: CPT

## 2024-03-02 PROCEDURE — 2700000000 HC OXYGEN THERAPY PER DAY

## 2024-03-02 PROCEDURE — 2500000003 HC RX 250 WO HCPCS: Performed by: INTERNAL MEDICINE

## 2024-03-02 PROCEDURE — 84145 PROCALCITONIN (PCT): CPT

## 2024-03-02 RX ORDER — INSULIN LISPRO 100 [IU]/ML
0-4 INJECTION, SOLUTION INTRAVENOUS; SUBCUTANEOUS NIGHTLY
Status: DISCONTINUED | OUTPATIENT
Start: 2024-03-02 | End: 2024-03-11 | Stop reason: SDUPTHER

## 2024-03-02 RX ORDER — INSULIN LISPRO 100 [IU]/ML
0-8 INJECTION, SOLUTION INTRAVENOUS; SUBCUTANEOUS
Status: DISCONTINUED | OUTPATIENT
Start: 2024-03-02 | End: 2024-03-11

## 2024-03-02 RX ORDER — FUROSEMIDE 10 MG/ML
80 INJECTION INTRAMUSCULAR; INTRAVENOUS DAILY
Status: DISCONTINUED | OUTPATIENT
Start: 2024-03-02 | End: 2024-03-05

## 2024-03-02 RX ORDER — OSELTAMIVIR PHOSPHATE 75 MG/1
75 CAPSULE ORAL 2 TIMES DAILY
Status: COMPLETED | OUTPATIENT
Start: 2024-03-02 | End: 2024-03-07

## 2024-03-02 RX ADMIN — LISINOPRIL 40 MG: 40 TABLET ORAL at 20:37

## 2024-03-02 RX ADMIN — PREDNISONE 40 MG: 20 TABLET ORAL at 10:39

## 2024-03-02 RX ADMIN — SPIRONOLACTONE 25 MG: 25 TABLET ORAL at 20:37

## 2024-03-02 RX ADMIN — IPRATROPIUM BROMIDE AND ALBUTEROL SULFATE 1 DOSE: 2.5; .5 SOLUTION RESPIRATORY (INHALATION) at 07:44

## 2024-03-02 RX ADMIN — OSELTAMIVIR PHOSPHATE 75 MG: 75 CAPSULE ORAL at 20:37

## 2024-03-02 RX ADMIN — IPRATROPIUM BROMIDE AND ALBUTEROL SULFATE 1 DOSE: 2.5; .5 SOLUTION RESPIRATORY (INHALATION) at 19:39

## 2024-03-02 RX ADMIN — Medication 1 LOZENGE: at 12:39

## 2024-03-02 RX ADMIN — PHENYLEPHRINE HYDROCHLORIDE 1 SPRAY: 0.5 SPRAY NASAL at 12:39

## 2024-03-02 RX ADMIN — Medication 10 ML: at 10:58

## 2024-03-02 RX ADMIN — CARVEDILOL 25 MG: 25 TABLET, FILM COATED ORAL at 18:11

## 2024-03-02 RX ADMIN — IPRATROPIUM BROMIDE AND ALBUTEROL SULFATE 1 DOSE: 2.5; .5 SOLUTION RESPIRATORY (INHALATION) at 16:00

## 2024-03-02 RX ADMIN — TIOTROPIUM BROMIDE INHALATION SPRAY 2 PUFF: 3.12 SPRAY, METERED RESPIRATORY (INHALATION) at 07:44

## 2024-03-02 RX ADMIN — FUROSEMIDE 80 MG: 10 INJECTION, SOLUTION INTRAMUSCULAR; INTRAVENOUS at 10:53

## 2024-03-02 RX ADMIN — ENOXAPARIN SODIUM 30 MG: 100 INJECTION SUBCUTANEOUS at 10:41

## 2024-03-02 RX ADMIN — CARVEDILOL 25 MG: 25 TABLET, FILM COATED ORAL at 10:47

## 2024-03-02 RX ADMIN — ACETAMINOPHEN 1000 MG: 500 TABLET ORAL at 15:40

## 2024-03-02 RX ADMIN — ATORVASTATIN CALCIUM 40 MG: 40 TABLET, FILM COATED ORAL at 20:37

## 2024-03-02 RX ADMIN — ENOXAPARIN SODIUM 30 MG: 100 INJECTION SUBCUTANEOUS at 20:38

## 2024-03-02 RX ADMIN — AZITHROMYCIN 500 MG: 500 TABLET, FILM COATED ORAL at 10:39

## 2024-03-02 RX ADMIN — EMPAGLIFLOZIN 25 MG: 25 TABLET, FILM COATED ORAL at 20:37

## 2024-03-02 RX ADMIN — AMLODIPINE BESYLATE 10 MG: 10 TABLET ORAL at 20:37

## 2024-03-02 RX ADMIN — MOMETASONE FUROATE AND FORMOTEROL FUMARATE DIHYDRATE 2 PUFF: 200; 5 AEROSOL RESPIRATORY (INHALATION) at 19:39

## 2024-03-02 RX ADMIN — IPRATROPIUM BROMIDE AND ALBUTEROL SULFATE 1 DOSE: 2.5; .5 SOLUTION RESPIRATORY (INHALATION) at 11:42

## 2024-03-02 ASSESSMENT — PAIN SCALES - GENERAL
PAINLEVEL_OUTOF10: 4
PAINLEVEL_OUTOF10: 2
PAINLEVEL_OUTOF10: 10

## 2024-03-02 ASSESSMENT — PAIN SCALES - WONG BAKER
WONGBAKER_NUMERICALRESPONSE: 0
WONGBAKER_NUMERICALRESPONSE: NO HURT

## 2024-03-02 ASSESSMENT — PAIN DESCRIPTION - ORIENTATION: ORIENTATION: OTHER (COMMENT)

## 2024-03-02 ASSESSMENT — PAIN DESCRIPTION - DESCRIPTORS
DESCRIPTORS: BURNING
DESCRIPTORS: ACHING

## 2024-03-02 ASSESSMENT — PAIN DESCRIPTION - LOCATION
LOCATION: HEAD
LOCATION: THROAT

## 2024-03-02 NOTE — PROGRESS NOTES
Physical Therapy  Facility/Department: [address] ORTHOPEDICS  Physical Therapy Initial Assessment    Name: Alvarado Sibley  : 1965  MRN: [id number]  Date of Service: 3/2/2024    Discharge Recommendations:  Home with assist PRN   PT Equipment Recommendations  Equipment Needed: No    Alvarado Sibley scored a 21/24 on the AM-PAC short mobility form.  At this time, no further PT is recommended upon discharge due to being close to functional baseline.  Recommend patient returns to prior setting with prior services.      This note serves as D/C summary if patient is discharged prior to next treatment session.         Patient Diagnosis(es): The primary encounter diagnosis was Acute respiratory failure with hypoxia (HCC). Diagnoses of Acute on chronic congestive heart failure, unspecified heart failure type (HCC) and Goals of care, counseling/discussion were also pertinent to this visit.  Past Medical History:  has a past medical history of Acute on chronic diastolic congestive heart failure (HCC), ADHD (attention deficit hyperactivity disorder), Adrenal nodule (HCC), COPD (chronic obstructive pulmonary disease) (HCC), Emphysema of lung (HCC), Essential hypertension, History of pneumothorax, Hx of colonic polyps, Hx of renal calculi, Hypertension, Kidney stone, Neuropathy, Obesity, Osteoarthritis, Pneumothorax, Prediabetes, Retinal hemorrhage, Sleep apnea, Substance abuse (HCC), and Type 2 diabetes mellitus without complication (HCC).  Past Surgical History:  has a past surgical history that includes Carpal tunnel release (Right); knee surgery (Left); Colonoscopy (2018); and Colonoscopy (N/A, 2023).    Assessment   Body Structures, Functions, Activity Limitations Requiring Skilled Therapeutic Intervention: Decreased endurance  Assessment: Pt is a 58 y/o male admitted with acute on chronic HF.  On baseline 2-3 L 02 but currently requiring 5L.  Pt lives alone in an apt with ramped entrance and indep ambulating  independent  Stand to Sit: Modified independent    Ambulation  Surface: Level tile  Device: No Device  Other Apparatus: O2 (pt managed his own line)  Assistance: Independent  Quality of Gait: increased MARCIA and lateral sway due to body habitus but steady without AD  Distance: approx 40' with multiple turns  Comments: Sp02 88% on 5L prior to ambulation and 86% after ambulation; took approx 2 min to recover to 88%  More Ambulation?: No  Stairs/Curb  Stairs?: No     Balance  Posture: Good  Sitting - Static: Good  Sitting - Dynamic: Good  Standing - Static: Good  Standing - Dynamic: Good                                                         AM-PAC - Mobility    AM-PAC Basic Mobility - Inpatient   How much help is needed turning from your back to your side while in a flat bed without using bedrails?: A Little  How much help is needed moving from lying on your back to sitting on the side of a flat bed without using bedrails?: A Little  How much help is needed moving to and from a bed to a chair?: None  How much help is needed standing up from a chair using your arms?: None  How much help is needed walking in hospital room?: None  How much help is needed climbing 3-5 steps with a railing?: A Little  AM-[facility] Inpatient Mobility Raw Score : 21  AM-PAC Inpatient T-Scale Score : 50.25  Mobility Inpatient CMS 0-100% Score: 28.97  Mobility Inpatient CMS G-Code Modifier : CJ            Goals  Short Term Goals  Time Frame for Short Term Goals: upon d/c  Short Term Goal 1: bed mobility indep  Short Term Goal 2: ambulate 100' without an AD indep maintaining Sp02 90% or above  Patient Goals   Patient Goals : \"to go home\"       Education  Patient Education  Education Given To: Patient  Education Provided: Role of Therapy;Plan of Care  Education Method: Verbal  Barriers to Learning: None  Education Outcome: Verbalized understanding      Therapy Time   Individual Concurrent Group Co-treatment   Time In 1223         Time Out 1249

## 2024-03-02 NOTE — PROGRESS NOTES
[place] [facility] Medicine Note      Chief Complaint: I am feeling okay    Subjective/Interval History:    Currently patient is sitting in the bedside chair ordering breakfast.  He states overall he is feeling better since admission.  Swelling has improved, breathing is improved.  Currently not wheezing.  He does complain of some increased nasal congestion and is asking for something for this.  Minimal sputum noted.  No other new problems noted overnight.    No chest pain. No nausea, vomiting, diarrhea. No abdominal pain. No dysuria.  The remainder of the review of systems is negative.     PMH, PSH, FH/SH reviewed and unchanged as documented in the H&P dated 3/1/24    Medication list reviewed    Objective:    BP (!) 115/59   Pulse 65   Temp 97.7 °F (36.5 °C) (Oral)   Resp 17   Wt 125.9 kg (277 lb 9 oz)   SpO2 97%   BMI 47.64 kg/m²   Temp  Av.1 °F (36.7 °C)  Min: 97.7 °F (36.5 °C)  Max: 98.9 °F (37.2 °C)    RRR  Chest-respirations are easy, occasional scattered rhonchi but no significant wheezing noted  Abd- BS+, soft, NTND  Ext-3+ edema mid calf and below    The Following Labs Were Reviewed Today:     Recent Results (from the past 24 hour(s))   POCT Glucose    Collection Time: 24 11:37 AM   Result Value Ref Range    POC Glucose 338 (H) 70 - 99 mg/dl    Performed on ACCU-CHEK    POCT Glucose    Collection Time: 24  4:21 PM   Result Value Ref Range    POC Glucose 231 (H) 70 - 99 mg/dl    Performed on ACCU-CHEK    POCT Glucose    Collection Time: 24  9:11 PM   Result Value Ref Range    POC Glucose 273 (H) 70 - 99 mg/dl    Performed on ACCU-CHEK    Basic Metabolic Panel w/ Reflex to MG    Collection Time: 24  6:06 AM   Result Value Ref Range    Sodium 138 136 - 145 mmol/L    Potassium reflex Magnesium 4.1 3.5 - 5.1 mmol/L    Chloride 97 (L) 99 - 110 mmol/L    CO2 30 21 - 32 mmol/L    Anion Gap 11 3 - 16    Glucose 152 (H) 70 - 99 mg/dL    BUN 44 (H) 7 - 20 mg/dL    Creatinine 1.3 0.8 -

## 2024-03-02 NOTE — PROGRESS NOTES
Patient still sitting up in the chair breathing comfortably.  Now on 5 L of oxygen.  Orders entered for additional workup.  Electronically signed by YAJAIRA BAPTISTE MD on 3/2/2024 at 11:00 AM

## 2024-03-02 NOTE — PLAN OF CARE
Problem: ABCDS Injury Assessment  Goal: Absence of physical injury  Outcome: Progressing  Flowsheets (Taken 3/2/2024 1311)  Absence of Physical Injury: Implement safety measures based on patient assessment     Problem: Respiratory - Adult  Goal: Achieves optimal ventilation and oxygenation  Outcome: Progressing  Flowsheets (Taken 3/2/2024 1304)  Achieves optimal ventilation and oxygenation: Assess for changes in respiratory status     Problem: Cardiovascular - Adult  Goal: Maintains optimal cardiac output and hemodynamic stability  Outcome: Progressing     Problem: Metabolic/Fluid and Electrolytes - Adult  Goal: Electrolytes maintained within normal limits  Outcome: Progressing  Flowsheets (Taken 3/2/2024 1304)  Electrolytes maintained within normal limits: Monitor labs and assess patient for signs and symptoms of electrolyte imbalances  Goal: Hemodynamic stability and optimal renal function maintained  Outcome: Progressing  Flowsheets (Taken 3/2/2024 1304)  Hemodynamic stability and optimal renal function maintained: Monitor labs and assess for signs and symptoms of volume excess or deficit  Goal: Glucose maintained within prescribed range  Outcome: Progressing  Flowsheets (Taken 3/2/2024 1304)  Glucose maintained within prescribed range: Monitor blood glucose as ordered     Problem: Discharge Planning  Goal: Discharge to home or other facility with appropriate resources  Outcome: Progressing  Flowsheets (Taken 3/2/2024 1304)  Discharge to home or other facility with appropriate resources: Identify barriers to discharge with patient and caregiver     Problem: Pain  Goal: Verbalizes/displays adequate comfort level or baseline comfort level  Outcome: Progressing     Problem: Safety - Adult  Goal: Free from fall injury  Outcome: Progressing  Flowsheets (Taken 3/2/2024 1311)  Free From Fall Injury: Instruct family/caregiver on patient safety     Problem: Chronic Conditions and Co-morbidities  Goal: Patient's chronic  conditions and co-morbidity symptoms are monitored and maintained or improved  Outcome: Progressing  Flowsheets (Taken 3/2/2024 1309)  Care Plan - Patient's Chronic Conditions and Co-Morbidity Symptoms are Monitored and Maintained or Improved: Monitor and assess patient's chronic conditions and comorbid symptoms for stability, deterioration, or improvement

## 2024-03-02 NOTE — PROGRESS NOTES
Pt with worsening cold/flu symptoms of chest congestion, sore throat, cough and chills. O2 88% on 3L increased to 5L for SpO2 92%. Notified Dr Gotti of pt condition. MD stated he would put new orders in for CXR, rapid COVID, afrin and cough drops. Orders explained to patient. Patient sitting in chair. Alert and oriented. Call light in reach.

## 2024-03-03 ENCOUNTER — APPOINTMENT (OUTPATIENT)
Dept: GENERAL RADIOLOGY | Age: 59
End: 2024-03-03
Payer: COMMERCIAL

## 2024-03-03 LAB
ALBUMIN SERPL-MCNC: 3.8 G/DL (ref 3.4–5)
ALP SERPL-CCNC: 119 U/L (ref 40–129)
ALT SERPL-CCNC: 12 U/L (ref 10–40)
ANION GAP SERPL CALCULATED.3IONS-SCNC: 10 MMOL/L (ref 3–16)
AST SERPL-CCNC: 16 U/L (ref 15–37)
BASE EXCESS BLDA CALC-SCNC: 3.1 MMOL/L (ref -3–3)
BASOPHILS # BLD: 0 K/UL (ref 0–0.2)
BASOPHILS NFR BLD: 0.2 %
BILIRUB DIRECT SERPL-MCNC: <0.2 MG/DL (ref 0–0.3)
BILIRUB INDIRECT SERPL-MCNC: NORMAL MG/DL (ref 0–1)
BILIRUB SERPL-MCNC: 0.6 MG/DL (ref 0–1)
BUN SERPL-MCNC: 52 MG/DL (ref 7–20)
CALCIUM SERPL-MCNC: 9 MG/DL (ref 8.3–10.6)
CHLORIDE SERPL-SCNC: 98 MMOL/L (ref 99–110)
CO2 BLDA-SCNC: 34.8 MMOL/L
CO2 SERPL-SCNC: 29 MMOL/L (ref 21–32)
COHGB MFR BLDA: 1.2 % (ref 0–1.5)
CREAT SERPL-MCNC: 1.7 MG/DL (ref 0.8–1.3)
DEPRECATED RDW RBC AUTO: 15.8 % (ref 12.4–15.4)
EOSINOPHIL # BLD: 0 K/UL (ref 0–0.6)
EOSINOPHIL NFR BLD: 0 %
GFR SERPLBLD CREATININE-BSD FMLA CKD-EPI: 45 ML/MIN/{1.73_M2}
GLUCOSE BLD-MCNC: 172 MG/DL (ref 70–99)
GLUCOSE BLD-MCNC: 198 MG/DL (ref 70–99)
GLUCOSE BLD-MCNC: 341 MG/DL (ref 70–99)
GLUCOSE SERPL-MCNC: 211 MG/DL (ref 70–99)
HCO3 BLDA-SCNC: 32.7 MMOL/L (ref 21–29)
HCT VFR BLD AUTO: 53.6 % (ref 40.5–52.5)
HGB BLD-MCNC: 18.3 G/DL (ref 13.5–17.5)
HGB BLDA-MCNC: 18.8 G/DL (ref 13.5–17.5)
LYMPHOCYTES # BLD: 0.6 K/UL (ref 1–5.1)
LYMPHOCYTES NFR BLD: 6.3 %
MCH RBC QN AUTO: 33.6 PG (ref 26–34)
MCHC RBC AUTO-ENTMCNC: 34.1 G/DL (ref 31–36)
MCV RBC AUTO: 98.7 FL (ref 80–100)
METHGB MFR BLDA: 0 %
MONOCYTES # BLD: 1.2 K/UL (ref 0–1.3)
MONOCYTES NFR BLD: 12.7 %
NEUTROPHILS # BLD: 7.3 K/UL (ref 1.7–7.7)
NEUTROPHILS NFR BLD: 80.8 %
O2 CT VFR BLDA CALC: 26 ML/DL
O2 THERAPY: ABNORMAL
PCO2 BLDA: 67.6 MMHG (ref 35–45)
PERFORMED ON: ABNORMAL
PH BLDA: 7.29 [PH] (ref 7.35–7.45)
PLATELET # BLD AUTO: 175 K/UL (ref 135–450)
PMV BLD AUTO: 8.9 FL (ref 5–10.5)
PO2 BLDA: 144 MMHG (ref 75–108)
POTASSIUM SERPL-SCNC: 5 MMOL/L (ref 3.5–5.1)
PROT SERPL-MCNC: 8.1 G/DL (ref 6.4–8.2)
RBC # BLD AUTO: 5.43 M/UL (ref 4.2–5.9)
SAO2 % BLDA: 98.5 %
SODIUM SERPL-SCNC: 137 MMOL/L (ref 136–145)
WBC # BLD AUTO: 9.1 K/UL (ref 4–11)

## 2024-03-03 PROCEDURE — 94660 CPAP INITIATION&MGMT: CPT

## 2024-03-03 PROCEDURE — 36415 COLL VENOUS BLD VENIPUNCTURE: CPT

## 2024-03-03 PROCEDURE — 94761 N-INVAS EAR/PLS OXIMETRY MLT: CPT

## 2024-03-03 PROCEDURE — 1200000000 HC SEMI PRIVATE

## 2024-03-03 PROCEDURE — 99223 1ST HOSP IP/OBS HIGH 75: CPT | Performed by: INTERNAL MEDICINE

## 2024-03-03 PROCEDURE — 80076 HEPATIC FUNCTION PANEL: CPT

## 2024-03-03 PROCEDURE — 82803 BLOOD GASES ANY COMBINATION: CPT

## 2024-03-03 PROCEDURE — 6370000000 HC RX 637 (ALT 250 FOR IP): Performed by: INTERNAL MEDICINE

## 2024-03-03 PROCEDURE — 80048 BASIC METABOLIC PNL TOTAL CA: CPT

## 2024-03-03 PROCEDURE — 2580000003 HC RX 258: Performed by: INTERNAL MEDICINE

## 2024-03-03 PROCEDURE — 99233 SBSQ HOSP IP/OBS HIGH 50: CPT | Performed by: INTERNAL MEDICINE

## 2024-03-03 PROCEDURE — 6370000000 HC RX 637 (ALT 250 FOR IP): Performed by: STUDENT IN AN ORGANIZED HEALTH CARE EDUCATION/TRAINING PROGRAM

## 2024-03-03 PROCEDURE — 6360000002 HC RX W HCPCS: Performed by: INTERNAL MEDICINE

## 2024-03-03 PROCEDURE — 2700000000 HC OXYGEN THERAPY PER DAY

## 2024-03-03 PROCEDURE — 71045 X-RAY EXAM CHEST 1 VIEW: CPT

## 2024-03-03 PROCEDURE — 6360000002 HC RX W HCPCS: Performed by: STUDENT IN AN ORGANIZED HEALTH CARE EDUCATION/TRAINING PROGRAM

## 2024-03-03 PROCEDURE — 36600 WITHDRAWAL OF ARTERIAL BLOOD: CPT

## 2024-03-03 PROCEDURE — 5A09457 ASSISTANCE WITH RESPIRATORY VENTILATION, 24-96 CONSECUTIVE HOURS, CONTINUOUS POSITIVE AIRWAY PRESSURE: ICD-10-PCS | Performed by: STUDENT IN AN ORGANIZED HEALTH CARE EDUCATION/TRAINING PROGRAM

## 2024-03-03 PROCEDURE — 94640 AIRWAY INHALATION TREATMENT: CPT

## 2024-03-03 PROCEDURE — 2580000003 HC RX 258

## 2024-03-03 PROCEDURE — 85025 COMPLETE CBC W/AUTO DIFF WBC: CPT

## 2024-03-03 PROCEDURE — 2060000000 HC ICU INTERMEDIATE R&B

## 2024-03-03 RX ORDER — INSULIN GLARGINE 100 [IU]/ML
10 INJECTION, SOLUTION SUBCUTANEOUS DAILY
Status: DISCONTINUED | OUTPATIENT
Start: 2024-03-03 | End: 2024-03-13 | Stop reason: HOSPADM

## 2024-03-03 RX ORDER — WATER 10 ML/10ML
INJECTION INTRAMUSCULAR; INTRAVENOUS; SUBCUTANEOUS
Status: COMPLETED
Start: 2024-03-03 | End: 2024-03-03

## 2024-03-03 RX ORDER — FUROSEMIDE 10 MG/ML
40 INJECTION INTRAMUSCULAR; INTRAVENOUS ONCE
Status: COMPLETED | OUTPATIENT
Start: 2024-03-03 | End: 2024-03-03

## 2024-03-03 RX ORDER — METHYLPREDNISOLONE SODIUM SUCCINATE 40 MG/ML
40 INJECTION, POWDER, LYOPHILIZED, FOR SOLUTION INTRAMUSCULAR; INTRAVENOUS EVERY 12 HOURS
Status: DISCONTINUED | OUTPATIENT
Start: 2024-03-03 | End: 2024-03-05

## 2024-03-03 RX ORDER — IPRATROPIUM BROMIDE AND ALBUTEROL SULFATE 2.5; .5 MG/3ML; MG/3ML
1 SOLUTION RESPIRATORY (INHALATION) EVERY 4 HOURS PRN
Status: DISCONTINUED | OUTPATIENT
Start: 2024-03-03 | End: 2024-03-13 | Stop reason: HOSPADM

## 2024-03-03 RX ADMIN — CARVEDILOL 25 MG: 25 TABLET, FILM COATED ORAL at 16:48

## 2024-03-03 RX ADMIN — OSELTAMIVIR PHOSPHATE 75 MG: 75 CAPSULE ORAL at 09:20

## 2024-03-03 RX ADMIN — AMLODIPINE BESYLATE 10 MG: 10 TABLET ORAL at 20:22

## 2024-03-03 RX ADMIN — Medication 10 ML: at 09:26

## 2024-03-03 RX ADMIN — IPRATROPIUM BROMIDE AND ALBUTEROL SULFATE 1 DOSE: 2.5; .5 SOLUTION RESPIRATORY (INHALATION) at 11:40

## 2024-03-03 RX ADMIN — METHYLPREDNISOLONE SODIUM SUCCINATE 40 MG: 40 INJECTION INTRAMUSCULAR; INTRAVENOUS at 16:48

## 2024-03-03 RX ADMIN — IPRATROPIUM BROMIDE AND ALBUTEROL SULFATE 1 DOSE: 2.5; .5 SOLUTION RESPIRATORY (INHALATION) at 21:15

## 2024-03-03 RX ADMIN — ATORVASTATIN CALCIUM 40 MG: 40 TABLET, FILM COATED ORAL at 20:22

## 2024-03-03 RX ADMIN — FUROSEMIDE 40 MG: 10 INJECTION, SOLUTION INTRAMUSCULAR; INTRAVENOUS at 06:47

## 2024-03-03 RX ADMIN — WATER 10 ML: 1 INJECTION INTRAMUSCULAR; INTRAVENOUS; SUBCUTANEOUS at 16:48

## 2024-03-03 RX ADMIN — IPRATROPIUM BROMIDE AND ALBUTEROL SULFATE 1 DOSE: 2.5; .5 SOLUTION RESPIRATORY (INHALATION) at 07:44

## 2024-03-03 RX ADMIN — IPRATROPIUM BROMIDE AND ALBUTEROL SULFATE 1 DOSE: 2.5; .5 SOLUTION RESPIRATORY (INHALATION) at 00:33

## 2024-03-03 RX ADMIN — ENOXAPARIN SODIUM 30 MG: 100 INJECTION SUBCUTANEOUS at 10:17

## 2024-03-03 RX ADMIN — MOMETASONE FUROATE AND FORMOTEROL FUMARATE DIHYDRATE 2 PUFF: 200; 5 AEROSOL RESPIRATORY (INHALATION) at 21:16

## 2024-03-03 RX ADMIN — EMPAGLIFLOZIN 25 MG: 25 TABLET, FILM COATED ORAL at 20:22

## 2024-03-03 RX ADMIN — LISINOPRIL 40 MG: 40 TABLET ORAL at 20:22

## 2024-03-03 RX ADMIN — SPIRONOLACTONE 25 MG: 25 TABLET ORAL at 20:22

## 2024-03-03 RX ADMIN — PREDNISONE 40 MG: 20 TABLET ORAL at 10:17

## 2024-03-03 RX ADMIN — ENOXAPARIN SODIUM 30 MG: 100 INJECTION SUBCUTANEOUS at 20:22

## 2024-03-03 RX ADMIN — Medication 1 LOZENGE: at 16:55

## 2024-03-03 RX ADMIN — Medication 10 ML: at 20:23

## 2024-03-03 RX ADMIN — CARVEDILOL 25 MG: 25 TABLET, FILM COATED ORAL at 10:17

## 2024-03-03 RX ADMIN — ACETAMINOPHEN 1000 MG: 500 TABLET ORAL at 11:17

## 2024-03-03 RX ADMIN — IPRATROPIUM BROMIDE AND ALBUTEROL SULFATE 1 DOSE: 2.5; .5 SOLUTION RESPIRATORY (INHALATION) at 15:37

## 2024-03-03 RX ADMIN — OSELTAMIVIR PHOSPHATE 75 MG: 75 CAPSULE ORAL at 20:22

## 2024-03-03 RX ADMIN — ACETAMINOPHEN 1000 MG: 500 TABLET ORAL at 03:53

## 2024-03-03 ASSESSMENT — PAIN SCALES - WONG BAKER
WONGBAKER_NUMERICALRESPONSE: 0
WONGBAKER_NUMERICALRESPONSE: NO HURT

## 2024-03-03 ASSESSMENT — PAIN SCALES - GENERAL
PAINLEVEL_OUTOF10: 0
PAINLEVEL_OUTOF10: 4

## 2024-03-03 ASSESSMENT — PAIN - FUNCTIONAL ASSESSMENT: PAIN_FUNCTIONAL_ASSESSMENT: PREVENTS OR INTERFERES WITH MANY ACTIVE NOT PASSIVE ACTIVITIES

## 2024-03-03 ASSESSMENT — PAIN DESCRIPTION - DESCRIPTORS: DESCRIPTORS: ACHING

## 2024-03-03 NOTE — PROGRESS NOTES
ABG drawn as ordered:     03/03/24 0455   Oxygen Therapy/Pulse Ox   O2 Therapy Oxygen   O2 Device PAP (positive airway pressure)   FiO2  60 %   Blood Gas  Performed? Yes   Padilla's Test #1 Pos   Site #1 Right Radial   Site Prepped #1 Yes   Number of Attempts #1 2   Pressure Held #1 Yes   Complications #1 None   Post-procedure #1 Standard   Specimen Status #1 To lab   How Tolerated? Tolerated well     Electronically signed by Robinson Kim RCP on 3/3/2024 at 5:00 AM

## 2024-03-03 NOTE — PROGRESS NOTES
NIV therapy initiated on patient at this time as follows:     03/03/24 0439   NIV Type   NIV Started/Stopped On   Equipment Type V60   Mode CPAP   Mask Type Full face mask   Mask Size Medium   Assessment   Comfort Level Good   Using Accessory Muscles Yes   Mask Compliance Good   Skin Assessment Clean, dry, & intact   Skin Protection for O2 Device Yes   Orientation Middle   Location Nose   Intervention(s) Skin Barrier   Breath Sounds   Breath Sounds Bilateral Diminished   Settings/Measurements   PIP Observed 11 cm H20   CPAP/EPAP 10 cmH2O   Vt (Measured) 511 mL   FiO2  60 %   Minute Volume (L/min) 10.9 Liters   Mask Leak (lpm) 18 lpm     Electronically signed by Robinson Kim RCP on 3/3/2024 at 4:41 AM

## 2024-03-03 NOTE — CARE COORDINATION
SW left message for son Calin today at [phone]. SW spoke with brother via telephone and confirmed a baseline. A full assessment will follow.     Respectfully submitted,    Candice BROOKS, KARINS  [facility]   [phone]    Electronically signed by TODD Monzon, LSW on 3/3/2024 at 3:15 PM

## 2024-03-03 NOTE — CARE COORDINATION
Case Management Assessment  Initial Evaluation    Date/Time of Evaluation: 3/3/2024 3:27 PM  Assessment Completed by: TODD Monzon, NAVYAW    If patient is discharged prior to next notation, then this note serves as note for discharge by case management.    Patient Name: Alvarado Sibley                   YOB: 1965  Diagnosis: Goals of care, counseling/discussion [Z71.89]  Acute respiratory failure with hypoxia (HCC) [J96.01]  Acute on chronic congestive heart failure, unspecified heart failure type (HCC) [I50.9]  Acute on chronic heart failure with preserved ejection fraction (HFpEF) (HCC) [I50.33]                   Date / Time: 2/29/2024  4:02 PM    Patient Admission Status: Inpatient   Readmission Risk (Low < 19, Mod (19-27), High > 27): Readmission Risk Score: 14.9    Current PCP: Oscar Medina MD  PCP verified by CM? Yes    Chart Reviewed: Yes      History Provided by: Child/Family  Patient Orientation: Alert and Oriented    Patient Cognition: Alert    Hospitalization in the last 30 days (Readmission):  No    If yes, Readmission Assessment in CM Navigator will be completed.    Advance Directives:      Code Status: Full Code   Patient's Primary Decision Maker is: Legal Next of Kin    Primary Decision Maker: Calin Rodriguez - Child - [phone]    Discharge Planning:    Patient lives with: Alone Type of Home: Apartment  Primary Care Giver: Self  Patient Support Systems include: Children, Family Members   Current Financial resources: None  Current community resources: None  Current services prior to admission: None            Current DME:              Type of Home Care services:   (TBD)    ADLS  Prior functional level: Independent in ADLs/IADLs  Current functional level: Other (see comment) (TBD)    PT AM-PAC: 21 /24  OT AM-PAC: 24 /24    Family can provide assistance at DC: No  Would you like Case Management to discuss the discharge plan with any other family members/significant others, and if so,

## 2024-03-03 NOTE — PROGRESS NOTES
Oxygen demands increased. Pt was on 4L, currently on 10L satting at 88-90%.  RT at bedside hooking pt up to CPAP machine. Dr. Gotti made aware. New orders Stat CXR, Stat SBG's and continuous pulse ox monitoring.

## 2024-03-03 NOTE — PLAN OF CARE
Problem: ABCDS Injury Assessment  Goal: Absence of physical injury  3/3/2024 0051 by April Menjivar RN  Outcome: Progressing  Flowsheets (Taken 3/3/2024 0050)  Absence of Physical Injury: Implement safety measures based on patient assessment  3/2/2024 1316 by Daniela Iraheta RN  Outcome: Progressing  Flowsheets (Taken 3/2/2024 1311)  Absence of Physical Injury: Implement safety measures based on patient assessment     Problem: Respiratory - Adult  Goal: Achieves optimal ventilation and oxygenation  3/3/2024 0051 by April Menjivar RN  Outcome: Progressing  3/2/2024 1316 by Daniela Iraheta RN  Outcome: Progressing  Flowsheets (Taken 3/2/2024 1304)  Achieves optimal ventilation and oxygenation: Assess for changes in respiratory status     Problem: Cardiovascular - Adult  Goal: Maintains optimal cardiac output and hemodynamic stability  3/3/2024 0051 by April Menjivar RN  Outcome: Progressing  3/2/2024 1316 by Daniela Iraheta RN  Outcome: Progressing     Problem: Metabolic/Fluid and Electrolytes - Adult  Goal: Electrolytes maintained within normal limits  3/2/2024 1316 by Daniela Iraheta RN  Outcome: Progressing  Flowsheets (Taken 3/2/2024 1304)  Electrolytes maintained within normal limits: Monitor labs and assess patient for signs and symptoms of electrolyte imbalances  Goal: Hemodynamic stability and optimal renal function maintained  3/2/2024 1316 by Daniela Iraheta RN  Outcome: Progressing  Flowsheets (Taken 3/2/2024 1304)  Hemodynamic stability and optimal renal function maintained: Monitor labs and assess for signs and symptoms of volume excess or deficit  Goal: Glucose maintained within prescribed range  3/2/2024 1316 by Daniela Iraheta RN  Outcome: Progressing  Flowsheets (Taken 3/2/2024 1304)  Glucose maintained within prescribed range: Monitor blood glucose as ordered     Problem: Discharge Planning  Goal: Discharge to home or other facility with appropriate resources  3/2/2024 1316 by Daniela Iraheta

## 2024-03-03 NOTE — CONSULTS
REASON FOR CONSULTATION/CC:  influenza      Consult at request of Oscar Medina MD for      PCP: Oscar Medina MD  Established Pulmonologist:   None    HISTORY OF PRESENT ILLNESS: Alvarado Sibley is a 59 y.o. year old male with a history of   who presents with      Patient presented 3 days ago with sinus congestion and some shortness of breath.  Started on treatment for his CHF.  Influenza a positive with worsening hypoxemia.  Started on Tamiflu.      This note may have been  transcribed using Dragon Dictation software. Please disregard any translational errors.      Assessment:     COPD  Chronic hypoxemia  Sleep apnea  Tobacco abuse  Obesity    Plan:      Hospital Day 3     COPD, severe   Duoneb's   Dulera  Change prednisone to Solumedrol  - monitor glucose     Acute on chronic hypoxemic resp failure with acute hypercapnia   Wean O2 to sat >90%  Bipap  Patient was last seen in 2021 noted to have oxygen saturation of 75% on my visit.  Oxygen was ordered.       Influenza   Tamiflu  Worsening chest X-ray.    Volume status -2.8 L  Procal 0.14 yesterday    Check procal gain.       MELISSA  Monitor daily     Patient was quite argumentative and had multiple objections to current care.  Also tried to make the claimed that this was all from congestive heart failure and is only worse because before slow to react.  However, he was nonspecific with this.  Attempted to answer all of his questions but was difficult to follow logic after making statements such as coughing with scarring of his lungs.  Patient was not resectable to education or different ideas        This note was transcribed using Dragon Dictation software. Please disregard any translational errors.    Thank you for the consult    [facility]  [phone]             Data:     PAST MEDICAL HISTORY:  Past Medical History:   Diagnosis Date    Acute on chronic diastolic congestive heart failure (HCC)     ADHD  neck pain.  Initial study.    FINDINGS:  Mediastinum: No pathologically enlarged mediastinal, hilar, nor axillary  lymph nodes are seen.  No mediastinal hematoma is evident.    Lungs/pleura: Right basilar dependent atelectasis is noted.  There is a small  left hemothorax.  No left pneumothorax is evident.  Left lower lobe and  lingular airspace disease is evident.    Upper Abdomen: Limited images the upper abdomen show no acute traumatic  abnormality.  No upper abdominal fluid is evident.  The attenuation of the  liver is decreased suggestive of fatty infiltration.    Soft Tissues/Bones: There mildly displaced fractures of the 7th and 8th left  lateral ribs with associated soft tissue swelling and gas within the adjacent  chest wall.  There are remote fractures of the 5th through 7th right lateral  ribs    Impression  Acute mildly displaced fractures of the 7th and 8th left lateral ribs.  Small  left hemothorax.  Left basilar airspace disease, likely a combination of  contusion and atelectasis.    Fatty liver.      CTPA: No results found for this or any previous visit.      CXR PA/LAT: Results for orders placed during the hospital encounter of 01/20/24    XR CHEST (2 VW)    Narrative  EXAMINATION:  TWO XRAY VIEWS OF THE CHEST    1/20/2024 7:39 am    COMPARISON:  None.    HISTORY:  ORDERING SYSTEM PROVIDED HISTORY: Short of breath, history of COPD, cannot  lay still for MRI  TECHNOLOGIST PROVIDED HISTORY:  Reason for exam:->Short of breath, history of COPD, cannot lay still for MRI    FINDINGS:  Two views the chest.  Good inspiration with slight elevation left  hemidiaphragm.  Cardiac enlargement.  Vascular prominence.  Diffusely  increased interstitial markings.  Patchy airspace opacity in the bases.  The  trachea is midline.  Minimally calcified aorta.  Degeneration of the spine.  Osteopenia.  Old right and left posterior rib fractures.    Impression  Cardiac enlargement, and vascular prominence as well as mild

## 2024-03-03 NOTE — PROGRESS NOTES
[facility] Note      Chief Complaint: I am feeling better    Subjective/Interval History:    Overnight the patient's oxygen requirement increased.  He tested positive for flu and Tamiflu was started yesterday afternoon.  Procalcitonin was low.  Chest x-ray showed worsened airspace disease.  Overnight increased oxygen requirement even more, ABG showed pH of 7.29 and pCO2 of 67.  BiPAP was started and the patient was transferred to the PCU.    This morning the patient is resting comfortably on BiPAP.  An extra dose of Lasix was given overnight, his creatinine is up today.  No other new problems noted.  The patient states he is feeling better.  He has a very moist cough that is not productive of anything at this point.    No chest pain. No nausea, vomiting, diarrhea. No abdominal pain. No dysuria.  The remainder of the review of systems is negative.     PMH, PSH, FH/SH reviewed and unchanged as documented in the H&P dated 3/1/24    Medication list reviewed    Objective:    /64   Pulse 67   Temp 98.3 °F (36.8 °C) (Axillary)   Resp 19   Ht 1.651 m (5' 5\")   Wt 125.7 kg (277 lb 1.9 oz)   SpO2 98%   BMI 46.11 kg/m²   Temp  Av.3 °F (36.8 °C)  Min: 97.8 °F (36.6 °C)  Max: 99.2 °F (37.3 °C)    RRR  Chest-respirations are easy on BiPAP.  Diffusely diminished breath sounds with a few scattered rhonchi  Abd- BS+, soft, NTND  Ext-3+ edema mid calf and below, unchanged.    The Following Labs Were Reviewed Today:     Recent Results (from the past 24 hour(s))   POCT Glucose    Collection Time: 24 10:57 AM   Result Value Ref Range    POC Glucose 188 (H) 70 - 99 mg/dl    Performed on ACCU-CHEK    Respiratory Panel, Molecular, with COVID-19 (Restricted: peds pts or suitable admitted adults)    Collection Time: 24  3:30 PM    Specimen: Nasopharyngeal; Nasal swab   Result Value Ref Range    Organism Influenza A H3 detected by PCR (A)     Respiratory Panel PCR       POSITIVE FOR  See  Continue to monitor, not ready for discharge.    Time > 35 minutes reviewing chart and patient data, examining and interviewing patient, and discussing with nursing staff, family, etc.       YAJAIRA BAPTISTE MD, FACP  9:03 AM  3/3/2024

## 2024-03-03 NOTE — PROGRESS NOTES
ABG and CXR results called into Dr. Gotti. New orders to convert pt to Bipap. Pt transferred to PCU with RT and nurse at side. Belongings transported with pt. Bedside shift report performed.

## 2024-03-03 NOTE — PLAN OF CARE
Problem: ABCDS Injury Assessment  Goal: Absence of physical injury  3/3/2024 1350 by Bindu Kelsey RN  Outcome: Progressing  3/3/2024 0051 by April Menjivar RN  Outcome: Progressing  Flowsheets (Taken 3/3/2024 0050)  Absence of Physical Injury: Implement safety measures based on patient assessment     Problem: Respiratory - Adult  Goal: Achieves optimal ventilation and oxygenation  3/3/2024 1350 by Bindu Kelsey RN  Outcome: Progressing  3/3/2024 0051 by April Menjivar RN  Outcome: Progressing     Problem: Cardiovascular - Adult  Goal: Maintains optimal cardiac output and hemodynamic stability  3/3/2024 1350 by Bindu Kelsey RN  Outcome: Progressing  3/3/2024 0051 by April Menjivar RN  Outcome: Progressing     Problem: Metabolic/Fluid and Electrolytes - Adult  Goal: Electrolytes maintained within normal limits  Outcome: Progressing  Flowsheets (Taken 3/3/2024 1343)  Electrolytes maintained within normal limits:   Monitor labs and assess patient for signs and symptoms of electrolyte imbalances   Administer electrolyte replacement as ordered   Monitor response to electrolyte replacements, including repeat lab results as appropriate  Goal: Hemodynamic stability and optimal renal function maintained  Outcome: Progressing  Flowsheets (Taken 3/3/2024 1343)  Hemodynamic stability and optimal renal function maintained:   Monitor intake, output and patient weight   Monitor urine specific gravity, serum osmolarity and serum sodium as indicated or ordered   Monitor labs and assess for signs and symptoms of volume excess or deficit  Goal: Glucose maintained within prescribed range  Outcome: Progressing     Problem: Discharge Planning  Goal: Discharge to home or other facility with appropriate resources  Outcome: Progressing  Flowsheets (Taken 3/3/2024 1343)  Discharge to home or other facility with appropriate resources:   Identify barriers to discharge with patient and caregiver   Identify discharge learning

## 2024-03-03 NOTE — PROGRESS NOTES
Per MD, NIV converted from CPAP to Bilevel as follows:     03/03/24 0630   NIV Type   NIV Started/Stopped On   Equipment Type V60   Mode Bilevel   Mask Type Full face mask   Mask Size Medium   Assessment   Pulse 84   Respirations 24   SpO2 97 %   Comfort Level Good   Using Accessory Muscles Yes   Mask Compliance Good   Skin Assessment Clean, dry, & intact   Skin Protection for O2 Device Yes   Orientation Middle   Location Nose   Intervention(s) Skin Barrier   Breath Sounds   Breath Sounds Bilateral Diminished   Settings/Measurements   PIP Observed 18 cm H20   IPAP 18 cmH20   CPAP/EPAP 10 cmH2O   Vt (Measured) 581 mL   Rate Ordered 14   Insp Rise Time (%) 24 %   FiO2  60 %   I Time/ I Time % 1 s   Minute Volume (L/min) 13.8 Liters   Mask Leak (lpm) 20 lpm   Alarm Settings   Alarms On Y   Low Pressure (cmH2O) 4 cmH2O   High Pressure (cmH2O) 30 cmH2O   Delay Alarm 20 sec(s)   RR Low (bpm) 10   RR High (bpm) 40 br/min     Pt transferred to [facility] without complication.    Electronically signed by Robinson Kim RCP on 3/3/2024 at 7:01 AM

## 2024-03-03 NOTE — RT PROTOCOL NOTE
RT Nebulizer Bronchodilator Protocol Note    There is a bronchodilator order in the chart from a provider indicating to follow the RT Bronchodilator Protocol and there is an “Initiate RT Bronchodilator Protocol” order as well (see protocol at bottom of note).    CXR Findings:  XR CHEST PORTABLE    Result Date: 3/2/2024  1. No significant interval change in pulmonary edema and small left pleural effusion. 2. Stable cardiomegaly.       The findings from the last RT Protocol Assessment were as follows:  Smoking: Chronic pulmonary disease  Respiratory Pattern: Regular pattern and RR 12-20 bpm  Breath Sounds: Slightly diminished and/or crackles  Cough: Strong, spontaneous, non-productive  Indication for Bronchodilator Therapy: Decreased or absent breath sounds  Bronchodilator Assessment Score: 4    Aerosolized bronchodilator medication orders have been revised according to the RT Nebulizer Bronchodilator Protocol below.    Respiratory Therapist to perform RT Therapy Protocol Assessment initially then follow the protocol.  Repeat RT Therapy Protocol Assessment PRN for score 0-3 or on second treatment, BID, and PRN for scores above 3.    No Indications - adjust the frequency to every 6 hours PRN wheezing or bronchospasm, if no treatments needed after 48 hours then discontinue using Per Protocol order mode.     If indication present, adjust the RT bronchodilator orders based on the Bronchodilator Assessment Score as indicated below.  If a patient is on this medication at home then do not decrease Frequency below that used at home.    0-3 - enter or revise RT bronchodilator order(s) to equivalent RT Bronchodilator order with Frequency of every 4 hours PRN for wheezing or increased work of breathing using Per Protocol order mode.       4-6 - enter or revise RT Bronchodilator order(s) to two equivalent RT bronchodilator orders with one order with BID Frequency and one order with Frequency of every 4 hours PRN wheezing or

## 2024-03-03 NOTE — PROGRESS NOTES
4 Eyes Skin Assessment     NAME:  Alvarado Sibley  YOB: 1965  MEDICAL RECORD NUMBER:  [id number]    The patient is being assessed for  Transfer to New Unit    I agree that at least one RN has performed a thorough Head to Toe Skin Assessment on the patient. ALL assessment sites listed below have been assessed.      Areas assessed by both nurses:    Head, Face, Ears, Shoulders, Back, Chest, Arms, Elbows, Hands, Sacrum. Buttock, Coccyx, Ischium, Legs. Feet and Heels, Under Medical Devices , and Other          Does the Patient have a Wound? Yes wound(s) were present on assessment. LDA wound assessment was Initiated and completed by RN       Hugh Prevention initiated by RN: Yes  Wound Care Orders initiated by RN: Yes    Pressure Injury (Stage 3,4, Unstageable, DTI, NWPT, and Complex wounds) if present, place Wound referral order by RN under : Yes    New Ostomies, if present place, Ostomy referral order under : No     Nurse 1 eSignature: Electronically signed by Rell Clark RN on 3/3/24 at 7:16 AM EST    **SHARE this note so that the co-signing nurse can place an eSignature**    Nurse 2 eSignature: {Esignature:[id number]} EYES

## 2024-03-04 ENCOUNTER — APPOINTMENT (OUTPATIENT)
Dept: ULTRASOUND IMAGING | Age: 59
End: 2024-03-04
Payer: COMMERCIAL

## 2024-03-04 LAB
ALBUMIN SERPL-MCNC: 3.5 G/DL (ref 3.4–5)
ALP SERPL-CCNC: 100 U/L (ref 40–129)
ALT SERPL-CCNC: 11 U/L (ref 10–40)
ANION GAP SERPL CALCULATED.3IONS-SCNC: 11 MMOL/L (ref 3–16)
AST SERPL-CCNC: 11 U/L (ref 15–37)
BACTERIA URNS QL MICRO: ABNORMAL /HPF
BASOPHILS # BLD: 0 K/UL (ref 0–0.2)
BASOPHILS NFR BLD: 0.1 %
BILIRUB DIRECT SERPL-MCNC: <0.2 MG/DL (ref 0–0.3)
BILIRUB INDIRECT SERPL-MCNC: ABNORMAL MG/DL (ref 0–1)
BILIRUB SERPL-MCNC: 0.3 MG/DL (ref 0–1)
BILIRUB UR QL STRIP.AUTO: NEGATIVE
BUN SERPL-MCNC: 80 MG/DL (ref 7–20)
CALCIUM SERPL-MCNC: 8.7 MG/DL (ref 8.3–10.6)
CHLORIDE SERPL-SCNC: 94 MMOL/L (ref 99–110)
CLARITY UR: CLEAR
CO2 SERPL-SCNC: 29 MMOL/L (ref 21–32)
COLOR UR: YELLOW
CREAT SERPL-MCNC: 2.4 MG/DL (ref 0.8–1.3)
CREAT UR-MCNC: 74.9 MG/DL (ref 39–259)
CREAT UR-MCNC: 76.5 MG/DL (ref 39–259)
DEPRECATED RDW RBC AUTO: 15.6 % (ref 12.4–15.4)
EOSINOPHIL # BLD: 0 K/UL (ref 0–0.6)
EOSINOPHIL NFR BLD: 0 %
EPI CELLS #/AREA URNS AUTO: 0 /HPF (ref 0–5)
GFR SERPLBLD CREATININE-BSD FMLA CKD-EPI: 30 ML/MIN/{1.73_M2}
GLUCOSE BLD-MCNC: 326 MG/DL (ref 70–99)
GLUCOSE BLD-MCNC: 389 MG/DL (ref 70–99)
GLUCOSE SERPL-MCNC: 269 MG/DL (ref 70–99)
GLUCOSE UR STRIP.AUTO-MCNC: >=1000 MG/DL
HCT VFR BLD AUTO: 51 % (ref 40.5–52.5)
HGB BLD-MCNC: 17.2 G/DL (ref 13.5–17.5)
HGB UR QL STRIP.AUTO: NEGATIVE
HYALINE CASTS #/AREA URNS AUTO: 3 /LPF (ref 0–8)
KETONES UR STRIP.AUTO-MCNC: NEGATIVE MG/DL
LEUKOCYTE ESTERASE UR QL STRIP.AUTO: NEGATIVE
LYMPHOCYTES # BLD: 0.6 K/UL (ref 1–5.1)
LYMPHOCYTES NFR BLD: 13.9 %
MCH RBC QN AUTO: 33.7 PG (ref 26–34)
MCHC RBC AUTO-ENTMCNC: 33.8 G/DL (ref 31–36)
MCV RBC AUTO: 99.7 FL (ref 80–100)
MICROALBUMIN UR DL<=1MG/L-MCNC: <1.2 MG/DL
MICROALBUMIN/CREAT UR: NORMAL MG/G (ref 0–30)
MONOCYTES # BLD: 0.3 K/UL (ref 0–1.3)
MONOCYTES NFR BLD: 7.2 %
NEUTROPHILS # BLD: 3.2 K/UL (ref 1.7–7.7)
NEUTROPHILS NFR BLD: 78.8 %
NITRITE UR QL STRIP.AUTO: NEGATIVE
PERFORMED ON: ABNORMAL
PERFORMED ON: ABNORMAL
PH UR STRIP.AUTO: 5.5 [PH] (ref 5–8)
PLATELET # BLD AUTO: 156 K/UL (ref 135–450)
PLATELET BLD QL SMEAR: ADEQUATE
PMV BLD AUTO: 8.9 FL (ref 5–10.5)
POTASSIUM SERPL-SCNC: 5 MMOL/L (ref 3.5–5.1)
PROT SERPL-MCNC: 7.5 G/DL (ref 6.4–8.2)
PROT UR STRIP.AUTO-MCNC: NEGATIVE MG/DL
PROT UR-MCNC: 9 MG/DL
PROT/CREAT UR-RTO: 0.1 MG/DL
RBC # BLD AUTO: 5.12 M/UL (ref 4.2–5.9)
RBC CLUMPS #/AREA URNS AUTO: 0 /HPF (ref 0–4)
SLIDE REVIEW: ABNORMAL
SODIUM SERPL-SCNC: 134 MMOL/L (ref 136–145)
SODIUM UR-SCNC: 23 MMOL/L
SP GR UR STRIP.AUTO: 1.02 (ref 1–1.03)
UA DIPSTICK W REFLEX MICRO PNL UR: ABNORMAL
URN SPEC COLLECT METH UR: ABNORMAL
UROBILINOGEN UR STRIP-ACNC: 0.2 E.U./DL
WBC # BLD AUTO: 4.1 K/UL (ref 4–11)
WBC #/AREA URNS AUTO: 0 /HPF (ref 0–5)

## 2024-03-04 PROCEDURE — 99233 SBSQ HOSP IP/OBS HIGH 50: CPT | Performed by: INTERNAL MEDICINE

## 2024-03-04 PROCEDURE — 94660 CPAP INITIATION&MGMT: CPT

## 2024-03-04 PROCEDURE — 6370000000 HC RX 637 (ALT 250 FOR IP): Performed by: INTERNAL MEDICINE

## 2024-03-04 PROCEDURE — 94640 AIRWAY INHALATION TREATMENT: CPT

## 2024-03-04 PROCEDURE — 6370000000 HC RX 637 (ALT 250 FOR IP): Performed by: STUDENT IN AN ORGANIZED HEALTH CARE EDUCATION/TRAINING PROGRAM

## 2024-03-04 PROCEDURE — 81001 URINALYSIS AUTO W/SCOPE: CPT

## 2024-03-04 PROCEDURE — 6360000002 HC RX W HCPCS: Performed by: INTERNAL MEDICINE

## 2024-03-04 PROCEDURE — 2700000000 HC OXYGEN THERAPY PER DAY

## 2024-03-04 PROCEDURE — 36415 COLL VENOUS BLD VENIPUNCTURE: CPT

## 2024-03-04 PROCEDURE — 82043 UR ALBUMIN QUANTITATIVE: CPT

## 2024-03-04 PROCEDURE — 82570 ASSAY OF URINE CREATININE: CPT

## 2024-03-04 PROCEDURE — 80076 HEPATIC FUNCTION PANEL: CPT

## 2024-03-04 PROCEDURE — 84156 ASSAY OF PROTEIN URINE: CPT

## 2024-03-04 PROCEDURE — 80048 BASIC METABOLIC PNL TOTAL CA: CPT

## 2024-03-04 PROCEDURE — 85025 COMPLETE CBC W/AUTO DIFF WBC: CPT

## 2024-03-04 PROCEDURE — 84300 ASSAY OF URINE SODIUM: CPT

## 2024-03-04 PROCEDURE — 76770 US EXAM ABDO BACK WALL COMP: CPT

## 2024-03-04 PROCEDURE — 2580000003 HC RX 258: Performed by: INTERNAL MEDICINE

## 2024-03-04 PROCEDURE — 94761 N-INVAS EAR/PLS OXIMETRY MLT: CPT

## 2024-03-04 PROCEDURE — 2060000000 HC ICU INTERMEDIATE R&B

## 2024-03-04 RX ORDER — IPRATROPIUM BROMIDE AND ALBUTEROL SULFATE 2.5; .5 MG/3ML; MG/3ML
1 SOLUTION RESPIRATORY (INHALATION) EVERY 8 HOURS
Status: DISCONTINUED | OUTPATIENT
Start: 2024-03-04 | End: 2024-03-13 | Stop reason: HOSPADM

## 2024-03-04 RX ADMIN — PHENYLEPHRINE HYDROCHLORIDE 1 SPRAY: 0.5 SPRAY NASAL at 21:47

## 2024-03-04 RX ADMIN — Medication 10 ML: at 09:44

## 2024-03-04 RX ADMIN — Medication 1 LOZENGE: at 15:35

## 2024-03-04 RX ADMIN — METHYLPREDNISOLONE SODIUM SUCCINATE 40 MG: 40 INJECTION INTRAMUSCULAR; INTRAVENOUS at 04:10

## 2024-03-04 RX ADMIN — MOMETASONE FUROATE AND FORMOTEROL FUMARATE DIHYDRATE 2 PUFF: 200; 5 AEROSOL RESPIRATORY (INHALATION) at 20:13

## 2024-03-04 RX ADMIN — OSELTAMIVIR PHOSPHATE 75 MG: 75 CAPSULE ORAL at 21:46

## 2024-03-04 RX ADMIN — ACETAMINOPHEN 1000 MG: 500 TABLET ORAL at 00:05

## 2024-03-04 RX ADMIN — Medication 2 SPRAY: at 23:21

## 2024-03-04 RX ADMIN — IPRATROPIUM BROMIDE AND ALBUTEROL SULFATE 1 DOSE: 2.5; .5 SOLUTION RESPIRATORY (INHALATION) at 23:49

## 2024-03-04 RX ADMIN — ATORVASTATIN CALCIUM 40 MG: 40 TABLET, FILM COATED ORAL at 21:46

## 2024-03-04 RX ADMIN — AMLODIPINE BESYLATE 10 MG: 10 TABLET ORAL at 21:46

## 2024-03-04 RX ADMIN — ENOXAPARIN SODIUM 30 MG: 100 INJECTION SUBCUTANEOUS at 21:46

## 2024-03-04 RX ADMIN — Medication 10 ML: at 21:47

## 2024-03-04 RX ADMIN — OSELTAMIVIR PHOSPHATE 75 MG: 75 CAPSULE ORAL at 09:40

## 2024-03-04 RX ADMIN — CARVEDILOL 25 MG: 25 TABLET, FILM COATED ORAL at 09:40

## 2024-03-04 RX ADMIN — IPRATROPIUM BROMIDE AND ALBUTEROL SULFATE 1 DOSE: 2.5; .5 SOLUTION RESPIRATORY (INHALATION) at 12:13

## 2024-03-04 RX ADMIN — IPRATROPIUM BROMIDE AND ALBUTEROL SULFATE 1 DOSE: 2.5; .5 SOLUTION RESPIRATORY (INHALATION) at 08:10

## 2024-03-04 RX ADMIN — Medication 1 LOZENGE: at 21:46

## 2024-03-04 RX ADMIN — PHENYLEPHRINE HYDROCHLORIDE 1 SPRAY: 0.5 SPRAY NASAL at 09:44

## 2024-03-04 RX ADMIN — ENOXAPARIN SODIUM 30 MG: 100 INJECTION SUBCUTANEOUS at 09:39

## 2024-03-04 RX ADMIN — MOMETASONE FUROATE AND FORMOTEROL FUMARATE DIHYDRATE 2 PUFF: 200; 5 AEROSOL RESPIRATORY (INHALATION) at 08:10

## 2024-03-04 RX ADMIN — ACETAMINOPHEN 1000 MG: 500 TABLET ORAL at 15:35

## 2024-03-04 RX ADMIN — METHYLPREDNISOLONE SODIUM SUCCINATE 40 MG: 40 INJECTION INTRAMUSCULAR; INTRAVENOUS at 17:41

## 2024-03-04 RX ADMIN — IPRATROPIUM BROMIDE AND ALBUTEROL SULFATE 1 DOSE: 2.5; .5 SOLUTION RESPIRATORY (INHALATION) at 15:57

## 2024-03-04 ASSESSMENT — PAIN SCALES - GENERAL
PAINLEVEL_OUTOF10: 0
PAINLEVEL_OUTOF10: 1
PAINLEVEL_OUTOF10: 0
PAINLEVEL_OUTOF10: 2

## 2024-03-04 ASSESSMENT — PAIN DESCRIPTION - ORIENTATION
ORIENTATION: ANTERIOR
ORIENTATION: ANTERIOR

## 2024-03-04 ASSESSMENT — PAIN - FUNCTIONAL ASSESSMENT: PAIN_FUNCTIONAL_ASSESSMENT: PREVENTS OR INTERFERES SOME ACTIVE ACTIVITIES AND ADLS

## 2024-03-04 ASSESSMENT — PAIN DESCRIPTION - LOCATION
LOCATION: HEAD
LOCATION: HEAD

## 2024-03-04 ASSESSMENT — PAIN DESCRIPTION - DESCRIPTORS
DESCRIPTORS: ACHING
DESCRIPTORS: ACHING

## 2024-03-04 NOTE — PROGRESS NOTES
[facility]  Diabetes Education   Progress Note       NAME:  Alvarado Sibley  MEDICAL RECORD NUMBER:  [id number]  AGE: 59 y.o.   GENDER: male  : 1965  TODAY'S DATE:  3/4/2024    Subjective   Reason for Diabetic Education Evaluation and Assessment: medication taking    Ed has been reluctant to accept any insulin doses today.  He describes insulin taking as a heavy burden for his late wife and he is not going to take it.  Absolutely refuses to consider insulin at discharge.      Encouraged to consider taking while inpatient with current state of steroid use and kidney complications.      Reviewed most recent BG of 269 from morning labs and risk for higher readings with the steroid.        Visit Type: evaluation      Alvarado Sibley is a 59 y.o. male referred by:     [] Physician  [x] Nursing  [] Chart Review   [] Other:     PAST MEDICAL HISTORY        Diagnosis Date    Acute on chronic diastolic congestive heart failure (HCC)     ADHD (attention deficit hyperactivity disorder)     pt denies    Adrenal nodule (HCC) 2017    low-density nodular enlargement of a left adrenal gland which is stable since       COPD (chronic obstructive pulmonary disease) (HCC)     Emphysema of lung (HCC)     Essential hypertension 2017    History of pneumothorax 2017    Rib fx    Hx of colonic polyps 2018    1.2018 Descending Polyp 2. Sigmoid Polyp 3. Sigmoid Diverticulosis recheck     Hx of renal calculi 2017    Hypertension     Kidney stone     Neuropathy     Obesity     Osteoarthritis     Pneumothorax      d/t fall     Prediabetes 2017    Retinal hemorrhage 10/29/2020    HTN-right eye    Sleep apnea     uses C-PAP    Substance abuse (HCC)     Type 2 diabetes mellitus without complication (HCC)        PAST SURGICAL HISTORY    Past Surgical History:   Procedure Laterality Date    CARPAL TUNNEL RELEASE Right     COLONOSCOPY  2018    Nerissaely, x2 polyps    COLONOSCOPY N/A

## 2024-03-04 NOTE — PROGRESS NOTES
Patient sitting comfortably in the chair. Currently on 6 L, down from 11 L. AM assessment completed and morning medications given. Patient with BS of 269. He is refusing both lantus and humalog, stating these medications are unaffordable at home and he \"knows if his sugar is too high or too low.\" No needs at this time. Will continue to monitor.

## 2024-03-04 NOTE — PLAN OF CARE
Problem: ABCDS Injury Assessment  Goal: Absence of physical injury  3/4/2024 1055 by Sal Reed RN  Outcome: Progressing     Problem: Respiratory - Adult  Goal: Achieves optimal ventilation and oxygenation  3/4/2024 1055 by Sal Reed RN  Outcome: Progressing     Problem: Cardiovascular - Adult  Goal: Maintains optimal cardiac output and hemodynamic stability  3/4/2024 1055 by Sal Reed RN  Outcome: Progressing     Problem: Metabolic/Fluid and Electrolytes - Adult  Goal: Electrolytes maintained within normal limits  3/4/2024 1055 by Sal Reed RN  Outcome: Progressing     Problem: Metabolic/Fluid and Electrolytes - Adult  Goal: Hemodynamic stability and optimal renal function maintained  3/4/2024 1055 by Sal Reed RN  Outcome: Progressing     Problem: Metabolic/Fluid and Electrolytes - Adult  Goal: Glucose maintained within prescribed range  3/4/2024 1055 by Sal Reed RN  Outcome: Progressing     Problem: Discharge Planning  Goal: Discharge to home or other facility with appropriate resources  3/4/2024 1055 by Sal Reed RN  Outcome: Progressing     Problem: Pain  Goal: Verbalizes/displays adequate comfort level or baseline comfort level  Outcome: Progressing     Problem: Safety - Adult  Goal: Free from fall injury  Outcome: Progressing     Problem: Chronic Conditions and Co-morbidities  Goal: Patient's chronic conditions and co-morbidity symptoms are monitored and maintained or improved  3/4/2024 1055 by Sal Reed RN  Outcome: Progressing     Problem: Musculoskeletal - Adult  Goal: Return mobility to safest level of function  Outcome: Progressing     Problem: Infection - Adult  Goal: Absence of infection at discharge  3/4/2024 1055 by Sal Reed RN  Outcome: Progressing

## 2024-03-04 NOTE — PLAN OF CARE
Problem: ABCDS Injury Assessment  Goal: Absence of physical injury  3/3/2024 2228 by Ravi Cantu RN  Outcome: Progressing  Flowsheets (Taken 3/3/2024 2228)  Absence of Physical Injury: Implement safety measures based on patient assessment     Problem: Respiratory - Adult  Goal: Achieves optimal ventilation and oxygenation  3/3/2024 2228 by Ravi Cantu RN  Outcome: Progressing  Flowsheets (Taken 3/3/2024 2228)  Achieves optimal ventilation and oxygenation:   Assess for changes in respiratory status   Assess for changes in mentation and behavior     Problem: Cardiovascular - Adult  Goal: Maintains optimal cardiac output and hemodynamic stability  3/3/2024 2228 by Ravi Cantu RN  Outcome: Progressing  Flowsheets (Taken 3/3/2024 2228)  Maintains optimal cardiac output and hemodynamic stability: Monitor blood pressure and heart rate     Problem: Metabolic/Fluid and Electrolytes - Adult  Goal: Electrolytes maintained within normal limits  3/3/2024 2228 by Ravi Cantu RN  Outcome: Progressing  Flowsheets (Taken 3/3/2024 2228)  Electrolytes maintained within normal limits: Monitor labs and assess patient for signs and symptoms of electrolyte imbalances     Problem: Metabolic/Fluid and Electrolytes - Adult  Goal: Hemodynamic stability and optimal renal function maintained  3/3/2024 2228 by Ravi Cantu RN  Outcome: Progressing  Flowsheets (Taken 3/3/2024 2228)  Hemodynamic stability and optimal renal function maintained: Monitor intake, output and patient weight     Problem: Metabolic/Fluid and Electrolytes - Adult  Goal: Glucose maintained within prescribed range  3/3/2024 2228 by Ravi Cantu RN  Outcome: Progressing  Flowsheets (Taken 3/3/2024 2228)  Glucose maintained within prescribed range: Monitor blood glucose as ordered     Problem: Infection - Adult  Goal: Absence of infection at discharge  Outcome: Progressing     Problem: Discharge Planning  Goal: Discharge to home or other facility  with appropriate resources  3/3/2024 2228 by Ravi Cantu, RN  Outcome: Progressing  Flowsheets (Taken 3/3/2024 2228)  Discharge to home or other facility with appropriate resources: Identify discharge learning needs (meds, wound care, etc)     Problem: Chronic Conditions and Co-morbidities  Goal: Patient's chronic conditions and co-morbidity symptoms are monitored and maintained or improved  3/3/2024 2228 by Ravi Cantu, RN  Outcome: Progressing  Flowsheets (Taken 3/3/2024 2228)  Care Plan - Patient's Chronic Conditions and Co-Morbidity Symptoms are Monitored and Maintained or Improved: Monitor and assess patient's chronic conditions and comorbid symptoms for stability, deterioration, or improvement

## 2024-03-04 NOTE — PROGRESS NOTES
Pulmonary Progress Note    Date of Admission: 2/29/2024   LOS: 4 days       CC:  Chief Complaint   Patient presents with    Shortness of Breath     Pt reports hx of CHF and COPD. Pt reports on diuretics and has been taking as prescribed. Pt reports BLE swelling and SOB. Pt is 77% on RA. Pt reports on 2L O2 at home as needed. Pt placed on 4l n/c in triage and increased to 89%        Subjective:  No change   See below.     ROS:   No nausea  No Vomiting  No chest pain       Assessment:     COPD  Chronic hypoxemia  Sleep apnea  Tobacco abuse  Obesity    Plan:     This note may have been transcribed using Dragon Dictation software. Please disregard any translational errors.       Hospital Day: 4     COPD, severe   Duoneb's   Dulera   Solumedrol  - monitor glucose      Acute on chronic hypoxemic resp failure with acute hypercapnia   Wean O2 to sat >90%  Bipap  Patient was last seen in 2021 noted to have oxygen saturation of 75% on my visit.  Oxygen was ordered.         Influenza   Tamiflu  Worsening chest X-ray.    Volume status -2.8 L  Procal 0.14  3/2/24   .         MELISSA  Worsening.   Defer to Oscar Medina MD         Sinus dryness   Gravity spray every 8 hours    Patient has multiple theories about him getting worse.  Now States he only worsened after humidifier placed on oxygen .  Keeps stating he needs to discharge and go back to work.        Data:        PHYSICAL EXAM:   Blood pressure (!) 85/46, pulse 60, temperature 97.6 °F (36.4 °C), temperature source Oral, resp. rate 18, height 1.651 m (5' 5\"), weight 125.3 kg (276 lb 3.8 oz), SpO2 93 %.'  Body mass index is 45.97 kg/m².   Gen: No distress.    ENT:   Resp: No accessory muscle use. No crackles. No wheezes. No rhonchi.    CV: Regular rate. Regular rhythm. No murmur or rub. No edema.   Skin: Warm, dry, normal texture and turgor. No nodule on exposed extremities.   M/S: No cyanosis. No clubbing. No joint deformity.  Psych: Oriented x 3. No anxiety.  Awake. Alert.  \"LABCAST\", \"WBCUA\", \"RBCUA\", \"MUCUS\", \"TRICHOMONAS\", \"YEAST\", \"BACTERIA\", \"CLARITYU\", \"SPECGRAV\", \"LEUKOCYTESUR\", \"UROBILINOGEN\", \"BILIRUBINUR\", \"BLOODU\", \"GLUCOSEU\", \"AMORPHOUS\" in the last 72 hours.    Invalid input(s): \"KETONESU\"  No results for input(s): \"PH\", \"PCO2\", \"PO2\" in the last 72 hours.    Cx:      Films:             This note was transcribed using Dragon Dictation software. Please disregard any translational errors.      NAZ Gilliland [facility]  [phone]

## 2024-03-04 NOTE — CONSULTS
Patient ID: Alvarado Sibley  Referring/ Physician: Oscar Medina MD      Summary:   Alvarado Sibley is being seen by nephrology for MELISSA.     Reason for admission:   COPD  CHF EF preserved G1DD, normal LV filling pressures.   Severe pulmonary HTN. Severe TR  Hypotension     Cr 1.3 > 2.4 in 2 days after diuresis and lower BP readings.   Probably hemodynamic mediated MELISSA.   Holding lasix, jardiance, aldactone and ACE      Has pulmonary hypertension with no signs of elevated filling pressures or RV dysfunction      Interval Hx:   See HPI     Assessment/Plan:   -Clinically appears fluid overloaded and will need some diuretics at some point.  -Right now would hold diuretics, RAAS blockade, SGL 2 inhibitor and allow his blood pressure to come up.  -Check bladder scan ensure no  urine retention      Acute kidney injury  This is probably related to diuretic use and being on RAAS blockade, SGL 2 inhibitor and was on NSAIDs prior to admission.  He also had an episode of hypotension where his blood pressure dropped into the 80s systolic.  Check urinalysis, albumin creatinine ratio, protein creatinine ratio  Check bladder scan  Check renal ultrasound      History of hypertension  Prior to admission was on Aldactone, lisinopril, Coreg, Lasix, holding diuretics and RAAS blockade    Pulmonary arterial hypertension  Probably related to COPD  His echo showed severe pulmonary pretension and severe TR but no RV dysfunction and is LV filling pressures were okay.    Edema  He does have grade 1 diastolic dysfunction than normal filling pressures on the left side noted on echo.  The RV was dilated with normal function, has severe TR and some pulmonary hypertension as well      [facility] would like to thank you for the opportunity to serve this patient. Please call with any questions or concerns.    MD [facility]  [address] [place] [address]  Fax: [phone]  Office:  provider] empagliflozin, 25 mg, Nightly  [Held by provider] lisinopril, 40 mg, Nightly  [Held by provider] spironolactone, 25 mg, Nightly  mometasone-formoterol, 2 puff, BID RT   And  [Held by provider] tiotropium, 2 puff, Daily RT       Guaifenesin, Dextromethorphan-guaifenesin, and Aspirin    Allergies:   Allergies   Allergen Reactions    Guaifenesin Other (See Comments)     Felt like having a stroke    \"paralized me\"    Dextromethorphan-Guaifenesin      Other reaction(s): catatonic state    Aspirin Itching         Physical Exam/Objective:   Vitals:    03/04/24 1200   BP:    Pulse: 65   Resp: 16   Temp:    SpO2:        Intake/Output Summary (Last 24 hours) at 3/4/2024 1240  Last data filed at 3/4/2024 0033  Gross per 24 hour   Intake 480 ml   Output 300 ml   Net 180 ml         General appearance: in no acute distress.   HEENT: + JVD elevation   Respiratory: Respiratory effort normal, bilateral equal chest rise. No wheeze, + crackles  Cardiovascular: Ausculation shows RRR and  + 2  edema   Abdomen: abdomen is soft, non distended  Musculoskeletal:  no joint swelling, no deformity  Skin: no rashes,  no jaundice   Neuro:  Follows commands      Data:   CBC:   Recent Labs     03/02/24  0606 03/03/24  0358 03/04/24  0553   WBC 10.8 9.1 4.1   HGB 18.2* 18.3* 17.2   HCT 54.7* 53.6* 51.0    175 156     BMP:    Recent Labs     03/02/24  0606 03/03/24  0358 03/04/24  0553    137 134*   K 4.1 5.0 5.0   CL 97* 98* 94*   CO2 30 29 29   BUN 44* 52* 80*   CREATININE 1.3 1.7* 2.4*   GLUCOSE 152* 211* 269*     Lab Results   Component Value Date/Time    COLORU Yellow 01/20/2021 06:40 PM    NITRU Negative 01/20/2021 06:40 PM    GLUCOSEU 500 01/20/2021 06:40 PM    KETUA Negative 01/20/2021 06:40 PM    UROBILINOGEN 0.2 01/20/2021 06:40 PM    BILIRUBINUR Negative 01/20/2021 06:40 PM

## 2024-03-04 NOTE — CARE COORDINATION
Advance Care Planning     Advance Care Planning Activator (Inpatient)  Conversation Note      Date of ACP Conversation: 3/4/2024     Conversation Conducted with: Patient with Decision Making Capacity    ACP Activator: Nirali Spears RN    Health Care Decision Maker:     Current Designated Health Care Decision Maker:     Primary Decision Maker: Calin Rodriguez - Child - [phone]    Secondary Decision Maker: Adam Sibley - Brother/Sister - [phone]    Care Preferences    Ventilation:  \"If you were in your present state of health and suddenly became very ill and were unable to breathe on your own, what would your preference be about the use of a ventilator (breathing machine) if it were available to you?\"      Would the patient desire the use of ventilator (breathing machine)?: yes    \"If your health worsens and it becomes clear that your chance of recovery is unlikely, what would your preference be about the use of a ventilator (breathing machine) if it were available to you?\"     Would the patient desire the use of ventilator (breathing machine)?: Unsure      Resuscitation  \"CPR works best to restart the heart when there is a sudden event, like a heart attack, in someone who is otherwise healthy. Unfortunately, CPR does not typically restart the heart for people who have serious health conditions or who are very sick.\"    \"In the event your heart stopped as a result of an underlying serious health condition, would you want attempts to be made to restart your heart (answer \"yes\" for attempt to resuscitate) or would you prefer a natural death (answer \"no\" for do not attempt to resuscitate)?\" yes       [] Yes   [x] No   Educated Patient / Decision Maker regarding differences between Advance Directives and portable DNR orders.    Length of ACP Conversation in minutes:  5 minutes    Conversation Outcomes:  ACP discussion completed    Follow-up plan:    [] Schedule follow-up conversation to continue planning  []

## 2024-03-04 NOTE — CARE COORDINATION
Met with patient. Patient plans on returning home. Declines needing home care. Discussed home oxygen needs. Patient says he has an old concentrator that was inherited from his late wife. Patient has a CPAP machine that he uses & O2 PRN through old concentrator.   Patient is not active with any oxygen provider at this time.  Patient currently on 6L O2 weaned down from 10L this morning.    Will need a home O2 eval prior to discharge to see if qualifies for home oxygen. Will need DME oxygen orders if qualifies.    Electronically signed by Nirali Spears RN Case Management on 3/4/2024 at 1:52 PM

## 2024-03-05 LAB
ALBUMIN SERPL-MCNC: 3.7 G/DL (ref 3.4–5)
ALP SERPL-CCNC: 125 U/L (ref 40–129)
ALT SERPL-CCNC: 13 U/L (ref 10–40)
ANION GAP SERPL CALCULATED.3IONS-SCNC: 7 MMOL/L (ref 3–16)
ANION GAP SERPL CALCULATED.3IONS-SCNC: 8 MMOL/L (ref 3–16)
AST SERPL-CCNC: 12 U/L (ref 15–37)
BASOPHILS # BLD: 0 K/UL (ref 0–0.2)
BASOPHILS NFR BLD: 0 %
BILIRUB DIRECT SERPL-MCNC: <0.2 MG/DL (ref 0–0.3)
BILIRUB INDIRECT SERPL-MCNC: ABNORMAL MG/DL (ref 0–1)
BILIRUB SERPL-MCNC: 0.3 MG/DL (ref 0–1)
BUN SERPL-MCNC: 65 MG/DL (ref 7–20)
BUN SERPL-MCNC: 75 MG/DL (ref 7–20)
CALCIUM SERPL-MCNC: 8.6 MG/DL (ref 8.3–10.6)
CALCIUM SERPL-MCNC: 9 MG/DL (ref 8.3–10.6)
CHLORIDE SERPL-SCNC: 100 MMOL/L (ref 99–110)
CHLORIDE SERPL-SCNC: 99 MMOL/L (ref 99–110)
CO2 SERPL-SCNC: 27 MMOL/L (ref 21–32)
CO2 SERPL-SCNC: 30 MMOL/L (ref 21–32)
CREAT SERPL-MCNC: 1.4 MG/DL (ref 0.8–1.3)
CREAT SERPL-MCNC: 1.6 MG/DL (ref 0.8–1.3)
DEPRECATED RDW RBC AUTO: 15.4 % (ref 12.4–15.4)
EOSINOPHIL # BLD: 0 K/UL (ref 0–0.6)
EOSINOPHIL NFR BLD: 0 %
GFR SERPLBLD CREATININE-BSD FMLA CKD-EPI: 49 ML/MIN/{1.73_M2}
GFR SERPLBLD CREATININE-BSD FMLA CKD-EPI: 57 ML/MIN/{1.73_M2}
GLUCOSE BLD-MCNC: 293 MG/DL (ref 70–99)
GLUCOSE BLD-MCNC: 369 MG/DL (ref 70–99)
GLUCOSE BLD-MCNC: 392 MG/DL (ref 70–99)
GLUCOSE BLD-MCNC: 398 MG/DL (ref 70–99)
GLUCOSE SERPL-MCNC: 336 MG/DL (ref 70–99)
GLUCOSE SERPL-MCNC: 451 MG/DL (ref 70–99)
HCT VFR BLD AUTO: 55.4 % (ref 40.5–52.5)
HGB BLD-MCNC: 18.5 G/DL (ref 13.5–17.5)
LYMPHOCYTES # BLD: 0.7 K/UL (ref 1–5.1)
LYMPHOCYTES NFR BLD: 9.6 %
MCH RBC QN AUTO: 33.3 PG (ref 26–34)
MCHC RBC AUTO-ENTMCNC: 33.4 G/DL (ref 31–36)
MCV RBC AUTO: 99.8 FL (ref 80–100)
MONOCYTES # BLD: 0.7 K/UL (ref 0–1.3)
MONOCYTES NFR BLD: 10.2 %
NEUTROPHILS # BLD: 5.8 K/UL (ref 1.7–7.7)
NEUTROPHILS NFR BLD: 80.2 %
PERFORMED ON: ABNORMAL
PLATELET # BLD AUTO: 145 K/UL (ref 135–450)
PMV BLD AUTO: 9.3 FL (ref 5–10.5)
POTASSIUM SERPL-SCNC: 5.7 MMOL/L (ref 3.5–5.1)
POTASSIUM SERPL-SCNC: 5.9 MMOL/L (ref 3.5–5.1)
PROT SERPL-MCNC: 7.9 G/DL (ref 6.4–8.2)
RBC # BLD AUTO: 5.55 M/UL (ref 4.2–5.9)
SODIUM SERPL-SCNC: 135 MMOL/L (ref 136–145)
SODIUM SERPL-SCNC: 136 MMOL/L (ref 136–145)
WBC # BLD AUTO: 7.3 K/UL (ref 4–11)

## 2024-03-05 PROCEDURE — 6370000000 HC RX 637 (ALT 250 FOR IP): Performed by: INTERNAL MEDICINE

## 2024-03-05 PROCEDURE — 6370000000 HC RX 637 (ALT 250 FOR IP): Performed by: STUDENT IN AN ORGANIZED HEALTH CARE EDUCATION/TRAINING PROGRAM

## 2024-03-05 PROCEDURE — 2060000000 HC ICU INTERMEDIATE R&B

## 2024-03-05 PROCEDURE — 94669 MECHANICAL CHEST WALL OSCILL: CPT

## 2024-03-05 PROCEDURE — 2700000000 HC OXYGEN THERAPY PER DAY

## 2024-03-05 PROCEDURE — 6360000002 HC RX W HCPCS: Performed by: INTERNAL MEDICINE

## 2024-03-05 PROCEDURE — 97530 THERAPEUTIC ACTIVITIES: CPT

## 2024-03-05 PROCEDURE — 85025 COMPLETE CBC W/AUTO DIFF WBC: CPT

## 2024-03-05 PROCEDURE — 80076 HEPATIC FUNCTION PANEL: CPT

## 2024-03-05 PROCEDURE — 2580000003 HC RX 258

## 2024-03-05 PROCEDURE — 94640 AIRWAY INHALATION TREATMENT: CPT

## 2024-03-05 PROCEDURE — 80048 BASIC METABOLIC PNL TOTAL CA: CPT

## 2024-03-05 PROCEDURE — 94660 CPAP INITIATION&MGMT: CPT

## 2024-03-05 PROCEDURE — 36415 COLL VENOUS BLD VENIPUNCTURE: CPT

## 2024-03-05 PROCEDURE — 94761 N-INVAS EAR/PLS OXIMETRY MLT: CPT

## 2024-03-05 PROCEDURE — 99233 SBSQ HOSP IP/OBS HIGH 50: CPT | Performed by: INTERNAL MEDICINE

## 2024-03-05 PROCEDURE — 2580000003 HC RX 258: Performed by: INTERNAL MEDICINE

## 2024-03-05 RX ORDER — CARVEDILOL 12.5 MG/1
12.5 TABLET ORAL 2 TIMES DAILY WITH MEALS
Status: DISCONTINUED | OUTPATIENT
Start: 2024-03-05 | End: 2024-03-13 | Stop reason: HOSPADM

## 2024-03-05 RX ORDER — GLIPIZIDE 5 MG/1
5 TABLET ORAL
Status: DISCONTINUED | OUTPATIENT
Start: 2024-03-05 | End: 2024-03-13 | Stop reason: HOSPADM

## 2024-03-05 RX ORDER — FUROSEMIDE 10 MG/ML
40 INJECTION INTRAMUSCULAR; INTRAVENOUS ONCE
Status: COMPLETED | OUTPATIENT
Start: 2024-03-05 | End: 2024-03-05

## 2024-03-05 RX ORDER — WATER 10 ML/10ML
INJECTION INTRAMUSCULAR; INTRAVENOUS; SUBCUTANEOUS
Status: COMPLETED
Start: 2024-03-05 | End: 2024-03-05

## 2024-03-05 RX ORDER — PREDNISONE 20 MG/1
40 TABLET ORAL DAILY
Status: DISCONTINUED | OUTPATIENT
Start: 2024-03-05 | End: 2024-03-08

## 2024-03-05 RX ADMIN — METHYLPREDNISOLONE SODIUM SUCCINATE 40 MG: 40 INJECTION INTRAMUSCULAR; INTRAVENOUS at 04:17

## 2024-03-05 RX ADMIN — Medication 1 LOZENGE: at 15:34

## 2024-03-05 RX ADMIN — FUROSEMIDE 40 MG: 10 INJECTION, SOLUTION INTRAMUSCULAR; INTRAVENOUS at 15:35

## 2024-03-05 RX ADMIN — ENOXAPARIN SODIUM 30 MG: 100 INJECTION SUBCUTANEOUS at 08:23

## 2024-03-05 RX ADMIN — CARVEDILOL 25 MG: 25 TABLET, FILM COATED ORAL at 08:36

## 2024-03-05 RX ADMIN — IPRATROPIUM BROMIDE AND ALBUTEROL SULFATE 1 DOSE: 2.5; .5 SOLUTION RESPIRATORY (INHALATION) at 23:09

## 2024-03-05 RX ADMIN — OSELTAMIVIR PHOSPHATE 75 MG: 75 CAPSULE ORAL at 08:24

## 2024-03-05 RX ADMIN — SODIUM ZIRCONIUM CYCLOSILICATE 10 G: 10 POWDER, FOR SUSPENSION ORAL at 15:35

## 2024-03-05 RX ADMIN — CARVEDILOL 12.5 MG: 12.5 TABLET, FILM COATED ORAL at 18:22

## 2024-03-05 RX ADMIN — PREDNISONE 40 MG: 20 TABLET ORAL at 13:56

## 2024-03-05 RX ADMIN — MOMETASONE FUROATE AND FORMOTEROL FUMARATE DIHYDRATE 2 PUFF: 200; 5 AEROSOL RESPIRATORY (INHALATION) at 08:05

## 2024-03-05 RX ADMIN — MOMETASONE FUROATE AND FORMOTEROL FUMARATE DIHYDRATE 2 PUFF: 200; 5 AEROSOL RESPIRATORY (INHALATION) at 20:06

## 2024-03-05 RX ADMIN — WATER 10 ML: 1 INJECTION INTRAMUSCULAR; INTRAVENOUS; SUBCUTANEOUS at 04:17

## 2024-03-05 RX ADMIN — ATORVASTATIN CALCIUM 40 MG: 40 TABLET, FILM COATED ORAL at 20:29

## 2024-03-05 RX ADMIN — GLIPIZIDE 5 MG: 5 TABLET ORAL at 13:56

## 2024-03-05 RX ADMIN — IPRATROPIUM BROMIDE AND ALBUTEROL SULFATE 1 DOSE: 2.5; .5 SOLUTION RESPIRATORY (INHALATION) at 15:43

## 2024-03-05 RX ADMIN — Medication 10 ML: at 08:25

## 2024-03-05 RX ADMIN — OSELTAMIVIR PHOSPHATE 75 MG: 75 CAPSULE ORAL at 20:29

## 2024-03-05 RX ADMIN — Medication 2 SPRAY: at 06:21

## 2024-03-05 RX ADMIN — AMLODIPINE BESYLATE 10 MG: 10 TABLET ORAL at 20:29

## 2024-03-05 RX ADMIN — ENOXAPARIN SODIUM 30 MG: 100 INJECTION SUBCUTANEOUS at 20:31

## 2024-03-05 RX ADMIN — Medication 10 ML: at 20:32

## 2024-03-05 RX ADMIN — IPRATROPIUM BROMIDE AND ALBUTEROL SULFATE 1 DOSE: 2.5; .5 SOLUTION RESPIRATORY (INHALATION) at 08:05

## 2024-03-05 ASSESSMENT — PAIN DESCRIPTION - DESCRIPTORS: DESCRIPTORS: ACHING

## 2024-03-05 ASSESSMENT — PAIN SCALES - GENERAL
PAINLEVEL_OUTOF10: 2
PAINLEVEL_OUTOF10: 1

## 2024-03-05 ASSESSMENT — PAIN DESCRIPTION - LOCATION: LOCATION: THROAT

## 2024-03-05 ASSESSMENT — PAIN DESCRIPTION - ORIENTATION: ORIENTATION: MID

## 2024-03-05 ASSESSMENT — PAIN - FUNCTIONAL ASSESSMENT: PAIN_FUNCTIONAL_ASSESSMENT: PREVENTS OR INTERFERES SOME ACTIVE ACTIVITIES AND ADLS

## 2024-03-05 NOTE — PLAN OF CARE
Problem: ABCDS Injury Assessment  Goal: Absence of physical injury  3/5/2024 0010 by Ruthann Francis RN  Outcome: Progressing  3/4/2024 1055 by Sal Reed RN  Outcome: Progressing     Problem: Respiratory - Adult  Goal: Achieves optimal ventilation and oxygenation  3/5/2024 0010 by Ruthann Francis RN  Outcome: Progressing  3/4/2024 1055 by Sal Reed RN  Outcome: Progressing     Problem: Cardiovascular - Adult  Goal: Maintains optimal cardiac output and hemodynamic stability  3/5/2024 0010 by Ruthann Francis RN  Outcome: Progressing  3/4/2024 1055 by Sal Reed RN  Outcome: Progressing     Problem: Metabolic/Fluid and Electrolytes - Adult  Goal: Electrolytes maintained within normal limits  3/5/2024 0010 by Ruthann Francis RN  Outcome: Progressing  3/4/2024 1055 by Sal Reed RN  Outcome: Progressing  Goal: Hemodynamic stability and optimal renal function maintained  3/5/2024 0010 by Ruthann Francis RN  Outcome: Progressing  3/4/2024 1055 by Sal Reed RN  Outcome: Progressing  Goal: Glucose maintained within prescribed range  3/5/2024 0010 by Ruthann Francis RN  Outcome: Progressing  3/4/2024 1055 by Sal Reed RN  Outcome: Progressing     Problem: Discharge Planning  Goal: Discharge to home or other facility with appropriate resources  3/5/2024 0010 by Ruthann Francis RN  Outcome: Progressing  3/4/2024 1055 by Sal Reed RN  Outcome: Progressing     Problem: Pain  Goal: Verbalizes/displays adequate comfort level or baseline comfort level  3/5/2024 0010 by Ruthann Francis RN  Outcome: Progressing  3/4/2024 1055 by Sal Reed RN  Outcome: Progressing     Problem: Safety - Adult  Goal: Free from fall injury  3/5/2024 0010 by Ruthann Francis RN  Outcome: Progressing  3/4/2024 1055 by Sal Reed RN  Outcome: Progressing     Problem: Chronic Conditions and Co-morbidities  Goal: Patient's chronic conditions and co-morbidity symptoms are monitored and maintained or improved  3/5/2024 0010 by  Penny, Ruthann, RN  Outcome: Progressing  3/4/2024 1055 by Sal Reed RN  Outcome: Progressing     Problem: Musculoskeletal - Adult  Goal: Return mobility to safest level of function  3/5/2024 0010 by Ruthann Francis RN  Outcome: Progressing  3/4/2024 1055 by Sal Reed RN  Outcome: Progressing     Problem: Infection - Adult  Goal: Absence of infection at discharge  3/5/2024 0010 by Ruthann Francis RN  Outcome: Progressing  3/4/2024 1055 by Sal Reed RN  Outcome: Progressing

## 2024-03-05 NOTE — PROGRESS NOTES
Internal Medicine Hospital Progress Note    Patient:  Alvarado Sibley 59 y.o. male MRN: [id number]     Date of Service: 3/5/2024    Allergy: Guaifenesin, Dextromethorphan-guaifenesin, and Aspirin  CC: F/u:   Brief Course   Alvarado Sibley was admitted on 2/29/2024 for Acute on chronic heart failure with preserved ejection fraction (HFpEF) ([facility]).    24 hr interval hx:  Patient continues to have hypoxia. He is feeling some better with improvement in the swelling in his legs. He still is having high blood sugars, likely result of prednisone.    Objective     Physical Exam:  Vitals: BP (!) 116/56   Pulse 66   Temp 97.8 °F (36.6 °C) (Oral)   Resp 18   Ht 1.651 m (5' 5\")   Wt 126.4 kg (278 lb 10.6 oz)   SpO2 94%   BMI 46.37 kg/m²     Physical Exam  Constitutional:       General: He is not in acute distress.  HENT:      Mouth/Throat:      Mouth: Mucous membranes are moist.   Eyes:      Pupils: Pupils are equal, round, and reactive to light.   Cardiovascular:      Rate and Rhythm: Normal rate and regular rhythm.      Pulses: Normal pulses.   Pulmonary:      Breath sounds: Wheezing present.   Abdominal:      General: Abdomen is flat. There is no distension.      Palpations: Abdomen is soft.      Tenderness: There is no abdominal tenderness.   Skin:     General: Skin is warm and dry.      Coloration: Skin is not jaundiced or pale.      Findings: No erythema.   Neurological:      General: No focal deficit present.      Mental Status: He is alert and oriented to person, place, and time.         Pertinent/ New Labs and Imaging Studies   Labs reviewed. Pertinent labs noted in assessment and plan      Assessment and Plan   Alvarado Sibley was admitted to hospital for Acute on chronic heart failure with preserved ejection fraction (HFpEF) (HCC)    #Acute on chronic hypoxic respiratory failure: Severe COPD and pulmonary edema likely the underlying etiology of his hypoxia  - Wean oxygen as tolerated  - Home oxygen evaluation ordered  -

## 2024-03-05 NOTE — PROGRESS NOTES
Pulmonary Progress Note    Date of Admission: 2/29/2024   LOS: 5 days       CC:  Chief Complaint   Patient presents with    Shortness of Breath     Pt reports hx of CHF and COPD. Pt reports on diuretics and has been taking as prescribed. Pt reports BLE swelling and SOB. Pt is 77% on RA. Pt reports on 2L O2 at home as needed. Pt placed on 4l n/c in triage and increased to 89%        Subjective:  Patient feeling better today.  Weaned to 4 L nasal cannula.  Diuresing well.    ROS:   No nausea  No Vomiting  No chest pain       Assessment:     COPD  Chronic hypoxemia  Sleep apnea  Tobacco abuse  Obesity    Plan:     This note may have been transcribed using Dragon Dictation software. Please disregard any translational errors.       Hospital Day: 5     COPD, severe   Duoneb's   Dulera  Change steroids to prednisone     Acute on chronic hypoxemic resp failure with acute hypercapnia   Wean O2 to sat >90%  Weaned to 4 L nasal cannula.  Baseline 2 L.        Influenza   Tamiflu  Worsening chest X-ray.    Volume status -2.8 L  Procal 0.14  3/2/24   .         MELISSA  Creatinine improved..   Diuresing well    Sinus dryness   [place] spray every 8 hours    Obesity  Discussed benefits of weight loss.    Data:        PHYSICAL EXAM:   Blood pressure (!) 116/56, pulse 66, temperature 97.8 °F (36.6 °C), temperature source Oral, resp. rate 18, height 1.651 m (5' 5\"), weight 126.4 kg (278 lb 10.6 oz), SpO2 94 %.'  Body mass index is 46.37 kg/m².   Gen: No distress.    ENT:   Resp: No accessory muscle use. No crackles. No wheezes. No rhonchi.    CV: Regular rate. Regular rhythm. No murmur or rub. No edema.   Skin: Warm, dry, normal texture and turgor. No nodule on exposed extremities.   M/S: No cyanosis. No clubbing. No joint deformity.  Psych:       Medications:    Scheduled Meds:   ipratropium 0.5 mg-albuterol 2.5 mg  1 Dose Inhalation Q8H    sodium chloride  2 spray Each Nostril 3 times per day    insulin glargine  10 Units SubCUTAneous

## 2024-03-05 NOTE — PROGRESS NOTES
Physical Therapy  Facility/Department: [address] PROGRESSIVE CARE  Daily Treatment Note/Discharge Summary  NAME: Alvarado Sibley  : 1965  MRN: [id number]    Date of Service: 3/5/2024    Discharge Recommendations:  Home with assist PRN   PT Equipment Recommendations  Other: Wants an oxygen concentrator    Patient Diagnosis(es): The primary encounter diagnosis was Acute respiratory failure with hypoxia (HCC). Diagnoses of Acute on chronic congestive heart failure, unspecified heart failure type (HCC) and Goals of care, counseling/discussion were also pertinent to this visit.    Assessment   Assessment: Pt agreeable to activity, up ad carin, ambulating independently without device.  SPO2 mid-high 80s with activity on 4-6 L. O2.  Pt wants to go home.  Appears to be functioning near his baseline for mobility.  Not interested in additional therapy in the acute setting.  He is discharged from acute PT.  Anticipate safe return home with prn assist.    Alvarado Sibley scored a 24/24 on the AM-PAC short mobility form.     If patient discharges prior to next session this note will serve as a discharge summary.  Please see below for the latest assessment towards goals.      Activity Tolerance: Patient tolerated treatment well  Other: Wants an oxygen concentrator       Restrictions  Restrictions/Precautions  Restrictions/Precautions: Fall Risk, Isolation (Covid r/o)  Position Activity Restriction  Other position/activity restrictions: on 5L O2 nasal cannula (baseline 2-3L)     Subjective    Subjective  Subjective: Pt agreeable to activity.  On 6 L. O2 (Baseline 2 L.).  Up independently in room.  Orientation  Overall Orientation Status: Within Functional Limits     Objective     Transfer Training  Transfer Training: Yes  Overall Level of Assistance: Independent  Sit to Stand: Independent  Stand to Sit: Independent    Gait Training  Gait Training: Yes  Gait  Gait Training: Yes  Overall Level of Assistance: Independent (SPO2 to low

## 2024-03-05 NOTE — PROGRESS NOTES
[facility]  Hyperglycemia Event      NAME: Alvarado Riverah  MEDICAL RECORD NUMBER:  [id number]  AGE: 59 y.o.   GENDER: male  : 1965  EPISODE DATE:  3/5/2024     Data     Recent Labs     24  0813 24  1809 24  2151 24  0803 24  1203   POCGLU 172* 341* 326* 389* 369* 398*       HgBA1c:    Lab Results   Component Value Date/Time    LABA1C 8.8 2024 08:36 AM       BUN/Creatinine:    Lab Results   Component Value Date/Time    BUN 65 2024 08:40 AM    CREATININE 1.4 2024 08:40 AM       Medications  Scheduled Medications:   predniSONE  40 mg Oral Daily    ipratropium 0.5 mg-albuterol 2.5 mg  1 Dose Inhalation Q8H    sodium chloride  2 spray Each Nostril 3 times per day    insulin glargine  10 Units SubCUTAneous Daily    insulin lispro  0-8 Units SubCUTAneous TID WC    insulin lispro  0-4 Units SubCUTAneous Nightly    [Held by provider] furosemide  80 mg IntraVENous Daily    oseltamivir  75 mg Oral BID    sodium chloride flush  5-40 mL IntraVENous 2 times per day    enoxaparin  30 mg SubCUTAneous BID    amLODIPine  10 mg Oral Nightly    atorvastatin  40 mg Oral Nightly    carvedilol  25 mg Oral BID WC    [Held by provider] empagliflozin  25 mg Oral Nightly    [Held by provider] lisinopril  40 mg Oral Nightly    [Held by provider] spironolactone  25 mg Oral Nightly    mometasone-formoterol  2 puff Inhalation BID RT    And    [Held by provider] tiotropium  2 puff Inhalation Daily RT       Diet  Current diet/supplement order: ADULT DIET; Regular; 4 carb choices (60 gm/meal); Low Sodium (2 gm); 1500 ml     Recorded PO: PO Meals Eaten (%): 76 - 100% last meal in flowsheets      Action     Total dose of insulin yesterday = 0 units.       Experiencing increased thirst and urination.  Discussed lowering blood sugars with insulin as a means to improve symptoms.  Discussed benefits of improved glycemic control.      Remains committed to plan to refuse

## 2024-03-05 NOTE — PROGRESS NOTES
Pt. Blood sugar was 389. Pt. Refused insulin to be given to him. Educated pt. The importance of him receiving insulin. Pt. Understood education but still refused. Pt. Expressed he is concerned about receiving insulin d/t medical expenses. Pt. Wanted to get washed up in bathroom by himself. Education was applied as to why it is unsafe for us to leave him alone in bathroom d/t him becoming SOB and Spo2 decreasing with exertions. Assistant to get pt. Washed up was offered. Pt. Refused and states \" I can do it by myself\". Re-education was applied to pt. He no longer wants to be washed up.

## 2024-03-05 NOTE — PROGRESS NOTES
Patient ID: Alvarado Sibley  Referring/ Physician: Oscar Medina MD      Summary:   Alvarado Sibley is being seen by nephrology for MELISSA.     Reason for admission:   COPD  CHF EF preserved G1DD, normal LV filling pressures.   Severe pulmonary HTN. Severe TR  Hypotension     Interval Hx:   Seen and examined at bedside.   Awake and alert and sitting up in the chair.   Has some LE edema.   Breathing a little better but still has cough and congestion.   Blood sugars are very high, 400s. Does not want insulin. On steroids.     /56  On 7 L high flow.   Feels like his nose is dry.     Na 135  K 5.9 with hemolysis  Cr 2.4 > 1.4    Assessment/Plan:   - Cr better  - has edema and hyperkalemia.   - lasix 40 mg IV once.   - lokelma 10 g daily.   - holding lisinopril, aldactone and jardiance.   - hold parameters on amlodipine and coreg , reduce coreg to 12.5 mg BID. HR has dropped < 50 and risk of bronchospasm    Acute kidney injury  This is probably related to diuretic use and being on RAAS blockade, SGL 2 inhibitor and was on NSAIDs prior to admission.  He also had an episode of hypotension where his blood pressure dropped into the 80s systolic.  Check urinalysis, albumin creatinine ratio, protein creatinine ratio  Check bladder scan  Check renal ultrasound    Hyperkalemia  Multifactorial   Hyperglycemia causing intracellular shifting, MELISSA and had been on RAASI and MRA.   Holding culprit medications  Lokelma and lasix.       History of hypertension  Prior to admission was on Aldactone, lisinopril, Coreg, Lasix, holding diuretics and RAAS blockade    Pulmonary arterial hypertension  Probably related to COPD  His echo showed severe pulmonary pretension and severe TR but no RV dysfunction and is LV filling pressures were okay.    Edema  He does have grade 1 diastolic dysfunction than normal filling pressures on the left side noted on echo.  The RV was dilated with normal function, has severe TR  02:25 PM    GLUCOSEU >=1000 03/04/2024 02:25 PM    KETUA Negative 03/04/2024 02:25 PM    UROBILINOGEN 0.2 03/04/2024 02:25 PM    BILIRUBINUR Negative 03/04/2024 02:25 PM

## 2024-03-06 ENCOUNTER — TELEPHONE (OUTPATIENT)
Dept: INTERNAL MEDICINE CLINIC | Age: 59
End: 2024-03-06

## 2024-03-06 LAB
ALBUMIN SERPL-MCNC: 3.7 G/DL (ref 3.4–5)
ALP SERPL-CCNC: 98 U/L (ref 40–129)
ALT SERPL-CCNC: 12 U/L (ref 10–40)
ANION GAP SERPL CALCULATED.3IONS-SCNC: 4 MMOL/L (ref 3–16)
ANISOCYTOSIS BLD QL SMEAR: ABNORMAL
AST SERPL-CCNC: 12 U/L (ref 15–37)
BASOPHILS # BLD: 0 K/UL (ref 0–0.2)
BASOPHILS NFR BLD: 0.2 %
BILIRUB DIRECT SERPL-MCNC: <0.2 MG/DL (ref 0–0.3)
BILIRUB INDIRECT SERPL-MCNC: ABNORMAL MG/DL (ref 0–1)
BILIRUB SERPL-MCNC: 0.4 MG/DL (ref 0–1)
BUN SERPL-MCNC: 65 MG/DL (ref 7–20)
CALCIUM SERPL-MCNC: 9 MG/DL (ref 8.3–10.6)
CHLORIDE SERPL-SCNC: 101 MMOL/L (ref 99–110)
CO2 SERPL-SCNC: 34 MMOL/L (ref 21–32)
CREAT SERPL-MCNC: 1.3 MG/DL (ref 0.8–1.3)
DEPRECATED RDW RBC AUTO: 15.5 % (ref 12.4–15.4)
EOSINOPHIL # BLD: 0 K/UL (ref 0–0.6)
EOSINOPHIL NFR BLD: 0 %
GFR SERPLBLD CREATININE-BSD FMLA CKD-EPI: >60 ML/MIN/{1.73_M2}
GLUCOSE BLD-MCNC: 249 MG/DL (ref 70–99)
GLUCOSE BLD-MCNC: 326 MG/DL (ref 70–99)
GLUCOSE BLD-MCNC: 343 MG/DL (ref 70–99)
GLUCOSE BLD-MCNC: 373 MG/DL (ref 70–99)
GLUCOSE SERPL-MCNC: 238 MG/DL (ref 70–99)
HCT VFR BLD AUTO: 54.1 % (ref 40.5–52.5)
HGB BLD-MCNC: 17.6 G/DL (ref 13.5–17.5)
LYMPHOCYTES # BLD: 1.1 K/UL (ref 1–5.1)
LYMPHOCYTES NFR BLD: 13.5 %
MCH RBC QN AUTO: 32.8 PG (ref 26–34)
MCHC RBC AUTO-ENTMCNC: 32.5 G/DL (ref 31–36)
MCV RBC AUTO: 100.9 FL (ref 80–100)
MONOCYTES # BLD: 0.6 K/UL (ref 0–1.3)
MONOCYTES NFR BLD: 8.1 %
NEUTROPHILS # BLD: 6.2 K/UL (ref 1.7–7.7)
NEUTROPHILS NFR BLD: 78.2 %
PERFORMED ON: ABNORMAL
PLATELET # BLD AUTO: 136 K/UL (ref 135–450)
PLATELET BLD QL SMEAR: ADEQUATE
PMV BLD AUTO: 9.1 FL (ref 5–10.5)
POTASSIUM SERPL-SCNC: 5.4 MMOL/L (ref 3.5–5.1)
PROT SERPL-MCNC: 7.5 G/DL (ref 6.4–8.2)
RBC # BLD AUTO: 5.36 M/UL (ref 4.2–5.9)
SLIDE REVIEW: ABNORMAL
SODIUM SERPL-SCNC: 139 MMOL/L (ref 136–145)
WBC # BLD AUTO: 7.9 K/UL (ref 4–11)

## 2024-03-06 PROCEDURE — 6370000000 HC RX 637 (ALT 250 FOR IP): Performed by: INTERNAL MEDICINE

## 2024-03-06 PROCEDURE — 94761 N-INVAS EAR/PLS OXIMETRY MLT: CPT

## 2024-03-06 PROCEDURE — 85025 COMPLETE CBC W/AUTO DIFF WBC: CPT

## 2024-03-06 PROCEDURE — 6360000002 HC RX W HCPCS: Performed by: INTERNAL MEDICINE

## 2024-03-06 PROCEDURE — 36415 COLL VENOUS BLD VENIPUNCTURE: CPT

## 2024-03-06 PROCEDURE — 94669 MECHANICAL CHEST WALL OSCILL: CPT

## 2024-03-06 PROCEDURE — 94660 CPAP INITIATION&MGMT: CPT

## 2024-03-06 PROCEDURE — 2060000000 HC ICU INTERMEDIATE R&B

## 2024-03-06 PROCEDURE — 99233 SBSQ HOSP IP/OBS HIGH 50: CPT | Performed by: INTERNAL MEDICINE

## 2024-03-06 PROCEDURE — 99232 SBSQ HOSP IP/OBS MODERATE 35: CPT | Performed by: STUDENT IN AN ORGANIZED HEALTH CARE EDUCATION/TRAINING PROGRAM

## 2024-03-06 PROCEDURE — 94640 AIRWAY INHALATION TREATMENT: CPT

## 2024-03-06 PROCEDURE — 80076 HEPATIC FUNCTION PANEL: CPT

## 2024-03-06 PROCEDURE — 80048 BASIC METABOLIC PNL TOTAL CA: CPT

## 2024-03-06 PROCEDURE — 6370000000 HC RX 637 (ALT 250 FOR IP): Performed by: STUDENT IN AN ORGANIZED HEALTH CARE EDUCATION/TRAINING PROGRAM

## 2024-03-06 PROCEDURE — 2700000000 HC OXYGEN THERAPY PER DAY

## 2024-03-06 PROCEDURE — 2580000003 HC RX 258: Performed by: INTERNAL MEDICINE

## 2024-03-06 RX ORDER — FUROSEMIDE 10 MG/ML
40 INJECTION INTRAMUSCULAR; INTRAVENOUS ONCE
Status: COMPLETED | OUTPATIENT
Start: 2024-03-06 | End: 2024-03-06

## 2024-03-06 RX ADMIN — MOMETASONE FUROATE AND FORMOTEROL FUMARATE DIHYDRATE 2 PUFF: 200; 5 AEROSOL RESPIRATORY (INHALATION) at 08:16

## 2024-03-06 RX ADMIN — IPRATROPIUM BROMIDE AND ALBUTEROL SULFATE 1 DOSE: 2.5; .5 SOLUTION RESPIRATORY (INHALATION) at 23:36

## 2024-03-06 RX ADMIN — Medication 2 SPRAY: at 22:34

## 2024-03-06 RX ADMIN — Medication 2 SPRAY: at 16:09

## 2024-03-06 RX ADMIN — ATORVASTATIN CALCIUM 40 MG: 40 TABLET, FILM COATED ORAL at 20:08

## 2024-03-06 RX ADMIN — PREDNISONE 40 MG: 20 TABLET ORAL at 08:21

## 2024-03-06 RX ADMIN — Medication 1 LOZENGE: at 04:01

## 2024-03-06 RX ADMIN — IPRATROPIUM BROMIDE AND ALBUTEROL SULFATE 1 DOSE: 2.5; .5 SOLUTION RESPIRATORY (INHALATION) at 08:16

## 2024-03-06 RX ADMIN — GLIPIZIDE 5 MG: 5 TABLET ORAL at 08:26

## 2024-03-06 RX ADMIN — ENOXAPARIN SODIUM 30 MG: 100 INJECTION SUBCUTANEOUS at 08:21

## 2024-03-06 RX ADMIN — Medication 2 SPRAY: at 08:22

## 2024-03-06 RX ADMIN — ENOXAPARIN SODIUM 30 MG: 100 INJECTION SUBCUTANEOUS at 20:08

## 2024-03-06 RX ADMIN — OSELTAMIVIR PHOSPHATE 75 MG: 75 CAPSULE ORAL at 08:21

## 2024-03-06 RX ADMIN — Medication 10 ML: at 08:22

## 2024-03-06 RX ADMIN — OSELTAMIVIR PHOSPHATE 75 MG: 75 CAPSULE ORAL at 20:08

## 2024-03-06 RX ADMIN — Medication 10 ML: at 20:08

## 2024-03-06 RX ADMIN — IPRATROPIUM BROMIDE AND ALBUTEROL SULFATE 1 DOSE: 2.5; .5 SOLUTION RESPIRATORY (INHALATION) at 15:36

## 2024-03-06 RX ADMIN — MOMETASONE FUROATE AND FORMOTEROL FUMARATE DIHYDRATE 2 PUFF: 200; 5 AEROSOL RESPIRATORY (INHALATION) at 20:16

## 2024-03-06 RX ADMIN — INSULIN LISPRO 4 UNITS: 100 INJECTION, SOLUTION INTRAVENOUS; SUBCUTANEOUS at 21:06

## 2024-03-06 RX ADMIN — INSULIN LISPRO 8 UNITS: 100 INJECTION, SOLUTION INTRAVENOUS; SUBCUTANEOUS at 18:26

## 2024-03-06 RX ADMIN — FUROSEMIDE 40 MG: 10 INJECTION, SOLUTION INTRAMUSCULAR; INTRAVENOUS at 16:09

## 2024-03-06 RX ADMIN — SODIUM ZIRCONIUM CYCLOSILICATE 10 G: 10 POWDER, FOR SUSPENSION ORAL at 08:21

## 2024-03-06 RX ADMIN — FUROSEMIDE 40 MG: 10 INJECTION, SOLUTION INTRAMUSCULAR; INTRAVENOUS at 11:43

## 2024-03-06 NOTE — PLAN OF CARE
Problem: Respiratory - Adult  Goal: Achieves optimal ventilation and oxygenation  Outcome: Progressing     Problem: ABCDS Injury Assessment  Goal: Absence of physical injury  Outcome: Progressing     Problem: Cardiovascular - Adult  Goal: Maintains optimal cardiac output and hemodynamic stability  Outcome: Progressing     Problem: Metabolic/Fluid and Electrolytes - Adult  Goal: Electrolytes maintained within normal limits  Outcome: Progressing     Problem: Metabolic/Fluid and Electrolytes - Adult  Goal: Hemodynamic stability and optimal renal function maintained  Outcome: Progressing     Problem: Metabolic/Fluid and Electrolytes - Adult  Goal: Glucose maintained within prescribed range  Outcome: Progressing     Problem: Discharge Planning  Goal: Discharge to home or other facility with appropriate resources  Outcome: Progressing     Problem: Pain  Goal: Verbalizes/displays adequate comfort level or baseline comfort level  Outcome: Progressing     Problem: Safety - Adult  Goal: Free from fall injury  Outcome: Progressing     Problem: Chronic Conditions and Co-morbidities  Goal: Patient's chronic conditions and co-morbidity symptoms are monitored and maintained or improved  Outcome: Progressing     Problem: Musculoskeletal - Adult  Goal: Return mobility to safest level of function  Outcome: Progressing     Problem: Infection - Adult  Goal: Absence of infection at discharge  Outcome: Progressing

## 2024-03-06 NOTE — PROGRESS NOTES
Pt was o2 sats are in the mid 80's. Pt is refusing to wear BiPap. O2 was turned up to 9L. O2 stats are now 93%. Pt is in no distress. Education given about BiPap use. Call light in reach.

## 2024-03-06 NOTE — PROGRESS NOTES
O2 was found to be 52% on 9L. At this time, Pt encouraged to place BiPaP for oxygenation. Pt was unhappy about this and started tossing things on his tray.

## 2024-03-06 NOTE — CARE COORDINATION
Patient up to 9L oxygen. Pulmonology following. Please note patient is not on oxygen at home. He has an old concentrator from a  family member. He himself has not been prescribed oxygen. Will need a home O2 eval prior to discharge and DME oxygen orders if qualifies.     Plan is home independently and watch for home O2 needs.    Electronically signed by Nirali Spears RN Case Management on 3/6/2024 at 4:13 PM

## 2024-03-06 NOTE — PROGRESS NOTES
have grade 1 diastolic dysfunction than normal filling pressures on the left side noted on echo.  The RV was dilated with normal function, has severe TR and some pulmonary hypertension as well      [facility] would like to thank you for the opportunity to serve this patient. Please call with any questions or concerns.    MD [facility]  [address] [place] [address]  Fax: [phone]  Office: [phone]    [facility] [place]  Alvarado Sibley is a 59 y.o. male  with  has a past medical history of Acute on chronic diastolic congestive heart failure (HCC), ADHD (attention deficit hyperactivity disorder), Adrenal nodule (HCC), COPD (chronic obstructive pulmonary disease) (HCC), Emphysema of lung (HCC), Essential hypertension, History of pneumothorax, Hx of colonic polyps, Hx of renal calculi, Hypertension, Kidney stone, Neuropathy, Obesity, Osteoarthritis, Pneumothorax, Prediabetes, Retinal hemorrhage, Sleep apnea, Substance abuse (HCC), and Type 2 diabetes mellitus without complication (HCC). who presented with SOB.    Patient presented to hospital with shortness of breath.  He had been having increased leg swelling and orthopnea over the past few days so he added an extra diuretic pill to his regimen.  He does have significant lower extremity edema and an ulcer on his left shin.  He has not had any fevers or chills.  He has had a cough and congestion.  His eyes are red and injected.  He tested positive for influenza.  He has a history of COPD with pulmonary pretension but EF is preserved and his RV function looked okay.  He was admitted on 2/29/2024 and was diuresed with IV Lasix now his creatinine is increased.  Denies lower urinary tract symptoms.  No blood in the urine no foamy frothy urine.  Prior to admission he was on lisinopril amlodipine spironolactone Jardiance Lasix Coreg.  It also looks like he was prescribed Motrin 800 mg as needed for pain.  He also reports history of kidney  stones and says he has an episode every 6 to 7 years.  No current flank pain not passing any gravel.          Review of Systems:   As above.     PMH/SH/FH:    Medical Hx: reviewed and updated as appropriate  Past Medical History:   Diagnosis Date    Acute on chronic diastolic congestive heart failure (HCC)     ADHD (attention deficit hyperactivity disorder)     pt denies    Adrenal nodule (HCC) 12/07/2017    low-density nodular enlargement of a left adrenal gland which is stable since 2007      COPD (chronic obstructive pulmonary disease) (HCC)     Emphysema of lung (HCC)     Essential hypertension 11/30/2017    History of pneumothorax 11/30/2017    Rib fx    Hx of colonic polyps 01/18/2018    1.2018 Descending Polyp 2. Sigmoid Polyp 3. Sigmoid Diverticulosis recheck 2023    Hx of renal calculi 11/30/2017    Hypertension     Kidney stone     Neuropathy     Obesity     Osteoarthritis     Pneumothorax     2016 d/t fall     Prediabetes 12/13/2017    Retinal hemorrhage 10/29/2020    HTN-right eye    Sleep apnea     uses C-PAP    Substance abuse (HCC)     Type 2 diabetes mellitus without complication (HCC)          Surgical Hx: reviewed and updated as appropriate   has a past surgical history that includes Carpal tunnel release (Right); knee surgery (Left); Colonoscopy (01/18/2018); and Colonoscopy (N/A, 8/21/2023).     Social Hx: reviewed and updated as appropriate  Social History     Tobacco Use    Smoking status: Every Day     Current packs/day: 1.00     Average packs/day: 1 pack/day for 50.2 years (50.2 ttl pk-yrs)     Types: Cigarettes     Start date: 1/1/1974    Smokeless tobacco: Never   Substance Use Topics    Alcohol use: No        Family hx: reviewed and updated as appropriate  family history includes Cancer in his father and mother; Diabetes in his brother and mother; No Known Problems in his brother, brother, brother, brother, brother, and sister.    Medications:   predniSONE, 40 mg, Daily  glipiZIDE, 5 mg,

## 2024-03-06 NOTE — PROGRESS NOTES
Blood sugar of 451 reported to MD, as well as patient noncompliance with insulin. Glipizide ordered.

## 2024-03-06 NOTE — PROGRESS NOTES
Patient took oxygen off and ambulated to bathroom. Patient dropped to the 50s% SPO2. Patients lips blue and patient SOB. Patient stated he was fine and he does this all of the time. Patient required 11L NC to recover to the 90s. Patient stated he had to have water because he was so dry. Explained the importance of fluid restriction, patient noncompliant with the restriction. Patient agreed to go back on Bipap if he drank some cold water. Patient now back on bipap after drink and is satting mid 90s.

## 2024-03-06 NOTE — PROGRESS NOTES
Pt has a blood sugar of 373. Pt had spoken with MD and stated he is agreeable to take the recommended dose one time. MD was asked if any other insulin should be added to 8 units that will be given and states to give 8 units with no additional coverage.

## 2024-03-06 NOTE — PROGRESS NOTES
Pt is refusing all insulin stating unless its for blood clots. Pt educated on use of insulin and states again he doesn't want to become dependent. Refused x3. MD made aware

## 2024-03-06 NOTE — TELEPHONE ENCOUNTER
Pt called, he would like to speak with you. He is currently admitted to [facility]      Thank you

## 2024-03-07 ENCOUNTER — APPOINTMENT (OUTPATIENT)
Dept: GENERAL RADIOLOGY | Age: 59
End: 2024-03-07
Payer: COMMERCIAL

## 2024-03-07 LAB
ALBUMIN SERPL-MCNC: 3.8 G/DL (ref 3.4–5)
ALP SERPL-CCNC: 91 U/L (ref 40–129)
ALT SERPL-CCNC: 13 U/L (ref 10–40)
ANION GAP SERPL CALCULATED.3IONS-SCNC: 8 MMOL/L (ref 3–16)
AST SERPL-CCNC: 11 U/L (ref 15–37)
BASOPHILS # BLD: 0 K/UL (ref 0–0.2)
BASOPHILS NFR BLD: 0.3 %
BILIRUB DIRECT SERPL-MCNC: <0.2 MG/DL (ref 0–0.3)
BILIRUB INDIRECT SERPL-MCNC: ABNORMAL MG/DL (ref 0–1)
BILIRUB SERPL-MCNC: 0.9 MG/DL (ref 0–1)
BUN SERPL-MCNC: 63 MG/DL (ref 7–20)
CALCIUM SERPL-MCNC: 9.3 MG/DL (ref 8.3–10.6)
CHLORIDE SERPL-SCNC: 98 MMOL/L (ref 99–110)
CO2 SERPL-SCNC: 35 MMOL/L (ref 21–32)
CREAT SERPL-MCNC: 1 MG/DL (ref 0.8–1.3)
DEPRECATED RDW RBC AUTO: 15.9 % (ref 12.4–15.4)
EOSINOPHIL # BLD: 0 K/UL (ref 0–0.6)
EOSINOPHIL NFR BLD: 0.2 %
GFR SERPLBLD CREATININE-BSD FMLA CKD-EPI: >60 ML/MIN/{1.73_M2}
GLUCOSE BLD-MCNC: 207 MG/DL (ref 70–99)
GLUCOSE BLD-MCNC: 244 MG/DL (ref 70–99)
GLUCOSE BLD-MCNC: 319 MG/DL (ref 70–99)
GLUCOSE BLD-MCNC: 374 MG/DL (ref 70–99)
GLUCOSE SERPL-MCNC: 178 MG/DL (ref 70–99)
HCT VFR BLD AUTO: 55.9 % (ref 40.5–52.5)
HGB BLD-MCNC: 18.6 G/DL (ref 13.5–17.5)
LYMPHOCYTES # BLD: 1.4 K/UL (ref 1–5.1)
LYMPHOCYTES NFR BLD: 17.3 %
MCH RBC QN AUTO: 33.2 PG (ref 26–34)
MCHC RBC AUTO-ENTMCNC: 33.3 G/DL (ref 31–36)
MCV RBC AUTO: 99.6 FL (ref 80–100)
MONOCYTES # BLD: 0.7 K/UL (ref 0–1.3)
MONOCYTES NFR BLD: 8.4 %
NEUTROPHILS # BLD: 6.2 K/UL (ref 1.7–7.7)
NEUTROPHILS NFR BLD: 73.8 %
NT-PROBNP SERPL-MCNC: 155 PG/ML (ref 0–124)
PERFORMED ON: ABNORMAL
PLATELET # BLD AUTO: 162 K/UL (ref 135–450)
PLATELET BLD QL SMEAR: ADEQUATE
PMV BLD AUTO: 9.8 FL (ref 5–10.5)
POTASSIUM SERPL-SCNC: 4.7 MMOL/L (ref 3.5–5.1)
PROT SERPL-MCNC: 7.9 G/DL (ref 6.4–8.2)
RBC # BLD AUTO: 5.62 M/UL (ref 4.2–5.9)
REASON FOR REJECTION: NORMAL
REJECTED TEST: NORMAL
SLIDE REVIEW: ABNORMAL
SODIUM SERPL-SCNC: 141 MMOL/L (ref 136–145)
WBC # BLD AUTO: 8.4 K/UL (ref 4–11)

## 2024-03-07 PROCEDURE — 99233 SBSQ HOSP IP/OBS HIGH 50: CPT | Performed by: INTERNAL MEDICINE

## 2024-03-07 PROCEDURE — 6370000000 HC RX 637 (ALT 250 FOR IP): Performed by: STUDENT IN AN ORGANIZED HEALTH CARE EDUCATION/TRAINING PROGRAM

## 2024-03-07 PROCEDURE — 6370000000 HC RX 637 (ALT 250 FOR IP): Performed by: INTERNAL MEDICINE

## 2024-03-07 PROCEDURE — 71045 X-RAY EXAM CHEST 1 VIEW: CPT

## 2024-03-07 PROCEDURE — 6360000002 HC RX W HCPCS: Performed by: INTERNAL MEDICINE

## 2024-03-07 PROCEDURE — 80076 HEPATIC FUNCTION PANEL: CPT

## 2024-03-07 PROCEDURE — 94761 N-INVAS EAR/PLS OXIMETRY MLT: CPT

## 2024-03-07 PROCEDURE — 94640 AIRWAY INHALATION TREATMENT: CPT

## 2024-03-07 PROCEDURE — 2700000000 HC OXYGEN THERAPY PER DAY

## 2024-03-07 PROCEDURE — 94660 CPAP INITIATION&MGMT: CPT

## 2024-03-07 PROCEDURE — 85025 COMPLETE CBC W/AUTO DIFF WBC: CPT

## 2024-03-07 PROCEDURE — 2580000003 HC RX 258: Performed by: INTERNAL MEDICINE

## 2024-03-07 PROCEDURE — 36415 COLL VENOUS BLD VENIPUNCTURE: CPT

## 2024-03-07 PROCEDURE — 2060000000 HC ICU INTERMEDIATE R&B

## 2024-03-07 PROCEDURE — 94680 O2 UPTK RST&XERS DIR SIMPLE: CPT

## 2024-03-07 PROCEDURE — 94669 MECHANICAL CHEST WALL OSCILL: CPT

## 2024-03-07 PROCEDURE — 99232 SBSQ HOSP IP/OBS MODERATE 35: CPT | Performed by: STUDENT IN AN ORGANIZED HEALTH CARE EDUCATION/TRAINING PROGRAM

## 2024-03-07 PROCEDURE — 80048 BASIC METABOLIC PNL TOTAL CA: CPT

## 2024-03-07 PROCEDURE — 83880 ASSAY OF NATRIURETIC PEPTIDE: CPT

## 2024-03-07 RX ORDER — FUROSEMIDE 10 MG/ML
40 INJECTION INTRAMUSCULAR; INTRAVENOUS ONCE
Status: COMPLETED | OUTPATIENT
Start: 2024-03-07 | End: 2024-03-07

## 2024-03-07 RX ADMIN — MOMETASONE FUROATE AND FORMOTEROL FUMARATE DIHYDRATE 2 PUFF: 200; 5 AEROSOL RESPIRATORY (INHALATION) at 09:02

## 2024-03-07 RX ADMIN — OSELTAMIVIR PHOSPHATE 75 MG: 75 CAPSULE ORAL at 08:42

## 2024-03-07 RX ADMIN — Medication 10 ML: at 20:51

## 2024-03-07 RX ADMIN — ENOXAPARIN SODIUM 30 MG: 100 INJECTION SUBCUTANEOUS at 20:51

## 2024-03-07 RX ADMIN — PREDNISONE 40 MG: 20 TABLET ORAL at 08:42

## 2024-03-07 RX ADMIN — Medication 2 SPRAY: at 08:42

## 2024-03-07 RX ADMIN — INSULIN LISPRO 4 UNITS: 100 INJECTION, SOLUTION INTRAVENOUS; SUBCUTANEOUS at 20:53

## 2024-03-07 RX ADMIN — Medication 2 SPRAY: at 23:06

## 2024-03-07 RX ADMIN — IPRATROPIUM BROMIDE AND ALBUTEROL SULFATE 1 DOSE: 2.5; .5 SOLUTION RESPIRATORY (INHALATION) at 15:55

## 2024-03-07 RX ADMIN — ENOXAPARIN SODIUM 30 MG: 100 INJECTION SUBCUTANEOUS at 08:42

## 2024-03-07 RX ADMIN — ATORVASTATIN CALCIUM 40 MG: 40 TABLET, FILM COATED ORAL at 20:51

## 2024-03-07 RX ADMIN — IPRATROPIUM BROMIDE AND ALBUTEROL SULFATE 1 DOSE: 2.5; .5 SOLUTION RESPIRATORY (INHALATION) at 23:19

## 2024-03-07 RX ADMIN — Medication 2 SPRAY: at 15:21

## 2024-03-07 RX ADMIN — Medication 10 ML: at 08:42

## 2024-03-07 RX ADMIN — GLIPIZIDE 5 MG: 5 TABLET ORAL at 08:42

## 2024-03-07 RX ADMIN — SODIUM ZIRCONIUM CYCLOSILICATE 10 G: 10 POWDER, FOR SUSPENSION ORAL at 08:42

## 2024-03-07 RX ADMIN — MOMETASONE FUROATE AND FORMOTEROL FUMARATE DIHYDRATE 2 PUFF: 200; 5 AEROSOL RESPIRATORY (INHALATION) at 20:14

## 2024-03-07 RX ADMIN — FUROSEMIDE 40 MG: 10 INJECTION, SOLUTION INTRAMUSCULAR; INTRAVENOUS at 14:05

## 2024-03-07 RX ADMIN — IPRATROPIUM BROMIDE AND ALBUTEROL SULFATE 1 DOSE: 2.5; .5 SOLUTION RESPIRATORY (INHALATION) at 08:52

## 2024-03-07 NOTE — PROGRESS NOTES
[facility]  Hyperglycemia Event      NAME: Alvarado Sibley  MEDICAL RECORD NUMBER:  [id number]  AGE: 59 y.o.   GENDER: male  : 1965  EPISODE DATE:  3/7/2024     Data     Recent Labs     24  0827 24  1211 24  1807 248 24  0817 24  1226   POCGLU 249* 326* 373* 343* 207* 244*       HgBA1c:    Lab Results   Component Value Date/Time    LABA1C 8.8 2024 08:36 AM       BUN/Creatinine:    Lab Results   Component Value Date/Time    BUN 63 2024 07:26 AM    CREATININE 1.0 2024 07:26 AM       Medications  Scheduled Medications:   predniSONE  40 mg Oral Daily    glipiZIDE  5 mg Oral QAM AC    sodium zirconium cyclosilicate  10 g Oral Daily    [Held by provider] carvedilol  12.5 mg Oral BID WC    ipratropium 0.5 mg-albuterol 2.5 mg  1 Dose Inhalation Q8H    sodium chloride  2 spray Each Nostril 3 times per day    insulin glargine  10 Units SubCUTAneous Daily    insulin lispro  0-8 Units SubCUTAneous TID WC    insulin lispro  0-4 Units SubCUTAneous Nightly    sodium chloride flush  5-40 mL IntraVENous 2 times per day    enoxaparin  30 mg SubCUTAneous BID    [Held by provider] amLODIPine  10 mg Oral Nightly    atorvastatin  40 mg Oral Nightly    [Held by provider] empagliflozin  25 mg Oral Nightly    [Held by provider] lisinopril  40 mg Oral Nightly    [Held by provider] spironolactone  25 mg Oral Nightly    mometasone-formoterol  2 puff Inhalation BID RT    And    [Held by provider] tiotropium  2 puff Inhalation Daily RT       Diet  Current diet/supplement order: ADULT DIET; Regular; 4 carb choices (60 gm/meal); Low Sodium (2 gm); 1500 ml     Recorded PO: PO Meals Eaten (%): 76 - 100% last meal in flowsheets      Action     Total dose of insulin yesterday = 12 units.      Encouraged to continue to accept insulin doses.     Electronically signed by Raisa Shanks RN on 3/7/2024 at 3:20 PM

## 2024-03-07 NOTE — PROGRESS NOTES
[facility]    Respiratory Therapy   Home Oxygen Evaluation        Name: Alvarado Sibley  Medical Record Number: [id number]  Age: 59 y.o.  Gender:  male   : 1965  Today's date: 3/7/2024  Room: P7I-3211/5259-01      Assessment        /62   Pulse 64   Temp 97.4 °F (36.3 °C) (Oral)   Resp 20   Ht 1.651 m (5' 5\")   Wt 124 kg (273 lb 5.9 oz)   SpO2 (!) 89%   BMI 45.49 kg/m²     Patient Active Problem List   Diagnosis    Personal history of tobacco use    COPD, severe (HCC)    Chronic respiratory failure with hypercapnia (HCC)    SILVANA (obstructive sleep apnea)    Acute on chronic diastolic congestive heart failure (HCC)    NSVT (nonsustained ventricular tachycardia) (HCC)    Acute respiratory failure with hypoxia (HCC)    Essential hypertension    History of pneumothorax    Hx of renal calculi    Sciatica of left side    Adrenal nodule (HCC)    Type 2 diabetes mellitus (HCC)    Hx of colonic polyps    Chronic rhinitis    Retinal hemorrhage    Chronic respiratory failure with hypoxia (HCC)    Obesity, Class III, BMI 40-49.9 (morbid obesity) (HCC)    Elevated hemoglobin (HCC)    Acute pain of left knee    Acute on chronic heart failure with preserved ejection fraction (HFpEF) (HCC)       Social History:  Social History     Tobacco Use    Smoking status: Every Day     Current packs/day: 1.00     Average packs/day: 1 pack/day for 50.2 years (50.2 ttl pk-yrs)     Types: Cigarettes     Start date: 1974    Smokeless tobacco: Never   Vaping Use    Vaping Use: Never used   Substance Use Topics    Alcohol use: No    Drug use: No       Patient Room Air saturation at rest 76  %    Oxygen saturations of 88% or less on RA qualifies patient for Home Oxygen    Patient resting on 10  lmp  with an oxygen saturation of  91 %     Patient ambulated on 8 lpm with an oxygen saturation of 86%    Patient ambulated on 9 lpm with an oxygen saturation of 87%    Patient ambulated on 10 lpm with an oxygen  saturation of 90%     Qualifying patient for home oxygen with ambulation and continuous flow  @ 10 lpm.      In your clinical assessment does the Patient Require Portable Oxygen Tanks?    Yes           Lyssa Spears, RN,  contacted to follow care of patients home oxygen needs on 3/7/2024 at 9:15 AM    Patient/caregiver was educated on Home Oxygen process:  Yes      Level of patient/caregiver understanding able to:   [] Verbalize understanding   [] Demonstrate understanding       [] Teach back        [] Needs reinforcement        []  No available caregiver               []  Other:     Response to education:  Good     Time Spent with Home O2 Set Up:  10  minutes     Brenda Aldrich RCP on 3/7/2024 at 9:15 AM

## 2024-03-07 NOTE — PROGRESS NOTES
Patient ID: Alvarado Sibley  Referring/ Physician: Oscar Medina MD      Summary:   Alvarado Sibley is being seen by nephrology for MELISSA.     Reason for admission:   COPD  CHF EF preserved G1DD, normal LV filling pressures.   Severe pulmonary HTN. Severe TR  Hypotension     Interval Hx:   Seen and examined at bedside.   He is awake and alert .   Feels his breathing is better today .   He is refusing insulin at times.   He wants to go home.     /63  On 10 L high flow    K 4.7   Cr 1    Assessment/Plan:   -Blood pressure acceptable, holding coreg , amlodipine and Jardiance and lisinopril , aldactone.   - redose lasix 40 mg IV now.   - stop lokelma   - renal function is back to baseline. Would keep him lisinopril and aldactone at discharge because of the hyperkalemia issues.         Acute kidney injury  Creatinine peaked at 2.4 and improved to baseline of 1.2-1.3.  This is probably related to diuretic use and being on RAAS blockade, SGL 2 inhibitor and was on NSAIDs prior to admission.  He also had an episode of hypotension where his blood pressure dropped into the 80s systolic.  Check urinalysis, albumin creatinine ratio, protein creatinine ratio  Check bladder scan  Check renal ultrasound    Hyperkalemia  Multifactorial   Hyperglycemia causing intracellular shifting, MELISSA and had been on RAASI and MRA.   Holding culprit medications  Lokelma and lasix.       History of hypertension  Prior to admission was on Aldactone, lisinopril, Coreg, Lasix, holding diuretics and RAAS blockade    Pulmonary arterial hypertension  Probably related to COPD  His echo showed severe pulmonary pretension and severe TR but no RV dysfunction and is LV filling pressures were okay.    Edema  He does have grade 1 diastolic dysfunction than normal filling pressures on the left side noted on echo.  The RV was dilated with normal function, has severe TR and some pulmonary hypertension as well      [facility]

## 2024-03-07 NOTE — PROGRESS NOTES
Internal Medicine Hospital Progress Note    Patient:  Alvarado Sibley 59 y.o. male MRN: [id number]     Date of Service: 3/7/2024    Allergy: Guaifenesin, Dextromethorphan-guaifenesin, and Aspirin  CC: F/u:   Brief Course   Alvarado Sibley was admitted on 2/29/2024 for Acute on chronic heart failure with preserved ejection fraction (HFpEF) ([facility]).    24 hr interval hx:  Patient continues to have hypoxia. He is feeling some better with improvement in the swelling in his legs. He still is having high blood sugars, likely result of prednisone.    Objective     Physical Exam:  Vitals: /63   Pulse 63   Temp 98.1 °F (36.7 °C) (Oral)   Resp 20   Ht 1.651 m (5' 5\")   Wt 124 kg (273 lb 5.9 oz)   SpO2 92%   BMI 45.49 kg/m²     Physical Exam  Constitutional:       General: He is not in acute distress.  HENT:      Mouth/Throat:      Mouth: Mucous membranes are moist.   Eyes:      Pupils: Pupils are equal, round, and reactive to light.   Cardiovascular:      Rate and Rhythm: Normal rate and regular rhythm.      Pulses: Normal pulses.   Pulmonary:      Breath sounds: Wheezing present.   Abdominal:      General: Abdomen is flat. There is no distension.      Palpations: Abdomen is soft.      Tenderness: There is no abdominal tenderness.   Skin:     General: Skin is warm and dry.      Coloration: Skin is not jaundiced or pale.      Findings: No erythema.   Neurological:      General: No focal deficit present.      Mental Status: He is alert and oriented to person, place, and time.         Pertinent/ New Labs and Imaging Studies   Labs reviewed. Pertinent labs noted in assessment and plan      Assessment and Plan   Alvarado Sibley was admitted to hospital for Acute on chronic heart failure with preserved ejection fraction (HFpEF) (HCC)    #Acute on chronic hypoxic respiratory failure: Severe COPD and pulmonary edema likely the underlying etiology of his hypoxia  - Wean oxygen as tolerated  - Home oxygen evaluation ordered  -  Continue home dulera.  - Q8h duonebs  - Flutter valve for cough.  - Furosemide 40 mg IV BID for diuresis.    #Heart failure with preserved ejection fraction: Last echo showing preserved EF, but diastolic dysfunction  - Furosemide 40 mg IV BID for diuresis  - hold carvedilol and lisinopril for low blood pressure    #Acute kidney injury: Creatinine peaked at 2.4, now down trending.   - Nephrology following appreciate recommendations  - Avoid hypotension, holding coreg and lisinopril  - Furosemide 40 mg IV BID for diuresis    #Chronic severe COPD: On 3L of oxygen at home.  - Continue prednisone daily  - Continue home dulera and PRN nebs  - Home oxygen evaluation    DVT Prophylaxis: Enoxaparin  Code:Full  Disposition: Pending improvement in respiratory status.    Oscar Medina MD  [facility]  Office [phone]  Cell [phone]

## 2024-03-07 NOTE — PROGRESS NOTES
PT on BiPAP in-line scheduled breathing treatment given   03/06/24 2333   NIV Type   NIV Started/Stopped On   Equipment Type V60   Mode Bilevel   Mask Type Full face mask   Mask Size Medium   Assessment   Pulse 53   Respirations 20   SpO2 95 %   Level of Consciousness 0   Comfort Level Good   Using Accessory Muscles No   Mask Compliance Good   Skin Assessment Clean, dry, & intact   Skin Protection for O2 Device Yes   Orientation Middle   Location Nose   Intervention(s) Skin Barrier   Breath Sounds   Breath Sounds Bilateral Diminished   Settings/Measurements   PIP Observed 18 cm H20   IPAP 18 cmH20   CPAP/EPAP 10 cmH2O   Vt (Measured) 573 mL   Rate Ordered 14   Insp Rise Time (%) 3 %   FiO2  60 %   I Time/ I Time % 1 s   Minute Volume (L/min) 11.2 Liters   Mask Leak (lpm) 9 lpm   Patient's Home Machine No   Alarm Settings   Alarms On Y   Low Pressure (cmH2O) 4 cmH2O   High Pressure (cmH2O) 30 cmH2O   Apnea (secs) 20 secs   RR Low (bpm) 10   RR High (bpm) 40 br/min   Oxygen Therapy/Pulse Ox   O2 Therapy Oxygen   O2 Device PAP (positive airway pressure)   Pulse Oximeter Device Mode Continuous   Pulse Oximeter Device Location Finger

## 2024-03-07 NOTE — CARE COORDINATION
Received a call from Brenda WOODWARD that patient qualified for 10L continuously (see RT note). Spoke to Millicent with [employer], confirms they are able to provide this level of oxygen. Will need DME oxygen orders from the physician.    Electronically signed by Nirali Spears RN Case Management on 3/7/2024 at 9:21 AM

## 2024-03-07 NOTE — PROGRESS NOTES
Pulmonary Progress Note    Date of Admission: 2/29/2024   LOS: 7 days       CC:  Chief Complaint   Patient presents with    Shortness of Breath     Pt reports hx of CHF and COPD. Pt reports on diuretics and has been taking as prescribed. Pt reports BLE swelling and SOB. Pt is 77% on RA. Pt reports on 2L O2 at home as needed. Pt placed on 4l n/c in triage and increased to 89%        Subjective:    Would like to be discharge.    Assessment:     COPD  Chronic hypoxemia  Sleep apnea  Tobacco abuse  Obesity    Plan:     This note may have been transcribed using Dragon Dictation software. Please disregard any translational errors.       Hospital Day: 7     COPD, severe   Duoneb's   Dulera  prednisone     Acute on chronic hypoxemic resp failure with acute hypercapnia   Wean O2 to sat >90%  Back up to 9 L nasal cannula.  Baseline 2 L.  Difficulty weaning oxygen.  Repeat chest x-ray.  Echocardiogram ordered.        Influenza   Tamiflu  Worsening chest X-ray.    Volume status - 11 L  Monitor contraction alkalosis.  Procal 0.14  3/2/24       Sinus dryness   [place] spray every 8 hours, controlled    Obesity       Data:        PHYSICAL EXAM:   Blood pressure 107/62, pulse 64, temperature 97.4 °F (36.3 °C), temperature source Oral, resp. rate 20, height 1.651 m (5' 5\"), weight 124 kg (273 lb 5.9 oz), SpO2 (!) 89 %.'  Body mass index is 45.49 kg/m².   Gen: No distress.    ENT:   Resp: No accessory muscle use. No crackles. No wheezes. No rhonchi.    CV: Regular rate. Regular rhythm. No murmur or rub. No edema.   Skin: Warm, dry, normal texture and turgor. No nodule on exposed extremities.   M/S: No cyanosis. No clubbing. No joint deformity.  Psych:       Medications:    Scheduled Meds:   predniSONE  40 mg Oral Daily    glipiZIDE  5 mg Oral QAM AC    sodium zirconium cyclosilicate  10 g Oral Daily    [Held by provider] carvedilol  12.5 mg Oral BID WC    ipratropium 0.5 mg-albuterol 2.5 mg  1 Dose Inhalation Q8H    sodium chloride  2  last 72 hours.    Cx:      Films:             This note was transcribed using Dragon Dictation software. Please disregard any translational errors.      NAZ SILVERMAN   [facility] Pulmonary, Sleep and Critical Care  [phone]

## 2024-03-08 LAB
ALBUMIN SERPL-MCNC: 3.6 G/DL (ref 3.4–5)
ALP SERPL-CCNC: 92 U/L (ref 40–129)
ALT SERPL-CCNC: 13 U/L (ref 10–40)
ANION GAP SERPL CALCULATED.3IONS-SCNC: 7 MMOL/L (ref 3–16)
AST SERPL-CCNC: 12 U/L (ref 15–37)
BASOPHILS # BLD: 0 K/UL (ref 0–0.2)
BASOPHILS NFR BLD: 0.2 %
BILIRUB DIRECT SERPL-MCNC: 0.3 MG/DL (ref 0–0.3)
BILIRUB INDIRECT SERPL-MCNC: 1.2 MG/DL (ref 0–1)
BILIRUB SERPL-MCNC: 1.5 MG/DL (ref 0–1)
BUN SERPL-MCNC: 58 MG/DL (ref 7–20)
CALCIUM SERPL-MCNC: 9.4 MG/DL (ref 8.3–10.6)
CHLORIDE SERPL-SCNC: 97 MMOL/L (ref 99–110)
CO2 SERPL-SCNC: 36 MMOL/L (ref 21–32)
CREAT SERPL-MCNC: 1.1 MG/DL (ref 0.8–1.3)
DEPRECATED RDW RBC AUTO: 15.2 % (ref 12.4–15.4)
EOSINOPHIL # BLD: 0 K/UL (ref 0–0.6)
EOSINOPHIL NFR BLD: 0.1 %
GFR SERPLBLD CREATININE-BSD FMLA CKD-EPI: >60 ML/MIN/{1.73_M2}
GLUCOSE BLD-MCNC: 200 MG/DL (ref 70–99)
GLUCOSE BLD-MCNC: 312 MG/DL (ref 70–99)
GLUCOSE BLD-MCNC: 349 MG/DL (ref 70–99)
GLUCOSE BLD-MCNC: 371 MG/DL (ref 70–99)
GLUCOSE SERPL-MCNC: 186 MG/DL (ref 70–99)
HCT VFR BLD AUTO: 56.3 % (ref 40.5–52.5)
HGB BLD-MCNC: 18.9 G/DL (ref 13.5–17.5)
LYMPHOCYTES # BLD: 1.5 K/UL (ref 1–5.1)
LYMPHOCYTES NFR BLD: 19.4 %
MCH RBC QN AUTO: 33.5 PG (ref 26–34)
MCHC RBC AUTO-ENTMCNC: 33.7 G/DL (ref 31–36)
MCV RBC AUTO: 99.6 FL (ref 80–100)
MONOCYTES # BLD: 0.7 K/UL (ref 0–1.3)
MONOCYTES NFR BLD: 9.4 %
NEUTROPHILS # BLD: 5.7 K/UL (ref 1.7–7.7)
NEUTROPHILS NFR BLD: 70.9 %
PERFORMED ON: ABNORMAL
PLATELET # BLD AUTO: 147 K/UL (ref 135–450)
PMV BLD AUTO: 9.5 FL (ref 5–10.5)
POTASSIUM SERPL-SCNC: 4.4 MMOL/L (ref 3.5–5.1)
PROT SERPL-MCNC: 7.7 G/DL (ref 6.4–8.2)
RBC # BLD AUTO: 5.65 M/UL (ref 4.2–5.9)
SODIUM SERPL-SCNC: 140 MMOL/L (ref 136–145)
WBC # BLD AUTO: 8 K/UL (ref 4–11)

## 2024-03-08 PROCEDURE — 94669 MECHANICAL CHEST WALL OSCILL: CPT

## 2024-03-08 PROCEDURE — 6370000000 HC RX 637 (ALT 250 FOR IP): Performed by: INTERNAL MEDICINE

## 2024-03-08 PROCEDURE — 6360000002 HC RX W HCPCS: Performed by: INTERNAL MEDICINE

## 2024-03-08 PROCEDURE — 80076 HEPATIC FUNCTION PANEL: CPT

## 2024-03-08 PROCEDURE — 94660 CPAP INITIATION&MGMT: CPT

## 2024-03-08 PROCEDURE — 85025 COMPLETE CBC W/AUTO DIFF WBC: CPT

## 2024-03-08 PROCEDURE — 94640 AIRWAY INHALATION TREATMENT: CPT

## 2024-03-08 PROCEDURE — 2700000000 HC OXYGEN THERAPY PER DAY

## 2024-03-08 PROCEDURE — 80048 BASIC METABOLIC PNL TOTAL CA: CPT

## 2024-03-08 PROCEDURE — 6370000000 HC RX 637 (ALT 250 FOR IP): Performed by: STUDENT IN AN ORGANIZED HEALTH CARE EDUCATION/TRAINING PROGRAM

## 2024-03-08 PROCEDURE — 36415 COLL VENOUS BLD VENIPUNCTURE: CPT

## 2024-03-08 PROCEDURE — 2580000003 HC RX 258: Performed by: INTERNAL MEDICINE

## 2024-03-08 PROCEDURE — 94761 N-INVAS EAR/PLS OXIMETRY MLT: CPT

## 2024-03-08 PROCEDURE — 99233 SBSQ HOSP IP/OBS HIGH 50: CPT | Performed by: INTERNAL MEDICINE

## 2024-03-08 PROCEDURE — 99232 SBSQ HOSP IP/OBS MODERATE 35: CPT | Performed by: STUDENT IN AN ORGANIZED HEALTH CARE EDUCATION/TRAINING PROGRAM

## 2024-03-08 PROCEDURE — 2580000003 HC RX 258

## 2024-03-08 PROCEDURE — 2060000000 HC ICU INTERMEDIATE R&B

## 2024-03-08 RX ORDER — FUROSEMIDE 40 MG/1
40 TABLET ORAL DAILY
Qty: 90 TABLET | Refills: 3 | Status: SHIPPED | OUTPATIENT
Start: 2024-03-08 | End: 2024-03-12 | Stop reason: HOSPADM

## 2024-03-08 RX ORDER — CARVEDILOL 6.25 MG/1
6.25 TABLET ORAL 2 TIMES DAILY WITH MEALS
Qty: 60 TABLET | Refills: 5 | Status: SHIPPED | OUTPATIENT
Start: 2024-03-08 | End: 2024-03-12 | Stop reason: HOSPADM

## 2024-03-08 RX ORDER — ACETAZOLAMIDE 500 MG/5ML
500 INJECTION, POWDER, LYOPHILIZED, FOR SOLUTION INTRAVENOUS ONCE
Status: COMPLETED | OUTPATIENT
Start: 2024-03-08 | End: 2024-03-08

## 2024-03-08 RX ORDER — WATER 10 ML/10ML
INJECTION INTRAMUSCULAR; INTRAVENOUS; SUBCUTANEOUS
Status: COMPLETED
Start: 2024-03-08 | End: 2024-03-08

## 2024-03-08 RX ORDER — PREDNISONE 20 MG/1
20 TABLET ORAL DAILY
Status: COMPLETED | OUTPATIENT
Start: 2024-03-08 | End: 2024-03-12

## 2024-03-08 RX ADMIN — MOMETASONE FUROATE AND FORMOTEROL FUMARATE DIHYDRATE 2 PUFF: 200; 5 AEROSOL RESPIRATORY (INHALATION) at 08:23

## 2024-03-08 RX ADMIN — ATORVASTATIN CALCIUM 40 MG: 40 TABLET, FILM COATED ORAL at 20:18

## 2024-03-08 RX ADMIN — IPRATROPIUM BROMIDE AND ALBUTEROL SULFATE 1 DOSE: 2.5; .5 SOLUTION RESPIRATORY (INHALATION) at 16:11

## 2024-03-08 RX ADMIN — ACETAZOLAMIDE SODIUM 500 MG: 500 INJECTION, POWDER, LYOPHILIZED, FOR SOLUTION INTRAVENOUS at 10:49

## 2024-03-08 RX ADMIN — INSULIN LISPRO 6 UNITS: 100 INJECTION, SOLUTION INTRAVENOUS; SUBCUTANEOUS at 17:50

## 2024-03-08 RX ADMIN — Medication 2 SPRAY: at 17:51

## 2024-03-08 RX ADMIN — WATER 5 ML: 1 INJECTION INTRAMUSCULAR; INTRAVENOUS; SUBCUTANEOUS at 10:50

## 2024-03-08 RX ADMIN — INSULIN LISPRO 4 UNITS: 100 INJECTION, SOLUTION INTRAVENOUS; SUBCUTANEOUS at 20:18

## 2024-03-08 RX ADMIN — IPRATROPIUM BROMIDE AND ALBUTEROL SULFATE 1 DOSE: 2.5; .5 SOLUTION RESPIRATORY (INHALATION) at 08:13

## 2024-03-08 RX ADMIN — Medication 10 ML: at 20:18

## 2024-03-08 RX ADMIN — Medication 2 SPRAY: at 05:28

## 2024-03-08 RX ADMIN — GLIPIZIDE 5 MG: 5 TABLET ORAL at 09:30

## 2024-03-08 RX ADMIN — INSULIN LISPRO 8 UNITS: 100 INJECTION, SOLUTION INTRAVENOUS; SUBCUTANEOUS at 13:07

## 2024-03-08 RX ADMIN — Medication 10 ML: at 09:33

## 2024-03-08 RX ADMIN — PREDNISONE 20 MG: 20 TABLET ORAL at 09:31

## 2024-03-08 RX ADMIN — Medication 2 SPRAY: at 22:25

## 2024-03-08 RX ADMIN — MOMETASONE FUROATE AND FORMOTEROL FUMARATE DIHYDRATE 2 PUFF: 200; 5 AEROSOL RESPIRATORY (INHALATION) at 19:33

## 2024-03-08 RX ADMIN — ENOXAPARIN SODIUM 30 MG: 100 INJECTION SUBCUTANEOUS at 20:20

## 2024-03-08 RX ADMIN — ENOXAPARIN SODIUM 30 MG: 100 INJECTION SUBCUTANEOUS at 09:31

## 2024-03-08 ASSESSMENT — PAIN SCALES - GENERAL: PAINLEVEL_OUTOF10: 0

## 2024-03-08 NOTE — CARE COORDINATION
Patient had a discharge order this morning & [employer] already delivered 2 oxygen tanks to bedside for transport home as thought he was leaving.   Pulmonology mentions needing a heart cath, so await to see if this is going to happen.    If patient is not ready for discharge by today, will need a new home O2 eval and RT eval as they are only good for 48hrs. We will then new DME orders.   Patient wants to return home. Denies home needs. Will continue to follow.     Electronically signed by Nirali Spears RN Case Management on 3/8/2024 at 3:03 PM

## 2024-03-08 NOTE — PROGRESS NOTES
Patient ID: Alvarado Sibley  Referring/ Physician: Oscar Medina MD      Summary:   Alvarado Sibley is being seen by nephrology for MELISSA.     Reason for admission:   COPD  CHF EF preserved G1DD, normal LV filling pressures.   Severe pulmonary HTN. Severe TR  Hypotension     Interval Hx:   Seen and examined at bedside.  He is awake and alert.  He standing up.  He is still on 10 L high flow oxygen  Blood pressure is 121/66  Heart rate 66 afebrile  Urine output was 3.6 L yesterday -14 L for admission.    Sodium 140 potassium 4.4 bicarb 36 BUN 58 creatinine 1.1    Assessment/Plan:   -Blood pressure acceptable, will continue to hold his antihypertensives  -Hold further IV Lasix, swelling is better and weight is down 10 kg  -Got a dose of Diamox per pulmonology today        Acute kidney injury  Creatinine peaked at 2.4 and improved to baseline of 1.2-1.3.  This is probably related to diuretic use and being on RAAS blockade, SGL 2 inhibitor and was on NSAIDs prior to admission.  He also had an episode of hypotension where his blood pressure dropped into the 80s systolic.  Check urinalysis, albumin creatinine ratio, protein creatinine ratio  Check bladder scan  Check renal ultrasound    Hyperkalemia  Multifactorial   Hyperglycemia causing intracellular shifting, MELISSA and had been on RAASI and MRA.   Holding culprit medications  Lokelma and lasix.       History of hypertension  Prior to admission was on Aldactone, lisinopril, Coreg, Lasix, holding diuretics and RAAS blockade    Pulmonary arterial hypertension  Probably related to COPD  His echo showed severe pulmonary pretension and severe TR but no RV dysfunction and is LV filling pressures were okay.    Edema  He does have grade 1 diastolic dysfunction than normal filling pressures on the left side noted on echo.  The RV was dilated with normal function, has severe TR and some pulmonary hypertension as well      [facility] would like to thank

## 2024-03-08 NOTE — PROGRESS NOTES
Pt remained on 10L high flow throughout the day with O2 sats remaining above 90%. This afternoon RN walked in on patient smoking a cigarette in the bathroom. A pack of cigarettes and a lighter were taken from the patient room and security was called to hold while patient is here. Pt stated he does not want or need the cigarettes anymore and to get rid of them. Security aware and will dispose accordingly. Pt was educated on risks of smoking with supplemental oxygen and was agreeable to stop.     Pt refused insulin this morning, but accepted his afternoon doses receiving 8U at lunch and 6U at dinner. Pt denies further questions or concerns at this time.

## 2024-03-08 NOTE — PROGRESS NOTES
recover from that. That was hard.\" and the loss of his dog in past year with minimal coping. Pt stated he has no plans for taking insulin when he is discharged. Pt stated he will manage it by watching his diet and getting exercise on his own after he goes home. Pt aware of plan for heart cath and that he will be inpt until Monday per Cardiology.     Glucose Target: 140-180    Total Daily Insulin:  3/7/2024: 4 units + Glipizide  3/6/2024: 12 units + Glipizide    Recommendations: Offer and encourage insulin.    Electronically signed by Carly Espinosa RN on 3/8/2024 at 11:45 AM

## 2024-03-08 NOTE — PROGRESS NOTES
Internal Medicine Hospital Progress Note    Patient:  Alvarado Sibley 59 y.o. male MRN: [id number]     Date of Service: 3/8/2024    Allergy: Guaifenesin, Dextromethorphan-guaifenesin, and Aspirin  CC: F/u:   Brief Course   Alvarado Sibley was admitted on 2/29/2024 for Acute on chronic heart failure with preserved ejection fraction (HFpEF) ([facility]).    24 hr interval hx:  Patient is now interested in [facility] to evaluate pulm htn.  Objective     Physical Exam:  Vitals: /66   Pulse 64   Temp 98.4 °F (36.9 °C) (Oral)   Resp 18   Ht 1.651 m (5' 5\")   Wt 118.2 kg (260 lb 9.3 oz)   SpO2 92%   BMI 43.36 kg/m²     Physical Exam  Constitutional:       General: He is not in acute distress.  HENT:      Mouth/Throat:      Mouth: Mucous membranes are moist.   Eyes:      Pupils: Pupils are equal, round, and reactive to light.   Cardiovascular:      Rate and Rhythm: Normal rate and regular rhythm.      Pulses: Normal pulses.   Pulmonary:      Breath sounds: Wheezing present.   Abdominal:      General: Abdomen is flat. There is no distension.      Palpations: Abdomen is soft.      Tenderness: There is no abdominal tenderness.   Skin:     General: Skin is warm and dry.      Coloration: Skin is not jaundiced or pale.      Findings: No erythema.   Neurological:      General: No focal deficit present.      Mental Status: He is alert and oriented to person, place, and time.         Pertinent/ New Labs and Imaging Studies   Labs reviewed. Pertinent labs noted in assessment and plan      Assessment and Plan   Alvarado Sibley was admitted to hospital for Acute on chronic heart failure with preserved ejection fraction (HFpEF) ([facility])    #Acute on chronic hypoxic respiratory failure: Severe COPD and pulmonary edema likely the underlying etiology of his hypoxia  - Wean oxygen as tolerated  - Home oxygen evaluation ordered  - Continue home dulera.  - Q8h duonebs  - Flutter valve for cough.  - Furosemide 40 mg IV BID for diuresis.    #Heart failure  with preserved ejection fraction: Last echo showing preserved EF, but diastolic dysfunction  - Furosemide 40 mg IV BID for diuresis  - hold carvedilol and lisinopril for low blood pressure    #Acute kidney injury: Creatinine peaked at 2.4, now down trending.   - Nephrology following appreciate recommendations  - Avoid hypotension, holding coreg and lisinopril  - Furosemide 40 mg IV BID for diuresis    #Chronic severe COPD: On 3L of oxygen at home.  - Continue prednisone daily  - Continue home dulera and PRN nebs  - Home oxygen evaluation    DVT Prophylaxis: Enoxaparin  Code:Full  Disposition: Pending improvement in respiratory status.    Oscar Medina MD  [facility]  Office [phone]  Cell [phone]

## 2024-03-08 NOTE — PLAN OF CARE
assistance   Assess pain using appropriate pain scale     Problem: Safety - Adult  Goal: Free from fall injury  Outcome: Progressing  Flowsheets (Taken 3/7/2024 2111)  Free From Fall Injury: Instruct family/caregiver on patient safety     Problem: Musculoskeletal - Adult  Goal: Return mobility to safest level of function  Outcome: Progressing     Problem: Infection - Adult  Goal: Absence of infection at discharge  Outcome: Progressing

## 2024-03-08 NOTE — PROGRESS NOTES
Pulmonary Progress Note    Date of Admission: 2/29/2024   LOS: 8 days       CC:  Chief Complaint   Patient presents with    Shortness of Breath     Pt reports hx of CHF and COPD. Pt reports on diuretics and has been taking as prescribed. Pt reports BLE swelling and SOB. Pt is 77% on RA. Pt reports on 2L O2 at home as needed. Pt placed on 4l n/c in triage and increased to 89%        Subjective:    Would like to be discharge.    Assessment:     COPD  Chronic hypoxemia  Sleep apnea  Tobacco abuse  Obesity  Elevated PASP on echocardiogram March 2024    Plan:     This note may have been transcribed using Dragon Dictation software. Please disregard any translational errors.       Hospital Day: 8     COPD, severe   Duoneb's   Dulera  prednisone     Acute on chronic hypoxemic resp failure with acute hypercapnia   Wean O2 to sat >90%  Back up to 10  L nasal cannula.  Baseline 2 L.  Difficulty weaning oxygen.  Repeat chest x-ray.      Pulmonary hypertension  Echocardiogram demonstrated elevated PASP with associated heart failure.  This raise the question of the pulmonary hypertension with additional question of left-sided from diastolic versus intrapulmonary.  Patient has risk factors for who group 3 pulmonary hypertension from COPD and also from chronic hypoxemia and sleep apnea that is been undertreated.   Diastolic dysfunction is probable as well  We discussed considering right heart catheterization.  He has had significant issues with cost of procedures medications and interventions.  This is led to most of his noncompliance.  Therefore, it is not clear that it is worth a right heart catheterization if he cannot afford medications.  Volume status - 14 L  Diamox for contraction alkalosis  Procal 0.14  3/2/24        Influenza   Tamiflu  Completed treatment       Sinus dryness   [place] spray every 8 hours, controlled    Obesity       Data:        PHYSICAL EXAM:   Blood pressure 112/68, pulse 68, temperature 98.4 °F (36.9 °C),  140   K 5.4* 4.7 4.4    98* 97*   CO2 34* 35* 36*   BUN 65* 63* 58*   CREATININE 1.3 1.0 1.1       LIVER PROFILE:   Recent Labs     03/06/24  0539 03/07/24  0726 03/08/24  0658   AST 12* 11* 12*   ALT 12 13 13   BILIDIR <0.2 <0.2 0.3   BILITOT 0.4 0.9 1.5*   ALKPHOS 98 91 92       PT/INR: No results for input(s): \"PROTIME\", \"INR\" in the last 72 hours.  APTT: No results for input(s): \"APTT\" in the last 72 hours.  UA:No results for input(s): \"NITRITE\", \"COLORU\", \"PHUR\", \"LABCAST\", \"WBCUA\", \"RBCUA\", \"MUCUS\", \"TRICHOMONAS\", \"YEAST\", \"BACTERIA\", \"CLARITYU\", \"SPECGRAV\", \"LEUKOCYTESUR\", \"UROBILINOGEN\", \"BILIRUBINUR\", \"BLOODU\", \"GLUCOSEU\", \"AMORPHOUS\" in the last 72 hours.    Invalid input(s): \"KETONESU\"    No results for input(s): \"PH\", \"PCO2\", \"PO2\" in the last 72 hours.    Cx:      Films:             This note was transcribed using Dragon Dictation software. Please disregard any translational errors.      [facility]  [phone]

## 2024-03-09 PROBLEM — J10.1 INFLUENZA A: Status: ACTIVE | Noted: 2024-03-09

## 2024-03-09 LAB
ALBUMIN SERPL-MCNC: 3.5 G/DL (ref 3.4–5)
ALP SERPL-CCNC: 89 U/L (ref 40–129)
ALT SERPL-CCNC: 13 U/L (ref 10–40)
ANION GAP SERPL CALCULATED.3IONS-SCNC: 2 MMOL/L (ref 3–16)
AST SERPL-CCNC: 10 U/L (ref 15–37)
BASOPHILS # BLD: 0 K/UL (ref 0–0.2)
BASOPHILS NFR BLD: 0.1 %
BILIRUB DIRECT SERPL-MCNC: 0.3 MG/DL (ref 0–0.3)
BILIRUB INDIRECT SERPL-MCNC: 1.3 MG/DL (ref 0–1)
BILIRUB SERPL-MCNC: 1.6 MG/DL (ref 0–1)
BUN SERPL-MCNC: 52 MG/DL (ref 7–20)
CALCIUM SERPL-MCNC: 9 MG/DL (ref 8.3–10.6)
CHLORIDE SERPL-SCNC: 96 MMOL/L (ref 99–110)
CO2 SERPL-SCNC: 34 MMOL/L (ref 21–32)
CREAT SERPL-MCNC: 0.9 MG/DL (ref 0.8–1.3)
DEPRECATED RDW RBC AUTO: 15.2 % (ref 12.4–15.4)
EOSINOPHIL # BLD: 0 K/UL (ref 0–0.6)
EOSINOPHIL NFR BLD: 0.3 %
GFR SERPLBLD CREATININE-BSD FMLA CKD-EPI: >60 ML/MIN/{1.73_M2}
GLUCOSE BLD-MCNC: 209 MG/DL (ref 70–99)
GLUCOSE BLD-MCNC: 232 MG/DL (ref 70–99)
GLUCOSE BLD-MCNC: 264 MG/DL (ref 70–99)
GLUCOSE BLD-MCNC: 490 MG/DL (ref 70–99)
GLUCOSE SERPL-MCNC: 237 MG/DL (ref 70–99)
HCT VFR BLD AUTO: 56.9 % (ref 40.5–52.5)
HGB BLD-MCNC: 18.7 G/DL (ref 13.5–17.5)
LYMPHOCYTES # BLD: 1.5 K/UL (ref 1–5.1)
LYMPHOCYTES NFR BLD: 17.5 %
MCH RBC QN AUTO: 32.5 PG (ref 26–34)
MCHC RBC AUTO-ENTMCNC: 32.8 G/DL (ref 31–36)
MCV RBC AUTO: 99.2 FL (ref 80–100)
MONOCYTES # BLD: 0.7 K/UL (ref 0–1.3)
MONOCYTES NFR BLD: 8.2 %
NEUTROPHILS # BLD: 6.4 K/UL (ref 1.7–7.7)
NEUTROPHILS NFR BLD: 73.9 %
PERFORMED ON: ABNORMAL
PLATELET # BLD AUTO: 128 K/UL (ref 135–450)
PMV BLD AUTO: 9.1 FL (ref 5–10.5)
POTASSIUM SERPL-SCNC: 4.3 MMOL/L (ref 3.5–5.1)
PROT SERPL-MCNC: 7.2 G/DL (ref 6.4–8.2)
RBC # BLD AUTO: 5.74 M/UL (ref 4.2–5.9)
SODIUM SERPL-SCNC: 132 MMOL/L (ref 136–145)
WBC # BLD AUTO: 8.6 K/UL (ref 4–11)

## 2024-03-09 PROCEDURE — 6360000002 HC RX W HCPCS: Performed by: INTERNAL MEDICINE

## 2024-03-09 PROCEDURE — 2580000003 HC RX 258: Performed by: INTERNAL MEDICINE

## 2024-03-09 PROCEDURE — 94669 MECHANICAL CHEST WALL OSCILL: CPT

## 2024-03-09 PROCEDURE — 94761 N-INVAS EAR/PLS OXIMETRY MLT: CPT

## 2024-03-09 PROCEDURE — 6370000000 HC RX 637 (ALT 250 FOR IP): Performed by: STUDENT IN AN ORGANIZED HEALTH CARE EDUCATION/TRAINING PROGRAM

## 2024-03-09 PROCEDURE — 6370000000 HC RX 637 (ALT 250 FOR IP): Performed by: INTERNAL MEDICINE

## 2024-03-09 PROCEDURE — 80048 BASIC METABOLIC PNL TOTAL CA: CPT

## 2024-03-09 PROCEDURE — 36415 COLL VENOUS BLD VENIPUNCTURE: CPT

## 2024-03-09 PROCEDURE — 94640 AIRWAY INHALATION TREATMENT: CPT

## 2024-03-09 PROCEDURE — 85025 COMPLETE CBC W/AUTO DIFF WBC: CPT

## 2024-03-09 PROCEDURE — 2060000000 HC ICU INTERMEDIATE R&B

## 2024-03-09 PROCEDURE — 94660 CPAP INITIATION&MGMT: CPT

## 2024-03-09 PROCEDURE — 99233 SBSQ HOSP IP/OBS HIGH 50: CPT | Performed by: INTERNAL MEDICINE

## 2024-03-09 PROCEDURE — 2700000000 HC OXYGEN THERAPY PER DAY

## 2024-03-09 PROCEDURE — 80076 HEPATIC FUNCTION PANEL: CPT

## 2024-03-09 PROCEDURE — 99232 SBSQ HOSP IP/OBS MODERATE 35: CPT | Performed by: STUDENT IN AN ORGANIZED HEALTH CARE EDUCATION/TRAINING PROGRAM

## 2024-03-09 RX ORDER — TORSEMIDE 20 MG/1
20 TABLET ORAL DAILY
Status: DISCONTINUED | OUTPATIENT
Start: 2024-03-09 | End: 2024-03-13 | Stop reason: HOSPADM

## 2024-03-09 RX ORDER — INSULIN LISPRO 100 [IU]/ML
4 INJECTION, SOLUTION INTRAVENOUS; SUBCUTANEOUS ONCE
Status: COMPLETED | OUTPATIENT
Start: 2024-03-09 | End: 2024-03-09

## 2024-03-09 RX ORDER — LANOLIN ALCOHOL/MO/W.PET/CERES
6 CREAM (GRAM) TOPICAL NIGHTLY PRN
Status: DISCONTINUED | OUTPATIENT
Start: 2024-03-09 | End: 2024-03-13 | Stop reason: HOSPADM

## 2024-03-09 RX ADMIN — TORSEMIDE 20 MG: 20 TABLET ORAL at 14:28

## 2024-03-09 RX ADMIN — ENOXAPARIN SODIUM 30 MG: 100 INJECTION SUBCUTANEOUS at 08:37

## 2024-03-09 RX ADMIN — INSULIN LISPRO 8 UNITS: 100 INJECTION, SOLUTION INTRAVENOUS; SUBCUTANEOUS at 18:09

## 2024-03-09 RX ADMIN — IPRATROPIUM BROMIDE AND ALBUTEROL SULFATE 1 DOSE: 2.5; .5 SOLUTION RESPIRATORY (INHALATION) at 16:02

## 2024-03-09 RX ADMIN — MOMETASONE FUROATE AND FORMOTEROL FUMARATE DIHYDRATE 2 PUFF: 200; 5 AEROSOL RESPIRATORY (INHALATION) at 19:57

## 2024-03-09 RX ADMIN — IPRATROPIUM BROMIDE AND ALBUTEROL SULFATE 1 DOSE: 2.5; .5 SOLUTION RESPIRATORY (INHALATION) at 23:45

## 2024-03-09 RX ADMIN — GLIPIZIDE 5 MG: 5 TABLET ORAL at 05:06

## 2024-03-09 RX ADMIN — Medication 10 ML: at 20:49

## 2024-03-09 RX ADMIN — IPRATROPIUM BROMIDE AND ALBUTEROL SULFATE 1 DOSE: 2.5; .5 SOLUTION RESPIRATORY (INHALATION) at 00:08

## 2024-03-09 RX ADMIN — ENOXAPARIN SODIUM 30 MG: 100 INJECTION SUBCUTANEOUS at 20:49

## 2024-03-09 RX ADMIN — INSULIN LISPRO 4 UNITS: 100 INJECTION, SOLUTION INTRAVENOUS; SUBCUTANEOUS at 14:28

## 2024-03-09 RX ADMIN — Medication 10 ML: at 08:38

## 2024-03-09 RX ADMIN — PREDNISONE 20 MG: 20 TABLET ORAL at 08:37

## 2024-03-09 RX ADMIN — Medication 2 SPRAY: at 14:29

## 2024-03-09 RX ADMIN — INSULIN GLARGINE 10 UNITS: 100 INJECTION, SOLUTION SUBCUTANEOUS at 08:38

## 2024-03-09 RX ADMIN — ATORVASTATIN CALCIUM 40 MG: 40 TABLET, FILM COATED ORAL at 20:49

## 2024-03-09 RX ADMIN — IPRATROPIUM BROMIDE AND ALBUTEROL SULFATE 1 DOSE: 2.5; .5 SOLUTION RESPIRATORY (INHALATION) at 08:14

## 2024-03-09 RX ADMIN — Medication 2 SPRAY: at 05:06

## 2024-03-09 RX ADMIN — Medication 6 MG: at 23:51

## 2024-03-09 RX ADMIN — MOMETASONE FUROATE AND FORMOTEROL FUMARATE DIHYDRATE 2 PUFF: 200; 5 AEROSOL RESPIRATORY (INHALATION) at 08:24

## 2024-03-09 RX ADMIN — Medication 2 SPRAY: at 23:51

## 2024-03-09 RX ADMIN — INSULIN LISPRO 4 UNITS: 100 INJECTION, SOLUTION INTRAVENOUS; SUBCUTANEOUS at 18:09

## 2024-03-09 ASSESSMENT — PAIN SCALES - GENERAL
PAINLEVEL_OUTOF10: 0

## 2024-03-09 NOTE — PROGRESS NOTES
Patient ID: Alvarado Sibley  Referring/ Physician: Oscar Medina MD      Summary:   Alvarado Sibley is being seen by nephrology for MELISSA.     Reason for admission:   COPD  CHF EF preserved G1DD, normal LV filling pressures.   Severe pulmonary HTN. Severe TR  Hypotension         Interval Hx:   Seen and examined at bedside.  He is awake and alert.  Breathing is feeling better   Has some edema but it is better.     UO 1575 CC  Negative 15 L   Na 132 glucose 237   Cr 0.9  K 4.3       Assessment/Plan:   -renal function normal now.   - edema better , down 15 L for admission.   - put on maintenance diuretic, torsemide 20 mg daily         Acute kidney injury  Creatinine peaked at 2.4 and improved to baseline of 1.2-1.3.  This is probably related to diuretic use and being on RAAS blockade, SGL 2 inhibitor and was on NSAIDs prior to admission.  He also had an episode of hypotension where his blood pressure dropped into the 80s systolic.  Check urinalysis, albumin creatinine ratio, protein creatinine ratio  Check bladder scan  Check renal ultrasound    Hyperkalemia  Multifactorial   Hyperglycemia causing intracellular shifting, MELISSA and had been on RAASI and MRA.   Holding culprit medications  Lokelma and lasix.       History of hypertension  Prior to admission was on Aldactone, lisinopril, Coreg, Lasix, holding diuretics and RAAS blockade    Pulmonary arterial hypertension  Probably related to COPD  His echo showed severe pulmonary pretension and severe TR but no RV dysfunction and is LV filling pressures were okay.    Edema  He does have grade 1 diastolic dysfunction than normal filling pressures on the left side noted on echo.  The RV was dilated with normal function, has severe TR and some pulmonary hypertension as well      [facility] would like to thank you for the opportunity to serve this patient. Please call with any questions or concerns.    Nga Monte MD  [facility]  [phone]

## 2024-03-09 NOTE — PROGRESS NOTES
Internal Medicine Hospital Progress Note    Patient:  Alvarado Sibley 59 y.o. male MRN: [id number]     Date of Service: 3/9/2024    Allergy: Guaifenesin, Dextromethorphan-guaifenesin, and Aspirin  CC: F/u:   Brief Course   Alvarado Sibley was admitted on 2/29/2024 for Acute on chronic heart failure with preserved ejection fraction (HFpEF) ([facility]).    24 hr interval hx:  Patient is now interested in RHC to evaluate pulm htn.  Objective     Physical Exam:  Vitals: /70   Pulse 79   Temp 97.6 °F (36.4 °C) (Oral)   Resp 18   Ht 1.651 m (5' 5\")   Wt 119.2 kg (262 lb 12.6 oz)   SpO2 92%   BMI 43.73 kg/m²     Physical Exam  Constitutional:       General: He is not in acute distress.  HENT:      Mouth/Throat:      Mouth: Mucous membranes are moist.   Eyes:      Pupils: Pupils are equal, round, and reactive to light.   Cardiovascular:      Rate and Rhythm: Normal rate and regular rhythm.      Pulses: Normal pulses.   Pulmonary:      Breath sounds: Wheezing present.   Abdominal:      General: Abdomen is flat. There is no distension.      Palpations: Abdomen is soft.      Tenderness: There is no abdominal tenderness.   Skin:     General: Skin is warm and dry.      Coloration: Skin is not jaundiced or pale.      Findings: No erythema.   Neurological:      General: No focal deficit present.      Mental Status: He is alert and oriented to person, place, and time.         Pertinent/ New Labs and Imaging Studies   Labs reviewed. Pertinent labs noted in assessment and plan      Assessment and Plan   Alvarado Sibley was admitted to hospital for Acute on chronic heart failure with preserved ejection fraction (HFpEF) ([facility])    #Acute on chronic hypoxic respiratory failure: Severe COPD and pulmonary edema likely the underlying etiology of his hypoxia  - Wean oxygen as tolerated  - Home oxygen evaluation ordered  - Continue home dulera.  - Q8h duonebs  - Flutter valve for cough.  - Torsemide    #Heart failure with preserved ejection  fraction: Last echo showing preserved EF, but diastolic dysfunction  - Furosemide 40 mg IV BID for diuresis  - hold carvedilol and lisinopril for low blood pressure    #Acute kidney injury: Creatinine peaked at 2.4, now down trending.   - Nephrology following appreciate recommendations  - Avoid hypotension, holding coreg and lisinopril  - Furosemide 40 mg IV BID for diuresis    #Chronic severe COPD: On 3L of oxygen at home.  - Continue prednisone daily  - Continue home dulera and PRN nebs  - Home oxygen evaluation    DVT Prophylaxis: Enoxaparin  Code:Full  Disposition: Pending improvement in respiratory status.    Oscar Medina MD  [facility]  Office [phone]  Cell [phone]

## 2024-03-09 NOTE — PROGRESS NOTES
Pulmonary Progress Note    CC:  Follow up hypoxia, possible PH    Subjective:  On 10 liters of oxygen  Negative 14 liters since admission   He says he is not SOB and does not like the higher flow oxygen in his nose  He is unsure why he is having a cath on Monday  Doing better  Can't sleep with the bilevel mask and cannot sleep in general     ROS  No SOB  Irritated from hf oxygen in nose         Intake/Output Summary (Last 24 hours) at 3/9/2024 1131  Last data filed at 3/9/2024 0815  Gross per 24 hour   Intake 360 ml   Output 1400 ml   Net -1040 ml         PHYSICAL EXAM:  Blood pressure 117/60, pulse 72, temperature 97.9 °F (36.6 °C), temperature source Oral, resp. rate 18, height 1.651 m (5' 5\"), weight 119.2 kg (262 lb 12.6 oz), SpO2 93 %.'  Gen: Chronically ill, cyanotic   Eyes: PERRL. No sclera icterus. No conjunctival injection.   ENT: No discharge. Pharynx clear. External appearance of ears and nose normal.  Neck: Trachea midline. No obvious mass.    Resp: Scattered wheezing   CV: Tachy, irregular    GI: Non-tender. Non-distended. No hernia.   Skin: Cyanotic   Lymph: No cervical LAD. No supraclavicular LAD.   M/S: No cyanosis. No clubbing. No joint deformity.    Neuro: Moves all four extremities. CN 2-12 tested, no defect noted.  Ext:   ++ edema    Medications:    Scheduled Meds:   predniSONE  20 mg Oral Daily    glipiZIDE  5 mg Oral QAM AC    [Held by provider] carvedilol  12.5 mg Oral BID     ipratropium 0.5 mg-albuterol 2.5 mg  1 Dose Inhalation Q8H    sodium chloride  2 spray Each Nostril 3 times per day    insulin glargine  10 Units SubCUTAneous Daily    insulin lispro  0-8 Units SubCUTAneous TID WC    insulin lispro  0-4 Units SubCUTAneous Nightly    sodium chloride flush  5-40 mL IntraVENous 2 times per day    enoxaparin  30 mg SubCUTAneous BID    [Held by provider] amLODIPine  10 mg Oral Nightly    atorvastatin  40 mg Oral Nightly    [Held by provider] empagliflozin  25 mg Oral Nightly    [Held by

## 2024-03-10 LAB
ALBUMIN SERPL-MCNC: 3.5 G/DL (ref 3.4–5)
ALP SERPL-CCNC: 91 U/L (ref 40–129)
ALT SERPL-CCNC: 15 U/L (ref 10–40)
ANION GAP SERPL CALCULATED.3IONS-SCNC: 8 MMOL/L (ref 3–16)
AST SERPL-CCNC: 10 U/L (ref 15–37)
BASOPHILS # BLD: 0 K/UL (ref 0–0.2)
BASOPHILS NFR BLD: 0.2 %
BILIRUB DIRECT SERPL-MCNC: 0.3 MG/DL (ref 0–0.3)
BILIRUB INDIRECT SERPL-MCNC: 1.2 MG/DL (ref 0–1)
BILIRUB SERPL-MCNC: 1.5 MG/DL (ref 0–1)
BUN SERPL-MCNC: 53 MG/DL (ref 7–20)
CALCIUM SERPL-MCNC: 9.3 MG/DL (ref 8.3–10.6)
CHLORIDE SERPL-SCNC: 97 MMOL/L (ref 99–110)
CO2 SERPL-SCNC: 33 MMOL/L (ref 21–32)
CREAT SERPL-MCNC: 1.1 MG/DL (ref 0.8–1.3)
DEPRECATED RDW RBC AUTO: 15 % (ref 12.4–15.4)
EOSINOPHIL # BLD: 0 K/UL (ref 0–0.6)
EOSINOPHIL NFR BLD: 0.5 %
GFR SERPLBLD CREATININE-BSD FMLA CKD-EPI: >60 ML/MIN/{1.73_M2}
GLUCOSE BLD-MCNC: 242 MG/DL (ref 70–99)
GLUCOSE BLD-MCNC: 333 MG/DL (ref 70–99)
GLUCOSE BLD-MCNC: 337 MG/DL (ref 70–99)
GLUCOSE BLD-MCNC: 349 MG/DL (ref 70–99)
GLUCOSE SERPL-MCNC: 245 MG/DL (ref 70–99)
HCT VFR BLD AUTO: 57.1 % (ref 40.5–52.5)
HGB BLD-MCNC: 19.2 G/DL (ref 13.5–17.5)
LYMPHOCYTES # BLD: 1.6 K/UL (ref 1–5.1)
LYMPHOCYTES NFR BLD: 16.3 %
MCH RBC QN AUTO: 33.4 PG (ref 26–34)
MCHC RBC AUTO-ENTMCNC: 33.5 G/DL (ref 31–36)
MCV RBC AUTO: 99.5 FL (ref 80–100)
MONOCYTES # BLD: 0.7 K/UL (ref 0–1.3)
MONOCYTES NFR BLD: 6.9 %
NEUTROPHILS # BLD: 7.3 K/UL (ref 1.7–7.7)
NEUTROPHILS NFR BLD: 76.1 %
PERFORMED ON: ABNORMAL
PLATELET # BLD AUTO: 131 K/UL (ref 135–450)
PMV BLD AUTO: 9.6 FL (ref 5–10.5)
POTASSIUM SERPL-SCNC: 4.6 MMOL/L (ref 3.5–5.1)
PROT SERPL-MCNC: 7.4 G/DL (ref 6.4–8.2)
RBC # BLD AUTO: 5.74 M/UL (ref 4.2–5.9)
SODIUM SERPL-SCNC: 138 MMOL/L (ref 136–145)
WBC # BLD AUTO: 9.6 K/UL (ref 4–11)

## 2024-03-10 PROCEDURE — 6360000002 HC RX W HCPCS: Performed by: INTERNAL MEDICINE

## 2024-03-10 PROCEDURE — 6370000000 HC RX 637 (ALT 250 FOR IP): Performed by: INTERNAL MEDICINE

## 2024-03-10 PROCEDURE — 85025 COMPLETE CBC W/AUTO DIFF WBC: CPT

## 2024-03-10 PROCEDURE — 80048 BASIC METABOLIC PNL TOTAL CA: CPT

## 2024-03-10 PROCEDURE — 94669 MECHANICAL CHEST WALL OSCILL: CPT

## 2024-03-10 PROCEDURE — 2700000000 HC OXYGEN THERAPY PER DAY

## 2024-03-10 PROCEDURE — 2060000000 HC ICU INTERMEDIATE R&B

## 2024-03-10 PROCEDURE — 80076 HEPATIC FUNCTION PANEL: CPT

## 2024-03-10 PROCEDURE — 6370000000 HC RX 637 (ALT 250 FOR IP): Performed by: STUDENT IN AN ORGANIZED HEALTH CARE EDUCATION/TRAINING PROGRAM

## 2024-03-10 PROCEDURE — 2580000003 HC RX 258: Performed by: INTERNAL MEDICINE

## 2024-03-10 PROCEDURE — 99233 SBSQ HOSP IP/OBS HIGH 50: CPT | Performed by: INTERNAL MEDICINE

## 2024-03-10 PROCEDURE — 36415 COLL VENOUS BLD VENIPUNCTURE: CPT

## 2024-03-10 PROCEDURE — 94640 AIRWAY INHALATION TREATMENT: CPT

## 2024-03-10 PROCEDURE — 94761 N-INVAS EAR/PLS OXIMETRY MLT: CPT

## 2024-03-10 RX ORDER — INSULIN GLARGINE 100 [IU]/ML
10 INJECTION, SOLUTION SUBCUTANEOUS NIGHTLY
Status: DISCONTINUED | OUTPATIENT
Start: 2024-03-10 | End: 2024-03-13 | Stop reason: HOSPADM

## 2024-03-10 RX ADMIN — INSULIN LISPRO 6 UNITS: 100 INJECTION, SOLUTION INTRAVENOUS; SUBCUTANEOUS at 13:13

## 2024-03-10 RX ADMIN — IPRATROPIUM BROMIDE AND ALBUTEROL SULFATE 1 DOSE: 2.5; .5 SOLUTION RESPIRATORY (INHALATION) at 08:21

## 2024-03-10 RX ADMIN — ENOXAPARIN SODIUM 30 MG: 100 INJECTION SUBCUTANEOUS at 08:59

## 2024-03-10 RX ADMIN — INSULIN LISPRO 6 UNITS: 100 INJECTION, SOLUTION INTRAVENOUS; SUBCUTANEOUS at 18:56

## 2024-03-10 RX ADMIN — MOMETASONE FUROATE AND FORMOTEROL FUMARATE DIHYDRATE 2 PUFF: 200; 5 AEROSOL RESPIRATORY (INHALATION) at 08:31

## 2024-03-10 RX ADMIN — MOMETASONE FUROATE AND FORMOTEROL FUMARATE DIHYDRATE 2 PUFF: 200; 5 AEROSOL RESPIRATORY (INHALATION) at 20:09

## 2024-03-10 RX ADMIN — IPRATROPIUM BROMIDE AND ALBUTEROL SULFATE 1 DOSE: 2.5; .5 SOLUTION RESPIRATORY (INHALATION) at 16:30

## 2024-03-10 RX ADMIN — Medication 2 SPRAY: at 05:12

## 2024-03-10 RX ADMIN — Medication 6 MG: at 21:14

## 2024-03-10 RX ADMIN — INSULIN GLARGINE 10 UNITS: 100 INJECTION, SOLUTION SUBCUTANEOUS at 09:00

## 2024-03-10 RX ADMIN — ENOXAPARIN SODIUM 30 MG: 100 INJECTION SUBCUTANEOUS at 21:16

## 2024-03-10 RX ADMIN — PREDNISONE 20 MG: 20 TABLET ORAL at 08:59

## 2024-03-10 RX ADMIN — Medication 2 SPRAY: at 18:56

## 2024-03-10 RX ADMIN — Medication 10 ML: at 09:00

## 2024-03-10 RX ADMIN — TORSEMIDE 20 MG: 20 TABLET ORAL at 08:58

## 2024-03-10 RX ADMIN — Medication 10 ML: at 21:15

## 2024-03-10 RX ADMIN — INSULIN LISPRO 4 UNITS: 100 INJECTION, SOLUTION INTRAVENOUS; SUBCUTANEOUS at 21:15

## 2024-03-10 RX ADMIN — GLIPIZIDE 5 MG: 5 TABLET ORAL at 08:58

## 2024-03-10 RX ADMIN — INSULIN LISPRO 2 UNITS: 100 INJECTION, SOLUTION INTRAVENOUS; SUBCUTANEOUS at 09:54

## 2024-03-10 RX ADMIN — Medication 2 SPRAY: at 21:16

## 2024-03-10 RX ADMIN — INSULIN GLARGINE 10 UNITS: 100 INJECTION, SOLUTION SUBCUTANEOUS at 21:15

## 2024-03-10 RX ADMIN — ATORVASTATIN CALCIUM 40 MG: 40 TABLET, FILM COATED ORAL at 21:15

## 2024-03-10 ASSESSMENT — PAIN SCALES - GENERAL
PAINLEVEL_OUTOF10: 0
PAINLEVEL_OUTOF10: 0

## 2024-03-10 NOTE — PLAN OF CARE
Problem: ABCDS Injury Assessment  Goal: Absence of physical injury  Outcome: Progressing     Problem: Respiratory - Adult  Goal: Achieves optimal ventilation and oxygenation  Outcome: Progressing     Problem: Cardiovascular - Adult  Goal: Maintains optimal cardiac output and hemodynamic stability  Outcome: Progressing     Problem: Metabolic/Fluid and Electrolytes - Adult  Goal: Electrolytes maintained within normal limits  Outcome: Progressing  Goal: Hemodynamic stability and optimal renal function maintained  Outcome: Progressing  Goal: Glucose maintained within prescribed range  Outcome: Progressing     Problem: Discharge Planning  Goal: Discharge to home or other facility with appropriate resources  Outcome: Progressing     Problem: Pain  Goal: Verbalizes/displays adequate comfort level or baseline comfort level  Outcome: Progressing     Problem: Safety - Adult  Goal: Free from fall injury  Outcome: Progressing     Problem: Chronic Conditions and Co-morbidities  Goal: Patient's chronic conditions and co-morbidity symptoms are monitored and maintained or improved  Outcome: Progressing     Problem: Musculoskeletal - Adult  Goal: Return mobility to safest level of function  Outcome: Progressing     Problem: Infection - Adult  Goal: Absence of infection at discharge  Outcome: Progressing

## 2024-03-10 NOTE — PROGRESS NOTES
Patient states he has not slept in days.  Patient is starting to desat on 6LPM.  Upon entering room patient is asleep.  O2 flow increased for support while sleeping.

## 2024-03-10 NOTE — PROGRESS NOTES
Internal Medicine Hospital Progress Note    Patient:  Alvarado Sibley 59 y.o. male MRN: [id number]     Date of Service: 3/10/2024    Allergy: Guaifenesin, Dextromethorphan-guaifenesin, and Aspirin  CC: F/u:   Brief Course   Alvarado Sibley was admitted on 2/29/2024 for Acute on chronic heart failure with preserved ejection fraction (HFpEF) (HCC).    24 hr interval hx:  Patient is now interested in RHC to evaluate pulm htn.    3/10  Sitting up in chair at bedside.  Sleep isnt great prefers his home cpap machine.  Hoping to wean down on his oxygen .   Objective     Physical Exam:  Vitals: /82   Pulse 67   Temp 97.8 °F (36.6 °C) (Oral)   Resp 18   Ht 1.651 m (5' 5\")   Wt 117.9 kg (259 lb 14.8 oz)   SpO2 95%   BMI 43.25 kg/m²     Physical Exam  Constitutional:       General: He is not in acute distress.  HENT:      Mouth/Throat:      Mouth: Mucous membranes are moist.   Eyes:      Pupils: Pupils are equal, round, and reactive to light.   Cardiovascular:      Rate and Rhythm: Normal rate and regular rhythm.      Pulses: Normal pulses.   Pulmonary:      Breath sounds: Wheezing present.   Abdominal:      General: Abdomen is flat. There is no distension.      Palpations: Abdomen is soft.      Tenderness: There is no abdominal tenderness.   Skin:     General: Skin is warm and dry.      Coloration: Skin is not jaundiced or pale.      Findings: No erythema.   Neurological:      General: No focal deficit present.      Mental Status: He is alert and oriented to person, place, and time.     3/10  Also disheveled, MO  Chest diffuse insp and ex wheeze  Trace to 1+pitting edema and venous stasis changes (much improved per his rpt)  Wght down another kg  Pertinent/ New Labs and Imaging Studies   Labs reviewed. Pertinent labs noted in assessment and plan      Assessment and Plan   Alvarado Sibley was admitted to hospital for Acute on chronic heart failure with preserved ejection fraction (HFpEF) (HCC)    #Acute on chronic hypoxic

## 2024-03-10 NOTE — PLAN OF CARE
for signs and symptoms of hyperglycemia and hypoglycemia   Administer ordered medications to maintain glucose within target range     Problem: Discharge Planning  Goal: Discharge to home or other facility with appropriate resources  Outcome: Progressing  Flowsheets (Taken 3/9/2024 2030)  Discharge to home or other facility with appropriate resources:   Identify barriers to discharge with patient and caregiver   Arrange for needed discharge resources and transportation as appropriate   Identify discharge learning needs (meds, wound care, etc)   Refer to discharge planning if patient needs post-hospital services based on physician order or complex needs related to functional status, cognitive ability or social support system     Problem: Pain  Goal: Verbalizes/displays adequate comfort level or baseline comfort level  Outcome: Progressing  Flowsheets (Taken 3/9/2024 2030)  Verbalizes/displays adequate comfort level or baseline comfort level:   Encourage patient to monitor pain and request assistance   Assess pain using appropriate pain scale   Administer analgesics based on type and severity of pain and evaluate response   Implement non-pharmacological measures as appropriate and evaluate response     Problem: Safety - Adult  Goal: Free from fall injury  Outcome: Progressing     Problem: Chronic Conditions and Co-morbidities  Goal: Patient's chronic conditions and co-morbidity symptoms are monitored and maintained or improved  Outcome: Progressing  Flowsheets (Taken 3/9/2024 2030)  Care Plan - Patient's Chronic Conditions and Co-Morbidity Symptoms are Monitored and Maintained or Improved:   Monitor and assess patient's chronic conditions and comorbid symptoms for stability, deterioration, or improvement   Collaborate with multidisciplinary team to address chronic and comorbid conditions and prevent exacerbation or deterioration   Update acute care plan with appropriate goals if chronic or comorbid symptoms are  exacerbated and prevent overall improvement and discharge     Problem: Musculoskeletal - Adult  Goal: Return mobility to safest level of function  Outcome: Progressing  Flowsheets (Taken 3/9/2024 2030)  Return Mobility to Safest Level of Function:   Assess patient stability and activity tolerance for standing, transferring and ambulating with or without assistive devices   Assist with transfers and ambulation using safe patient handling equipment as needed   Ensure adequate protection for wounds/incisions during mobilization     Problem: Infection - Adult  Goal: Absence of infection at discharge  Outcome: Progressing  Flowsheets (Taken 3/9/2024 2030)  Absence of infection at discharge:   Assess and monitor for signs and symptoms of infection   Monitor lab/diagnostic results   Monitor all insertion sites i.e., indwelling lines, tubes and drains

## 2024-03-10 NOTE — PROGRESS NOTES
Patient ID: Alvarado Sibley  Referring/ Physician: Oscar Medina MD      Summary:   Alvarado Sibley is being seen by nephrology for MELISSA.     Reason for admission:   COPD  CHF EF preserved G1DD, normal LV filling pressures.   Severe pulmonary HTN. Severe TR  Hypotension         Interval Hx:   Seen and examined at bedside.  He is awake and alert.  No new issues.     Bun 53   Cr 1.1        Uo 3800 cc      Assessment/Plan:   - Hold torsemide. BP soft and negative 18 L for admission. Bicarb going up and Cr bumped some.         Acute kidney injury  Creatinine peaked at 2.4 and improved to baseline of 1.2-1.3.  This is probably related to diuretic use and being on RAAS blockade, SGL 2 inhibitor and was on NSAIDs prior to admission.  He also had an episode of hypotension where his blood pressure dropped into the 80s systolic.  Check urinalysis, albumin creatinine ratio, protein creatinine ratio  Check bladder scan  Check renal ultrasound    Hyperkalemia  Multifactorial   Hyperglycemia causing intracellular shifting, MELISSA and had been on RAASI and MRA.   Holding culprit medications  Lokelma and lasix.       History of hypertension  Prior to admission was on Aldactone, lisinopril, Coreg, Lasix, holding diuretics and RAAS blockade    Pulmonary arterial hypertension  Probably related to COPD  His echo showed severe pulmonary pretension and severe TR but no RV dysfunction and is LV filling pressures were okay.    Edema  He does have grade 1 diastolic dysfunction than normal filling pressures on the left side noted on echo.  The RV was dilated with normal function, has severe TR and some pulmonary hypertension as well      [facility] would like to thank you for the opportunity to serve this patient. Please call with any questions or concerns.    Nga Monte MD  [facility]  [address] [place] [address]  Fax: [phone]  Office: [phone]    \[facility]\""  Alvarado Sibley is a 59  since 2007      COPD (chronic obstructive pulmonary disease) (HCC)     Emphysema of lung (HCC)     Essential hypertension 11/30/2017    History of pneumothorax 11/30/2017    Rib fx    Hx of colonic polyps 01/18/2018    1.2018 Descending Polyp 2. Sigmoid Polyp 3. Sigmoid Diverticulosis recheck 2023    Hx of renal calculi 11/30/2017    Hypertension     Kidney stone     Neuropathy     Obesity     Osteoarthritis     Pneumothorax     2016 d/t fall     Prediabetes 12/13/2017    Retinal hemorrhage 10/29/2020    HTN-right eye    Sleep apnea     uses C-PAP    Substance abuse (HCC)     Type 2 diabetes mellitus without complication (HCC)          Surgical Hx: reviewed and updated as appropriate   has a past surgical history that includes Carpal tunnel release (Right); knee surgery (Left); Colonoscopy (01/18/2018); and Colonoscopy (N/A, 8/21/2023).     Social Hx: reviewed and updated as appropriate  Social History     Tobacco Use    Smoking status: Every Day     Current packs/day: 1.00     Average packs/day: 1 pack/day for 50.2 years (50.2 ttl pk-yrs)     Types: Cigarettes     Start date: 1/1/1974    Smokeless tobacco: Never   Substance Use Topics    Alcohol use: No        Family hx: reviewed and updated as appropriate  family history includes Cancer in his father and mother; Diabetes in his brother and mother; No Known Problems in his brother, brother, brother, brother, brother, and sister.    Medications:   torsemide, 20 mg, Daily  predniSONE, 20 mg, Daily  glipiZIDE, 5 mg, QAM AC  [Held by provider] carvedilol, 12.5 mg, BID WC  ipratropium 0.5 mg-albuterol 2.5 mg, 1 Dose, Q8H  sodium chloride, 2 spray, 3 times per day  insulin glargine, 10 Units, Daily  insulin lispro, 0-8 Units, TID WC  insulin lispro, 0-4 Units, Nightly  sodium chloride flush, 5-40 mL, 2 times per day  enoxaparin, 30 mg, BID  [Held by provider] amLODIPine, 10 mg, Nightly  atorvastatin, 40 mg, Nightly  [Held by provider] empagliflozin, 25 mg, Nightly  [Held

## 2024-03-11 LAB
ALBUMIN SERPL-MCNC: 3.5 G/DL (ref 3.4–5)
ALP SERPL-CCNC: 93 U/L (ref 40–129)
ALT SERPL-CCNC: 18 U/L (ref 10–40)
ANION GAP SERPL CALCULATED.3IONS-SCNC: 8 MMOL/L (ref 3–16)
ANISOCYTOSIS BLD QL SMEAR: ABNORMAL
AST SERPL-CCNC: 10 U/L (ref 15–37)
BASOPHILS # BLD: 0 K/UL (ref 0–0.2)
BASOPHILS NFR BLD: 0.1 %
BILIRUB DIRECT SERPL-MCNC: 0.3 MG/DL (ref 0–0.3)
BILIRUB INDIRECT SERPL-MCNC: 0.8 MG/DL (ref 0–1)
BILIRUB SERPL-MCNC: 1.1 MG/DL (ref 0–1)
BUN SERPL-MCNC: 56 MG/DL (ref 7–20)
CALCIUM SERPL-MCNC: 9 MG/DL (ref 8.3–10.6)
CHLORIDE SERPL-SCNC: 99 MMOL/L (ref 99–110)
CO2 SERPL-SCNC: 30 MMOL/L (ref 21–32)
CREAT SERPL-MCNC: 1 MG/DL (ref 0.8–1.3)
DEPRECATED RDW RBC AUTO: 15 % (ref 12.4–15.4)
EOSINOPHIL # BLD: 0.1 K/UL (ref 0–0.6)
EOSINOPHIL NFR BLD: 0.6 %
GFR SERPLBLD CREATININE-BSD FMLA CKD-EPI: >60 ML/MIN/{1.73_M2}
GLUCOSE BLD-MCNC: 197 MG/DL (ref 70–99)
GLUCOSE BLD-MCNC: 348 MG/DL (ref 70–99)
GLUCOSE BLD-MCNC: 374 MG/DL (ref 70–99)
GLUCOSE BLD-MCNC: 477 MG/DL (ref 70–99)
GLUCOSE SERPL-MCNC: 192 MG/DL (ref 70–99)
HCT VFR BLD AUTO: 58.6 % (ref 40.5–52.5)
HGB BLD-MCNC: 19.1 G/DL (ref 13.5–17.5)
LYMPHOCYTES # BLD: 1.6 K/UL (ref 1–5.1)
LYMPHOCYTES NFR BLD: 16.1 %
MCH RBC QN AUTO: 32.4 PG (ref 26–34)
MCHC RBC AUTO-ENTMCNC: 32.6 G/DL (ref 31–36)
MCV RBC AUTO: 99.5 FL (ref 80–100)
MONOCYTES # BLD: 0.7 K/UL (ref 0–1.3)
MONOCYTES NFR BLD: 7.3 %
NEUTROPHILS # BLD: 7.7 K/UL (ref 1.7–7.7)
NEUTROPHILS NFR BLD: 75.9 %
PERFORMED ON: ABNORMAL
PLATELET # BLD AUTO: 132 K/UL (ref 135–450)
PLATELET BLD QL SMEAR: ABNORMAL
PMV BLD AUTO: 10.1 FL (ref 5–10.5)
POTASSIUM SERPL-SCNC: 4.6 MMOL/L (ref 3.5–5.1)
PROT SERPL-MCNC: 7.4 G/DL (ref 6.4–8.2)
RBC # BLD AUTO: 5.89 M/UL (ref 4.2–5.9)
SLIDE REVIEW: ABNORMAL
SODIUM SERPL-SCNC: 137 MMOL/L (ref 136–145)
WBC # BLD AUTO: 10.2 K/UL (ref 4–11)

## 2024-03-11 PROCEDURE — 99232 SBSQ HOSP IP/OBS MODERATE 35: CPT | Performed by: STUDENT IN AN ORGANIZED HEALTH CARE EDUCATION/TRAINING PROGRAM

## 2024-03-11 PROCEDURE — 6360000002 HC RX W HCPCS: Performed by: INTERNAL MEDICINE

## 2024-03-11 PROCEDURE — 2060000000 HC ICU INTERMEDIATE R&B

## 2024-03-11 PROCEDURE — 99233 SBSQ HOSP IP/OBS HIGH 50: CPT | Performed by: INTERNAL MEDICINE

## 2024-03-11 PROCEDURE — 94640 AIRWAY INHALATION TREATMENT: CPT

## 2024-03-11 PROCEDURE — 80076 HEPATIC FUNCTION PANEL: CPT

## 2024-03-11 PROCEDURE — 6370000000 HC RX 637 (ALT 250 FOR IP): Performed by: INTERNAL MEDICINE

## 2024-03-11 PROCEDURE — 36415 COLL VENOUS BLD VENIPUNCTURE: CPT

## 2024-03-11 PROCEDURE — 94669 MECHANICAL CHEST WALL OSCILL: CPT

## 2024-03-11 PROCEDURE — 85025 COMPLETE CBC W/AUTO DIFF WBC: CPT

## 2024-03-11 PROCEDURE — 2580000003 HC RX 258: Performed by: INTERNAL MEDICINE

## 2024-03-11 PROCEDURE — 80048 BASIC METABOLIC PNL TOTAL CA: CPT

## 2024-03-11 PROCEDURE — 2700000000 HC OXYGEN THERAPY PER DAY

## 2024-03-11 PROCEDURE — 94761 N-INVAS EAR/PLS OXIMETRY MLT: CPT

## 2024-03-11 PROCEDURE — 6370000000 HC RX 637 (ALT 250 FOR IP): Performed by: STUDENT IN AN ORGANIZED HEALTH CARE EDUCATION/TRAINING PROGRAM

## 2024-03-11 RX ORDER — INSULIN LISPRO 100 [IU]/ML
0-16 INJECTION, SOLUTION INTRAVENOUS; SUBCUTANEOUS
Status: DISCONTINUED | OUTPATIENT
Start: 2024-03-11 | End: 2024-03-13 | Stop reason: HOSPADM

## 2024-03-11 RX ORDER — INSULIN LISPRO 100 [IU]/ML
0-4 INJECTION, SOLUTION INTRAVENOUS; SUBCUTANEOUS NIGHTLY
Status: DISCONTINUED | OUTPATIENT
Start: 2024-03-11 | End: 2024-03-13 | Stop reason: HOSPADM

## 2024-03-11 RX ADMIN — MOMETASONE FUROATE AND FORMOTEROL FUMARATE DIHYDRATE 2 PUFF: 200; 5 AEROSOL RESPIRATORY (INHALATION) at 09:29

## 2024-03-11 RX ADMIN — TORSEMIDE 20 MG: 20 TABLET ORAL at 09:21

## 2024-03-11 RX ADMIN — ENOXAPARIN SODIUM 30 MG: 100 INJECTION SUBCUTANEOUS at 22:09

## 2024-03-11 RX ADMIN — Medication 10 ML: at 22:19

## 2024-03-11 RX ADMIN — INSULIN LISPRO 6 UNITS: 100 INJECTION, SOLUTION INTRAVENOUS; SUBCUTANEOUS at 11:54

## 2024-03-11 RX ADMIN — IPRATROPIUM BROMIDE AND ALBUTEROL SULFATE 1 DOSE: 2.5; .5 SOLUTION RESPIRATORY (INHALATION) at 09:19

## 2024-03-11 RX ADMIN — MOMETASONE FUROATE AND FORMOTEROL FUMARATE DIHYDRATE 2 PUFF: 200; 5 AEROSOL RESPIRATORY (INHALATION) at 19:57

## 2024-03-11 RX ADMIN — INSULIN LISPRO 4 UNITS: 100 INJECTION, SOLUTION INTRAVENOUS; SUBCUTANEOUS at 22:19

## 2024-03-11 RX ADMIN — PREDNISONE 20 MG: 20 TABLET ORAL at 09:21

## 2024-03-11 RX ADMIN — Medication 2 SPRAY: at 22:09

## 2024-03-11 RX ADMIN — Medication 10 ML: at 09:23

## 2024-03-11 RX ADMIN — INSULIN GLARGINE 10 UNITS: 100 INJECTION, SOLUTION SUBCUTANEOUS at 09:22

## 2024-03-11 RX ADMIN — Medication 6 MG: at 22:09

## 2024-03-11 RX ADMIN — INSULIN GLARGINE 10 UNITS: 100 INJECTION, SOLUTION SUBCUTANEOUS at 22:23

## 2024-03-11 RX ADMIN — ATORVASTATIN CALCIUM 40 MG: 40 TABLET, FILM COATED ORAL at 22:09

## 2024-03-11 RX ADMIN — IPRATROPIUM BROMIDE AND ALBUTEROL SULFATE 1 DOSE: 2.5; .5 SOLUTION RESPIRATORY (INHALATION) at 15:50

## 2024-03-11 RX ADMIN — INSULIN LISPRO 8 UNITS: 100 INJECTION, SOLUTION INTRAVENOUS; SUBCUTANEOUS at 17:46

## 2024-03-11 RX ADMIN — Medication 2 SPRAY: at 06:24

## 2024-03-11 RX ADMIN — ENOXAPARIN SODIUM 30 MG: 100 INJECTION SUBCUTANEOUS at 09:22

## 2024-03-11 RX ADMIN — IPRATROPIUM BROMIDE AND ALBUTEROL SULFATE 1 DOSE: 2.5; .5 SOLUTION RESPIRATORY (INHALATION) at 19:56

## 2024-03-11 ASSESSMENT — PAIN SCALES - GENERAL
PAINLEVEL_OUTOF10: 0

## 2024-03-11 ASSESSMENT — PAIN SCALES - WONG BAKER
WONGBAKER_NUMERICALRESPONSE: 0
WONGBAKER_NUMERICALRESPONSE: NO HURT
WONGBAKER_NUMERICALRESPONSE: 0
WONGBAKER_NUMERICALRESPONSE: 0
WONGBAKER_NUMERICALRESPONSE: NO HURT
WONGBAKER_NUMERICALRESPONSE: 0

## 2024-03-11 NOTE — PROGRESS NOTES
Internal Medicine Hospital Progress Note    Patient:  Alvarado Sibley 59 y.o. male MRN: [id number]     Date of Service: 3/11/2024    Allergy: Guaifenesin, Dextromethorphan-guaifenesin, and Aspirin  CC: F/u:   Brief Course   Alvarado Sibley was admitted on 2/29/2024 for Acute on chronic heart failure with preserved ejection fraction (HFpEF) ([facility]).    24 hr interval hx:  Patient feels ok today. He is ready to get heart cath.  Objective     Physical Exam:  Vitals: /74   Pulse 83   Temp 97.5 °F (36.4 °C) (Oral)   Resp 18   Ht 1.651 m (5' 5\")   Wt 119 kg (262 lb 5.6 oz)   SpO2 (!) 82%   BMI 43.66 kg/m²     Physical Exam  Constitutional:       General: He is not in acute distress.  HENT:      Mouth/Throat:      Mouth: Mucous membranes are moist.   Eyes:      Pupils: Pupils are equal, round, and reactive to light.   Cardiovascular:      Rate and Rhythm: Normal rate and regular rhythm.      Pulses: Normal pulses.   Pulmonary:      Breath sounds: Wheezing present.   Abdominal:      General: Abdomen is flat. There is no distension.      Palpations: Abdomen is soft.      Tenderness: There is no abdominal tenderness.   Skin:     General: Skin is warm and dry.      Coloration: Skin is not jaundiced or pale.      Findings: No erythema.   Neurological:      General: No focal deficit present.      Mental Status: He is alert and oriented to person, place, and time.         Pertinent/ New Labs and Imaging Studies   Labs reviewed. Pertinent labs noted in assessment and plan      Assessment and Plan   Alvarado Sibley was admitted to hospital for Acute on chronic heart failure with preserved ejection fraction (HFpEF) ([facility])    #Acute on chronic hypoxic respiratory failure: Severe COPD and pulmonary edema likely the underlying etiology of his hypoxia  - Wean oxygen as tolerated  - Home oxygen evaluation ordered  - Continue home dulera.  - Q8h duonebs  - Flutter valve for cough.  - Torsemide    #Elevated pulmonary pressure on echo:  RVSP estimated at 75 mmHg. Primary vs secondary pulmonary hypertension. Could be who group 3 due to severe COPD and hypoxia.  - Right heart cath ordered    #Heart failure with preserved ejection fraction: Last echo showing preserved EF, but diastolic dysfunction  - Furosemide 40 mg IV BID for diuresis  - hold carvedilol and lisinopril for low blood pressure    #Acute kidney injury: Creatinine peaked at 2.4, now down trending.   - Nephrology following appreciate recommendations  - Avoid hypotension, holding coreg and lisinopril  - Furosemide 40 mg IV BID for diuresis    #Chronic severe COPD: On 3L of oxygen at home.  - Continue prednisone daily  - Continue home dulera and PRN nebs  - Home oxygen evaluation    DVT Prophylaxis: Enoxaparin  Code:Full  Disposition: Pending improvement in respiratory status.    Oscar Medina MD  [facility]  Office [phone]  Cell [phone]

## 2024-03-11 NOTE — PROGRESS NOTES
D:    Latest Reference Range & Units 03/11/24 16:56   POC Glucose 70 - 99 mg/dl 374 (H)   (H): Data is abnormally high  A: pt on medium sliding scale at this time, message to MD awaiting response.   R: VSS

## 2024-03-11 NOTE — PLAN OF CARE
Problem: ABCDS Injury Assessment  Goal: Absence of physical injury  3/10/2024 2226 by Ruthann Francis RN  Outcome: Progressing  3/10/2024 1753 by Misbah Lux RN  Outcome: Progressing     Problem: Respiratory - Adult  Goal: Achieves optimal ventilation and oxygenation  3/10/2024 2226 by Ruthann Francis RN  Outcome: Progressing  3/10/2024 1753 by Misbah Lux RN  Outcome: Progressing     Problem: Cardiovascular - Adult  Goal: Maintains optimal cardiac output and hemodynamic stability  3/10/2024 2226 by Ruthann Francis RN  Outcome: Progressing  3/10/2024 1753 by Misbah Lux RN  Outcome: Progressing     Problem: Metabolic/Fluid and Electrolytes - Adult  Goal: Electrolytes maintained within normal limits  3/10/2024 2226 by Ruthann Francis RN  Outcome: Progressing  3/10/2024 1753 by Misbah Lux RN  Outcome: Progressing  Goal: Hemodynamic stability and optimal renal function maintained  3/10/2024 2226 by Ruthann Francis RN  Outcome: Progressing  3/10/2024 1753 by Misbah Lux RN  Outcome: Progressing  Goal: Glucose maintained within prescribed range  3/10/2024 2226 by Ruthann Francis RN  Outcome: Progressing  3/10/2024 1753 by Misbah Lux RN  Outcome: Progressing     Problem: Discharge Planning  Goal: Discharge to home or other facility with appropriate resources  3/10/2024 2226 by Ruthann Francis RN  Outcome: Progressing  3/10/2024 1753 by Misbah Lux RN  Outcome: Progressing     Problem: Pain  Goal: Verbalizes/displays adequate comfort level or baseline comfort level  3/10/2024 2226 by Ruthann Francis RN  Outcome: Progressing  3/10/2024 1753 by Misbah Lux RN  Outcome: Progressing     Problem: Safety - Adult  Goal: Free from fall injury  3/10/2024 2226 by Ruthann Francis RN  Outcome: Progressing  3/10/2024 1753 by Misbah Lux RN  Outcome: Progressing     Problem: Chronic Conditions and Co-morbidities  Goal: Patient's chronic conditions and co-morbidity symptoms are monitored and maintained or  improved  3/10/2024 2226 by Ruthann Francis RN  Outcome: Progressing  3/10/2024 1753 by Misbah Lux RN  Outcome: Progressing     Problem: Musculoskeletal - Adult  Goal: Return mobility to safest level of function  3/10/2024 2226 by Ruthann Francis, RN  Outcome: Progressing  3/10/2024 1753 by Misbah Lux RN  Outcome: Progressing     Problem: Infection - Adult  Goal: Absence of infection at discharge  3/10/2024 2226 by Ruthann Francis RN  Outcome: Progressing  3/10/2024 1753 by Misbah Lux RN  Outcome: Progressing

## 2024-03-11 NOTE — PROGRESS NOTES
Patient ID: Alvarado Sibley  Referring/ Physician: Oscar Medina MD      Summary:   Alvarado Sibley is being seen by nephrology for MELISSA.     Reason for admission:   COPD  CHF EF preserved G1DD, normal LV filling pressures.   Severe pulmonary HTN. Severe TR  Hypotension         Interval Hx:   Seen at bedside  /66  2700 mL urine yesterday with torsemide 20 mg daily.  Cr 1.0  O2 needs up to 10 L today  Net -ve 20 L this admission    Assessment/Plan:   - persistent hypoxia.  - RHC tomorrow to determine further diuretics plan  - polycythemia noted.      Acute kidney injury  Creatinine peaked at 2.4 and improved to baseline of 1.2-1.3.  This is probably related to diuretic use and being on RAAS blockade, SGL 2 inhibitor and was on NSAIDs prior to admission.  He also had an episode of hypotension where his blood pressure dropped into the 80s systolic.  Check urinalysis, albumin creatinine ratio, protein creatinine ratio  Check bladder scan  Check renal ultrasound    Hyperkalemia  Multifactorial   Hyperglycemia causing intracellular shifting, MELISSA and had been on RAASI and MRA.   Holding culprit medications  Lokelma and lasix.       History of hypertension  Prior to admission was on Aldactone, lisinopril, Coreg, Lasix, holding diuretics and RAAS blockade    Pulmonary arterial hypertension  Probably related to COPD  His echo showed severe pulmonary pretension and severe TR but no RV dysfunction and is LV filling pressures were okay.    Edema  He does have grade 1 diastolic dysfunction than normal filling pressures on the left side noted on echo.  The RV was dilated with normal function, has severe TR and some pulmonary hypertension as well      [facility] would like to thank you for the opportunity to serve this patient. Please call with any questions or concerns.    [facility]  [address] [place] [address]  Fax: [phone]  Office: ([phone])  Nightly  atorvastatin, 40 mg, Nightly  [Held by provider] empagliflozin, 25 mg, Nightly  [Held by provider] lisinopril, 40 mg, Nightly  [Held by provider] spironolactone, 25 mg, Nightly  mometasone-formoterol, 2 puff, BID RT   And  [Held by provider] tiotropium, 2 puff, Daily RT       Guaifenesin, Dextromethorphan-guaifenesin, and Aspirin    Allergies:   Allergies   Allergen Reactions    Guaifenesin Other (See Comments)     Felt like having a stroke    \"paralized me\"    Dextromethorphan-Guaifenesin      Other reaction(s): catatonic state    Aspirin Itching         Physical Exam/Objective:   Vitals:    03/11/24 1152   BP: 124/66   Pulse: 76   Resp: 20   Temp: 98.1 °F (36.7 °C)   SpO2: 92%       Intake/Output Summary (Last 24 hours) at 3/11/2024 1508  Last data filed at 3/11/2024 1400  Gross per 24 hour   Intake 270 ml   Output 2700 ml   Net -2430 ml           General appearance: in no acute distress.   HEENT: + JVD elevation   Respiratory: Respiratory effort normal, bilateral equal chest rise. No wheeze  Cardiovascular: Ausculation shows RRR and  + 2  edema   Abdomen: abdomen is soft, non distended  Musculoskeletal:  no joint swelling, no deformity  Skin: no rashes,  no jaundice   Neuro:  Follows commands      Data:   CBC:   Recent Labs     03/09/24  0611 03/10/24  0646 03/11/24  0616   WBC 8.6 9.6 10.2   HGB 18.7* 19.2* 19.1*   HCT 56.9* 57.1* 58.6*   * 131* 132*       BMP:    Recent Labs     03/09/24  0611 03/10/24  0646 03/11/24  0616   * 138 137   K 4.3 4.6 4.6   CL 96* 97* 99   CO2 34* 33* 30   BUN 52* 53* 56*   CREATININE 0.9 1.1 1.0   GLUCOSE 237* 245* 192*       Lab Results   Component Value Date/Time    COLORU Yellow 03/04/2024 02:25 PM    NITRU Negative 03/04/2024 02:25 PM    GLUCOSEU >=1000 03/04/2024 02:25 PM    KETUA Negative 03/04/2024 02:25 PM    UROBILINOGEN 0.2 03/04/2024 02:25 PM    BILIRUBINUR Negative 03/04/2024 02:25 PM

## 2024-03-11 NOTE — PROGRESS NOTES
[facility]  Glycemic Control      NAME: Alvarado Sibley  MEDICAL RECORD NUMBER:  [id number]  AGE: 59 y.o.   GENDER: male  : 1965  EPISODE DATE:  3/11/2024     Data     Recent Labs     03/10/24  0919 03/10/24  1255 03/10/24  1741 03/10/24  2036 24  0825 24  1142   POCGLU 242* 333* 349* 337* 197* 348*       HgBA1c:    Lab Results   Component Value Date/Time    LABA1C 8.8 2024 08:36 AM       BUN/Creatinine:    Lab Results   Component Value Date/Time    BUN 56 2024 06:16 AM    CREATININE 1.0 2024 06:16 AM       Medications  Scheduled Medications:   insulin glargine  10 Units SubCUTAneous Nightly    torsemide  20 mg Oral Daily    predniSONE  20 mg Oral Daily    glipiZIDE  5 mg Oral QAM AC    [Held by provider] carvedilol  12.5 mg Oral BID WC    ipratropium 0.5 mg-albuterol 2.5 mg  1 Dose Inhalation Q8H    sodium chloride  2 spray Each Nostril 3 times per day    insulin glargine  10 Units SubCUTAneous Daily    insulin lispro  0-8 Units SubCUTAneous TID WC    insulin lispro  0-4 Units SubCUTAneous Nightly    sodium chloride flush  5-40 mL IntraVENous 2 times per day    enoxaparin  30 mg SubCUTAneous BID    [Held by provider] amLODIPine  10 mg Oral Nightly    atorvastatin  40 mg Oral Nightly    [Held by provider] empagliflozin  25 mg Oral Nightly    [Held by provider] lisinopril  40 mg Oral Nightly    [Held by provider] spironolactone  25 mg Oral Nightly    mometasone-formoterol  2 puff Inhalation BID RT    And    [Held by provider] tiotropium  2 puff Inhalation Daily RT       Diet  Current diet/supplement order: Diet NPO     Recorded PO: PO Meals Eaten (%): 0% last meal in flowsheets      Action      Met with pt. Pt voiced he is open to an easier option for his medication. Pt asked for information to review. Pt provided information called Living with Diabetes (areas high lighted for pt to review include: Healthy Eating, Diabetes medicines, Use and care of injectables, When

## 2024-03-11 NOTE — PLAN OF CARE
Problem: Metabolic/Fluid and Electrolytes - Adult  Goal: Electrolytes maintained within normal limits  3/11/2024 0938 by Windy Patel, RN  Outcome: Progressing

## 2024-03-11 NOTE — PROGRESS NOTES
Pulmonary Progress Note    CC:  Follow up hypoxia, possible PH    Subjective:  8 liters of oxygen   Negative 20 liters to date  Does not feel SOB  Cannot use bilevel     ROS  No SOB  Nose is dry         Intake/Output Summary (Last 24 hours) at 3/11/2024 0814  Last data filed at 3/11/2024 0527  Gross per 24 hour   Intake --   Output 2700 ml   Net -2700 ml           PHYSICAL EXAM:  Blood pressure 120/74, pulse 69, temperature 97.5 °F (36.4 °C), temperature source Oral, resp. rate 18, height 1.651 m (5' 5\"), weight 119 kg (262 lb 5.6 oz), SpO2 92 %.'  Gen: Chronically ill, cyanotic   Eyes: PERRL. No sclera icterus. No conjunctival injection.   ENT: No discharge. Pharynx clear. External appearance of ears and nose normal.  Neck: Trachea midline. No obvious mass.    Resp: Scattered wheezing   CV: Tachy, irregular    GI: Non-tender. Non-distended. No hernia.   Skin: Cyanotic   Lymph: No cervical LAD. No supraclavicular LAD.   M/S: No cyanosis. No clubbing. No joint deformity.    Neuro: Moves all four extremities. CN 2-12 tested, no defect noted.  Ext:   ++ edema    Medications:    Scheduled Meds:   insulin glargine  10 Units SubCUTAneous Nightly    torsemide  20 mg Oral Daily    predniSONE  20 mg Oral Daily    glipiZIDE  5 mg Oral QAM AC    [Held by provider] carvedilol  12.5 mg Oral BID WC    ipratropium 0.5 mg-albuterol 2.5 mg  1 Dose Inhalation Q8H    sodium chloride  2 spray Each Nostril 3 times per day    insulin glargine  10 Units SubCUTAneous Daily    insulin lispro  0-8 Units SubCUTAneous TID WC    insulin lispro  0-4 Units SubCUTAneous Nightly    sodium chloride flush  5-40 mL IntraVENous 2 times per day    enoxaparin  30 mg SubCUTAneous BID    [Held by provider] amLODIPine  10 mg Oral Nightly    atorvastatin  40 mg Oral Nightly    [Held by provider] empagliflozin  25 mg Oral Nightly    [Held by provider] lisinopril  40 mg Oral Nightly    [Held by provider] spironolactone  25 mg Oral Nightly     mometasone-formoterol  2 puff Inhalation BID RT    And    [Held by provider] tiotropium  2 puff Inhalation Daily RT       Continuous Infusions:   sodium chloride      dextrose         PRN Meds:  melatonin, ipratropium 0.5 mg-albuterol 2.5 mg, benzocaine-menthol, sodium chloride flush, sodium chloride, polyethylene glycol, acetaminophen, glucose, dextrose bolus **OR** dextrose bolus, glucagon (rDNA), dextrose    Labs:  CBC:   Recent Labs     03/09/24  0611 03/10/24  0646 03/11/24  0616   WBC 8.6 9.6 10.2   HGB 18.7* 19.2* 19.1*   HCT 56.9* 57.1* 58.6*   MCV 99.2 99.5 99.5   * 131*  --        BMP:   Recent Labs     03/09/24  0611 03/10/24  0646 03/11/24  0616   * 138 137   K 4.3 4.6 4.6   CL 96* 97* 99   CO2 34* 33* 30   BUN 52* 53* 56*   CREATININE 0.9 1.1 1.0       LIVER PROFILE:   Recent Labs     03/09/24  0611 03/10/24  0646 03/11/24  0616   AST 10* 10* 10*   ALT 13 15 18   BILIDIR 0.3 0.3 0.3   BILITOT 1.6* 1.5* 1.1*   ALKPHOS 89 91 93       PT/INR: No results for input(s): \"PROTIME\", \"INR\" in the last 72 hours.  APTT: No results for input(s): \"APTT\" in the last 72 hours.  UA:No results for input(s): \"NITRITE\", \"COLORU\", \"PHUR\", \"LABCAST\", \"WBCUA\", \"RBCUA\", \"MUCUS\", \"TRICHOMONAS\", \"YEAST\", \"BACTERIA\", \"CLARITYU\", \"SPECGRAV\", \"LEUKOCYTESUR\", \"UROBILINOGEN\", \"BILIRUBINUR\", \"BLOODU\", \"GLUCOSEU\", \"AMORPHOUS\" in the last 72 hours.    Invalid input(s): \"KETONESU\"  No results for input(s): \"PH\", \"PCO2\", \"PO2\" in the last 72 hours.        Films:  Chest imaging reports were reviewed and imaging was reviewed by me and showed no new films     ABG:  None    Cultures:  Pneumonia:  influenza A    I reviewed the labs and images listed above    Assessment/Plan:   Acute on Chronic Hypoxic Respiratory Failure  Titrate oxygen for saturations greater than or equal to 88%  Acute Hypercapnic Respiratory Failure with pH less than 7.35   Unable to tolerate the bilevel machine   Severe COPD  Duonebs q 4 hours  Dulera q 12

## 2024-03-11 NOTE — CARE COORDINATION
Patient was on 8L O2 now back up to 10L. Unable to tolerate BIPAP, Pulmonology following. Cardiology consulted and plans for heart cath noted.     Patient will need an updated home O2 eval & DME orders, per [employer] testing/orders required within 24-48 hrs of discharge.    Patient plans on returning home independently.   Will continue to follow for discharge needs.     Electronically signed by Nirali Spears RN Case Management on 3/11/2024 at 3:00 PM

## 2024-03-11 NOTE — PROGRESS NOTES
[facility]  Glycemic Control      NAME: Alvarado Sibley  MEDICAL RECORD NUMBER:  [id number]  AGE: 59 y.o.   GENDER: male  : 1965  EPISODE DATE:  3/11/2024     Data     Recent Labs     03/09/24  2047 03/10/24  0919 03/10/24  1255 03/10/24  1741 03/10/24  2036 03/11/24  0825   POCGLU 232* 242* 333* 349* 337* 197*       HgBA1c:    Lab Results   Component Value Date/Time    LABA1C 8.8 2024 08:36 AM       BUN/Creatinine:    Lab Results   Component Value Date/Time    BUN 56 2024 06:16 AM    CREATININE 1.0 2024 06:16 AM       Medications  Scheduled Medications:   insulin glargine  10 Units SubCUTAneous Nightly    torsemide  20 mg Oral Daily    predniSONE  20 mg Oral Daily    glipiZIDE  5 mg Oral QAM AC    [Held by provider] carvedilol  12.5 mg Oral BID WC    ipratropium 0.5 mg-albuterol 2.5 mg  1 Dose Inhalation Q8H    sodium chloride  2 spray Each Nostril 3 times per day    insulin glargine  10 Units SubCUTAneous Daily    insulin lispro  0-8 Units SubCUTAneous TID WC    insulin lispro  0-4 Units SubCUTAneous Nightly    sodium chloride flush  5-40 mL IntraVENous 2 times per day    enoxaparin  30 mg SubCUTAneous BID    [Held by provider] amLODIPine  10 mg Oral Nightly    atorvastatin  40 mg Oral Nightly    [Held by provider] empagliflozin  25 mg Oral Nightly    [Held by provider] lisinopril  40 mg Oral Nightly    [Held by provider] spironolactone  25 mg Oral Nightly    mometasone-formoterol  2 puff Inhalation BID RT    And    [Held by provider] tiotropium  2 puff Inhalation Daily RT       Diet  Current diet/supplement order: Diet NPO     Recorded PO: PO Meals Eaten (%): 76 - 100% last meal in flowsheets      Action      Glucose Target: 140-180    Total Daily Insulin:  3/10/2024: 38 units  3/9/2024: 26 units    Recommendation: Increase Lantus 12 units BID, continue Medium dose SSI. Encourage insulin after discharge.     Physician Notified of event: Yes, Oscar Medina MD    Medication

## 2024-03-12 LAB
ALBUMIN SERPL-MCNC: 3.4 G/DL (ref 3.4–5)
ALP SERPL-CCNC: 88 U/L (ref 40–129)
ALT SERPL-CCNC: 24 U/L (ref 10–40)
ANION GAP SERPL CALCULATED.3IONS-SCNC: 7 MMOL/L (ref 3–16)
AST SERPL-CCNC: 24 U/L (ref 15–37)
BASOPHILS # BLD: 0 K/UL (ref 0–0.2)
BASOPHILS NFR BLD: 0.1 %
BILIRUB DIRECT SERPL-MCNC: <0.2 MG/DL (ref 0–0.3)
BILIRUB INDIRECT SERPL-MCNC: NORMAL MG/DL (ref 0–1)
BILIRUB SERPL-MCNC: 0.9 MG/DL (ref 0–1)
BUN SERPL-MCNC: 45 MG/DL (ref 7–20)
CALCIUM SERPL-MCNC: 8.8 MG/DL (ref 8.3–10.6)
CHLORIDE SERPL-SCNC: 100 MMOL/L (ref 99–110)
CO2 SERPL-SCNC: 29 MMOL/L (ref 21–32)
CREAT SERPL-MCNC: 1 MG/DL (ref 0.8–1.3)
DEPRECATED RDW RBC AUTO: 14.8 % (ref 12.4–15.4)
EOSINOPHIL # BLD: 0 K/UL (ref 0–0.6)
EOSINOPHIL NFR BLD: 0.5 %
GFR SERPLBLD CREATININE-BSD FMLA CKD-EPI: >60 ML/MIN/{1.73_M2}
GLUCOSE BLD-MCNC: 230 MG/DL (ref 70–99)
GLUCOSE BLD-MCNC: 269 MG/DL (ref 70–99)
GLUCOSE BLD-MCNC: 269 MG/DL (ref 70–99)
GLUCOSE BLD-MCNC: 318 MG/DL (ref 70–99)
GLUCOSE BLD-MCNC: 378 MG/DL (ref 70–99)
GLUCOSE BLD-MCNC: 467 MG/DL (ref 70–99)
GLUCOSE SERPL-MCNC: 217 MG/DL (ref 70–99)
HCT VFR BLD AUTO: 55 % (ref 40.5–52.5)
HGB BLD-MCNC: 18.6 G/DL (ref 13.5–17.5)
LYMPHOCYTES # BLD: 1.6 K/UL (ref 1–5.1)
LYMPHOCYTES NFR BLD: 15.1 %
MCH RBC QN AUTO: 33.4 PG (ref 26–34)
MCHC RBC AUTO-ENTMCNC: 33.7 G/DL (ref 31–36)
MCV RBC AUTO: 99.1 FL (ref 80–100)
MONOCYTES # BLD: 0.8 K/UL (ref 0–1.3)
MONOCYTES NFR BLD: 8.1 %
NEUTROPHILS # BLD: 8 K/UL (ref 1.7–7.7)
NEUTROPHILS NFR BLD: 76.2 %
PERFORMED ON: ABNORMAL
PLATELET # BLD AUTO: 139 K/UL (ref 135–450)
PMV BLD AUTO: 10.6 FL (ref 5–10.5)
POTASSIUM SERPL-SCNC: 5.5 MMOL/L (ref 3.5–5.1)
PROT SERPL-MCNC: 7.4 G/DL (ref 6.4–8.2)
RBC # BLD AUTO: 5.56 M/UL (ref 4.2–5.9)
SODIUM SERPL-SCNC: 136 MMOL/L (ref 136–145)
WBC # BLD AUTO: 10.5 K/UL (ref 4–11)

## 2024-03-12 PROCEDURE — 2500000003 HC RX 250 WO HCPCS

## 2024-03-12 PROCEDURE — 36415 COLL VENOUS BLD VENIPUNCTURE: CPT

## 2024-03-12 PROCEDURE — 4A023N6 MEASUREMENT OF CARDIAC SAMPLING AND PRESSURE, RIGHT HEART, PERCUTANEOUS APPROACH: ICD-10-PCS | Performed by: STUDENT IN AN ORGANIZED HEALTH CARE EDUCATION/TRAINING PROGRAM

## 2024-03-12 PROCEDURE — 6370000000 HC RX 637 (ALT 250 FOR IP): Performed by: INTERNAL MEDICINE

## 2024-03-12 PROCEDURE — 6370000000 HC RX 637 (ALT 250 FOR IP): Performed by: STUDENT IN AN ORGANIZED HEALTH CARE EDUCATION/TRAINING PROGRAM

## 2024-03-12 PROCEDURE — 93451 RIGHT HEART CATH: CPT | Performed by: INTERNAL MEDICINE

## 2024-03-12 PROCEDURE — 2060000000 HC ICU INTERMEDIATE R&B

## 2024-03-12 PROCEDURE — 2580000003 HC RX 258

## 2024-03-12 PROCEDURE — 2580000003 HC RX 258: Performed by: INTERNAL MEDICINE

## 2024-03-12 PROCEDURE — 80048 BASIC METABOLIC PNL TOTAL CA: CPT

## 2024-03-12 PROCEDURE — 2700000000 HC OXYGEN THERAPY PER DAY

## 2024-03-12 PROCEDURE — 85025 COMPLETE CBC W/AUTO DIFF WBC: CPT

## 2024-03-12 PROCEDURE — 94761 N-INVAS EAR/PLS OXIMETRY MLT: CPT

## 2024-03-12 PROCEDURE — 93451 RIGHT HEART CATH: CPT

## 2024-03-12 PROCEDURE — 94640 AIRWAY INHALATION TREATMENT: CPT

## 2024-03-12 PROCEDURE — 2709999900 HC NON-CHARGEABLE SUPPLY: Performed by: INTERNAL MEDICINE

## 2024-03-12 PROCEDURE — C1894 INTRO/SHEATH, NON-LASER: HCPCS | Performed by: INTERNAL MEDICINE

## 2024-03-12 PROCEDURE — C1751 CATH, INF, PER/CENT/MIDLINE: HCPCS | Performed by: INTERNAL MEDICINE

## 2024-03-12 PROCEDURE — 80076 HEPATIC FUNCTION PANEL: CPT

## 2024-03-12 PROCEDURE — 94669 MECHANICAL CHEST WALL OSCILL: CPT

## 2024-03-12 PROCEDURE — C1769 GUIDE WIRE: HCPCS | Performed by: INTERNAL MEDICINE

## 2024-03-12 PROCEDURE — 6360000002 HC RX W HCPCS: Performed by: INTERNAL MEDICINE

## 2024-03-12 RX ORDER — TORSEMIDE 20 MG/1
20 TABLET ORAL DAILY
Qty: 30 TABLET | Refills: 3 | Status: SHIPPED | OUTPATIENT
Start: 2024-03-13 | End: 2024-03-13

## 2024-03-12 RX ORDER — SODIUM CHLORIDE 0.9 % (FLUSH) 0.9 %
5-40 SYRINGE (ML) INJECTION EVERY 12 HOURS SCHEDULED
Status: DISCONTINUED | OUTPATIENT
Start: 2024-03-12 | End: 2024-03-13 | Stop reason: HOSPADM

## 2024-03-12 RX ORDER — SODIUM CHLORIDE 9 MG/ML
INJECTION, SOLUTION INTRAVENOUS PRN
Status: DISCONTINUED | OUTPATIENT
Start: 2024-03-12 | End: 2024-03-13 | Stop reason: HOSPADM

## 2024-03-12 RX ORDER — SODIUM CHLORIDE 9 MG/ML
INJECTION, SOLUTION INTRAVENOUS PRN
Status: DISCONTINUED | OUTPATIENT
Start: 2024-03-12 | End: 2024-03-12 | Stop reason: SDUPTHER

## 2024-03-12 RX ORDER — SODIUM CHLORIDE 0.9 % (FLUSH) 0.9 %
5-40 SYRINGE (ML) INJECTION PRN
Status: DISCONTINUED | OUTPATIENT
Start: 2024-03-12 | End: 2024-03-13 | Stop reason: HOSPADM

## 2024-03-12 RX ORDER — ACETAMINOPHEN 325 MG/1
650 TABLET ORAL EVERY 4 HOURS PRN
Status: DISCONTINUED | OUTPATIENT
Start: 2024-03-12 | End: 2024-03-12 | Stop reason: SDUPTHER

## 2024-03-12 RX ORDER — SODIUM CHLORIDE 0.9 % (FLUSH) 0.9 %
5-40 SYRINGE (ML) INJECTION PRN
Status: DISCONTINUED | OUTPATIENT
Start: 2024-03-12 | End: 2024-03-12 | Stop reason: SDUPTHER

## 2024-03-12 RX ORDER — SODIUM CHLORIDE 0.9 % (FLUSH) 0.9 %
5-40 SYRINGE (ML) INJECTION EVERY 12 HOURS SCHEDULED
Status: DISCONTINUED | OUTPATIENT
Start: 2024-03-12 | End: 2024-03-12 | Stop reason: SDUPTHER

## 2024-03-12 RX ADMIN — ATORVASTATIN CALCIUM 40 MG: 40 TABLET, FILM COATED ORAL at 21:20

## 2024-03-12 RX ADMIN — Medication 6 MG: at 21:23

## 2024-03-12 RX ADMIN — Medication 10 ML: at 09:59

## 2024-03-12 RX ADMIN — IPRATROPIUM BROMIDE AND ALBUTEROL SULFATE 1 DOSE: 2.5; .5 SOLUTION RESPIRATORY (INHALATION) at 16:14

## 2024-03-12 RX ADMIN — Medication 2 SPRAY: at 05:08

## 2024-03-12 RX ADMIN — ENOXAPARIN SODIUM 30 MG: 100 INJECTION SUBCUTANEOUS at 21:21

## 2024-03-12 RX ADMIN — INSULIN GLARGINE 10 UNITS: 100 INJECTION, SOLUTION SUBCUTANEOUS at 09:59

## 2024-03-12 RX ADMIN — MOMETASONE FUROATE AND FORMOTEROL FUMARATE DIHYDRATE 2 PUFF: 200; 5 AEROSOL RESPIRATORY (INHALATION) at 08:01

## 2024-03-12 RX ADMIN — MOMETASONE FUROATE AND FORMOTEROL FUMARATE DIHYDRATE 2 PUFF: 200; 5 AEROSOL RESPIRATORY (INHALATION) at 20:00

## 2024-03-12 RX ADMIN — Medication 2 SPRAY: at 21:21

## 2024-03-12 RX ADMIN — IPRATROPIUM BROMIDE AND ALBUTEROL SULFATE 1 DOSE: 2.5; .5 SOLUTION RESPIRATORY (INHALATION) at 23:25

## 2024-03-12 RX ADMIN — SODIUM ZIRCONIUM CYCLOSILICATE 10 G: 10 POWDER, FOR SUSPENSION ORAL at 14:03

## 2024-03-12 RX ADMIN — INSULIN LISPRO 16 UNITS: 100 INJECTION, SOLUTION INTRAVENOUS; SUBCUTANEOUS at 18:01

## 2024-03-12 RX ADMIN — INSULIN GLARGINE 10 UNITS: 100 INJECTION, SOLUTION SUBCUTANEOUS at 21:20

## 2024-03-12 RX ADMIN — INSULIN LISPRO 4 UNITS: 100 INJECTION, SOLUTION INTRAVENOUS; SUBCUTANEOUS at 09:58

## 2024-03-12 RX ADMIN — ENOXAPARIN SODIUM 30 MG: 100 INJECTION SUBCUTANEOUS at 09:59

## 2024-03-12 RX ADMIN — INSULIN LISPRO 1 UNITS: 100 INJECTION, SOLUTION INTRAVENOUS; SUBCUTANEOUS at 21:20

## 2024-03-12 RX ADMIN — Medication 10 ML: at 21:21

## 2024-03-12 RX ADMIN — IPRATROPIUM BROMIDE AND ALBUTEROL SULFATE 1 DOSE: 2.5; .5 SOLUTION RESPIRATORY (INHALATION) at 08:00

## 2024-03-12 RX ADMIN — PREDNISONE 20 MG: 20 TABLET ORAL at 09:59

## 2024-03-12 RX ADMIN — TORSEMIDE 20 MG: 20 TABLET ORAL at 09:59

## 2024-03-12 ASSESSMENT — PAIN SCALES - GENERAL: PAINLEVEL_OUTOF10: 0

## 2024-03-12 NOTE — PROGRESS NOTES
This RN attempted to place foam on high flow nasal cannula to protect patient's ears, as some redness is evident, but patient refused, saying, \"I'm fine. I'll let you know if I need something.\" Will continue monitoring.

## 2024-03-12 NOTE — PROGRESS NOTES
[facility]  Glycemic Control      NAME: Alvarado Sibley  MEDICAL RECORD NUMBER:  [id number]  AGE: 59 y.o.   GENDER: male  : 1965  EPISODE DATE:  3/12/2024     Data     Recent Labs     24  0825 24  1142 24  1656 24  0120 24  0802   POCGLU 197* 348* 374* 477* 269* 230*       HgBA1c:    Lab Results   Component Value Date/Time    LABA1C 8.8 2024 08:36 AM       BUN/Creatinine:    Lab Results   Component Value Date/Time    BUN 45 2024 06:22 AM    CREATININE 1.0 2024 06:22 AM       Medications  Scheduled Medications:   sodium chloride flush  5-40 mL IntraVENous 2 times per day    sodium zirconium cyclosilicate  10 g Oral Once    insulin lispro  0-16 Units SubCUTAneous TID WC    insulin lispro  0-4 Units SubCUTAneous Nightly    insulin glargine  10 Units SubCUTAneous Nightly    torsemide  20 mg Oral Daily    glipiZIDE  5 mg Oral QAM AC    [Held by provider] carvedilol  12.5 mg Oral BID WC    ipratropium 0.5 mg-albuterol 2.5 mg  1 Dose Inhalation Q8H    sodium chloride  2 spray Each Nostril 3 times per day    insulin glargine  10 Units SubCUTAneous Daily    enoxaparin  30 mg SubCUTAneous BID    [Held by provider] amLODIPine  10 mg Oral Nightly    atorvastatin  40 mg Oral Nightly    [Held by provider] empagliflozin  25 mg Oral Nightly    [Held by provider] lisinopril  40 mg Oral Nightly    [Held by provider] spironolactone  25 mg Oral Nightly    mometasone-formoterol  2 puff Inhalation BID RT    And    [Held by provider] tiotropium  2 puff Inhalation Daily RT       Diet  Current diet/supplement order: Diet NPO Exceptions are: Sips of Water with Meds     Recorded PO: PO Meals Eaten (%): 0% last meal in flowsheets      Action      Pt currently NPO for procedure between 0584-2885 per pt. Pt stated he has been reviewing materials provided previous day. Pt glucose could be elevated due to being provided Ana (pt spoke with this educator about

## 2024-03-12 NOTE — PROGRESS NOTES
Patient requested DuoNeb treatment with MDI so we don't wake him after his sleeping pill; treatment given.  Patient refusing the use of NIV for HS use.  Stating he has not worn it in days.  Electronically signed by Randee Schafer RCP on 3/11/2024 at 8:07 PM

## 2024-03-12 NOTE — PROGRESS NOTES
Patient returned to room from cath lab. Vitals are stable. Insertion sight on right side of neck is covered by gauze and tegaderm. Site is soft and dressing is clean, dry & intact. Patient instructed to be on bedrest for one hour per order. Call light in reach. No further needs. Will monitor.

## 2024-03-12 NOTE — PROGRESS NOTES
Internal Medicine Hospital Progress Note    Patient:  Alvarado Sibley 59 y.o. male MRN: [id number]     Date of Service: 3/12/2024    Allergy: Guaifenesin, Dextromethorphan-guaifenesin, and Aspirin  CC: F/u:   Brief Course   Alvarado Sibley was admitted on 2/29/2024 for Acute on chronic heart failure with preserved ejection fraction (HFpEF) ([facility]).    24 hr interval hx:  Patient feels ok today. He is ready to get heart cath.  Objective     Physical Exam:  Vitals: /73   Pulse 60   Temp 97.7 °F (36.5 °C) (Axillary)   Resp 18   Ht 1.651 m (5' 5\")   Wt 117.9 kg (259 lb 14.8 oz)   SpO2 93%   BMI 43.25 kg/m²     Physical Exam  Constitutional:       General: He is not in acute distress.  HENT:      Mouth/Throat:      Mouth: Mucous membranes are moist.   Eyes:      Pupils: Pupils are equal, round, and reactive to light.   Cardiovascular:      Rate and Rhythm: Normal rate and regular rhythm.      Pulses: Normal pulses.   Pulmonary:      Breath sounds: Wheezing present.   Abdominal:      General: Abdomen is flat. There is no distension.      Palpations: Abdomen is soft.      Tenderness: There is no abdominal tenderness.   Skin:     General: Skin is warm and dry.      Coloration: Skin is not jaundiced or pale.      Findings: No erythema.   Neurological:      General: No focal deficit present.      Mental Status: He is alert and oriented to person, place, and time.         Pertinent/ New Labs and Imaging Studies   Labs reviewed. Pertinent labs noted in assessment and plan      Assessment and Plan   Alvarado Sibley was admitted to hospital for Acute on chronic heart failure with preserved ejection fraction (HFpEF) ([facility])    #Acute on chronic hypoxic respiratory failure: Severe COPD and pulmonary edema likely the underlying etiology of his hypoxia  - Wean oxygen as tolerated  - Home oxygen evaluation ordered  - Continue home dulera.  - Q8h duonebs  - Flutter valve for cough.  - Torsemide    #Elevated pulmonary pressure on echo:

## 2024-03-12 NOTE — PLAN OF CARE
breakdown  Description: 1.  Monitor for areas of redness and/or skin breakdown  2.  Assess vascular access sites hourly  3.  Every 4-6 hours minimum:  Change oxygen saturation probe site  4.  Every 4-6 hours:  If on nasal continuous positive airway pressure, respiratory therapy assess nares and determine need for appliance change or resting period.  3/12/2024 1515 by Silva Dhaliwal, RN  Outcome: Progressing

## 2024-03-12 NOTE — PLAN OF CARE
Problem: ABCDS Injury Assessment  Goal: Absence of physical injury  Outcome: Progressing     Problem: Respiratory - Adult  Goal: Achieves optimal ventilation and oxygenation  Outcome: Progressing     Problem: Cardiovascular - Adult  Goal: Maintains optimal cardiac output and hemodynamic stability  Outcome: Progressing     Problem: Metabolic/Fluid and Electrolytes - Adult  Goal: Electrolytes maintained within normal limits  Outcome: Progressing  Goal: Hemodynamic stability and optimal renal function maintained  Outcome: Progressing  Goal: Glucose maintained within prescribed range  Outcome: Progressing     Problem: Discharge Planning  Goal: Discharge to home or other facility with appropriate resources  Outcome: Progressing     Problem: Pain  Goal: Verbalizes/displays adequate comfort level or baseline comfort level  Outcome: Progressing     Problem: Safety - Adult  Goal: Free from fall injury  Outcome: Progressing     Problem: Chronic Conditions and Co-morbidities  Goal: Patient's chronic conditions and co-morbidity symptoms are monitored and maintained or improved  Outcome: Progressing     Problem: Musculoskeletal - Adult  Goal: Return mobility to safest level of function  Outcome: Progressing     Problem: Infection - Adult  Goal: Absence of infection at discharge  Outcome: Progressing     Problem: Skin/Tissue Integrity  Goal: Absence of new skin breakdown  Description: 1.  Monitor for areas of redness and/or skin breakdown  2.  Assess vascular access sites hourly  3.  Every 4-6 hours minimum:  Change oxygen saturation probe site  4.  Every 4-6 hours:  If on nasal continuous positive airway pressure, respiratory therapy assess nares and determine need for appliance change or resting period.  Outcome: Progressing

## 2024-03-12 NOTE — PROCEDURES
Patient: Alvarado Sibley  : 1965  MRN: [id number]    Procedure: Right heart catheterization.  Consent: Risks/benefits explained in detail. Obtained written and verbal.   Pre-Operative diagnosis:  Other hypoxia   Post-Operative diagnosis: same  Complications: None  ASA: III  Mallampati: III  Estimated Blood Loss: Less than 20 mL  Specimens: were not obtained  Contrast: 0 cc  At the end of the procedure a Manual pressure device was used for hemostasis.     Procedure details/Technique:    Local anesthetic was given and access was obtained in the right IJ vein using a micropuncture technique and a 7 Fr Terumo Sheath was placed without difficulty. [place] Eri catheter was advanced under fluoroscopic guidance.    Right Heart Catheterization  RA: 4 mmHg  RV: 53/4, 6  PA: 56/24 and mean of 35 mmHg  PCWP: 12 mmHg    Sats:  PA: 75%    Rachna CO/CI: 6.02/2.7  SVR 1558  PVR: 311  WYATT:8    Impression/Recommendations:  Euvolemic.   PH, likely group III.     Yun Diaz DO, FACC  Interventional Cardiology      [facility]  o: [phone]  [address], [address]  [place] [address]

## 2024-03-12 NOTE — PROGRESS NOTES
Patient ID: Alvarado Sibley  Referring/ Physician: Oscar Medina MD      Summary:   Alvarado Sibley is being seen by nephrology for MELISSA.     Reason for admission:   COPD  CHF EF preserved G1DD, normal LV filling pressures.   Severe pulmonary HTN. Severe TR  Hypotension         Interval Hx:   Seen at bedside  /76  3025 mL urine yesterday with torsemide 20 mg daily.  Cr 1.0  O2 at 10 L today  Net -ve 23 L this admission  Weight 259 lbs today  K up at 5.5, has been on spironolactone for a few days    Assessment/Plan:   - persistent hypoxia.  - RHC today to determine further diuretics plan  - lokelma 10 g x 1 today for hyperkalemia  - polycythemia noted.      Acute kidney injury  Creatinine peaked at 2.4 and improved to baseline of 1.2-1.3.  This is probably related to diuretic use and being on RAAS blockade, SGL 2 inhibitor and was on NSAIDs prior to admission.  He also had an episode of hypotension where his blood pressure dropped into the 80s systolic.  Check urinalysis, albumin creatinine ratio, protein creatinine ratio  Check bladder scan  Check renal ultrasound    Hyperkalemia  Multifactorial   Hyperglycemia causing intracellular shifting, MELISSA and had been on RAASI and MRA.   Holding culprit medications  Lokelma and lasix.       History of hypertension  Prior to admission was on Aldactone, lisinopril, Coreg, Lasix, holding diuretics and RAAS blockade    Pulmonary arterial hypertension  Probably related to COPD  His echo showed severe pulmonary pretension and severe TR but no RV dysfunction and is LV filling pressures were okay.    Edema  He does have grade 1 diastolic dysfunction than normal filling pressures on the left side noted on echo.  The RV was dilated with normal function, has severe TR and some pulmonary hypertension as well      [facility] would like to thank you for the opportunity to serve this patient. Please call with any questions or concerns.    Enmanuel ALEGRIA

## 2024-03-13 VITALS
HEART RATE: 63 BPM | DIASTOLIC BLOOD PRESSURE: 71 MMHG | TEMPERATURE: 97.4 F | OXYGEN SATURATION: 91 % | BODY MASS INDEX: 43.45 KG/M2 | HEIGHT: 65 IN | WEIGHT: 260.8 LBS | SYSTOLIC BLOOD PRESSURE: 112 MMHG | RESPIRATION RATE: 18 BRPM

## 2024-03-13 LAB
ALBUMIN SERPL-MCNC: 3.6 G/DL (ref 3.4–5)
ALP SERPL-CCNC: 102 U/L (ref 40–129)
ALT SERPL-CCNC: 27 U/L (ref 10–40)
ANION GAP SERPL CALCULATED.3IONS-SCNC: 8 MMOL/L (ref 3–16)
AST SERPL-CCNC: 16 U/L (ref 15–37)
BASOPHILS # BLD: 0 K/UL (ref 0–0.2)
BASOPHILS NFR BLD: 0.3 %
BILIRUB DIRECT SERPL-MCNC: <0.2 MG/DL (ref 0–0.3)
BILIRUB INDIRECT SERPL-MCNC: NORMAL MG/DL (ref 0–1)
BILIRUB SERPL-MCNC: 0.9 MG/DL (ref 0–1)
BUN SERPL-MCNC: 40 MG/DL (ref 7–20)
CALCIUM SERPL-MCNC: 8.9 MG/DL (ref 8.3–10.6)
CHLORIDE SERPL-SCNC: 98 MMOL/L (ref 99–110)
CO2 SERPL-SCNC: 30 MMOL/L (ref 21–32)
CREAT SERPL-MCNC: 0.9 MG/DL (ref 0.8–1.3)
DEPRECATED RDW RBC AUTO: 14.8 % (ref 12.4–15.4)
EOSINOPHIL # BLD: 0.1 K/UL (ref 0–0.6)
EOSINOPHIL NFR BLD: 0.5 %
GFR SERPLBLD CREATININE-BSD FMLA CKD-EPI: >60 ML/MIN/{1.73_M2}
GLUCOSE BLD-MCNC: 186 MG/DL (ref 70–99)
GLUCOSE SERPL-MCNC: 197 MG/DL (ref 70–99)
HCT VFR BLD AUTO: 53.8 % (ref 40.5–52.5)
HGB BLD-MCNC: 18.5 G/DL (ref 13.5–17.5)
LYMPHOCYTES # BLD: 1.6 K/UL (ref 1–5.1)
LYMPHOCYTES NFR BLD: 16.8 %
MCH RBC QN AUTO: 33.8 PG (ref 26–34)
MCHC RBC AUTO-ENTMCNC: 34.4 G/DL (ref 31–36)
MCV RBC AUTO: 98.2 FL (ref 80–100)
MONOCYTES # BLD: 0.9 K/UL (ref 0–1.3)
MONOCYTES NFR BLD: 9.5 %
NEUTROPHILS # BLD: 7.2 K/UL (ref 1.7–7.7)
NEUTROPHILS NFR BLD: 72.9 %
PERFORMED ON: ABNORMAL
PLATELET # BLD AUTO: 134 K/UL (ref 135–450)
PMV BLD AUTO: 9.8 FL (ref 5–10.5)
POTASSIUM SERPL-SCNC: 4.5 MMOL/L (ref 3.5–5.1)
POTASSIUM SERPL-SCNC: 4.5 MMOL/L (ref 3.5–5.1)
PROT SERPL-MCNC: 7.1 G/DL (ref 6.4–8.2)
RBC # BLD AUTO: 5.47 M/UL (ref 4.2–5.9)
SODIUM SERPL-SCNC: 136 MMOL/L (ref 136–145)
WBC # BLD AUTO: 9.8 K/UL (ref 4–11)

## 2024-03-13 PROCEDURE — 80076 HEPATIC FUNCTION PANEL: CPT

## 2024-03-13 PROCEDURE — 80048 BASIC METABOLIC PNL TOTAL CA: CPT

## 2024-03-13 PROCEDURE — 6370000000 HC RX 637 (ALT 250 FOR IP): Performed by: INTERNAL MEDICINE

## 2024-03-13 PROCEDURE — 94669 MECHANICAL CHEST WALL OSCILL: CPT

## 2024-03-13 PROCEDURE — 94640 AIRWAY INHALATION TREATMENT: CPT

## 2024-03-13 PROCEDURE — 94761 N-INVAS EAR/PLS OXIMETRY MLT: CPT

## 2024-03-13 PROCEDURE — 6360000002 HC RX W HCPCS: Performed by: INTERNAL MEDICINE

## 2024-03-13 PROCEDURE — 6370000000 HC RX 637 (ALT 250 FOR IP): Performed by: STUDENT IN AN ORGANIZED HEALTH CARE EDUCATION/TRAINING PROGRAM

## 2024-03-13 PROCEDURE — 2580000003 HC RX 258: Performed by: INTERNAL MEDICINE

## 2024-03-13 PROCEDURE — 85025 COMPLETE CBC W/AUTO DIFF WBC: CPT

## 2024-03-13 PROCEDURE — 2700000000 HC OXYGEN THERAPY PER DAY

## 2024-03-13 PROCEDURE — 99232 SBSQ HOSP IP/OBS MODERATE 35: CPT | Performed by: INTERNAL MEDICINE

## 2024-03-13 PROCEDURE — 36415 COLL VENOUS BLD VENIPUNCTURE: CPT

## 2024-03-13 RX ORDER — TORSEMIDE 20 MG/1
20 TABLET ORAL DAILY
Qty: 30 TABLET | Refills: 5 | Status: SHIPPED | OUTPATIENT
Start: 2024-03-13

## 2024-03-13 RX ADMIN — MOMETASONE FUROATE AND FORMOTEROL FUMARATE DIHYDRATE 2 PUFF: 200; 5 AEROSOL RESPIRATORY (INHALATION) at 08:03

## 2024-03-13 RX ADMIN — INSULIN GLARGINE 10 UNITS: 100 INJECTION, SOLUTION SUBCUTANEOUS at 08:52

## 2024-03-13 RX ADMIN — Medication 10 ML: at 08:52

## 2024-03-13 RX ADMIN — IPRATROPIUM BROMIDE AND ALBUTEROL SULFATE 1 DOSE: 2.5; .5 SOLUTION RESPIRATORY (INHALATION) at 08:03

## 2024-03-13 RX ADMIN — Medication 2 SPRAY: at 08:52

## 2024-03-13 RX ADMIN — ENOXAPARIN SODIUM 30 MG: 100 INJECTION SUBCUTANEOUS at 08:58

## 2024-03-13 RX ADMIN — TORSEMIDE 20 MG: 20 TABLET ORAL at 08:52

## 2024-03-13 RX ADMIN — ACETAMINOPHEN 1000 MG: 500 TABLET ORAL at 05:51

## 2024-03-13 RX ADMIN — GLIPIZIDE 5 MG: 5 TABLET ORAL at 08:52

## 2024-03-13 ASSESSMENT — PAIN DESCRIPTION - DESCRIPTORS: DESCRIPTORS: CRAMPING

## 2024-03-13 ASSESSMENT — PAIN DESCRIPTION - PAIN TYPE: TYPE: ACUTE PAIN

## 2024-03-13 ASSESSMENT — PAIN SCALES - GENERAL
PAINLEVEL_OUTOF10: 3
PAINLEVEL_OUTOF10: 3

## 2024-03-13 ASSESSMENT — PAIN DESCRIPTION - ORIENTATION: ORIENTATION: MID

## 2024-03-13 ASSESSMENT — PAIN DESCRIPTION - LOCATION: LOCATION: NECK

## 2024-03-13 ASSESSMENT — PAIN - FUNCTIONAL ASSESSMENT: PAIN_FUNCTIONAL_ASSESSMENT: PREVENTS OR INTERFERES SOME ACTIVE ACTIVITIES AND ADLS

## 2024-03-13 ASSESSMENT — PAIN DESCRIPTION - ONSET: ONSET: AWAKENED FROM SLEEP

## 2024-03-13 ASSESSMENT — PAIN DESCRIPTION - FREQUENCY: FREQUENCY: INTERMITTENT

## 2024-03-13 NOTE — PLAN OF CARE
Problem: ABCDS Injury Assessment  Goal: Absence of physical injury  3/13/2024 1049 by Ruthann Lopez RN  Outcome: Completed  3/13/2024 1049 by Ruthann Lopez RN  Outcome: Adequate for Discharge  3/13/2024 1049 by Ruthann Lopez RN  Outcome: Progressing    Problem: Respiratory - Adult  Goal: Achieves optimal ventilation and oxygenation  3/13/2024 1049 by Ruthann Lopez RN  Outcome: Completed  3/13/2024 1049 by Ruthann Lopez RN  Outcome: Adequate for Discharge  3/13/2024 1049 by Ruthann Lopez RN  Outcome: Progressing       Problem: Cardiovascular - Adult  Goal: Maintains optimal cardiac output and hemodynamic stability  3/13/2024 1049 by Ruthann Lopez RN  Outcome: Completed  3/13/2024 1049 by Ruthann Lopez RN  Outcome: Adequate for Discharge  3/13/2024 1049 by Ruthann Lopez RN  Outcome: Progressing       Problem: Metabolic/Fluid and Electrolytes - Adult  Goal: Electrolytes maintained within normal limits  3/13/2024 1049 by Ruthann Lopez RN  Outcome: Completed  3/13/2024 1049 by Ruthann Lopez RN  Outcome: Adequate for Discharge  3/13/2024 1049 by Ruthann Lopze RN  Outcome: Progressing    Goal: Hemodynamic stability and optimal renal function maintained  3/13/2024 1049 by Ruthann Lopez RN  Outcome: Completed  3/13/2024 1049 by Ruthann Lopez RN  Outcome: Adequate for Discharge  3/13/2024 1049 by Ruthann Lopez RN  Outcome: Progressing    Goal: Glucose maintained within prescribed range  3/13/2024 1049 by Ruthann Lopez RN  Outcome: Completed  3/13/2024 1049 by Ruthann Lopez RN  Outcome: Adequate for Discharge  3/13/2024 1049 by Ruthann Lopez RN  Outcome: Progressing       Problem: Musculoskeletal - Adult  Goal: Return mobility to safest level of function  3/13/2024 1049 by Ruthann Lopez RN  Outcome: Completed  3/13/2024 1049 by Ruthann Lopez RN  Outcome: Adequate for Discharge  3/13/2024 1049 by Ruthann Lopez RN  Outcome: Progressing       Problem: Infection -

## 2024-03-13 NOTE — DISCHARGE INSTR - COC
Continuity of Care Form    Patient Name: Alvarado Sibley   :  1965  MRN:  [id number]    Admit date:  2024  Discharge date:  ***    Code Status Order: Full Code   Advance Directives:     Admitting Physician:  Oscar Medina MD  PCP: Oscar Medina MD    Discharging Nurse: ***  Discharging Hospital Unit/Room#: I4B-9140/5259-01  Discharging Unit Phone Number: ***    Emergency Contact:   Extended Emergency Contact Information  Primary Emergency Contact: Calin Rodriguez  Home Phone: [phone]  Relation: Child  Secondary Emergency Contact: Adam Sibley  Home Phone: [phone]  Mobile Phone: [phone]  Relation: Brother/Sister    Past Surgical History:  Past Surgical History:   Procedure Laterality Date    CARPAL TUNNEL RELEASE Right     COLONOSCOPY  2018    Nerissaely, x2 polyps    COLONOSCOPY N/A 2023    COLONOSCOPY performed by Emerson Horne MD at [facility] ENDOSCOPY    KNEE SURGERY Left     arthroscopic       Immunization History:   Immunization History   Administered Date(s) Administered    COVID-19, MODERNA BLUE border, Primary or Immunocompromised, (age 12y+), IM, 100 mcg/0.5mL 2021, 2021    Influenza Virus Vaccine 11/10/2010, 10/20/2011, 2012, 2013    Influenza, AFLURIA (age 3 yrs+), FLUZONE, (age 6 mo+), MDV, 0.5mL 2017, 2018    Influenza, FLUARIX, FLULAVAL, FLUZONE (age 6 mo+) AND AFLURIA, (age 3 y+), PF, 0.5mL 10/31/2019, 10/01/2020    Influenza, FLUBLOK, (age 18 y+), PF, 0.5mL 10/07/2022    Influenza, FLUCELVAX, (age 6 mo+), MDCK, PF, 0.5mL 2021    Influenza, Intradermal, Preservative free 2017    Pneumococcal, PCV20, PREVNAR 20, (age 6w+), IM, 0.5mL 05/10/2022    Pneumococcal, PPSV23, PNEUMOVAX 23, (age 2y+), SC/IM, 0.5mL 2009, 11/10/2010, 2017    TDaP, ADACEL (age 10y-64y), BOOSTRIX (age 10y+), IM, 0.5mL 2010       Active Problems:  Patient Active Problem List   Diagnosis Code    Acute respiratory failure with  Therapy:  Current Nutrition Therapy:   { BUBBA Diet List:278782514}    Routes of Feeding: {CHP DME Other Feedings:717951419}  Liquids: {Slp liquid thickness:43480}  Daily Fluid Restriction: {CHP DME Yes amt example:552290102}  Last Modified Barium Swallow with Video (Video Swallowing Test): {Done Not Done Date:}    Treatments at the Time of Hospital Discharge:   Respiratory Treatments: ***  Oxygen Therapy:  {Therapy; copd oxygen:01898}  Ventilator:    { CC Vent List:690341407}    Rehab Therapies: {THERAPEUTIC INTERVENTION:7430044695}  Weight Bearing Status/Restrictions: { CC Weight Bearin}  Other Medical Equipment (for information only, NOT a DME order):  {EQUIPMENT:511981099}  Other Treatments: ***    Patient's personal belongings (please select all that are sent with patient):  {[facility] DME Belongings:613731956}    RN SIGNATURE:  {Esignature:785168534}    CASE MANAGEMENT/SOCIAL WORK SECTION    Inpatient Status Date: ***    Readmission Risk Assessment Score:  Readmission Risk              Risk of Unplanned Readmission:  18           Discharging to Facility/ Agency   Name:   Address:  Phone:  Fax:    Dialysis Facility (if applicable)   Name:  Address:  Dialysis Schedule:  Phone:  Fax:    / signature: {Esignature:851958765}    PHYSICIAN SECTION    Prognosis: {Prognosis:4701943549}    Condition at Discharge: { Patient Condition:821831768}    Rehab Potential (if transferring to Rehab): {Prognosis:4996415810}    Recommended Labs or Other Treatments After Discharge: ***    Physician Certification: I certify the above information and transfer of Alvarado Sibley  is necessary for the continuing treatment of the diagnosis listed and that he requires {Admit to Appropriate Level of Care:39278} for {GREATER/LESS:105223149} 30 days.     Update Admission H&P: {CHP DME Changes in HandP:585565544}    PHYSICIAN SIGNATURE:  {Esignature:577232226}

## 2024-03-13 NOTE — PROGRESS NOTES
Repeat home oxygen evaluation performed with the following results. Patient still qualifies for 10 lpm home oxygen.       03/13/24 0919   Resting (On O2)   SpO2 87   O2 Device Nasal cannula   O2 Flow Rate (l/min) 9 l/min   After Walk   O2 Device Nasal cannula   O2 Flow Rate (l/min) 10 l/min   FIO2 (%) 93   Does the Patient Qualify for Home O2 Yes   Liter Flow at Rest 10   Liter Flow on Exertion 10   Does the Patient Need Portable Oxygen Tanks Yes

## 2024-03-13 NOTE — NURSE NAVIGATOR
DC order noted. HF dc instructions are on AVS. Pt has received HF education at bedside. Pt qualified for home O2 at 10 liters. Declines home care. Hospital follow up appt scheduled for 3/19 at 1:00 pm. Pt & Dr Medina talked that the plan is to move this to a Virtual appt since pt works. I reached out to Cardiology to see if they want to see pt in follow up. Awaiting response. Bedside RN, Ruthann, aware of the above.      Addendum:  CINTIA Parr spoke with Dr Diaz to inquire about cardiology f/u. Dr Diaz would like to see pt in f/u for HF. Appt scheduled for 3/29 at 10:15 at [place] location (pt resides in [place]).

## 2024-03-13 NOTE — PLAN OF CARE
Problem: ABCDS Injury Assessment  Goal: Absence of physical injury  3/12/2024 2301 by Edy Boo RN  Outcome: Progressing     Problem: Respiratory - Adult  Goal: Achieves optimal ventilation and oxygenation  3/12/2024 2301 by Edy Boo RN  Outcome: Progressing  Flowsheets (Taken 3/12/2024 2105)  Achieves optimal ventilation and oxygenation: Assess for changes in respiratory status     Problem: Cardiovascular - Adult  Goal: Maintains optimal cardiac output and hemodynamic stability  3/12/2024 2301 by Edy Boo RN  Outcome: Progressing  Flowsheets (Taken 3/12/2024 2105)  Maintains optimal cardiac output and hemodynamic stability: Monitor blood pressure and heart rate     Problem: Metabolic/Fluid and Electrolytes - Adult  Goal: Electrolytes maintained within normal limits  3/12/2024 2301 by Edy Boo RN  Outcome: Progressing  Flowsheets (Taken 3/12/2024 2105)  Electrolytes maintained within normal limits: Monitor labs and assess patient for signs and symptoms of electrolyte imbalances     Problem: Discharge Planning  Goal: Discharge to home or other facility with appropriate resources  3/12/2024 2301 by Edy Boo RN  Outcome: Progressing  Flowsheets (Taken 3/12/2024 2105)  Discharge to home or other facility with appropriate resources: Identify barriers to discharge with patient and caregiver     Problem: Pain  Goal: Verbalizes/displays adequate comfort level or baseline comfort level  3/12/2024 2301 by Edy Boo RN  Outcome: Progressing     Problem: Safety - Adult  Goal: Free from fall injury  3/12/2024 2301 by Edy Boo RN  Outcome: Progressing

## 2024-03-13 NOTE — PROGRESS NOTES
by provider] tiotropium  2 puff Inhalation Daily RT       Continuous Infusions:   sodium chloride      dextrose         PRN Meds:  sodium chloride flush, sodium chloride, melatonin, ipratropium 0.5 mg-albuterol 2.5 mg, benzocaine-menthol, polyethylene glycol, acetaminophen, glucose, dextrose bolus **OR** dextrose bolus, glucagon (rDNA), dextrose    Labs:  CBC:   Recent Labs     03/11/24 0616 03/12/24 0622 03/13/24 0620   WBC 10.2 10.5 9.8   HGB 19.1* 18.6* 18.5*   HCT 58.6* 55.0* 53.8*   MCV 99.5 99.1 98.2   * 139 134*       BMP:   Recent Labs     03/11/24 0616 03/12/24 0622 03/13/24 0620    136 136   K 4.6 5.5* 4.5  4.5   CL 99 100 98*   CO2 30 29 30   BUN 56* 45* 40*   CREATININE 1.0 1.0 0.9       LIVER PROFILE:   Recent Labs     03/11/24  0616 03/12/24 0622 03/13/24 0620   AST 10* 24 16   ALT 18 24 27   BILIDIR 0.3 <0.2 <0.2   BILITOT 1.1* 0.9 0.9   ALKPHOS 93 88 102       PT/INR: No results for input(s): \"PROTIME\", \"INR\" in the last 72 hours.  APTT: No results for input(s): \"APTT\" in the last 72 hours.  UA:No results for input(s): \"NITRITE\", \"COLORU\", \"PHUR\", \"LABCAST\", \"WBCUA\", \"RBCUA\", \"MUCUS\", \"TRICHOMONAS\", \"YEAST\", \"BACTERIA\", \"CLARITYU\", \"SPECGRAV\", \"LEUKOCYTESUR\", \"UROBILINOGEN\", \"BILIRUBINUR\", \"BLOODU\", \"GLUCOSEU\", \"AMORPHOUS\" in the last 72 hours.    Invalid input(s): \"KETONESU\"  No results for input(s): \"PH\", \"PCO2\", \"PO2\" in the last 72 hours.        Films:  Chest imaging reports were reviewed and imaging was reviewed by me and showed no new films     ABG:  None    Cultures:  Pneumonia:  influenza A    I reviewed the labs and images listed above    Assessment/Plan:   Acute on Chronic Hypoxic Respiratory Failure  Titrate oxygen for saturations greater than or equal to 88%  Acute Hypercapnic Respiratory Failure with pH less than 7.35   Has bilevel machine at home.  I told him to start using 4 liters bled into the machine to help reduce hypoxic events   Severe COPD  Duonebs q 4  hours  Dulera q 12 hours  Prednisone 20 mg for 5 days    Influenza A  Polycythemia possibly due to chronic hypoxia   Pulmonary Hypertension      Needs to follow up with Geovany     DVT prophylaxis  DO [facility]

## 2024-03-13 NOTE — CARE COORDINATION
Patient previously qualified for home O2 but need an updated home O2 eval & DME orders for insurance approval. Testing & DME orders need to be within 24-48hrs of discharge.  RN made aware.    Electronically signed by Nirali Spears RN Case Management on 3/13/2024 at 9:15 AM

## 2024-03-13 NOTE — CARE COORDINATION
Case Management Discharge Note          Date / Time of Note: 3/13/2024 9:24 AM                  Patient Name: Alvarado Sibley   YOB: 1965  Diagnosis: Goals of care, counseling/discussion [Z71.89]  Acute respiratory failure with hypoxia (HCC) [J96.01]  Acute on chronic congestive heart failure, unspecified heart failure type (HCC) [I50.9]  Acute on chronic heart failure with preserved ejection fraction (HFpEF) (HCC) [I50.33]   Date / Time: 2/29/2024  4:02 PM    Financial:  Payor: [employer] / Plan: [employer] - CHOICE PLU / Product Type: *No Product type* /      Pharmacy:    [facility] [address] -  [phone] - F [phone]  [address] [facility] [address]  Phone: [phone] Fax: [phone]      Assistance purchasing medications?: Potential Assistance Purchasing Medications: No  Assistance provided by Case Management: None at this time    DISCHARGE Disposition: Home- No Services Needed    Home Care:  Home Care ordered at discharge: No, declines home care    Durable Medical Equipment:  DME Provider: Jamie  Equipment obtained during hospitalization: new oxygen    Home Oxygen and Respiratory Equipment:  Oxygen needed at discharge?: Yes  Home Oxygen Company: [employer] Phone: [phone]   Portable tank available for discharge?: Yes    Transportation:  Transportation PLAN for discharge: friend   Mode of Transport: Private Car  Time of Transport: 12pm    IMM Completed:   Not Indicated    Additional CM Notes: Patient will return home, denies needs. Millicent w/ [facility] aware of new home O2 eval & qualifying for 10L O2. Millicent to provide to patient at bedside.     The Plan for Transition of Care is related to the following treatment goals of Goals of care, counseling/discussion [Z71.89]  Acute respiratory failure with hypoxia (HCC) [J96.01]  Acute on chronic congestive heart failure, unspecified heart failure type  ([facility]) [I50.9]  Acute on chronic heart failure with preserved ejection fraction (HFpEF) ([facility]) [I50.33]    The Patient and/or patient representative Alvarado and his family were provided with a choice of provider and agrees with the discharge plan Yes    Freedom of choice list was provided with basic dialogue that supports the patient's individualized plan of care/goals and shares the quality data associated with the providers. Yes    Nirali Spears RN  [facility]   Case Management Department  Ph: [phone]

## 2024-03-13 NOTE — PROGRESS NOTES
Slept off and on throughout night shift. No c/o excessive discomfort. In bed, call light in reach.

## 2024-03-14 ENCOUNTER — TELEPHONE (OUTPATIENT)
Dept: INTERNAL MEDICINE CLINIC | Age: 59
End: 2024-03-14

## 2024-03-14 NOTE — TELEPHONE ENCOUNTER
Care Transitions Initial Follow Up Call    Outreach made within 2 business days of discharge: Yes    Patient: Alvarado Sibley Patient : 1965   MRN: [id number]  Reason for Admission: There are no discharge diagnoses documented for the most recent discharge.  Discharge Date: 3/13/24       Spoke with: Patient    Discharge department/facility: [facility]    TCM Interactive Patient Contact:  Was patient able to fill all prescriptions: Yes  Was patient instructed to bring all medications to the follow-up visit: No: No  Is patient taking all medications as directed in the discharge summary? Yes  Does patient understand their discharge instructions: Yes  Does patient have questions or concerns that need addressed prior to 7-14 day follow up office visit: no    Scheduled appointment with PCP within 7-14 days-  Yes    Follow Up  Future Appointments   Date Time Provider Department Center   3/19/2024  1:00 PM Oscar Medina MD [facility]  [place] [facility]   3/29/2024 10:15 AM Yun Diaz DO [facility]   2024  9:15 AM Oscar Medina MD [facility] [place] [facility] [place]     Call within 2 business days of discharge: Yes     Patient: Alvarado Sibley Patient : 1965 MRN: [id number]    [unfilled]    RARS: Readmission Risk Score: 12.2       Spoke with: Patient    Discharge department/facility:  [facility]    Non-face-to-face services provided:  Scheduled appointment with PCP-Appointment on 3.19.2024 with Dr Medina    Follow Up  Future Appointments   Date Time Provider Department Center   3/19/2024  1:00 PM Oscar Medina MD [facility]  [place] [facility]   3/29/2024 10:15 AM Yun Diaz DO [facility]   2024  9:15 AM Oscar Medina MD [facility] [place] - [place] Krystal L Collins, [place]

## 2024-03-14 NOTE — ADT AUTH CERT
Utilization Reviews       03/07/24  by Damaris Eng LPN   Last Updated by Damaris Eng LPN on 3/13/2024 1656     Review Status Created By   In Primary Damaris Eng LPN       Review Type   --      Criteria Review   DATE:   03/07/24     RELEVANT BASELINES:  He wears 2 L nasal cannula as needed at home      PERTINENT UPDATES:  Continued O2 at 10L HFNC, breathing tx, steroids PO     VITALS:  98.1, 18, 63, 120/63, 92% 10L HFNC     ABNL/PERTINENT LABS/RADIOLOGY/DIAGNOSTIC STUDIES:  03/07/24 05:44  Hemoglobin Quant: 18.6 (H)  Hematocrit: 55.9 (H)     03/07/24 07:26  Chloride: 98 (L)  CO2: 35 (H)  BUN,BUNPL: 63 (H)  Glucose, Random: 178 (H)  Pro-BNP: 155 (H)  AST: 11 (L)     03/07/24 08:17  POC Glucose: 207 (H)     03/07/24 12:26  POC Glucose: 244 (H)  03/07/24 17:53  POC Glucose: 319 (H)     03/07/24 20:06  POC Glucose: 374 (H)        CXR-  1. Cardiomegaly and pulmonary vascular congestion which may represent CHF.  2. Prominence of the central pulmonary artery suggesting pulmonary arterial  hypertension.  3. Platelike atelectasis left lung base.     PHYSICAL EXAM:  Pulmonary:      Breath sounds: Wheezing present.     MD CONSULTS/ASSESSMENT AND PLAN:  PULM:  Difficulty weaning oxygen.  Repeat chest x-ray.  Echocardiogram ordered.  -----------------------------------------------------------  IM:  He is awake and alert .   Feels his breathing is better today .   He is refusing insulin at times.   He wants to go home.      /63  On 10 L high flow     K 4.7   Cr 1     Assessment/Plan:   -Blood pressure acceptable, holding coreg , amlodipine and Jardiance and lisinopril , aldactone.   - redose lasix 40 mg IV now.   - stop lokelma   - renal function is back to baseline. Would keep him lisinopril and aldactone at discharge because of the hyperkalemia issues.    ---------------------------------------------------------------        MEDICATIONS:  (LOKELMA) oral suspension 10 gDose: 10 gFreq: DAILY Route: PO  (DELTASONE)

## 2024-03-18 ENCOUNTER — FOLLOWUP TELEPHONE ENCOUNTER (OUTPATIENT)
Dept: ADMINISTRATIVE | Age: 59
End: 2024-03-18

## 2024-03-18 NOTE — PROGRESS NOTES
Late entry. Called 3/16/24 while away from [facility].  Denies s/s from the yellow zone. He has been weighing himself daily, 159lbs (3/16)  Watching his sodium and fluid intake closely, staying under 64 ounces. He had questions regarding his RHC, and those results. He will ask these questions at his follow up appointments 3/19, 3/22, 3/29. He plans on attending all of his appts with PCP, Pulm, Migel.he was thankful for the call. He is anxious to be cleared to go back to work.

## 2024-03-19 ENCOUNTER — OFFICE VISIT (OUTPATIENT)
Dept: INTERNAL MEDICINE CLINIC | Age: 59
End: 2024-03-19
Payer: COMMERCIAL

## 2024-03-19 VITALS
OXYGEN SATURATION: 84 % | WEIGHT: 262 LBS | BODY MASS INDEX: 43.65 KG/M2 | DIASTOLIC BLOOD PRESSURE: 60 MMHG | SYSTOLIC BLOOD PRESSURE: 132 MMHG | HEART RATE: 85 BPM | HEIGHT: 65 IN

## 2024-03-19 DIAGNOSIS — Z09 HOSPITAL DISCHARGE FOLLOW-UP: Primary | ICD-10-CM

## 2024-03-19 DIAGNOSIS — R80.9 TYPE 2 DIABETES MELLITUS WITH MICROALBUMINURIA, WITHOUT LONG-TERM CURRENT USE OF INSULIN (HCC): ICD-10-CM

## 2024-03-19 DIAGNOSIS — J44.9 COPD, SEVERE (HCC): ICD-10-CM

## 2024-03-19 DIAGNOSIS — J96.11 CHRONIC RESPIRATORY FAILURE WITH HYPOXIA (HCC): ICD-10-CM

## 2024-03-19 DIAGNOSIS — I10 ESSENTIAL HYPERTENSION: ICD-10-CM

## 2024-03-19 DIAGNOSIS — E11.29 TYPE 2 DIABETES MELLITUS WITH MICROALBUMINURIA, WITHOUT LONG-TERM CURRENT USE OF INSULIN (HCC): ICD-10-CM

## 2024-03-19 PROCEDURE — 1111F DSCHRG MED/CURRENT MED MERGE: CPT | Performed by: STUDENT IN AN ORGANIZED HEALTH CARE EDUCATION/TRAINING PROGRAM

## 2024-03-19 PROCEDURE — 3052F HG A1C>EQUAL 8.0%<EQUAL 9.0%: CPT | Performed by: STUDENT IN AN ORGANIZED HEALTH CARE EDUCATION/TRAINING PROGRAM

## 2024-03-19 PROCEDURE — G8427 DOCREV CUR MEDS BY ELIG CLIN: HCPCS | Performed by: STUDENT IN AN ORGANIZED HEALTH CARE EDUCATION/TRAINING PROGRAM

## 2024-03-19 PROCEDURE — 99214 OFFICE O/P EST MOD 30 MIN: CPT | Performed by: STUDENT IN AN ORGANIZED HEALTH CARE EDUCATION/TRAINING PROGRAM

## 2024-03-19 PROCEDURE — 2022F DILAT RTA XM EVC RTNOPTHY: CPT | Performed by: STUDENT IN AN ORGANIZED HEALTH CARE EDUCATION/TRAINING PROGRAM

## 2024-03-19 PROCEDURE — 3017F COLORECTAL CA SCREEN DOC REV: CPT | Performed by: STUDENT IN AN ORGANIZED HEALTH CARE EDUCATION/TRAINING PROGRAM

## 2024-03-19 PROCEDURE — 3075F SYST BP GE 130 - 139MM HG: CPT | Performed by: STUDENT IN AN ORGANIZED HEALTH CARE EDUCATION/TRAINING PROGRAM

## 2024-03-19 PROCEDURE — 3023F SPIROM DOC REV: CPT | Performed by: STUDENT IN AN ORGANIZED HEALTH CARE EDUCATION/TRAINING PROGRAM

## 2024-03-19 PROCEDURE — 4004F PT TOBACCO SCREEN RCVD TLK: CPT | Performed by: STUDENT IN AN ORGANIZED HEALTH CARE EDUCATION/TRAINING PROGRAM

## 2024-03-19 PROCEDURE — 3078F DIAST BP <80 MM HG: CPT | Performed by: STUDENT IN AN ORGANIZED HEALTH CARE EDUCATION/TRAINING PROGRAM

## 2024-03-19 PROCEDURE — G8484 FLU IMMUNIZE NO ADMIN: HCPCS | Performed by: STUDENT IN AN ORGANIZED HEALTH CARE EDUCATION/TRAINING PROGRAM

## 2024-03-19 PROCEDURE — G8417 CALC BMI ABV UP PARAM F/U: HCPCS | Performed by: STUDENT IN AN ORGANIZED HEALTH CARE EDUCATION/TRAINING PROGRAM

## 2024-03-19 RX ORDER — ALBUTEROL SULFATE 90 UG/1
2 AEROSOL, METERED RESPIRATORY (INHALATION) 4 TIMES DAILY PRN
Qty: 8.5 EACH | Refills: 5 | Status: SHIPPED | OUTPATIENT
Start: 2024-03-19

## 2024-03-19 SDOH — ECONOMIC STABILITY: FOOD INSECURITY: WITHIN THE PAST 12 MONTHS, THE FOOD YOU BOUGHT JUST DIDN'T LAST AND YOU DIDN'T HAVE MONEY TO GET MORE.: NEVER TRUE

## 2024-03-19 SDOH — ECONOMIC STABILITY: INCOME INSECURITY: HOW HARD IS IT FOR YOU TO PAY FOR THE VERY BASICS LIKE FOOD, HOUSING, MEDICAL CARE, AND HEATING?: NOT HARD AT ALL

## 2024-03-19 SDOH — ECONOMIC STABILITY: FOOD INSECURITY: WITHIN THE PAST 12 MONTHS, YOU WORRIED THAT YOUR FOOD WOULD RUN OUT BEFORE YOU GOT MONEY TO BUY MORE.: NEVER TRUE

## 2024-03-19 NOTE — PROGRESS NOTES
[facility]  Clinic Progress note:    Alvarado Sibley is a 59 y.o. male with the past medical history as listed below presenting to the clinic for follow up on chronic condition and discussion of preventative health care.    Chief Complaint   Patient presents with    Follow-Up from Hospital     Hypertension      Past Medical History:   Diagnosis Date    Acute on chronic diastolic congestive heart failure (HCC)     ADHD (attention deficit hyperactivity disorder)     pt denies    Adrenal nodule (HCC) 12/07/2017    low-density nodular enlargement of a left adrenal gland which is stable since 2007      COPD (chronic obstructive pulmonary disease) (HCC)     Emphysema of lung (HCC)     Essential hypertension 11/30/2017    History of pneumothorax 11/30/2017    Rib fx    Hx of colonic polyps 01/18/2018    1.2018 Descending Polyp 2. Sigmoid Polyp 3. Sigmoid Diverticulosis recheck 2023    Hx of renal calculi 11/30/2017    Hypertension     Kidney stone     Neuropathy     Obesity     Osteoarthritis     Pneumothorax     2016 d/t fall     Prediabetes 12/13/2017    Retinal hemorrhage 10/29/2020    HTN-right eye    Sleep apnea     uses C-PAP    Substance abuse (HCC)     Type 2 diabetes mellitus without complication (HCC)      Past Surgical History:   Procedure Laterality Date    CARPAL TUNNEL RELEASE Right     COLONOSCOPY  01/18/2018    [place], x2 polyps    COLONOSCOPY N/A 8/21/2023    COLONOSCOPY performed by Emerson Horne MD at [facility] ENDOSCOPY    KNEE SURGERY Left     arthroscopic     Social History     Socioeconomic History    Marital status:      Spouse name: Not on file    Number of children: Not on file    Years of education: Not on file    Highest education level: Not on file   Occupational History    Not on file   Tobacco Use    Smoking status: Every Day     Current packs/day: 1.00     Average packs/day: 1 pack/day for 50.2 years (50.2 ttl pk-yrs)     Types: Cigarettes     Start date:

## 2024-03-20 ENCOUNTER — TELEPHONE (OUTPATIENT)
Dept: INTERNAL MEDICINE CLINIC | Age: 59
End: 2024-03-20

## 2024-03-20 NOTE — TELEPHONE ENCOUNTER
Alvarado called asking that his workers comp paperwork be sent in and he needs to speak with you regarding some questions about a hospital stay.     Thank you

## 2024-03-22 ENCOUNTER — OFFICE VISIT (OUTPATIENT)
Dept: PULMONOLOGY | Age: 59
End: 2024-03-22
Payer: COMMERCIAL

## 2024-03-22 VITALS
OXYGEN SATURATION: 89 % | WEIGHT: 262 LBS | DIASTOLIC BLOOD PRESSURE: 60 MMHG | SYSTOLIC BLOOD PRESSURE: 128 MMHG | RESPIRATION RATE: 18 BRPM | TEMPERATURE: 97.8 F | BODY MASS INDEX: 43.65 KG/M2 | HEIGHT: 65 IN | HEART RATE: 118 BPM

## 2024-03-22 DIAGNOSIS — J96.11 CHRONIC RESPIRATORY FAILURE WITH HYPOXIA (HCC): ICD-10-CM

## 2024-03-22 DIAGNOSIS — Z87.891 PERSONAL HISTORY OF TOBACCO USE: ICD-10-CM

## 2024-03-22 DIAGNOSIS — E66.01 OBESITY, CLASS III, BMI 40-49.9 (MORBID OBESITY) (HCC): ICD-10-CM

## 2024-03-22 DIAGNOSIS — I27.21 PULMONARY ARTERIAL HYPERTENSION (HCC): Primary | ICD-10-CM

## 2024-03-22 DIAGNOSIS — J44.9 COPD, SEVERE (HCC): ICD-10-CM

## 2024-03-22 PROCEDURE — 4004F PT TOBACCO SCREEN RCVD TLK: CPT | Performed by: INTERNAL MEDICINE

## 2024-03-22 PROCEDURE — 3078F DIAST BP <80 MM HG: CPT | Performed by: INTERNAL MEDICINE

## 2024-03-22 PROCEDURE — G8427 DOCREV CUR MEDS BY ELIG CLIN: HCPCS | Performed by: INTERNAL MEDICINE

## 2024-03-22 PROCEDURE — 3017F COLORECTAL CA SCREEN DOC REV: CPT | Performed by: INTERNAL MEDICINE

## 2024-03-22 PROCEDURE — G8484 FLU IMMUNIZE NO ADMIN: HCPCS | Performed by: INTERNAL MEDICINE

## 2024-03-22 PROCEDURE — G8417 CALC BMI ABV UP PARAM F/U: HCPCS | Performed by: INTERNAL MEDICINE

## 2024-03-22 PROCEDURE — 3023F SPIROM DOC REV: CPT | Performed by: INTERNAL MEDICINE

## 2024-03-22 PROCEDURE — 1111F DSCHRG MED/CURRENT MED MERGE: CPT | Performed by: INTERNAL MEDICINE

## 2024-03-22 PROCEDURE — 3074F SYST BP LT 130 MM HG: CPT | Performed by: INTERNAL MEDICINE

## 2024-03-22 PROCEDURE — 99214 OFFICE O/P EST MOD 30 MIN: CPT | Performed by: INTERNAL MEDICINE

## 2024-03-22 RX ORDER — SILDENAFIL CITRATE 20 MG/1
20 TABLET ORAL 3 TIMES DAILY
Qty: 90 TABLET | Refills: 2 | Status: SHIPPED | OUTPATIENT
Start: 2024-03-22

## 2024-03-22 NOTE — PATIENT INSTRUCTIONS
Sildenafil - revatio    Adverse Reactions  The following adverse drug reactions and incidences are derived from product labeling unless otherwise specified.  >10%:  Cardiovascular: Flushing (9% to 19%)  Gastrointestinal: Diarrhea (9% to 12%), dyspepsia (3% to 17%)  Nervous system: Headache (16% to 49%)  Neuromuscular & skeletal: Back pain (3% to 13%), limb pain (7% to 15%), myalgia (2% to 14%)  Ophthalmic: Visual disturbance (2% to 11%; including vision color changes, blurred vision, and photophobia)  Respiratory: Epistaxis (2% to 3%; patients with pulmonary arterial hypertension secondary to connective tissue disorder: 13%)  1% to 10%:  Cardiovascular: Angina pectoris (<2%), atrioventricular block (<2%), cardiomyopathy (<2%), chest pain (<2%), ECG abnormality (<2%), edema (<2%), heart failure (<2%), hypotension (<2%), ischemic heart disease (<2%), orthostatic hypotension (<2%), palpitations (<2%), peripheral edema (<2%), shock (<2%), syncope (<2%), tachycardia (<2%)  Dermatologic: Contact dermatitis (<2%), dermal ulcer (<2%), diaphoresis (<2%), exfoliative dermatitis (<2%), pruritus (<2%), skin photosensitivity (<2%), skin rash (2% to 3%), urticaria (<2%)  Endocrine & metabolic: Hyperglycemia (<2%), hypernatremia (<2%), hyperuricemia (<2%), hypoglycemia (<2%), increased thirst (<2%), unstable diabetes (<2%)  Gastrointestinal: Abdominal pain (<2%), colitis (<2%), dysphagia (<2%), esophagitis (<2%), gastroenteritis (<2%), gingivitis (<2%), glossitis (<2%), nausea (2% to 3%), rectal hemorrhage (<2%), stomatitis (<2%), vomiting (<2%), xerostomia (<2%)  Genitourinary: Anorgasmia (<2%), breast hypertrophy (<2%), cystitis (<2%), ejaculatory disorder (<2%), genital edema (<2%), nocturia (<2%), urinary frequency (<2%), urinary incontinence (<2%)  Hematologic & oncologic: Anemia (<2%), leukopenia (<2%)  Hepatic: Abnormal hepatic function tests (<2%)  Hypersensitivity: Facial edema (<2%), hypersensitivity reaction

## 2024-03-22 NOTE — PROGRESS NOTES
liver.      CTPA: No results found for this or any previous visit.      CXR PA/LAT: Results for orders placed during the hospital encounter of 01/20/24    XR CHEST (2 VW)    Narrative  EXAMINATION:  TWO XRAY VIEWS OF THE CHEST    1/20/2024 7:39 am    COMPARISON:  None.    HISTORY:  ORDERING SYSTEM PROVIDED HISTORY: Short of breath, history of COPD, cannot  lay still for MRI  TECHNOLOGIST PROVIDED HISTORY:  Reason for exam:->Short of breath, history of COPD, cannot lay still for MRI    FINDINGS:  Two views the chest.  Good inspiration with slight elevation left  hemidiaphragm.  Cardiac enlargement.  Vascular prominence.  Diffusely  increased interstitial markings.  Patchy airspace opacity in the bases.  The  trachea is midline.  Minimally calcified aorta.  Degeneration of the spine.  Osteopenia.  Old right and left posterior rib fractures.    Impression  Cardiac enlargement, and vascular prominence as well as mild patchy airspace  opacities consistent failure in the correct clinical setting.  Early airspace  infiltrates are also a possibility.      CXR portable: Results for orders placed during the hospital encounter of 02/29/24    XR CHEST PORTABLE    Narrative  EXAMINATION:  ONE XRAY VIEW OF THE CHEST    3/7/2024 11:23 am    COMPARISON:  03/03/2024 and 03/02/2024    HISTORY:  ORDERING SYSTEM PROVIDED HISTORY: hypoxemia  TECHNOLOGIST PROVIDED HISTORY:  Reason for exam:->hypoxemia  Reason for Exam: hypoxemia    FINDINGS:  As compared to prior examination, there is little change.  There is  cardiomegaly and pulmonary vascular congestion which may represent CHF.  There is some prominence of the central pulmonary artery suggesting pulmonary  arterial hypertension.  There is some platelike atelectasis noted in the left  lung base.  There is mild cardiomegaly.    Impression  1. Cardiomegaly and pulmonary vascular congestion which may represent CHF.  2. Prominence of the central pulmonary artery suggesting pulmonary

## 2024-03-23 PROBLEM — I27.21 PULMONARY ARTERIAL HYPERTENSION (HCC): Status: ACTIVE | Noted: 2024-03-23

## 2024-03-23 NOTE — ASSESSMENT & PLAN NOTE
This has been discussed with the patient.  He continues to minimize any use of oxygen.  Currently 89%.  His chronic hypoxemia is exacerbating his pulmonary hypertension.  He expresses understanding for this.  Given his financial issues, he is doing everything he can to continue to work.

## 2024-03-23 NOTE — ASSESSMENT & PLAN NOTE
Pulm hypertension based on right heart catheterization.  Discussed his right heart failure based on echocardiogram and right heart catheterization.  Discussed his reluctance to take medications.  Discussed his obesity, sleep apnea, chronic hypoxemia all being contributing factors.  He expressed understanding for this.  Discussed treatment options including cost.  Given his longstanding concerns issues with cost, proceed with sildenafil 20 mg 3 times daily.  Side effects discussed and printed.

## 2024-03-23 NOTE — ASSESSMENT & PLAN NOTE
Feels like he is breathing better since last hospitalization.  He is quit smoking.  Using albuterol as needed.

## 2024-03-25 ENCOUNTER — TELEPHONE (OUTPATIENT)
Dept: INTERNAL MEDICINE CLINIC | Age: 59
End: 2024-03-25

## 2024-03-25 NOTE — TELEPHONE ENCOUNTER
PT calling in wanting to know if PCP has completed and sent in disability paperwork. Paperwork is needed to be in today, if it can't be completed let PT know so he can file for an ext.       Best call back for PT: [phone]

## 2024-03-29 ENCOUNTER — OFFICE VISIT (OUTPATIENT)
Dept: CARDIOLOGY CLINIC | Age: 59
End: 2024-03-29
Payer: COMMERCIAL

## 2024-03-29 VITALS
OXYGEN SATURATION: 88 % | HEART RATE: 76 BPM | BODY MASS INDEX: 43.82 KG/M2 | HEIGHT: 65 IN | WEIGHT: 263 LBS | DIASTOLIC BLOOD PRESSURE: 62 MMHG | SYSTOLIC BLOOD PRESSURE: 124 MMHG

## 2024-03-29 DIAGNOSIS — I27.21 PULMONARY ARTERIAL HYPERTENSION (HCC): Primary | ICD-10-CM

## 2024-03-29 PROCEDURE — 3017F COLORECTAL CA SCREEN DOC REV: CPT | Performed by: INTERNAL MEDICINE

## 2024-03-29 PROCEDURE — 1111F DSCHRG MED/CURRENT MED MERGE: CPT | Performed by: INTERNAL MEDICINE

## 2024-03-29 PROCEDURE — G8484 FLU IMMUNIZE NO ADMIN: HCPCS | Performed by: INTERNAL MEDICINE

## 2024-03-29 PROCEDURE — 1036F TOBACCO NON-USER: CPT | Performed by: INTERNAL MEDICINE

## 2024-03-29 PROCEDURE — 99214 OFFICE O/P EST MOD 30 MIN: CPT | Performed by: INTERNAL MEDICINE

## 2024-03-29 PROCEDURE — G8427 DOCREV CUR MEDS BY ELIG CLIN: HCPCS | Performed by: INTERNAL MEDICINE

## 2024-03-29 PROCEDURE — 3078F DIAST BP <80 MM HG: CPT | Performed by: INTERNAL MEDICINE

## 2024-03-29 PROCEDURE — 3074F SYST BP LT 130 MM HG: CPT | Performed by: INTERNAL MEDICINE

## 2024-03-29 PROCEDURE — G8417 CALC BMI ABV UP PARAM F/U: HCPCS | Performed by: INTERNAL MEDICINE

## 2024-03-29 NOTE — PROGRESS NOTES
2024    PATIENT: Alvarado Sibley  : 1965    Primary Care Provider:   Oscar Medina MD  o:[phone]  f:[phone]    Reason for evaluation:   Chief Complaint   Patient presents with    Follow-up     Hospital follow up-states no concerns     History of present illness:  Mr. Alvarado Sibley is a 59 y.o. male patient establishing cardiac care after hospitalization from 24- 3/13/2024 for acute hypoxic respiratory failure.   Inlfuenza A +.   He was reportedly near negative 22 L when right heart catheterization was requested 3/12, showing PCWP of 12 mmHg, with moderate pulmonary hypertension. No previous cardiac care.  He saw Dr. Armenta in Pulmonology follow up 3/23/2024 and started Sildenafil.   Ed has maintained dry weight of 259-261 lbs at home. He states that his oxygen level is not dropping as far or for as long as it was, reporting quicker recovery. 88% today. He states he hasn't felt this good in months. 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 He is most often standing on concrete. He hasn't historically worn compression stockings.   He is not smoking.   No chest pain, palpitations, edema.   Medical History:      Diagnosis Date    Acute on chronic diastolic congestive heart failure (HCC)     ADHD (attention deficit hyperactivity disorder)     pt denies    Adrenal nodule (HCC) 2017    low-density nodular enlargement of a left adrenal gland which is stable since       COPD (chronic obstructive pulmonary disease) (HCC)     Emphysema of lung (HCC)     Essential hypertension 2017    History of pneumothorax 2017    Rib fx    Hx of colonic polyps 2018    1.2018 Descending Polyp 2. Sigmoid Polyp 3. Sigmoid Diverticulosis recheck     Hx of renal calculi 2017    Hypertension     Kidney stone     Neuropathy     Obesity     Osteoarthritis     Pneumothorax     2016 d/t fall     Prediabetes 2017    Retinal

## 2024-03-29 NOTE — PATIENT INSTRUCTIONS
\"Dry weight/baseline\" 259-261 lbs by home scale    You may take extra Torsemide tablet if your morning weight is 263 lbs or higher by home scale  __________________    Check bloodwork on the way out today  __________________    Benefit from compression socks at work (over the counter strength 15-20 mmHg)

## 2024-04-01 DIAGNOSIS — I27.21 PULMONARY ARTERIAL HYPERTENSION (HCC): ICD-10-CM

## 2024-04-01 LAB
ANION GAP SERPL CALCULATED.3IONS-SCNC: 13 MMOL/L (ref 3–16)
BUN SERPL-MCNC: 23 MG/DL (ref 7–20)
CALCIUM SERPL-MCNC: 9.6 MG/DL (ref 8.3–10.6)
CHLORIDE SERPL-SCNC: 97 MMOL/L (ref 99–110)
CO2 SERPL-SCNC: 29 MMOL/L (ref 21–32)
CREAT SERPL-MCNC: 1.2 MG/DL (ref 0.8–1.3)
GFR SERPLBLD CREATININE-BSD FMLA CKD-EPI: 69 ML/MIN/{1.73_M2}
GLUCOSE SERPL-MCNC: 279 MG/DL (ref 70–99)
NT-PROBNP SERPL-MCNC: 81 PG/ML (ref 0–124)
POTASSIUM SERPL-SCNC: 4.4 MMOL/L (ref 3.5–5.1)
SODIUM SERPL-SCNC: 139 MMOL/L (ref 136–145)

## 2024-04-03 NOTE — RESULT ENCOUNTER NOTE
Chronic diastolic heart failure, NYHA Class II   Dry weight 259-261 lbs    Attempted to call Ed to give blood work results, no answer VM box not set up

## 2024-04-04 ENCOUNTER — OFFICE VISIT (OUTPATIENT)
Dept: INTERNAL MEDICINE CLINIC | Age: 59
End: 2024-04-04
Payer: COMMERCIAL

## 2024-04-04 VITALS
OXYGEN SATURATION: 89 % | WEIGHT: 265 LBS | DIASTOLIC BLOOD PRESSURE: 60 MMHG | BODY MASS INDEX: 44.1 KG/M2 | TEMPERATURE: 97.8 F | SYSTOLIC BLOOD PRESSURE: 112 MMHG | HEART RATE: 84 BPM

## 2024-04-04 DIAGNOSIS — E11.29 TYPE 2 DIABETES MELLITUS WITH DIABETIC MICROALBUMINURIA, WITHOUT LONG-TERM CURRENT USE OF INSULIN (HCC): Primary | ICD-10-CM

## 2024-04-04 DIAGNOSIS — I27.21 PULMONARY ARTERIAL HYPERTENSION (HCC): ICD-10-CM

## 2024-04-04 DIAGNOSIS — R80.9 TYPE 2 DIABETES MELLITUS WITH DIABETIC MICROALBUMINURIA, WITHOUT LONG-TERM CURRENT USE OF INSULIN (HCC): Primary | ICD-10-CM

## 2024-04-04 DIAGNOSIS — I10 ESSENTIAL HYPERTENSION: ICD-10-CM

## 2024-04-04 PROCEDURE — 3074F SYST BP LT 130 MM HG: CPT | Performed by: STUDENT IN AN ORGANIZED HEALTH CARE EDUCATION/TRAINING PROGRAM

## 2024-04-04 PROCEDURE — G8427 DOCREV CUR MEDS BY ELIG CLIN: HCPCS | Performed by: STUDENT IN AN ORGANIZED HEALTH CARE EDUCATION/TRAINING PROGRAM

## 2024-04-04 PROCEDURE — 3017F COLORECTAL CA SCREEN DOC REV: CPT | Performed by: STUDENT IN AN ORGANIZED HEALTH CARE EDUCATION/TRAINING PROGRAM

## 2024-04-04 PROCEDURE — 99214 OFFICE O/P EST MOD 30 MIN: CPT | Performed by: STUDENT IN AN ORGANIZED HEALTH CARE EDUCATION/TRAINING PROGRAM

## 2024-04-04 PROCEDURE — 1036F TOBACCO NON-USER: CPT | Performed by: STUDENT IN AN ORGANIZED HEALTH CARE EDUCATION/TRAINING PROGRAM

## 2024-04-04 PROCEDURE — 3078F DIAST BP <80 MM HG: CPT | Performed by: STUDENT IN AN ORGANIZED HEALTH CARE EDUCATION/TRAINING PROGRAM

## 2024-04-04 PROCEDURE — 1111F DSCHRG MED/CURRENT MED MERGE: CPT | Performed by: STUDENT IN AN ORGANIZED HEALTH CARE EDUCATION/TRAINING PROGRAM

## 2024-04-04 PROCEDURE — 3052F HG A1C>EQUAL 8.0%<EQUAL 9.0%: CPT | Performed by: STUDENT IN AN ORGANIZED HEALTH CARE EDUCATION/TRAINING PROGRAM

## 2024-04-04 PROCEDURE — G8417 CALC BMI ABV UP PARAM F/U: HCPCS | Performed by: STUDENT IN AN ORGANIZED HEALTH CARE EDUCATION/TRAINING PROGRAM

## 2024-04-04 PROCEDURE — 2022F DILAT RTA XM EVC RTNOPTHY: CPT | Performed by: STUDENT IN AN ORGANIZED HEALTH CARE EDUCATION/TRAINING PROGRAM

## 2024-04-04 NOTE — PROGRESS NOTES
FOR WHEEZING., Disp: 8.5 each, Rfl: 5    torsemide (DEMADEX) 20 MG tablet, Take 1 tablet by mouth daily, Disp: 30 tablet, Rfl: 5    diclofenac sodium (VOLTAREN) 1 % GEL, Apply 2 g topically 2 times daily as needed for Pain, Disp: , Rfl:     metFORMIN (GLUCOPHAGE-XR) 500 MG extended release tablet, TAKE 1 TABLET BY MOUTH EVERY DAY WITH BREAKFAST (Patient taking differently: Take 1 tablet by mouth at bedtime), Disp: 90 tablet, Rfl: 3    amLODIPine (NORVASC) 10 MG tablet, TAKE 1 TABLET BY MOUTH EVERY DAY (Patient taking differently: Take 1 tablet by mouth at bedtime TAKE 1 TABLET BY MOUTH EVERY DAY), Disp: 90 tablet, Rfl: 3    fluticasone (FLONASE) 50 MCG/ACT nasal spray, SPRAY 1 SPRAY INTO EACH NOSTRIL EVERY DAY Strength: 50 MCG/ACT, Disp: 1 each, Rfl: 11    fluticasone-umeclidin-vilant (TRELEGY ELLIPTA) 200-62.5-25 MCG/ACT AEPB inhaler, Inhale 1 puff into the lungs daily, Disp: 60 each, Rfl: 11    albuterol (ACCUNEB) 1.25 MG/3ML nebulizer solution, Inhale 3 mLs into the lungs every 6 hours as needed for Wheezing, Disp: 360 mL, Rfl: 3    atorvastatin (LIPITOR) 40 MG tablet, TAKE 1 TABLET BY MOUTH DAILY TO REPLACE SIMVASTATIN, Disp: 90 tablet, Rfl: 3    JARDIANCE 25 MG tablet, TAKE 1 TABLET BY MOUTH DAILY TO REPLACE 10 MG, Disp: 90 tablet, Rfl: 3    glipiZIDE (GLUCOTROL XL) 5 MG extended release tablet, TAKE 2 TABLETS BY MOUTH EVERY DAY FOR DIABETES, Disp: 180 tablet, Rfl: 3     Examination  Vitals:    04/04/24 0912   BP: 112/60   Site: Left Upper Arm   Position: Sitting   Cuff Size: Large Adult   Pulse: 84   Temp: 97.8 °F (36.6 °C)   TempSrc: Oral   SpO2: (!) 89%   Weight: 120.2 kg (265 lb)      Physical Exam  Constitutional:       General: He is not in acute distress.  HENT:      Mouth/Throat:      Mouth: Mucous membranes are moist.   Eyes:      Pupils: Pupils are equal, round, and reactive to light.   Cardiovascular:      Rate and Rhythm: Normal rate and regular rhythm.      Pulses: Normal pulses.   Pulmonary:

## 2024-04-08 PROBLEM — J10.1 INFLUENZA A: Status: RESOLVED | Noted: 2024-03-09 | Resolved: 2024-04-08

## 2024-04-08 RX ORDER — ATORVASTATIN CALCIUM 40 MG/1
40 TABLET, FILM COATED ORAL DAILY
Qty: 90 TABLET | Refills: 3 | Status: SHIPPED | OUTPATIENT
Start: 2024-04-08

## 2024-04-08 RX ORDER — GLIPIZIDE 5 MG/1
TABLET, FILM COATED, EXTENDED RELEASE ORAL
Qty: 180 TABLET | Refills: 3 | Status: SHIPPED | OUTPATIENT
Start: 2024-04-08

## 2024-04-08 RX ORDER — EMPAGLIFLOZIN 25 MG/1
TABLET, FILM COATED ORAL
Qty: 90 TABLET | Refills: 3 | Status: SHIPPED | OUTPATIENT
Start: 2024-04-08

## 2024-04-08 NOTE — TELEPHONE ENCOUNTER
Future Appointments   Date Time Provider Department Center   5/13/2024  1:20 PM [facility]   6/21/2024  8:30 AM Yun Diaz DO [place] [facility]   7/8/2024  9:30 AM Oscar Medina MD [facility] [place] [facility]     Last appt on 4.4.2024

## 2024-04-13 DIAGNOSIS — I27.21 PULMONARY ARTERIAL HYPERTENSION (HCC): ICD-10-CM

## 2024-04-15 RX ORDER — SILDENAFIL CITRATE 20 MG/1
20 TABLET ORAL 3 TIMES DAILY
Qty: 270 TABLET | Refills: 1 | Status: SHIPPED | OUTPATIENT
Start: 2024-04-15

## 2024-05-13 ENCOUNTER — OFFICE VISIT (OUTPATIENT)
Dept: PULMONOLOGY | Age: 59
End: 2024-05-13
Payer: COMMERCIAL

## 2024-05-13 VITALS
OXYGEN SATURATION: 87 % | HEIGHT: 65 IN | RESPIRATION RATE: 18 BRPM | WEIGHT: 267.2 LBS | DIASTOLIC BLOOD PRESSURE: 60 MMHG | BODY MASS INDEX: 44.52 KG/M2 | HEART RATE: 85 BPM | SYSTOLIC BLOOD PRESSURE: 130 MMHG | TEMPERATURE: 98.7 F

## 2024-05-13 DIAGNOSIS — I27.21 PULMONARY ARTERIAL HYPERTENSION (HCC): ICD-10-CM

## 2024-05-13 DIAGNOSIS — J44.9 COPD, SEVERE (HCC): Primary | ICD-10-CM

## 2024-05-13 DIAGNOSIS — E66.01 OBESITY, CLASS III, BMI 40-49.9 (MORBID OBESITY) (HCC): ICD-10-CM

## 2024-05-13 DIAGNOSIS — J96.11 CHRONIC RESPIRATORY FAILURE WITH HYPOXIA (HCC): ICD-10-CM

## 2024-05-13 PROCEDURE — 3078F DIAST BP <80 MM HG: CPT | Performed by: INTERNAL MEDICINE

## 2024-05-13 PROCEDURE — 99214 OFFICE O/P EST MOD 30 MIN: CPT | Performed by: INTERNAL MEDICINE

## 2024-05-13 PROCEDURE — 3017F COLORECTAL CA SCREEN DOC REV: CPT | Performed by: INTERNAL MEDICINE

## 2024-05-13 PROCEDURE — G8427 DOCREV CUR MEDS BY ELIG CLIN: HCPCS | Performed by: INTERNAL MEDICINE

## 2024-05-13 PROCEDURE — 4004F PT TOBACCO SCREEN RCVD TLK: CPT | Performed by: INTERNAL MEDICINE

## 2024-05-13 PROCEDURE — G8417 CALC BMI ABV UP PARAM F/U: HCPCS | Performed by: INTERNAL MEDICINE

## 2024-05-13 PROCEDURE — 3023F SPIROM DOC REV: CPT | Performed by: INTERNAL MEDICINE

## 2024-05-13 PROCEDURE — 3075F SYST BP GE 130 - 139MM HG: CPT | Performed by: INTERNAL MEDICINE

## 2024-05-13 NOTE — PROGRESS NOTES
REASON FOR CONSULTATION/CC: copd  Chief Complaint   Patient presents with    Shortness of Breath        Consult at request of  Oscar Medina MD for     PCP: Oscar Medina MD    HISTORY OF PRESENT ILLNESS: Alvarado Sibley is a 59 y.o. year old male with a history of  who presents             PAH   On sildenafil and feeling much better  Cost: ~ $24 per month.   Few bruising.   Few episodes of dizzy    CHF  Torsemide    Copd  albuterol   Using nebulizer prior to work.        Hypoxemia  87%.  Able to get o2 up higher with breathing.   Still has o2 at home.    Using at [facility] with cpap     SILVANA  Cpap .     Obesity  Limited from walking secondary to pain from feet.            Objective:   PHYSICAL EXAM:  Blood pressure 130/60, pulse 85, temperature 98.7 °F (37.1 °C), temperature source Infrared, resp. rate 18, height 1.651 m (5' 5\"), weight 121.2 kg (267 lb 3.2 oz), SpO2 (!) 87 %.'  Gen: No distress.    Eyes: PERRL. No sclera icterus. No conjunctival injection.   ENT: No discharge. Pharynx clear. External appearance of ears and nose normal.  Neck: Trachea midline. No obvious mass.    Resp: No accessory muscle use. No crackles. No wheezes. No rhonchi.    CV: Regular rate. Regular rhythm. No murmur or rub. No edema.   GI:    Skin: Warm, dry, normal texture and turgor. No nodule on exposed extremities.   Lymph: No cervical LAD. No supraclavicular LAD.   M/S: No cyanosis. No clubbing. No joint deformity.    Neuro: Moves all four extremities.    Psych: Oriented x 3. No anxiety.  Awake. Alert. Intact judgement and insight.    Current Outpatient Medications   Medication Sig Dispense Refill    sildenafil (REVATIO) 20 MG tablet TAKE 1 TABLET BY MOUTH THREE TIMES A  tablet 1    atorvastatin (LIPITOR) 40 MG tablet TAKE 1 TABLET BY MOUTH DAILY TO REPLACE SIMVASTATIN 90 tablet 3    JARDIANCE 25 MG tablet TAKE 1 TABLET BY MOUTH DAILY TO REPLACE 10 MG 90 tablet 3    glipiZIDE (GLUCOTROL XL) 5 MG extended release tablet TAKE 2

## 2024-05-13 NOTE — ASSESSMENT & PLAN NOTE
Continues to have hypoxemia.  He feels that overall this is improved but he continues to be 87% today.  Has oxygen using at night.

## 2024-05-20 RX ORDER — ALBUTEROL SULFATE 1.25 MG/3ML
1 SOLUTION RESPIRATORY (INHALATION) EVERY 6 HOURS PRN
Qty: 360 ML | Refills: 3 | Status: SHIPPED | OUTPATIENT
Start: 2024-05-20

## 2024-05-20 NOTE — TELEPHONE ENCOUNTER
Future Appointments   Date Time Provider Department Center   6/21/2024  8:30 AM Yun Diaz DO [place] [facility]   7/8/2024  9:30 AM Oscar Medina MD [facility] [place] - [facility]   9/4/2024  8:00 AM Grabiel Armenta MD [facility]     Last appt 4/2/24

## 2024-05-20 NOTE — TELEPHONE ENCOUNTER
Pt is requesting a refill for his albuterol (ACCUNEB) 1.25 MG/3ML nebulizer solution     Please send to [facility] in [place]     Thank you

## 2024-06-12 NOTE — PATIENT INSTRUCTIONS
Prednisone has you retaining fluid and will increase your sugars    We will increase your Torsemide for the period you are taking it    -Take Torsemide 20 mg twice daily (or 40 mg in the mornings) while on Prednisone and until you get closer to 262 lbs  -After that you can transition back to Torsemide 20 mg daily     You can take an extra Torsemide tablet any time you gain more than 3 lbs in 1 day    Goal is to get closer to 259-261 lbs    Vascular screening bundle (carotid ultrasound, leg ultrasound, screening for abdominal aortic aneurysm) next year

## 2024-06-12 NOTE — PROGRESS NOTES
2024    PATIENT: Alvarado Sibley  : 1965    Primary Care Provider:   Oscar Medina MD  o:[phone]  f:[phone]    Reason for evaluation:   Chief Complaint   Patient presents with    Follow-up            History of present illness:  Mr. Alvarado Sibley is a 59 y.o. male patient here for close cardiovascular follow up regarding chronic diastolic CHF. 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 He is compliant with medications but has been inconsistenet with compression stockings. He states he was started on Prednisone for left ankle concerns and is scheduling with Orthopedic surgery for further evaluation. He weighs himself daily and is up to 269 lbs from 259 lbs, noticing it in his legs and breathing again. No new concerns.     Medical History:      Diagnosis Date    Acute on chronic diastolic congestive heart failure (HCC)     ADHD (attention deficit hyperactivity disorder)     pt denies    Adrenal nodule (HCC) 2017    low-density nodular enlargement of a left adrenal gland which is stable since       COPD (chronic obstructive pulmonary disease) (HCC)     Emphysema of lung (HCC)     Essential hypertension 2017    History of pneumothorax 2017    Rib fx    Hx of colonic polyps 2018    1.2018 Descending Polyp 2. Sigmoid Polyp 3. Sigmoid Diverticulosis recheck     Hx of renal calculi 2017    Hypertension     Kidney stone     Neuropathy     Obesity     Osteoarthritis     Pneumothorax     2016 d/t fall     Prediabetes 2017    Retinal hemorrhage 10/29/2020    HTN-right eye    Sleep apnea     uses C-PAP    Substance abuse (HCC)     Type 2 diabetes mellitus without complication (HCC)        Surgical History:      Procedure Laterality Date    CARPAL TUNNEL RELEASE Right     COLONOSCOPY  2018    Adelaida, x2 polyps    COLONOSCOPY N/A 2023    COLONOSCOPY performed by Emerson Horne MD at [facility]

## 2024-06-18 ENCOUNTER — OFFICE VISIT (OUTPATIENT)
Dept: INTERNAL MEDICINE CLINIC | Age: 59
End: 2024-06-18
Payer: COMMERCIAL

## 2024-06-18 VITALS
HEART RATE: 89 BPM | DIASTOLIC BLOOD PRESSURE: 60 MMHG | WEIGHT: 270 LBS | OXYGEN SATURATION: 88 % | SYSTOLIC BLOOD PRESSURE: 120 MMHG | BODY MASS INDEX: 44.93 KG/M2

## 2024-06-18 DIAGNOSIS — M25.562 ACUTE PAIN OF LEFT KNEE: Primary | ICD-10-CM

## 2024-06-18 DIAGNOSIS — M25.572 ACUTE LEFT ANKLE PAIN: ICD-10-CM

## 2024-06-18 PROCEDURE — 3078F DIAST BP <80 MM HG: CPT | Performed by: STUDENT IN AN ORGANIZED HEALTH CARE EDUCATION/TRAINING PROGRAM

## 2024-06-18 PROCEDURE — 3017F COLORECTAL CA SCREEN DOC REV: CPT | Performed by: STUDENT IN AN ORGANIZED HEALTH CARE EDUCATION/TRAINING PROGRAM

## 2024-06-18 PROCEDURE — G8417 CALC BMI ABV UP PARAM F/U: HCPCS | Performed by: STUDENT IN AN ORGANIZED HEALTH CARE EDUCATION/TRAINING PROGRAM

## 2024-06-18 PROCEDURE — 4004F PT TOBACCO SCREEN RCVD TLK: CPT | Performed by: STUDENT IN AN ORGANIZED HEALTH CARE EDUCATION/TRAINING PROGRAM

## 2024-06-18 PROCEDURE — 3074F SYST BP LT 130 MM HG: CPT | Performed by: STUDENT IN AN ORGANIZED HEALTH CARE EDUCATION/TRAINING PROGRAM

## 2024-06-18 PROCEDURE — 99213 OFFICE O/P EST LOW 20 MIN: CPT | Performed by: STUDENT IN AN ORGANIZED HEALTH CARE EDUCATION/TRAINING PROGRAM

## 2024-06-18 PROCEDURE — G8427 DOCREV CUR MEDS BY ELIG CLIN: HCPCS | Performed by: STUDENT IN AN ORGANIZED HEALTH CARE EDUCATION/TRAINING PROGRAM

## 2024-06-18 RX ORDER — PREDNISONE 20 MG/1
TABLET ORAL
Qty: 25 TABLET | Refills: 0 | Status: SHIPPED | OUTPATIENT
Start: 2024-06-18 | End: 2024-07-08

## 2024-06-18 NOTE — PROGRESS NOTES
Inhale 3 mLs into the lungs every 6 hours as needed for Wheezing, Disp: 360 mL, Rfl: 3    sildenafil (REVATIO) 20 MG tablet, TAKE 1 TABLET BY MOUTH THREE TIMES A DAY, Disp: 270 tablet, Rfl: 1    atorvastatin (LIPITOR) 40 MG tablet, TAKE 1 TABLET BY MOUTH DAILY TO REPLACE SIMVASTATIN, Disp: 90 tablet, Rfl: 3    JARDIANCE 25 MG tablet, TAKE 1 TABLET BY MOUTH DAILY TO REPLACE 10 MG, Disp: 90 tablet, Rfl: 3    glipiZIDE (GLUCOTROL XL) 5 MG extended release tablet, TAKE 2 TABLETS BY MOUTH EVERY DAY FOR DIABETES (Patient taking differently: Take 1 tablet by mouth daily), Disp: 180 tablet, Rfl: 3    albuterol sulfate HFA (PROVENTIL;VENTOLIN;PROAIR) 108 (90 Base) MCG/ACT inhaler, INHALE 2 PUFFS INTO THE LUNGS 4 TIMES DAILY AS NEEDED FOR WHEEZING., Disp: 8.5 each, Rfl: 5    torsemide (DEMADEX) 20 MG tablet, Take 1 tablet by mouth daily, Disp: 30 tablet, Rfl: 5    diclofenac sodium (VOLTAREN) 1 % GEL, Apply 2 g topically 2 times daily as needed for Pain, Disp: , Rfl:     metFORMIN (GLUCOPHAGE-XR) 500 MG extended release tablet, TAKE 1 TABLET BY MOUTH EVERY DAY WITH BREAKFAST (Patient taking differently: Take 1 tablet by mouth at bedtime), Disp: 90 tablet, Rfl: 3    amLODIPine (NORVASC) 10 MG tablet, TAKE 1 TABLET BY MOUTH EVERY DAY (Patient taking differently: Take 1 tablet by mouth at bedtime TAKE 1 TABLET BY MOUTH EVERY DAY), Disp: 90 tablet, Rfl: 3    fluticasone (FLONASE) 50 MCG/ACT nasal spray, SPRAY 1 SPRAY INTO EACH NOSTRIL EVERY DAY Strength: 50 MCG/ACT, Disp: 1 each, Rfl: 11    fluticasone-umeclidin-vilant (TRELEGY ELLIPTA) 200-62.5-25 MCG/ACT AEPB inhaler, Inhale 1 puff into the lungs daily, Disp: 60 each, Rfl: 11     Examination  Vitals:    06/18/24 1436   BP: 120/60   Site: Right Upper Arm   Position: Sitting   Cuff Size: Large Adult   Pulse: 89   SpO2: (!) 88%   Weight: 122.5 kg (270 lb)      Physical Exam  Constitutional:       General: He is not in acute distress.  HENT:      Mouth/Throat:      Mouth: Mucous

## 2024-06-21 ENCOUNTER — OFFICE VISIT (OUTPATIENT)
Dept: CARDIOLOGY CLINIC | Age: 59
End: 2024-06-21
Payer: COMMERCIAL

## 2024-06-21 VITALS
HEIGHT: 65 IN | WEIGHT: 269.2 LBS | DIASTOLIC BLOOD PRESSURE: 68 MMHG | SYSTOLIC BLOOD PRESSURE: 138 MMHG | BODY MASS INDEX: 44.85 KG/M2 | OXYGEN SATURATION: 93 % | HEART RATE: 75 BPM

## 2024-06-21 DIAGNOSIS — G47.33 OSA (OBSTRUCTIVE SLEEP APNEA): ICD-10-CM

## 2024-06-21 DIAGNOSIS — I50.33 ACUTE ON CHRONIC DIASTOLIC CONGESTIVE HEART FAILURE (HCC): Primary | ICD-10-CM

## 2024-06-21 DIAGNOSIS — I27.21 PULMONARY ARTERIAL HYPERTENSION (HCC): ICD-10-CM

## 2024-06-21 DIAGNOSIS — I10 ESSENTIAL HYPERTENSION: ICD-10-CM

## 2024-06-21 PROCEDURE — 3017F COLORECTAL CA SCREEN DOC REV: CPT | Performed by: INTERNAL MEDICINE

## 2024-06-21 PROCEDURE — 4004F PT TOBACCO SCREEN RCVD TLK: CPT | Performed by: INTERNAL MEDICINE

## 2024-06-21 PROCEDURE — 3075F SYST BP GE 130 - 139MM HG: CPT | Performed by: INTERNAL MEDICINE

## 2024-06-21 PROCEDURE — 99214 OFFICE O/P EST MOD 30 MIN: CPT | Performed by: INTERNAL MEDICINE

## 2024-06-21 PROCEDURE — G8417 CALC BMI ABV UP PARAM F/U: HCPCS | Performed by: INTERNAL MEDICINE

## 2024-06-21 PROCEDURE — G8427 DOCREV CUR MEDS BY ELIG CLIN: HCPCS | Performed by: INTERNAL MEDICINE

## 2024-06-21 PROCEDURE — 3078F DIAST BP <80 MM HG: CPT | Performed by: INTERNAL MEDICINE

## 2024-06-29 PROBLEM — M25.572 ACUTE LEFT ANKLE PAIN: Status: ACTIVE | Noted: 2024-06-29

## 2024-06-30 ENCOUNTER — TELEPHONE (OUTPATIENT)
Dept: CASE MANAGEMENT | Age: 59
End: 2024-06-30

## 2024-07-15 ENCOUNTER — TELEPHONE (OUTPATIENT)
Dept: ORTHOPEDIC SURGERY | Age: 59
End: 2024-07-15

## 2024-07-15 NOTE — TELEPHONE ENCOUNTER
General Question     Subject: FOR BHUMIKA  Patient and /or Facility Request: Alvarado Sibley \"Ed\"   Contact Number: [phone]     THIS PT WAS REFERRED BY [facility] FOR FOOT/ANKLE PAIN.  NO ER/NO XR

## 2024-07-18 ENCOUNTER — OFFICE VISIT (OUTPATIENT)
Dept: INTERNAL MEDICINE CLINIC | Age: 59
End: 2024-07-18
Payer: COMMERCIAL

## 2024-07-18 ENCOUNTER — OFFICE VISIT (OUTPATIENT)
Dept: ORTHOPEDIC SURGERY | Age: 59
End: 2024-07-18

## 2024-07-18 VITALS
WEIGHT: 266 LBS | SYSTOLIC BLOOD PRESSURE: 118 MMHG | BODY MASS INDEX: 44.26 KG/M2 | DIASTOLIC BLOOD PRESSURE: 52 MMHG | OXYGEN SATURATION: 80 % | HEART RATE: 87 BPM

## 2024-07-18 VITALS — WEIGHT: 269 LBS | BODY MASS INDEX: 44.82 KG/M2 | HEIGHT: 65 IN

## 2024-07-18 DIAGNOSIS — E11.29 TYPE 2 DIABETES MELLITUS WITH DIABETIC MICROALBUMINURIA, WITHOUT LONG-TERM CURRENT USE OF INSULIN (HCC): Primary | ICD-10-CM

## 2024-07-18 DIAGNOSIS — M25.572 CHRONIC PAIN OF LEFT ANKLE: Primary | ICD-10-CM

## 2024-07-18 DIAGNOSIS — E11.42 DIABETIC POLYNEUROPATHY ASSOCIATED WITH TYPE 2 DIABETES MELLITUS (HCC): ICD-10-CM

## 2024-07-18 DIAGNOSIS — G89.29 CHRONIC PAIN OF LEFT ANKLE: Primary | ICD-10-CM

## 2024-07-18 DIAGNOSIS — L98.9 SKIN LESION: ICD-10-CM

## 2024-07-18 DIAGNOSIS — R80.9 TYPE 2 DIABETES MELLITUS WITH DIABETIC MICROALBUMINURIA, WITHOUT LONG-TERM CURRENT USE OF INSULIN (HCC): Primary | ICD-10-CM

## 2024-07-18 LAB
ALBUMIN SERPL-MCNC: 4.2 G/DL (ref 3.4–5)
ALBUMIN/GLOB SERPL: 1.2 {RATIO} (ref 1.1–2.2)
ALP SERPL-CCNC: 157 U/L (ref 40–129)
ALT SERPL-CCNC: 15 U/L (ref 10–40)
ANION GAP SERPL CALCULATED.3IONS-SCNC: 12 MMOL/L (ref 3–16)
AST SERPL-CCNC: 15 U/L (ref 15–37)
BILIRUB SERPL-MCNC: 0.7 MG/DL (ref 0–1)
BUN SERPL-MCNC: 23 MG/DL (ref 7–20)
CALCIUM SERPL-MCNC: 9.5 MG/DL (ref 8.3–10.6)
CHLORIDE SERPL-SCNC: 94 MMOL/L (ref 99–110)
CO2 SERPL-SCNC: 29 MMOL/L (ref 21–32)
CREAT SERPL-MCNC: 1.2 MG/DL (ref 0.8–1.3)
EST. AVERAGE GLUCOSE BLD GHB EST-MCNC: 208.7 MG/DL
GFR SERPLBLD CREATININE-BSD FMLA CKD-EPI: 69 ML/MIN/{1.73_M2}
GLUCOSE SERPL-MCNC: 185 MG/DL (ref 70–99)
HBA1C MFR BLD: 8.9 %
POTASSIUM SERPL-SCNC: 4.4 MMOL/L (ref 3.5–5.1)
PROT SERPL-MCNC: 7.8 G/DL (ref 6.4–8.2)
SODIUM SERPL-SCNC: 135 MMOL/L (ref 136–145)

## 2024-07-18 PROCEDURE — G8427 DOCREV CUR MEDS BY ELIG CLIN: HCPCS | Performed by: STUDENT IN AN ORGANIZED HEALTH CARE EDUCATION/TRAINING PROGRAM

## 2024-07-18 PROCEDURE — 3052F HG A1C>EQUAL 8.0%<EQUAL 9.0%: CPT | Performed by: STUDENT IN AN ORGANIZED HEALTH CARE EDUCATION/TRAINING PROGRAM

## 2024-07-18 PROCEDURE — 3074F SYST BP LT 130 MM HG: CPT | Performed by: STUDENT IN AN ORGANIZED HEALTH CARE EDUCATION/TRAINING PROGRAM

## 2024-07-18 PROCEDURE — 2022F DILAT RTA XM EVC RTNOPTHY: CPT | Performed by: STUDENT IN AN ORGANIZED HEALTH CARE EDUCATION/TRAINING PROGRAM

## 2024-07-18 PROCEDURE — 3017F COLORECTAL CA SCREEN DOC REV: CPT | Performed by: STUDENT IN AN ORGANIZED HEALTH CARE EDUCATION/TRAINING PROGRAM

## 2024-07-18 PROCEDURE — 3078F DIAST BP <80 MM HG: CPT | Performed by: STUDENT IN AN ORGANIZED HEALTH CARE EDUCATION/TRAINING PROGRAM

## 2024-07-18 PROCEDURE — G8417 CALC BMI ABV UP PARAM F/U: HCPCS | Performed by: STUDENT IN AN ORGANIZED HEALTH CARE EDUCATION/TRAINING PROGRAM

## 2024-07-18 PROCEDURE — 83036 HEMOGLOBIN GLYCOSYLATED A1C: CPT | Performed by: STUDENT IN AN ORGANIZED HEALTH CARE EDUCATION/TRAINING PROGRAM

## 2024-07-18 PROCEDURE — 99213 OFFICE O/P EST LOW 20 MIN: CPT | Performed by: STUDENT IN AN ORGANIZED HEALTH CARE EDUCATION/TRAINING PROGRAM

## 2024-07-18 PROCEDURE — 4004F PT TOBACCO SCREEN RCVD TLK: CPT | Performed by: STUDENT IN AN ORGANIZED HEALTH CARE EDUCATION/TRAINING PROGRAM

## 2024-07-18 PROCEDURE — 36415 COLL VENOUS BLD VENIPUNCTURE: CPT | Performed by: STUDENT IN AN ORGANIZED HEALTH CARE EDUCATION/TRAINING PROGRAM

## 2024-07-18 RX ORDER — TRIAMCINOLONE ACETONIDE 1 MG/G
OINTMENT TOPICAL 2 TIMES DAILY
Qty: 80 G | Refills: 0 | Status: SHIPPED | OUTPATIENT
Start: 2024-07-18 | End: 2024-07-25

## 2024-07-18 NOTE — PROGRESS NOTES
Abdominal:      General: Abdomen is flat. There is no distension.      Palpations: Abdomen is soft.      Tenderness: There is no abdominal tenderness.   Skin:     General: Skin is warm and dry.      Coloration: Skin is not jaundiced or pale.      Findings: No erythema.   Neurological:      General: No focal deficit present.      Mental Status: He is alert and oriented to person, place, and time.          Assessment and Plan  Alvarado Sibley presents to clinic for follow up  Chronic hypoxic respiratory failure    Pulmonary hypertension    T2DM: A1c is  today      See visit diagnosis and associated orders below.  Problem List    None        Oscar Medina MD      Discussed use, benefit, and side effects of prescribed medications.  Barriers to medication compliance addressed.  Discussed all ordered testing and labs. All patient questions answered. Patient agreeable with plan above.     Please note that this chart was generated using dragon dictation software.  Although every effort was made to ensure the accuracy of this automated transcription, some errors in transcription may have occurred.

## 2024-07-18 NOTE — PROGRESS NOTES
Alvarado Sibley is seen today for left ankle pain.  He began having pain several months ago.  He feels like he overstretched.  He complains of lateral ankle pain at 7 out of 10.    He had prednisone which did not help.  He has been using crutches intermittently.        Past medical history significant for severe COPD, hypertension, pulmonary hypertension, type 2 diabetes  I saw him after 2021 for right knee pain.      Most recent hemoglobin A1c dated 1/4/2024 is 8.8      History: Patient's relevant past family, medical, and social history are reviewed as part of today's visit. ROS of pertinent positives and negatives as above; otherwise negative.    General Exam:    Vitals: Height 1.651 m (5' 5\"), weight 122 kg (269 lb).  Constitutional: Patient is adequately groomed with no evidence of malnutrition  Mental Status: The patient is oriented to time, place and person.  The patient's mood and affect are appropriate.  Gait:  Patient walks with an antalgic gait lymphatic: The lymphatic examination bilaterally reveals all areas to be without enlargement or induration.  Vascular: Examination reveals pitting edema in the bilateral tibia.      Neurological: The patient has good coordination.  He complains of neuropathy.      Skin:    Head/Neck: inspection reveals no rashes, ulcerations or lesions.  Trunk:  inspection reveals no rashes, ulcerations or lesions.  Bilateral lower extremities show venous stasis      Left foot shows toenails that are poorly managed.  He complains of pain laterally in the ankle but there is no significant swelling, cellulitis or erythema.    Xrays of the left ankle were obtained today in the office and interpreted by me.  AP, Lateral and mortise views demonstrate: No acute bony abnormalities.  He has diffuse degenerative change with plantar and Achilles spurs on the calcaneus      Assessment: Chronic left ankle pain in the setting of diabetic neuropathy.      Plan: I believe he needs to be managed by a

## 2024-07-22 NOTE — TELEPHONE ENCOUNTER
Pt is requesting a refill for his torsemide (DEMADEX) 20 MG tablet     Please send to [facility] in [place]    Thank you

## 2024-07-22 NOTE — TELEPHONE ENCOUNTER
Future Appointments   Date Time Provider Department Center   9/4/2024  8:00 AM Grabiel Armenta MD  [facility]   10/24/2024  8:45 AM Oscar Medina MD [facility] [place]   12/12/2024  8:30 AM Yun Diaz DO [place] [facility]       Last appt 7/18/24

## 2024-07-23 RX ORDER — TORSEMIDE 20 MG/1
20 TABLET ORAL DAILY
Qty: 30 TABLET | Refills: 5 | Status: SHIPPED | OUTPATIENT
Start: 2024-07-23

## 2024-08-23 DIAGNOSIS — I27.21 PULMONARY ARTERIAL HYPERTENSION (HCC): ICD-10-CM

## 2024-08-26 DIAGNOSIS — I27.21 PULMONARY ARTERIAL HYPERTENSION (HCC): ICD-10-CM

## 2024-08-26 RX ORDER — SILDENAFIL CITRATE 20 MG/1
20 TABLET ORAL 3 TIMES DAILY
Qty: 270 TABLET | Refills: 1 | OUTPATIENT
Start: 2024-08-26

## 2024-08-26 NOTE — TELEPHONE ENCOUNTER
Medication:   Requested Prescriptions     Pending Prescriptions Disp Refills    sildenafil (REVATIO) 20 MG tablet 270 tablet 1     Sig: Take 1 tablet by mouth 3 times daily       Last Filled:      Patient Phone Number: [phone] (home) [phone] (work)    Last appt: 7/18/2024   Next appt: 10/24/2024  Future Appointments   Date Time Provider Department Center   9/4/2024  8:00 AM Grabiel Armenta MD [facility]   10/24/2024  8:45 AM Oscar Medina MD [facility]   12/12/2024  8:30 AM Yun Diaz DO [place] [facility]

## 2024-08-26 NOTE — TELEPHONE ENCOUNTER
Pt requesting a refill of   sildenafil (REVATIO) 20 MG tablet      [facility]/pharmacy #[id number] - [place], [place] - [address] [place] [address] - [address] [phone] - F [phone]

## 2024-08-26 NOTE — TELEPHONE ENCOUNTER
Future Appointments   Date Time Provider Department Center   9/4/2024  8:00 AM Grabiel Armenta MD  [facility]   10/24/2024  8:45 AM Oscar Medina MD [facility]   12/12/2024  8:30 AM Yun Diaz DO [place] [facility]       Last appt 7/18/24

## 2024-08-27 RX ORDER — SILDENAFIL CITRATE 20 MG/1
20 TABLET ORAL 3 TIMES DAILY
Qty: 270 TABLET | Refills: 1 | Status: SHIPPED | OUTPATIENT
Start: 2024-08-27

## 2024-08-31 ENCOUNTER — TELEPHONE (OUTPATIENT)
Dept: CASE MANAGEMENT | Age: 59
End: 2024-08-31

## 2024-09-04 ENCOUNTER — OFFICE VISIT (OUTPATIENT)
Dept: PULMONOLOGY | Age: 59
End: 2024-09-04
Payer: COMMERCIAL

## 2024-09-04 VITALS
TEMPERATURE: 98.7 F | OXYGEN SATURATION: 88 % | RESPIRATION RATE: 18 BRPM | SYSTOLIC BLOOD PRESSURE: 150 MMHG | WEIGHT: 278 LBS | BODY MASS INDEX: 46.32 KG/M2 | HEART RATE: 87 BPM | HEIGHT: 65 IN | DIASTOLIC BLOOD PRESSURE: 78 MMHG

## 2024-09-04 DIAGNOSIS — I27.21 PULMONARY ARTERIAL HYPERTENSION (HCC): ICD-10-CM

## 2024-09-04 DIAGNOSIS — Z87.891 PERSONAL HISTORY OF TOBACCO USE: Primary | ICD-10-CM

## 2024-09-04 DIAGNOSIS — G47.33 OSA (OBSTRUCTIVE SLEEP APNEA): ICD-10-CM

## 2024-09-04 DIAGNOSIS — E66.01 OBESITY, CLASS III, BMI 40-49.9 (MORBID OBESITY) (HCC): ICD-10-CM

## 2024-09-04 DIAGNOSIS — J44.9 COPD, SEVERE (HCC): ICD-10-CM

## 2024-09-04 PROCEDURE — G0296 VISIT TO DETERM LDCT ELIG: HCPCS | Performed by: INTERNAL MEDICINE

## 2024-09-04 PROCEDURE — G8417 CALC BMI ABV UP PARAM F/U: HCPCS | Performed by: INTERNAL MEDICINE

## 2024-09-04 PROCEDURE — 3017F COLORECTAL CA SCREEN DOC REV: CPT | Performed by: INTERNAL MEDICINE

## 2024-09-04 PROCEDURE — 3078F DIAST BP <80 MM HG: CPT | Performed by: INTERNAL MEDICINE

## 2024-09-04 PROCEDURE — 3023F SPIROM DOC REV: CPT | Performed by: INTERNAL MEDICINE

## 2024-09-04 PROCEDURE — 4004F PT TOBACCO SCREEN RCVD TLK: CPT | Performed by: INTERNAL MEDICINE

## 2024-09-04 PROCEDURE — 3077F SYST BP >= 140 MM HG: CPT | Performed by: INTERNAL MEDICINE

## 2024-09-04 PROCEDURE — G8427 DOCREV CUR MEDS BY ELIG CLIN: HCPCS | Performed by: INTERNAL MEDICINE

## 2024-09-04 PROCEDURE — 99214 OFFICE O/P EST MOD 30 MIN: CPT | Performed by: INTERNAL MEDICINE

## 2024-09-04 RX ORDER — FLUTICASONE FUROATE, UMECLIDINIUM BROMIDE AND VILANTEROL TRIFENATATE 200; 62.5; 25 UG/1; UG/1; UG/1
1 POWDER RESPIRATORY (INHALATION) DAILY
Qty: 1 EACH | Refills: 0 | Status: SHIPPED | COMMUNITY
Start: 2024-09-04

## 2024-09-04 ASSESSMENT — COPD QUESTIONNAIRES
QUESTION7_SLEEPQUALITY: 4
QUESTION8_ENERGYLEVEL: 3
QUESTION3_CHESTTIGHTNESS: 3
QUESTION2_CHESTPHLEGM: 3
QUESTION1_COUGHFREQUENCY: 2
CAT_TOTALSCORE: 25
QUESTION4_WALKINCLINE: 4
QUESTION6_LEAVINGHOUSE: 3
QUESTION5_HOMEACTIVITIES: 3

## 2024-09-04 NOTE — ASSESSMENT & PLAN NOTE
Patient now agreeable to referral to PAH specialist for additional treatment.  He as been concerned about cost.      Referred to Dr. Post.        Currently has 4+ edema and appears uncontrolled.

## 2024-09-04 NOTE — ASSESSMENT & PLAN NOTE
Trelegy by samples secondary to cost fo $300 per month.  Additional sample given.       Advised to call insurance to get formulary and consider mail order for cost effective.

## 2024-09-04 NOTE — PROGRESS NOTES
REASON FOR CONSULTATION/CC: copd  Chief Complaint   Patient presents with    COPD        Consult at request of  Oscar Medina MD for     PCP: Oscar Medina MD    HISTORY OF PRESENT ILLNESS: Alvarado Sibley is a 59 y.o. year old male with a history of  who presents             PAH     Sildenafil  3-4 plus edema.  Still on lasix.    Still having some shortness of breath     Cost: ~ $24 per month.     CHF  Torsemide  Has not used today.      Copd  Albuterol  Trelegy via sample.    Using nebulizer prior to work.        Hypoxemia  88%.    Not using portable oxygen.   Still has o2 at home.    Using at [facility] with cpap     SILVANA  Cpap .     Obesity  Still uncontrolled.   Discussed.             Objective:   PHYSICAL EXAM:  Blood pressure (!) 150/78, pulse 87, temperature 98.7 °F (37.1 °C), resp. rate 18, height 1.651 m (5' 5\"), weight 126.1 kg (278 lb), SpO2 (!) 88%.'  Gen: No distress.    Eyes: PERRL. No sclera icterus. No conjunctival injection.   ENT: No discharge. Pharynx clear. External appearance of ears and nose normal.  Neck: Trachea midline. No obvious mass.    Resp: No accessory muscle use. No crackles. No wheezes. No rhonchi.    CV: Regular rate. Regular rhythm. No murmur or rub. ++++  edema.   GI:    Skin: Warm, dry, normal texture and turgor. No nodule on exposed extremities.   Lymph: No cervical LAD. No supraclavicular LAD.   M/S: No cyanosis. No clubbing. No joint deformity.    Neuro: Moves all four extremities.    Psych: Oriented x 3. No anxiety.  Awake. Alert. Intact judgement and insight.    Current Outpatient Medications   Medication Sig Dispense Refill    fluticasone-umeclidin-vilant (TRELEGY ELLIPTA) 200-62.5-25 MCG/ACT AEPB inhaler Inhale 1 puff into the lungs daily 1 each 0    sildenafil (REVATIO) 20 MG tablet Take 1 tablet by mouth 3 times daily 270 tablet 1    torsemide (DEMADEX) 20 MG tablet Take 1 tablet by mouth daily 30 tablet 5    albuterol (ACCUNEB) 1.25 MG/3ML nebulizer solution Inhale 3 mLs

## 2024-09-04 NOTE — PATIENT INSTRUCTIONS
Kristin = Breztri     Or     Symbicort with Spiriva   Breo with Incruse              Learning About Lung Cancer Screening  What is screening for lung cancer?     Lung cancer screening is a way to find some lung cancers early, before a person has any symptoms of the cancer.  Lung cancer screening may help those who have the highest risk for lung cancer--people age 50 and older who are or were heavy smokers. For most people, who aren't at increased risk, screening for lung cancer probably isn't helpful.  Screening won't prevent cancer. And it may not find all lung cancers. Lung cancer screening may lower the risk of dying from lung cancer in a small number of people.  How is it done?  Lung cancer screening is done with a low-dose CT (computed tomography) scan. A CT scan uses X-rays, or radiation, to make detailed pictures of your body. Experts recommend that screening be done in medical centers that focus on finding and treating lung cancer.  Who is screening recommended for?  Lung cancer screening is recommended for people age 50 and older who are or were heavy smokers. That means people with a smoking history of at least 20 pack years. A pack year is a way to measure how heavy a smoker you are or were.  To figure out your pack years, multiply how many packs a day on average (assuming 20 cigarettes per pack) you have smoked by how many years you have smoked. For example:  If you smoked 1 pack a day for 20 years, that's 1 times 20. So you have a smoking history of 20 pack years.  If you smoked 2 packs a day for 10 years, that's 2 times 10. So you have a smoking history of 20 pack years.  Experts agree that screening is for people who have a high risk of lung cancer. But experts don't agree on what high risk means. Some say people age 50 or older with at least a 20-pack-year smoking history are high risk. Others say it's people age 55 or older with a 30-pack-year history.  To see if you could benefit from screening,

## 2024-09-05 ENCOUNTER — TELEPHONE (OUTPATIENT)
Dept: CARDIOLOGY CLINIC | Age: 59
End: 2024-09-05

## 2024-09-30 ENCOUNTER — HOSPITAL ENCOUNTER (OUTPATIENT)
Dept: CT IMAGING | Age: 59
Discharge: HOME | End: 2024-09-30
Attending: INTERNAL MEDICINE
Payer: COMMERCIAL

## 2024-09-30 DIAGNOSIS — Z87.891 PERSONAL HISTORY OF TOBACCO USE: ICD-10-CM

## 2024-09-30 PROCEDURE — 71271 CT THORAX LUNG CANCER SCR C-: CPT

## 2024-09-30 NOTE — RESULT ENCOUNTER NOTE
Please call with results:     1. Emphysema seen as expected  2. No sign of cancer.  Next screening in 1 year

## 2024-10-01 ENCOUNTER — TELEPHONE (OUTPATIENT)
Dept: CARDIOLOGY CLINIC | Age: 59
End: 2024-10-01

## 2024-10-01 NOTE — TELEPHONE ENCOUNTER
The patient  phoned to schedule an appointment with [facility]. He was referred to [facility] by Grabiel Armenta MD.  Please advise.  Thank you    Please call patient between 11-2pm

## 2024-10-24 ENCOUNTER — OFFICE VISIT (OUTPATIENT)
Dept: INTERNAL MEDICINE CLINIC | Age: 59
End: 2024-10-24

## 2024-10-24 VITALS
WEIGHT: 270 LBS | BODY MASS INDEX: 44.93 KG/M2 | SYSTOLIC BLOOD PRESSURE: 130 MMHG | HEART RATE: 80 BPM | DIASTOLIC BLOOD PRESSURE: 50 MMHG

## 2024-10-24 DIAGNOSIS — M25.562 CHRONIC PAIN OF BOTH KNEES: ICD-10-CM

## 2024-10-24 DIAGNOSIS — M25.561 CHRONIC PAIN OF BOTH KNEES: ICD-10-CM

## 2024-10-24 DIAGNOSIS — R80.9 TYPE 2 DIABETES MELLITUS WITH DIABETIC MICROALBUMINURIA, WITHOUT LONG-TERM CURRENT USE OF INSULIN (HCC): Primary | ICD-10-CM

## 2024-10-24 DIAGNOSIS — G89.29 CHRONIC PAIN OF BOTH KNEES: ICD-10-CM

## 2024-10-24 DIAGNOSIS — E11.29 TYPE 2 DIABETES MELLITUS WITH DIABETIC MICROALBUMINURIA, WITHOUT LONG-TERM CURRENT USE OF INSULIN (HCC): Primary | ICD-10-CM

## 2024-10-24 LAB — HBA1C MFR BLD: 7.6 %

## 2024-10-24 RX ORDER — GABAPENTIN 300 MG/1
300 CAPSULE ORAL 3 TIMES DAILY
Qty: 90 CAPSULE | Refills: 5 | Status: SHIPPED | OUTPATIENT
Start: 2024-10-24 | End: 2025-04-22

## 2024-10-24 RX ORDER — FLUTICASONE PROPIONATE 50 MCG
SPRAY, SUSPENSION (ML) NASAL
Qty: 1 EACH | Refills: 11 | Status: SHIPPED | OUTPATIENT
Start: 2024-10-24

## 2024-10-24 NOTE — PROGRESS NOTES
Alvarado Sibley (:  1965) is a 59 y.o. male,Established patient, here for evaluation of the following chief complaint(s):  3 Month Follow-Up         Assessment & Plan  Type 2 diabetes mellitus with diabetic microalbuminuria, without long-term current use of insulin (HCC)       Orders:    POCT glycosylated hemoglobin (Hb A1C)      No follow-ups on file.       Subjective   HPI    Review of Systems       Objective   Physical Exam             An electronic signature was used to authenticate this note.    --Oscar Medina MD

## 2024-11-04 NOTE — PROGRESS NOTES
[facility]  Advanced CHF/Pulmonary Hypertension   Cardiac Evaluation      Alvarado Sibley  YOB: 1965    Date of Visit:  11/15/24      Chief Complaint   Patient presents with    New Patient    Shortness of Breath    Edema            History of Present Illness:  Alvarado Sibley is a 59 y.o. male who presents from referral from Grabiel Armenta MD for consultation and management of pulmonary hypertension.    Hx of Chronic diastolic heart failure, NYHA Class II, PAH,  Sildenafil under the care of Pulmonology, Severe COPD, requiring nighttime O2, SILVANA on CPAP, Hypertension , NIDDM, A1c 8.8, Obesity.     Orthopedic: Misbah Cervantes MD   Pulmonary:  Grabiel Armenta MD   Cardiology:  Yun Diaz DO   PCP: Follows with Oscar Medina MD    Uses CPAP      He is a new pt and presents today for management of chronic diastolic CHF. Last EF 65% on 3/1/24. Last saw Dr. Armenta for pulmonary on 9/4/24. He has put off getting help with PAH because of cost. He feels like the sildenafil worked better when he first started taking it, \"Like it's slowing down now\". He is taking Jardiance for blood sugar. His goal is to get out and be able to walk for exercise. He works in a factory, a lot of standing. Wears oxygen at home, but not a work and not consistently. He is still smoking. Smoking cessation and education provided. Pt's lips are cyanotic. Had a discussion about how important it is to start wearing his oxygen more often.  He is quite hypoxic in the office today with cyanosis of the lips.  He is not symptomatic.    Patient will be started on new medication spironolactone 25 MG daily for SOB. It can also reduce swelling related to heart disease. Patient verbalizes understanding of the need for treatment and education provided at today's visit. Additional education materials will be provided in the AVS.       EKG READY BY ME TODAY: NSR 77 BPM    WHO GROUP 1 and 3    Allergies   Allergen

## 2024-11-15 ENCOUNTER — OFFICE VISIT (OUTPATIENT)
Dept: CARDIOLOGY CLINIC | Age: 59
End: 2024-11-15

## 2024-11-15 ENCOUNTER — TELEPHONE (OUTPATIENT)
Dept: CARDIOLOGY CLINIC | Age: 59
End: 2024-11-15

## 2024-11-15 VITALS
BODY MASS INDEX: 44.65 KG/M2 | HEART RATE: 83 BPM | WEIGHT: 268 LBS | HEIGHT: 65 IN | SYSTOLIC BLOOD PRESSURE: 120 MMHG | DIASTOLIC BLOOD PRESSURE: 56 MMHG | OXYGEN SATURATION: 78 %

## 2024-11-15 DIAGNOSIS — I27.21 PULMONARY ARTERIAL HYPERTENSION (HCC): Primary | ICD-10-CM

## 2024-11-15 DIAGNOSIS — R06.02 SHORTNESS OF BREATH: ICD-10-CM

## 2024-11-15 DIAGNOSIS — I50.32 CHRONIC DIASTOLIC HEART FAILURE (HCC): ICD-10-CM

## 2024-11-15 DIAGNOSIS — J43.9 PULMONARY EMPHYSEMA, UNSPECIFIED EMPHYSEMA TYPE (HCC): ICD-10-CM

## 2024-11-15 DIAGNOSIS — I10 ESSENTIAL HYPERTENSION: ICD-10-CM

## 2024-11-15 RX ORDER — SPIRONOLACTONE 25 MG/1
25 TABLET ORAL DAILY
Qty: 60 TABLET | Refills: 3 | Status: SHIPPED | OUTPATIENT
Start: 2024-11-15

## 2024-11-15 RX ORDER — SILDENAFIL CITRATE 20 MG/1
40 TABLET ORAL 3 TIMES DAILY
Qty: 546 TABLET | Refills: 3 | Status: SHIPPED | OUTPATIENT
Start: 2024-11-15

## 2024-11-15 NOTE — TELEPHONE ENCOUNTER
Submitted PA for Sildenafil Citrate (PAH) 20MG tablets   Via CM Key: [id number]  STATUS: PENDING.    Follow up done daily; if no decision with in three days we will refax.  If another three days goes by with no decision will call the insurance for status.

## 2024-11-15 NOTE — PATIENT INSTRUCTIONS
Start Spironolactone 25 MG daily for SOB and swelling on Monday, Wednesday, and Friday  Try getting portable oxygen machine   See Kaya Griffin in 3 months  Double your dose of Sildenafil to 40 mg three times a day  Continue Torsemide daily for swelling in legs and belly  Routine labs in one month:BMP and BMP  Follow up with Dr. Palomares in 6 months

## 2024-11-15 NOTE — TELEPHONE ENCOUNTER
Approved today by Em[facility]  Request Reference Number: [id number]. SILDENAFIL TAB 20MG is approved through 05/15/2025. Your patient may now fill this prescription and it will be covered.  Authorization Expiration Date: 5/15/2025

## 2024-11-15 NOTE — TELEPHONE ENCOUNTER
PA needed  for Sildenafil 40mg TID for PAH.  Dr. Palomares is increasing the dose due to worsening shortness of breath and hypoxia.     Pt has hypoxia.  Saturation in office today 78% on room air.     WHO group 1 and 3.     RHC 3/12/24     Right Heart Catheterization  RA: 4 mmHg  RV: 53/4, 6  PA: 56/24 and mean of 35 mmHg  PCWP: 12 mmHg

## 2024-12-10 ENCOUNTER — TELEPHONE (OUTPATIENT)
Dept: CARDIOLOGY CLINIC | Age: 59
End: 2024-12-10

## 2024-12-10 NOTE — TELEPHONE ENCOUNTER
Patient is scheduled 12/12/24 at [facility]  That office was cancelled  Unable to reach Ed by his mobile- VM not set up  Work number keeps ringing and unable to leave message     Called brother Adam- asked him to have Ed call us    Just need to cancel OV with Dr. Diaz this week   Looks like Dr. Palomares wanted Ed to see CAROLINA Kirkpatrick in Feb/March by her ov in December

## 2024-12-12 DIAGNOSIS — R06.02 SHORTNESS OF BREATH: ICD-10-CM

## 2024-12-12 DIAGNOSIS — I50.32 CHRONIC DIASTOLIC HEART FAILURE (HCC): ICD-10-CM

## 2024-12-12 DIAGNOSIS — I27.21 PULMONARY ARTERIAL HYPERTENSION (HCC): ICD-10-CM

## 2024-12-12 LAB
ANION GAP SERPL CALCULATED.3IONS-SCNC: 13 MMOL/L (ref 3–16)
BUN SERPL-MCNC: 30 MG/DL (ref 7–20)
CALCIUM SERPL-MCNC: 9.7 MG/DL (ref 8.3–10.6)
CHLORIDE SERPL-SCNC: 97 MMOL/L (ref 99–110)
CO2 SERPL-SCNC: 29 MMOL/L (ref 21–32)
CREAT SERPL-MCNC: 1.4 MG/DL (ref 0.8–1.3)
GFR SERPLBLD CREATININE-BSD FMLA CKD-EPI: 57 ML/MIN/{1.73_M2}
GLUCOSE SERPL-MCNC: 182 MG/DL (ref 70–99)
NT-PROBNP SERPL-MCNC: 214 PG/ML (ref 0–124)
POTASSIUM SERPL-SCNC: 4 MMOL/L (ref 3.5–5.1)
SODIUM SERPL-SCNC: 139 MMOL/L (ref 136–145)

## 2024-12-13 ENCOUNTER — TELEPHONE (OUTPATIENT)
Dept: CARDIOLOGY CLINIC | Age: 59
End: 2024-12-13

## 2024-12-13 NOTE — TELEPHONE ENCOUNTER
----- Message from Dr. Estella Palomares MD sent at 12/13/2024  3:56 PM EST -----  Please call patient and let him know that his labs look stable.  No changes.  Estella Palomares

## 2024-12-17 ENCOUNTER — TELEPHONE (OUTPATIENT)
Dept: CARDIOLOGY CLINIC | Age: 59
End: 2024-12-17

## 2024-12-17 NOTE — TELEPHONE ENCOUNTER
----- Message from Dr. Estella Palomares MD sent at 12/13/2024  3:56 PM EST -----  Please call patient and let him know that his labs look stable.  No changes.  Estella Palomares   Care Management Initial Consult    General Information  Assessment completed with: Patient, Spouse or significant other, VM-chart review,    Type of CM/SW Visit: Initial Assessment    Primary Care Provider verified and updated as needed: Yes (none here, willing to go to [facility]  for [facility])   Readmission within the last 30 days: other (see comments) (escalation of symptoms,)   Return Category: Exacerbation of disease  Reason for Consult: discharge planning, financial concerns  Advance Care Planning: Advance Care Planning Reviewed: other (see comments), verified with patient (none)          Communication Assessment  Patient's communication style: spoken language (English or Bilingual)    Hearing Difficulty or Deaf: no   Wear Glasses or Blind: yes    Cognitive  Cognitive/Neuro/Behavioral: WDL  Level of Consciousness: alert                   Living Environment:   People in home: significant other  Maryam  Current living Arrangements: house      Able to return to prior arrangements: yes       Family/Social Support:  Care provided by: self  Provides care for: no one  Marital Status: Lives with Significant Other  Significant Other       Maryam  Description of Support System: Supportive, Involved         Current Resources:   Patient receiving home care services: No     Community Resources: None  Equipment currently used at home: none  Supplies currently used at home: None    Employment/Financial:  Employment Status: unemployed        Financial Concerns: unemployed, insurance, none   Referral to Financial Worker: Yes       Does the patient's insurance plan have a 3 day qualifying hospital stay waiver?  No    Lifestyle & Psychosocial Needs:  Social Determinants of Health     Tobacco Use: Medium Risk (6/22/2023)    Patient History      Smoking Tobacco Use: Former      Smokeless Tobacco Use: Never      Passive Exposure: Not on file   Alcohol Use: Not on file   Financial Resource Strain: Not on file   Food Insecurity: Not  on file   Transportation Needs: Not on file   Physical Activity: Not on file   Stress: Not on file   Social Connections: Not on file   Intimate Partner Violence: Not on file   Depression: Not on file   Housing Stability: Not on file       Functional Status:  Prior to admission patient needed assistance:   Dependent ADLs:: Independent  Dependent IADLs:: Independent       Mental Health Status:  Mental Health Status: No Current Concerns       Chemical Dependency Status:  Chemical Dependency Status: No Current Concerns             Values/Beliefs:  Spiritual, Cultural Beliefs, Cheondoism Practices, Values that affect care: no               Additional Information:  Initial consult done with patient and significant other. Patient here visiting from [place] for about 6 months. Patient and significant other have rented a home in [place]. Financial counselors have assessed patient and patient is not eligible for any programs they an assist with. Maryam asks if there is any temporary international insurance they can purchase, inquiry sent to financial counselors. Patient is agreeable to PCP through [facility] in [place].    The home where patient is staying is at:  [address] [place] [address]. [place] [phone] updated by financial counselors, there are no resources for international patients they have. Writer updated patient and significant other. Maryam will search online to see if programs are available.     Meghna Parikh RN   [facility]   Phone [phone]

## 2024-12-23 RX ORDER — TORSEMIDE 20 MG/1
20 TABLET ORAL DAILY
Qty: 90 TABLET | Refills: 1 | Status: SHIPPED | OUTPATIENT
Start: 2024-12-23

## 2025-01-19 DIAGNOSIS — I10 ESSENTIAL HYPERTENSION: ICD-10-CM

## 2025-01-20 NOTE — TELEPHONE ENCOUNTER
Future Appointments   Date Time Provider Department Center   1/22/2025  9:30 AM Oscar Medina MD [facility] BS ECC DEP   3/10/2025  8:00 AM Grabiel Armenta MD  PULM [facility]   5/19/2025  8:00 AM Estella Palomares MD FF Cardio [facility]       Last appt 10/24/24

## 2025-01-22 ENCOUNTER — OFFICE VISIT (OUTPATIENT)
Dept: INTERNAL MEDICINE CLINIC | Age: 60
End: 2025-01-22
Payer: COMMERCIAL

## 2025-01-22 VITALS
SYSTOLIC BLOOD PRESSURE: 110 MMHG | BODY MASS INDEX: 45.43 KG/M2 | DIASTOLIC BLOOD PRESSURE: 50 MMHG | OXYGEN SATURATION: 81 % | HEART RATE: 93 BPM | WEIGHT: 273 LBS

## 2025-01-22 DIAGNOSIS — R80.9 TYPE 2 DIABETES MELLITUS WITH DIABETIC MICROALBUMINURIA, WITHOUT LONG-TERM CURRENT USE OF INSULIN (HCC): ICD-10-CM

## 2025-01-22 DIAGNOSIS — I10 ESSENTIAL HYPERTENSION: Primary | ICD-10-CM

## 2025-01-22 DIAGNOSIS — J44.9 COPD, SEVERE (HCC): ICD-10-CM

## 2025-01-22 DIAGNOSIS — E11.29 TYPE 2 DIABETES MELLITUS WITH DIABETIC MICROALBUMINURIA, WITHOUT LONG-TERM CURRENT USE OF INSULIN (HCC): ICD-10-CM

## 2025-01-22 PROCEDURE — 3078F DIAST BP <80 MM HG: CPT | Performed by: STUDENT IN AN ORGANIZED HEALTH CARE EDUCATION/TRAINING PROGRAM

## 2025-01-22 PROCEDURE — G8427 DOCREV CUR MEDS BY ELIG CLIN: HCPCS | Performed by: STUDENT IN AN ORGANIZED HEALTH CARE EDUCATION/TRAINING PROGRAM

## 2025-01-22 PROCEDURE — 3074F SYST BP LT 130 MM HG: CPT | Performed by: STUDENT IN AN ORGANIZED HEALTH CARE EDUCATION/TRAINING PROGRAM

## 2025-01-22 PROCEDURE — 3046F HEMOGLOBIN A1C LEVEL >9.0%: CPT | Performed by: STUDENT IN AN ORGANIZED HEALTH CARE EDUCATION/TRAINING PROGRAM

## 2025-01-22 PROCEDURE — G8417 CALC BMI ABV UP PARAM F/U: HCPCS | Performed by: STUDENT IN AN ORGANIZED HEALTH CARE EDUCATION/TRAINING PROGRAM

## 2025-01-22 PROCEDURE — 4004F PT TOBACCO SCREEN RCVD TLK: CPT | Performed by: STUDENT IN AN ORGANIZED HEALTH CARE EDUCATION/TRAINING PROGRAM

## 2025-01-22 PROCEDURE — 3023F SPIROM DOC REV: CPT | Performed by: STUDENT IN AN ORGANIZED HEALTH CARE EDUCATION/TRAINING PROGRAM

## 2025-01-22 PROCEDURE — 2022F DILAT RTA XM EVC RTNOPTHY: CPT | Performed by: STUDENT IN AN ORGANIZED HEALTH CARE EDUCATION/TRAINING PROGRAM

## 2025-01-22 PROCEDURE — 3017F COLORECTAL CA SCREEN DOC REV: CPT | Performed by: STUDENT IN AN ORGANIZED HEALTH CARE EDUCATION/TRAINING PROGRAM

## 2025-01-22 PROCEDURE — 99213 OFFICE O/P EST LOW 20 MIN: CPT | Performed by: STUDENT IN AN ORGANIZED HEALTH CARE EDUCATION/TRAINING PROGRAM

## 2025-01-22 RX ORDER — AMLODIPINE BESYLATE 10 MG/1
10 TABLET ORAL NIGHTLY
Qty: 90 TABLET | Refills: 3 | Status: SHIPPED | OUTPATIENT
Start: 2025-01-22

## 2025-01-22 SDOH — ECONOMIC STABILITY: FOOD INSECURITY: WITHIN THE PAST 12 MONTHS, THE FOOD YOU BOUGHT JUST DIDN'T LAST AND YOU DIDN'T HAVE MONEY TO GET MORE.: NEVER TRUE

## 2025-01-22 SDOH — ECONOMIC STABILITY: FOOD INSECURITY: WITHIN THE PAST 12 MONTHS, YOU WORRIED THAT YOUR FOOD WOULD RUN OUT BEFORE YOU GOT MONEY TO BUY MORE.: NEVER TRUE

## 2025-01-22 ASSESSMENT — PATIENT HEALTH QUESTIONNAIRE - PHQ9
1. LITTLE INTEREST OR PLEASURE IN DOING THINGS: SEVERAL DAYS
SUM OF ALL RESPONSES TO PHQ QUESTIONS 1-9: 2
SUM OF ALL RESPONSES TO PHQ9 QUESTIONS 1 & 2: 2
2. FEELING DOWN, DEPRESSED OR HOPELESS: SEVERAL DAYS
SUM OF ALL RESPONSES TO PHQ QUESTIONS 1-9: 2

## 2025-01-22 NOTE — PROGRESS NOTES
Alvarado Sibley is a 60 y.o. male with a past medical history noted below who presents for routine follow up on chronic conditions and discussion of preventative health care.    Chief Complaint   Patient presents with    3 Month Follow-Up     Meds refill     Patient feels ok. He still deals with chronic shortness of breath. He wears oxygen chronically      Past Medical History:   Diagnosis Date    Acute on chronic diastolic congestive heart failure (HCC)     ADHD (attention deficit hyperactivity disorder)     pt denies    Adrenal nodule (HCC) 12/07/2017    low-density nodular enlargement of a left adrenal gland which is stable since 2007      COPD (chronic obstructive pulmonary disease) (HCC)     Emphysema of lung (HCC)     Essential hypertension 11/30/2017    History of pneumothorax 11/30/2017    Rib fx    Hx of colonic polyps 01/18/2018    1.2018 Descending Polyp 2. Sigmoid Polyp 3. Sigmoid Diverticulosis recheck 2023    Hx of renal calculi 11/30/2017    Hypertension     Kidney stone     Neuropathy     Obesity     Osteoarthritis     Pneumothorax     2016 d/t fall     Prediabetes 12/13/2017    Retinal hemorrhage 10/29/2020    HTN-right eye    Sleep apnea     uses C-PAP    Substance abuse (HCC)     Type 2 diabetes mellitus without complication (HCC)      Patient Active Problem List   Diagnosis    Acute respiratory failure with hypercapnia    Personal history of tobacco use    COPD, severe (HCC)    Chronic respiratory failure with hypercapnia    SILVANA (obstructive sleep apnea)    Acute on chronic diastolic congestive heart failure (HCC)    NSVT (nonsustained ventricular tachycardia) (HCC)    Essential hypertension    History of pneumothorax    Hx of renal calculi    Sciatica of left side    Adrenal nodule (HCC)    Type 2 diabetes mellitus (HCC)    Hx of colonic polyps    Chronic rhinitis    Retinal hemorrhage    Chronic respiratory failure with hypoxia    Obesity, Class III, BMI 40-49.9 (morbid obesity)    Elevated

## 2025-01-22 NOTE — ASSESSMENT & PLAN NOTE
T2DM: Patient with history of type 2  diabetes.Patient's last a1c was   Hemoglobin A1C   Date Value Ref Range Status   10/24/2024 7.6 % Final   . Patient is compliant with   Key Antihyperglycemic Medications               JARDIANCE 25 MG tablet (Taking) TAKE 1 TABLET BY MOUTH DAILY TO REPLACE 10 MG    glipiZIDE (GLUCOTROL XL) 5 MG extended release tablet (Taking) TAKE 2 TABLETS BY MOUTH EVERY DAY FOR DIABETES    metFORMIN (GLUCOPHAGE-XR) 500 MG extended release tablet (Taking) TAKE 1 TABLET BY MOUTH EVERY DAY WITH BREAKFAST        . Patient does not monitor blood sugars at home. Patient does not have neuropathy, nephropathy, or history of vascular disorder.

## 2025-01-27 NOTE — ASSESSMENT & PLAN NOTE
Encouraged to wear his oxygen, does not have it today and is hypoxic. Follow with pulmonary. Continue triple inhaler therapy. With mayuri.

## 2025-02-26 NOTE — PROGRESS NOTES
Comprehensive Nutrition Assessment    Type and Reason for Visit:  RD Nutrition Re-Screen/LOS    Nutrition Recommendations/Plan:   Continue current diet     Malnutrition Assessment:  Malnutrition Status:  No malnutrition (03/09/24 1452)      Nutrition Assessment:    Pt presented with SOB, positive for influenza. PMH COPD, CHF, ADHD, HTN, T2M. Edema BLE +2 Pitting, improving, now holding lasix. Pt is down ~10kg since admission. I/O: 3/7 -2930, 3/8 -855. Pt spoke with CDE, -451mg/dl +prednisone. RD spoke with pt at bedside. Denies N/V/D/C and abd pain. C/o they have not been bringing his ice cream. This RD explained he only had 4 carb choices at meal times. Pt verbalized understanding and stated he's just finding something to complain about because he's ready to go home. Will continue to monitor.    Nutrition Related Findings:    BG 3/5-3/9: 178-451mg/dl. Na 132. BUN 52. Last BM 3/7 Wound Type: Multiple, Moisture Associate Skin Damage (Blister on shin, MASD buttocks)       Current Nutrition Intake & Therapies:    Average Meal Intake: %  Average Supplements Intake: None Ordered  ADULT DIET; Regular; 4 carb choices (60 gm/meal); Low Sodium (2 gm); 1500 ml    Anthropometric Measures:  Height: 165.1 cm (5' 5\")  Ideal Body Weight (IBW): 136 lbs (62 kg)    Admission Body Weight: 128.5 kg (283 lb 4.7 oz) (+Edema)  Current Body Weight: 119.2 kg (262 lb 12.6 oz), 193.2 % IBW. Weight Source: Standing Scale  Current BMI (kg/m2): 43.7                          BMI Categories: Obese Class 3 (BMI 40.0 or greater)    Estimated Daily Nutrient Needs:  Energy Requirements Based On: Kcal/kg  Weight Used for Energy Requirements: Current  Energy (kcal/day): 1430-1669kcal/day (12-14 kcal/kg)  Weight Used for Protein Requirements: Ideal  Protein (g/day): 92.7-123g/day  Method Used for Fluid Requirements: Other (Comment) (fluid restriction)  Fluid (ml/day): 1500 ml    Nutrition Diagnosis:   No nutrition diagnosis at this time  Thanks edilberto, I saw Alicia today. Could you kindly check if her insurance covers Prolia and let me know?

## 2025-03-03 RX ORDER — METFORMIN HYDROCHLORIDE 500 MG/1
500 TABLET, EXTENDED RELEASE ORAL NIGHTLY
Qty: 90 TABLET | Refills: 3 | Status: SHIPPED | OUTPATIENT
Start: 2025-03-03

## 2025-03-03 NOTE — TELEPHONE ENCOUNTER
Future Appointments   Date Time Provider Department Center   3/10/2025  8:00 AM Grabiel Armenta MD  PULM [facility]   4/22/2025 10:00 AM Oscar Medina MD [facility] BS ECC DEP   5/19/2025  8:00 AM Estella Palomares MD FF Cardio [facility]       Last appt 1/22/25

## 2025-03-10 ENCOUNTER — TELEPHONE (OUTPATIENT)
Dept: PULMONOLOGY | Age: 60
End: 2025-03-10

## 2025-03-10 ENCOUNTER — OFFICE VISIT (OUTPATIENT)
Dept: PULMONOLOGY | Age: 60
End: 2025-03-10
Payer: COMMERCIAL

## 2025-03-10 VITALS
WEIGHT: 272.4 LBS | OXYGEN SATURATION: 74 % | SYSTOLIC BLOOD PRESSURE: 120 MMHG | HEART RATE: 86 BPM | HEIGHT: 65 IN | RESPIRATION RATE: 18 BRPM | DIASTOLIC BLOOD PRESSURE: 60 MMHG | TEMPERATURE: 98.3 F | BODY MASS INDEX: 45.38 KG/M2

## 2025-03-10 DIAGNOSIS — J96.11 CHRONIC RESPIRATORY FAILURE WITH HYPOXIA (HCC): ICD-10-CM

## 2025-03-10 DIAGNOSIS — G47.33 OSA (OBSTRUCTIVE SLEEP APNEA): ICD-10-CM

## 2025-03-10 DIAGNOSIS — J44.9 COPD, SEVERE (HCC): Primary | ICD-10-CM

## 2025-03-10 PROCEDURE — 3023F SPIROM DOC REV: CPT | Performed by: INTERNAL MEDICINE

## 2025-03-10 PROCEDURE — 99214 OFFICE O/P EST MOD 30 MIN: CPT | Performed by: INTERNAL MEDICINE

## 2025-03-10 PROCEDURE — 3078F DIAST BP <80 MM HG: CPT | Performed by: INTERNAL MEDICINE

## 2025-03-10 PROCEDURE — G8417 CALC BMI ABV UP PARAM F/U: HCPCS | Performed by: INTERNAL MEDICINE

## 2025-03-10 PROCEDURE — G2211 COMPLEX E/M VISIT ADD ON: HCPCS | Performed by: INTERNAL MEDICINE

## 2025-03-10 PROCEDURE — 4004F PT TOBACCO SCREEN RCVD TLK: CPT | Performed by: INTERNAL MEDICINE

## 2025-03-10 PROCEDURE — G8427 DOCREV CUR MEDS BY ELIG CLIN: HCPCS | Performed by: INTERNAL MEDICINE

## 2025-03-10 PROCEDURE — 3074F SYST BP LT 130 MM HG: CPT | Performed by: INTERNAL MEDICINE

## 2025-03-10 PROCEDURE — 3017F COLORECTAL CA SCREEN DOC REV: CPT | Performed by: INTERNAL MEDICINE

## 2025-03-10 RX ORDER — FLUTICASONE FUROATE, UMECLIDINIUM BROMIDE AND VILANTEROL TRIFENATATE 200; 62.5; 25 UG/1; UG/1; UG/1
1 POWDER RESPIRATORY (INHALATION) DAILY
Qty: 1 EACH | Refills: 0 | Status: SHIPPED | COMMUNITY
Start: 2025-03-10

## 2025-03-10 NOTE — PROGRESS NOTES
REASON FOR CONSULTATION/CC: copd  Chief Complaint   Patient presents with    COPD     Sample of trelegy        Consult at request of  Oscar Medina MD for     PCP: Oscar Medina MD    HISTORY OF PRESENT ILLNESS: Alvraado Sibley is a 60 y.o. year old male with a history of  who presents               PAH - Dr. Post   CHF - Dr. Bennett  Following with Dr. Post.   Spironolactone with controlled swelling.     SILVANA  Cpap. Using nightly.   No issues.  Sleep interrupted secondary to urination.       Hypoxemia  74% today.   Refusing to use o2 during the day because he cannot use with work.  Discussed using when not working.    Does not have POC.   Using oxygen at night with cpap.         Copd  Albuterol  Trelegy     Obesity  Discussed.               Objective:   PHYSICAL EXAM:  Blood pressure 120/60, pulse 86, temperature 98.3 °F (36.8 °C), temperature source Infrared, resp. rate 18, height 1.651 m (5' 5\"), weight 123.6 kg (272 lb 6.4 oz), SpO2 (!) 74%.'  Gen: No distress.    Eyes: PERRL. No sclera icterus. No conjunctival injection.   ENT: No discharge. Pharynx clear. External appearance of ears and nose normal.  Neck: Trachea midline. No obvious mass.    Resp: No accessory muscle use. No crackles. No wheezes. No rhonchi.    CV: Regular rate. Regular rhythm. No murmur or rub. controlled edema.   GI:    Skin: Warm, dry, normal texture and turgor. No nodule on exposed extremities.   Lymph: No cervical LAD. No supraclavicular LAD.   M/S: No cyanosis. No clubbing. No joint deformity.    Neuro: Moves all four extremities.    Psych: Oriented x 3. No anxiety.  Awake. Alert. Intact judgement and insight.    Current Outpatient Medications   Medication Sig Dispense Refill    fluticasone-umeclidin-vilant (TRELEGY ELLIPTA) 200-62.5-25 MCG/ACT AEPB inhaler Inhale 1 puff into the lungs daily 1 each 0    metFORMIN (GLUCOPHAGE-XR) 500 MG extended release tablet Take 1 tablet by mouth at bedtime 90 tablet 3    amLODIPine (NORVASC) 10

## 2025-03-10 NOTE — ASSESSMENT & PLAN NOTE
Continues to not bring device.        Using by report.      Waking frequently .  Requested bringing device next visit to assess if nocturnal waking is secondary to SILVANA vs nocturnal urination.

## 2025-03-10 NOTE — TELEPHONE ENCOUNTER
Saranya Morales with [employer] called regarding the oxygen order they received.  -Frequency of use needs to be added to the order  -Oxygen concentrator needs to be checked off on the order    If changes are made on the current order, the changes need to be initialed and dated.    Please fax updated/corrected order to [phone]

## 2025-03-10 NOTE — ASSESSMENT & PLAN NOTE
Continues to have hypoxemia.    Today is 74%.  Gain discussed need for  chronic oxygen.  He now agrees to get POC.  While he cannot use at work, he could use before and after      Will connect with [employer].

## 2025-03-18 NOTE — TELEPHONE ENCOUNTER
Saranya from [facility] called in asking if we have been able to get a hold of the patient regarding the oxygen order. He does not answer their calls. She states Siddharth with [facility] talked to someone here and we stated we would reach out?

## 2025-04-21 RX ORDER — EMPAGLIFLOZIN 25 MG/1
TABLET, FILM COATED ORAL
Qty: 90 TABLET | Refills: 3 | Status: SHIPPED | OUTPATIENT
Start: 2025-04-21

## 2025-04-21 RX ORDER — ATORVASTATIN CALCIUM 40 MG/1
40 TABLET, FILM COATED ORAL DAILY
Qty: 90 TABLET | Refills: 3 | Status: SHIPPED | OUTPATIENT
Start: 2025-04-21

## 2025-04-21 NOTE — ASSESSMENT & PLAN NOTE
Stable, feels well today, but he is hypoxic  - Continue trellegy  - Continue albuterol as needed

## 2025-04-21 NOTE — ASSESSMENT & PLAN NOTE
T2DM: Patient with history of type 2  diabetes.Patient's last a1c was   Hemoglobin A1C   Date Value Ref Range Status   10/24/2024 7.6 % Final   . Patient is compliant with   Key Antihyperglycemic Medications              JARDIANCE 25 MG tablet (Taking) TAKE 1 TABLET BY MOUTH DAILY TO REPLACE 10 MG    metFORMIN (GLUCOPHAGE-XR) 500 MG extended release tablet (Taking) Take 1 tablet by mouth at bedtime    glipiZIDE (GLUCOTROL XL) 5 MG extended release tablet (Taking Differently) TAKE 2 TABLETS BY MOUTH EVERY DAY FOR DIABETES

## 2025-04-21 NOTE — TELEPHONE ENCOUNTER
Future Appointments   Date Time Provider Department Center   4/22/2025 10:00 AM Oscar Medina MD [facility] BS ECC DEP   5/19/2025  8:00 AM Estella Palomares MD FF Cardio [facility]   7/9/2025  9:40 AM [facility]

## 2025-04-21 NOTE — PROGRESS NOTES
Alvarado Sibley is a 60 y.o. male with a past medical history noted below who presents for routine follow up on chronic conditions and discussion of preventative health care.  Chief Complaint   Patient presents with    3 Month Follow-Up     Patient feels ok today. He reports some shortness of breath intermittently. He ran out of hipix recently.       Past Medical History:   Diagnosis Date    Acute on chronic diastolic congestive heart failure (HCC)     ADHD (attention deficit hyperactivity disorder)     pt denies    Adrenal nodule 12/07/2017    low-density nodular enlargement of a left adrenal gland which is stable since 2007      COPD (chronic obstructive pulmonary disease) (HCC)     Emphysema of lung (HCC)     Essential hypertension 11/30/2017    History of pneumothorax 11/30/2017    Rib fx    Hx of colonic polyps 01/18/2018    1.2018 Descending Polyp 2. Sigmoid Polyp 3. Sigmoid Diverticulosis recheck 2023    Hx of renal calculi 11/30/2017    Hypertension     Kidney stone     Neuropathy     Obesity     Osteoarthritis     Pneumothorax     2016 d/t fall     Prediabetes 12/13/2017    Retinal hemorrhage 10/29/2020    HTN-right eye    Sleep apnea     uses C-PAP    Substance abuse     Type 2 diabetes mellitus without complication      Patient Active Problem List   Diagnosis    Acute respiratory failure with hypercapnia    Personal history of tobacco use    COPD, severe (HCC)    Chronic respiratory failure with hypercapnia    SILVANA (obstructive sleep apnea)    Acute on chronic diastolic congestive heart failure (HCC)    NSVT (nonsustained ventricular tachycardia) (HCC)    Essential hypertension    History of pneumothorax    Hx of renal calculi    Sciatica of left side    Adrenal nodule    Type 2 diabetes mellitus    Hx of colonic polyps    Chronic rhinitis    Retinal hemorrhage    Chronic respiratory failure with hypoxia    Obesity, Class III, BMI 40-49.9 (morbid obesity)    Elevated hemoglobin    Acute pain of left knee

## 2025-04-22 ENCOUNTER — OFFICE VISIT (OUTPATIENT)
Dept: INTERNAL MEDICINE CLINIC | Age: 60
End: 2025-04-22
Payer: COMMERCIAL

## 2025-04-22 VITALS
DIASTOLIC BLOOD PRESSURE: 60 MMHG | WEIGHT: 263 LBS | BODY MASS INDEX: 43.77 KG/M2 | OXYGEN SATURATION: 82 % | SYSTOLIC BLOOD PRESSURE: 122 MMHG | HEART RATE: 81 BPM

## 2025-04-22 DIAGNOSIS — J96.11 CHRONIC RESPIRATORY FAILURE WITH HYPOXIA (HCC): ICD-10-CM

## 2025-04-22 DIAGNOSIS — R80.9 TYPE 2 DIABETES MELLITUS WITH DIABETIC MICROALBUMINURIA, WITHOUT LONG-TERM CURRENT USE OF INSULIN (HCC): ICD-10-CM

## 2025-04-22 DIAGNOSIS — J44.9 COPD, SEVERE (HCC): ICD-10-CM

## 2025-04-22 DIAGNOSIS — I10 ESSENTIAL HYPERTENSION: Primary | ICD-10-CM

## 2025-04-22 DIAGNOSIS — E11.29 TYPE 2 DIABETES MELLITUS WITH DIABETIC MICROALBUMINURIA, WITHOUT LONG-TERM CURRENT USE OF INSULIN (HCC): ICD-10-CM

## 2025-04-22 DIAGNOSIS — I27.21 PULMONARY ARTERIAL HYPERTENSION (HCC): ICD-10-CM

## 2025-04-22 LAB
25(OH)D3 SERPL-MCNC: 14.7 NG/ML
ALBUMIN SERPL-MCNC: 4.1 G/DL (ref 3.4–5)
ALBUMIN/GLOB SERPL: 1.1 {RATIO} (ref 1.1–2.2)
ALP SERPL-CCNC: 156 U/L (ref 40–129)
ALT SERPL-CCNC: 10 U/L (ref 10–40)
ANION GAP SERPL CALCULATED.3IONS-SCNC: 12 MMOL/L (ref 3–16)
AST SERPL-CCNC: 13 U/L (ref 15–37)
BASOPHILS # BLD: 0 K/UL (ref 0–0.2)
BASOPHILS NFR BLD: 0.6 %
BILIRUB SERPL-MCNC: 0.8 MG/DL (ref 0–1)
BUN SERPL-MCNC: 27 MG/DL (ref 7–20)
CALCIUM SERPL-MCNC: 9.5 MG/DL (ref 8.3–10.6)
CHLORIDE SERPL-SCNC: 94 MMOL/L (ref 99–110)
CHOLEST SERPL-MCNC: 111 MG/DL (ref 0–199)
CO2 SERPL-SCNC: 31 MMOL/L (ref 21–32)
CREAT SERPL-MCNC: 1.3 MG/DL (ref 0.8–1.3)
CREAT UR-MCNC: 20.2 MG/DL (ref 39–259)
DEPRECATED RDW RBC AUTO: 20.7 % (ref 12.4–15.4)
EOSINOPHIL # BLD: 0 K/UL (ref 0–0.6)
EOSINOPHIL NFR BLD: 0.4 %
EST. AVERAGE GLUCOSE BLD GHB EST-MCNC: 214.5 MG/DL
GFR SERPLBLD CREATININE-BSD FMLA CKD-EPI: 62 ML/MIN/{1.73_M2}
GLUCOSE SERPL-MCNC: 170 MG/DL (ref 70–99)
HBA1C MFR BLD: 9.1 %
HCT VFR BLD AUTO: 60.8 % (ref 40.5–52.5)
HDLC SERPL-MCNC: 28 MG/DL (ref 40–60)
HGB BLD-MCNC: 19.8 G/DL (ref 13.5–17.5)
LDLC SERPL CALC-MCNC: 64 MG/DL
LYMPHOCYTES # BLD: 1.4 K/UL (ref 1–5.1)
LYMPHOCYTES NFR BLD: 18.2 %
MCH RBC QN AUTO: 27.7 PG (ref 26–34)
MCHC RBC AUTO-ENTMCNC: 32.5 G/DL (ref 31–36)
MCV RBC AUTO: 85.2 FL (ref 80–100)
MICROALBUMIN UR DL<=1MG/L-MCNC: <1.2 MG/DL
MICROALBUMIN/CREAT UR: ABNORMAL MG/G (ref 0–30)
MONOCYTES # BLD: 0.7 K/UL (ref 0–1.3)
MONOCYTES NFR BLD: 8.9 %
NEUTROPHILS # BLD: 5.5 K/UL (ref 1.7–7.7)
NEUTROPHILS NFR BLD: 71.9 %
PLATELET # BLD AUTO: 171 K/UL (ref 135–450)
PMV BLD AUTO: 9.9 FL (ref 5–10.5)
POTASSIUM SERPL-SCNC: 4.2 MMOL/L (ref 3.5–5.1)
PROT SERPL-MCNC: 7.8 G/DL (ref 6.4–8.2)
PSA SERPL DL<=0.01 NG/ML-MCNC: 1.75 NG/ML (ref 0–4)
RBC # BLD AUTO: 7.13 M/UL (ref 4.2–5.9)
SODIUM SERPL-SCNC: 137 MMOL/L (ref 136–145)
TRIGL SERPL-MCNC: 96 MG/DL (ref 0–150)
TSH SERPL DL<=0.005 MIU/L-ACNC: 1.45 UIU/ML (ref 0.27–4.2)
VLDLC SERPL CALC-MCNC: 19 MG/DL
WBC # BLD AUTO: 7.7 K/UL (ref 4–11)

## 2025-04-22 PROCEDURE — 3074F SYST BP LT 130 MM HG: CPT | Performed by: STUDENT IN AN ORGANIZED HEALTH CARE EDUCATION/TRAINING PROGRAM

## 2025-04-22 PROCEDURE — 99213 OFFICE O/P EST LOW 20 MIN: CPT | Performed by: STUDENT IN AN ORGANIZED HEALTH CARE EDUCATION/TRAINING PROGRAM

## 2025-04-22 PROCEDURE — 4004F PT TOBACCO SCREEN RCVD TLK: CPT | Performed by: STUDENT IN AN ORGANIZED HEALTH CARE EDUCATION/TRAINING PROGRAM

## 2025-04-22 PROCEDURE — 3078F DIAST BP <80 MM HG: CPT | Performed by: STUDENT IN AN ORGANIZED HEALTH CARE EDUCATION/TRAINING PROGRAM

## 2025-04-22 PROCEDURE — G8417 CALC BMI ABV UP PARAM F/U: HCPCS | Performed by: STUDENT IN AN ORGANIZED HEALTH CARE EDUCATION/TRAINING PROGRAM

## 2025-04-22 PROCEDURE — 3017F COLORECTAL CA SCREEN DOC REV: CPT | Performed by: STUDENT IN AN ORGANIZED HEALTH CARE EDUCATION/TRAINING PROGRAM

## 2025-04-22 PROCEDURE — G8427 DOCREV CUR MEDS BY ELIG CLIN: HCPCS | Performed by: STUDENT IN AN ORGANIZED HEALTH CARE EDUCATION/TRAINING PROGRAM

## 2025-04-22 PROCEDURE — 2022F DILAT RTA XM EVC RTNOPTHY: CPT | Performed by: STUDENT IN AN ORGANIZED HEALTH CARE EDUCATION/TRAINING PROGRAM

## 2025-04-22 PROCEDURE — 36415 COLL VENOUS BLD VENIPUNCTURE: CPT | Performed by: STUDENT IN AN ORGANIZED HEALTH CARE EDUCATION/TRAINING PROGRAM

## 2025-04-22 PROCEDURE — 3046F HEMOGLOBIN A1C LEVEL >9.0%: CPT | Performed by: STUDENT IN AN ORGANIZED HEALTH CARE EDUCATION/TRAINING PROGRAM

## 2025-04-22 PROCEDURE — 3023F SPIROM DOC REV: CPT | Performed by: STUDENT IN AN ORGANIZED HEALTH CARE EDUCATION/TRAINING PROGRAM

## 2025-04-23 ENCOUNTER — RESULTS FOLLOW-UP (OUTPATIENT)
Dept: INTERNAL MEDICINE CLINIC | Age: 60
End: 2025-04-23

## 2025-04-23 RX ORDER — ERGOCALCIFEROL 1.25 MG/1
50000 CAPSULE, LIQUID FILLED ORAL WEEKLY
Qty: 12 CAPSULE | Refills: 5 | Status: SHIPPED | OUTPATIENT
Start: 2025-04-23

## 2025-04-23 RX ORDER — ORAL SEMAGLUTIDE 3 MG/1
3 TABLET ORAL DAILY
Qty: 30 TABLET | Refills: 0 | Status: SHIPPED | OUTPATIENT
Start: 2025-04-23

## 2025-04-30 ENCOUNTER — TELEPHONE (OUTPATIENT)
Dept: INTERNAL MEDICINE CLINIC | Age: 60
End: 2025-04-30

## 2025-04-30 NOTE — TELEPHONE ENCOUNTER
Since he started on Rybelsus he has had SOB, today he stopped taking the medication. Please call patient back [phone]

## 2025-06-09 ENCOUNTER — TELEPHONE (OUTPATIENT)
Dept: INTERNAL MEDICINE CLINIC | Age: 60
End: 2025-06-09

## 2025-06-09 DIAGNOSIS — G89.29 CHRONIC PAIN OF BOTH KNEES: ICD-10-CM

## 2025-06-09 DIAGNOSIS — M25.562 CHRONIC PAIN OF BOTH KNEES: ICD-10-CM

## 2025-06-09 DIAGNOSIS — M25.561 CHRONIC PAIN OF BOTH KNEES: ICD-10-CM

## 2025-06-09 RX ORDER — GLIPIZIDE 5 MG/1
TABLET, FILM COATED, EXTENDED RELEASE ORAL
Qty: 180 TABLET | Refills: 3 | Status: CANCELLED | OUTPATIENT
Start: 2025-06-09

## 2025-06-11 DIAGNOSIS — M25.561 CHRONIC PAIN OF BOTH KNEES: ICD-10-CM

## 2025-06-11 DIAGNOSIS — M25.562 CHRONIC PAIN OF BOTH KNEES: ICD-10-CM

## 2025-06-11 DIAGNOSIS — G89.29 CHRONIC PAIN OF BOTH KNEES: ICD-10-CM

## 2025-06-11 RX ORDER — GABAPENTIN 300 MG/1
300 CAPSULE ORAL 3 TIMES DAILY
Qty: 270 CAPSULE | Refills: 1 | Status: SHIPPED | OUTPATIENT
Start: 2025-06-11 | End: 2025-12-08

## 2025-06-11 RX ORDER — ALBUTEROL SULFATE 90 UG/1
2 INHALANT RESPIRATORY (INHALATION) 4 TIMES DAILY PRN
Qty: 8.5 EACH | Refills: 5 | Status: SHIPPED | OUTPATIENT
Start: 2025-06-11

## 2025-06-11 RX ORDER — GABAPENTIN 300 MG/1
300 CAPSULE ORAL 3 TIMES DAILY
Qty: 90 CAPSULE | Refills: 5 | Status: SHIPPED | OUTPATIENT
Start: 2025-06-11 | End: 2025-12-08

## 2025-06-11 RX ORDER — ALBUTEROL SULFATE 1.25 MG/3ML
1 SOLUTION RESPIRATORY (INHALATION) EVERY 6 HOURS PRN
Qty: 360 ML | Refills: 3 | Status: SHIPPED | OUTPATIENT
Start: 2025-06-11

## 2025-06-11 RX ORDER — GLIPIZIDE 5 MG/1
TABLET, FILM COATED, EXTENDED RELEASE ORAL
Qty: 180 TABLET | Refills: 3 | Status: SHIPPED | OUTPATIENT
Start: 2025-06-11

## 2025-06-11 RX ORDER — TORSEMIDE 20 MG/1
20 TABLET ORAL DAILY
Qty: 90 TABLET | Refills: 1 | Status: SHIPPED | OUTPATIENT
Start: 2025-06-11

## 2025-06-11 RX ORDER — ALBUTEROL SULFATE 1.25 MG/3ML
1 SOLUTION RESPIRATORY (INHALATION) EVERY 6 HOURS PRN
Qty: 375 ML | Refills: 3 | Status: SHIPPED | OUTPATIENT
Start: 2025-06-11

## 2025-06-11 NOTE — TELEPHONE ENCOUNTER
Future Appointments   Date Time Provider Department Center   7/9/2025  9:40 AM Grabiel Armenta MD [facility]   7/24/2025  9:00 AM Oscar Medina MD [facility] ECC DEP

## 2025-07-10 ENCOUNTER — TELEPHONE (OUTPATIENT)
Dept: INTERNAL MEDICINE CLINIC | Age: 60
End: 2025-07-10

## 2025-07-10 NOTE — TELEPHONE ENCOUNTER
Pt called to see if we have any samples of Trelegy, usually takes the 200    He is out    Please advise    Thank you

## 2025-08-05 ENCOUNTER — OFFICE VISIT (OUTPATIENT)
Dept: PULMONOLOGY | Age: 60
End: 2025-08-05
Payer: COMMERCIAL

## 2025-08-05 VITALS
RESPIRATION RATE: 18 BRPM | DIASTOLIC BLOOD PRESSURE: 70 MMHG | BODY MASS INDEX: 43.75 KG/M2 | HEART RATE: 97 BPM | WEIGHT: 262.6 LBS | SYSTOLIC BLOOD PRESSURE: 124 MMHG | HEIGHT: 65 IN | TEMPERATURE: 98.6 F | OXYGEN SATURATION: 77 %

## 2025-08-05 DIAGNOSIS — J44.9 COPD, SEVERE (HCC): Primary | ICD-10-CM

## 2025-08-05 DIAGNOSIS — J96.11 CHRONIC RESPIRATORY FAILURE WITH HYPOXIA (HCC): ICD-10-CM

## 2025-08-05 DIAGNOSIS — Z87.891 PERSONAL HISTORY OF TOBACCO USE: ICD-10-CM

## 2025-08-05 DIAGNOSIS — I27.21 PULMONARY ARTERIAL HYPERTENSION (HCC): ICD-10-CM

## 2025-08-05 DIAGNOSIS — E66.813 OBESITY, CLASS III, BMI 40-49.9 (MORBID OBESITY) (HCC): ICD-10-CM

## 2025-08-05 DIAGNOSIS — G47.33 OSA (OBSTRUCTIVE SLEEP APNEA): ICD-10-CM

## 2025-08-05 PROCEDURE — 3017F COLORECTAL CA SCREEN DOC REV: CPT | Performed by: INTERNAL MEDICINE

## 2025-08-05 PROCEDURE — 99214 OFFICE O/P EST MOD 30 MIN: CPT | Performed by: INTERNAL MEDICINE

## 2025-08-05 PROCEDURE — 3023F SPIROM DOC REV: CPT | Performed by: INTERNAL MEDICINE

## 2025-08-05 PROCEDURE — 4004F PT TOBACCO SCREEN RCVD TLK: CPT | Performed by: INTERNAL MEDICINE

## 2025-08-05 PROCEDURE — 3078F DIAST BP <80 MM HG: CPT | Performed by: INTERNAL MEDICINE

## 2025-08-05 PROCEDURE — 3074F SYST BP LT 130 MM HG: CPT | Performed by: INTERNAL MEDICINE

## 2025-08-05 PROCEDURE — G8417 CALC BMI ABV UP PARAM F/U: HCPCS | Performed by: INTERNAL MEDICINE

## 2025-08-05 PROCEDURE — G8427 DOCREV CUR MEDS BY ELIG CLIN: HCPCS | Performed by: INTERNAL MEDICINE

## 2025-08-05 RX ORDER — PREDNISONE 10 MG/1
TABLET ORAL
Qty: 30 TABLET | Refills: 0 | Status: SHIPPED | OUTPATIENT
Start: 2025-08-05 | End: 2025-08-15

## 2025-08-05 RX ORDER — AZITHROMYCIN 250 MG/1
TABLET, FILM COATED ORAL
Qty: 1 PACKET | Refills: 0 | Status: SHIPPED | OUTPATIENT
Start: 2025-08-05

## 2025-08-06 ENCOUNTER — TELEPHONE (OUTPATIENT)
Dept: PULMONOLOGY | Age: 60
End: 2025-08-06

## 2025-08-07 ENCOUNTER — OFFICE VISIT (OUTPATIENT)
Dept: INTERNAL MEDICINE CLINIC | Age: 60
End: 2025-08-07
Payer: COMMERCIAL

## 2025-08-07 VITALS
HEART RATE: 80 BPM | WEIGHT: 262 LBS | OXYGEN SATURATION: 88 % | BODY MASS INDEX: 43.6 KG/M2 | DIASTOLIC BLOOD PRESSURE: 70 MMHG | SYSTOLIC BLOOD PRESSURE: 122 MMHG

## 2025-08-07 DIAGNOSIS — R80.9 TYPE 2 DIABETES MELLITUS WITH DIABETIC MICROALBUMINURIA, WITHOUT LONG-TERM CURRENT USE OF INSULIN (HCC): ICD-10-CM

## 2025-08-07 DIAGNOSIS — R35.0 FREQUENT URINATION: Primary | ICD-10-CM

## 2025-08-07 DIAGNOSIS — N50.89 TESTICULAR SWELLING: ICD-10-CM

## 2025-08-07 DIAGNOSIS — E11.29 TYPE 2 DIABETES MELLITUS WITH DIABETIC MICROALBUMINURIA, WITHOUT LONG-TERM CURRENT USE OF INSULIN (HCC): ICD-10-CM

## 2025-08-07 LAB — HBA1C MFR BLD: 7.6 %

## 2025-08-07 PROCEDURE — 2022F DILAT RTA XM EVC RTNOPTHY: CPT | Performed by: STUDENT IN AN ORGANIZED HEALTH CARE EDUCATION/TRAINING PROGRAM

## 2025-08-07 PROCEDURE — 83036 HEMOGLOBIN GLYCOSYLATED A1C: CPT | Performed by: STUDENT IN AN ORGANIZED HEALTH CARE EDUCATION/TRAINING PROGRAM

## 2025-08-07 PROCEDURE — 4004F PT TOBACCO SCREEN RCVD TLK: CPT | Performed by: STUDENT IN AN ORGANIZED HEALTH CARE EDUCATION/TRAINING PROGRAM

## 2025-08-07 PROCEDURE — 3017F COLORECTAL CA SCREEN DOC REV: CPT | Performed by: STUDENT IN AN ORGANIZED HEALTH CARE EDUCATION/TRAINING PROGRAM

## 2025-08-07 PROCEDURE — 3078F DIAST BP <80 MM HG: CPT | Performed by: STUDENT IN AN ORGANIZED HEALTH CARE EDUCATION/TRAINING PROGRAM

## 2025-08-07 PROCEDURE — G8417 CALC BMI ABV UP PARAM F/U: HCPCS | Performed by: STUDENT IN AN ORGANIZED HEALTH CARE EDUCATION/TRAINING PROGRAM

## 2025-08-07 PROCEDURE — 3074F SYST BP LT 130 MM HG: CPT | Performed by: STUDENT IN AN ORGANIZED HEALTH CARE EDUCATION/TRAINING PROGRAM

## 2025-08-07 PROCEDURE — G8427 DOCREV CUR MEDS BY ELIG CLIN: HCPCS | Performed by: STUDENT IN AN ORGANIZED HEALTH CARE EDUCATION/TRAINING PROGRAM

## 2025-08-07 PROCEDURE — 99213 OFFICE O/P EST LOW 20 MIN: CPT | Performed by: STUDENT IN AN ORGANIZED HEALTH CARE EDUCATION/TRAINING PROGRAM

## 2025-08-07 PROCEDURE — 3051F HG A1C>EQUAL 7.0%<8.0%: CPT | Performed by: STUDENT IN AN ORGANIZED HEALTH CARE EDUCATION/TRAINING PROGRAM

## 2025-08-29 ENCOUNTER — TRANSCRIBE ORDERS (OUTPATIENT)
Dept: ADMINISTRATIVE | Age: 60
End: 2025-08-29

## 2025-08-29 DIAGNOSIS — I86.1 VARICOCELE: Primary | ICD-10-CM

## 2025-09-03 ENCOUNTER — TELEPHONE (OUTPATIENT)
Dept: CASE MANAGEMENT | Age: 60
End: 2025-09-03

## 2025-09-03 ENCOUNTER — HOSPITAL ENCOUNTER (OUTPATIENT)
Dept: ULTRASOUND IMAGING | Age: 60
Discharge: HOME | End: 2025-09-03
Attending: SURGERY
Payer: COMMERCIAL

## 2025-09-03 DIAGNOSIS — I86.1 VARICOCELE: ICD-10-CM

## 2025-09-03 PROCEDURE — 76870 US EXAM SCROTUM: CPT

## (undated) DEVICE — ENDOSCOPY KIT: Brand: MEDLINE INDUSTRIES, INC.